# Patient Record
Sex: FEMALE | Race: WHITE | Employment: FULL TIME | URBAN - METROPOLITAN AREA
[De-identification: names, ages, dates, MRNs, and addresses within clinical notes are randomized per-mention and may not be internally consistent; named-entity substitution may affect disease eponyms.]

---

## 2015-01-19 LAB
LEFT VENTRICULAR EJECTION FRACTION MODE: NORMAL
LV EF: 63 %

## 2017-04-18 PROBLEM — M99.01 SOMATIC DYSFUNCTION OF CERVICAL REGION: Status: ACTIVE | Noted: 2017-04-18

## 2017-04-18 PROBLEM — R29.898 WEAKNESS OF BOTH HIPS: Status: ACTIVE | Noted: 2017-04-18

## 2017-04-18 PROBLEM — M99.03 SOMATIC DYSFUNCTION OF LUMBAR REGION: Status: ACTIVE | Noted: 2017-04-18

## 2018-01-11 PROBLEM — N20.0 RENAL STONES: Status: ACTIVE | Noted: 2018-01-11

## 2018-01-11 PROBLEM — K22.70 BARRETT'S ESOPHAGUS: Status: ACTIVE | Noted: 2018-01-11

## 2018-03-28 ENCOUNTER — OFFICE VISIT (OUTPATIENT)
Dept: FAMILY MEDICINE CLINIC | Age: 68
End: 2018-03-28
Payer: COMMERCIAL

## 2018-03-28 VITALS
TEMPERATURE: 98.5 F | OXYGEN SATURATION: 98 % | SYSTOLIC BLOOD PRESSURE: 134 MMHG | DIASTOLIC BLOOD PRESSURE: 84 MMHG | BODY MASS INDEX: 28.22 KG/M2 | HEART RATE: 85 BPM | WEIGHT: 140 LBS | HEIGHT: 59 IN

## 2018-03-28 DIAGNOSIS — K44.9 HIATAL HERNIA: ICD-10-CM

## 2018-03-28 DIAGNOSIS — Z00.00 ROUTINE GENERAL MEDICAL EXAMINATION AT A HEALTH CARE FACILITY: Primary | ICD-10-CM

## 2018-03-28 PROCEDURE — G0438 PPPS, INITIAL VISIT: HCPCS | Performed by: FAMILY MEDICINE

## 2018-03-28 ASSESSMENT — ANXIETY QUESTIONNAIRES: GAD7 TOTAL SCORE: 1

## 2018-03-28 ASSESSMENT — LIFESTYLE VARIABLES: HOW OFTEN DO YOU HAVE A DRINK CONTAINING ALCOHOL: 0

## 2018-03-28 ASSESSMENT — PATIENT HEALTH QUESTIONNAIRE - PHQ9: SUM OF ALL RESPONSES TO PHQ QUESTIONS 1-9: 1

## 2018-07-12 ENCOUNTER — OFFICE VISIT (OUTPATIENT)
Dept: FAMILY MEDICINE CLINIC | Age: 68
End: 2018-07-12
Payer: COMMERCIAL

## 2018-07-12 ENCOUNTER — HOSPITAL ENCOUNTER (OUTPATIENT)
Age: 68
Discharge: HOME | End: 2018-07-14
Payer: COMMERCIAL

## 2018-07-12 VITALS
SYSTOLIC BLOOD PRESSURE: 124 MMHG | BODY MASS INDEX: 28.07 KG/M2 | HEART RATE: 88 BPM | DIASTOLIC BLOOD PRESSURE: 72 MMHG | OXYGEN SATURATION: 96 % | RESPIRATION RATE: 16 BRPM | WEIGHT: 139 LBS

## 2018-07-12 DIAGNOSIS — K21.9 GASTROESOPHAGEAL REFLUX DISEASE WITHOUT ESOPHAGITIS: ICD-10-CM

## 2018-07-12 DIAGNOSIS — K22.70 BARRETT'S ESOPHAGUS WITHOUT DYSPLASIA: ICD-10-CM

## 2018-07-12 DIAGNOSIS — E78.2 MIXED HYPERLIPIDEMIA: ICD-10-CM

## 2018-07-12 DIAGNOSIS — I10 ESSENTIAL HYPERTENSION: Primary | ICD-10-CM

## 2018-07-12 DIAGNOSIS — I10 ESSENTIAL HYPERTENSION: ICD-10-CM

## 2018-07-12 DIAGNOSIS — M99.03 SOMATIC DYSFUNCTION OF LUMBAR REGION: ICD-10-CM

## 2018-07-12 LAB
BASOPHILS ABSOLUTE: 0.04 E9/L (ref 0–0.2)
BASOPHILS RELATIVE PERCENT: 0.8 % (ref 0–2)
EOSINOPHILS ABSOLUTE: 0.11 E9/L (ref 0.05–0.5)
EOSINOPHILS RELATIVE PERCENT: 2.2 % (ref 0–6)
HCT VFR BLD CALC: 45.1 % (ref 34–48)
HEMOGLOBIN: 15.1 G/DL (ref 11.5–15.5)
IMMATURE GRANULOCYTES #: 0.03 E9/L
IMMATURE GRANULOCYTES %: 0.6 % (ref 0–5)
LYMPHOCYTES ABSOLUTE: 1.51 E9/L (ref 1.5–4)
LYMPHOCYTES RELATIVE PERCENT: 30.5 % (ref 20–42)
MCH RBC QN AUTO: 30.1 PG (ref 26–35)
MCHC RBC AUTO-ENTMCNC: 33.5 % (ref 32–34.5)
MCV RBC AUTO: 90 FL (ref 80–99.9)
MONOCYTES ABSOLUTE: 0.51 E9/L (ref 0.1–0.95)
MONOCYTES RELATIVE PERCENT: 10.3 % (ref 2–12)
NEUTROPHILS ABSOLUTE: 2.75 E9/L (ref 1.8–7.3)
NEUTROPHILS RELATIVE PERCENT: 55.6 % (ref 43–80)
PDW BLD-RTO: 13.3 FL (ref 11.5–15)
PLATELET # BLD: 266 E9/L (ref 130–450)
PMV BLD AUTO: 10 FL (ref 7–12)
RBC # BLD: 5.01 E12/L (ref 3.5–5.5)
WBC # BLD: 5 E9/L (ref 4.5–11.5)

## 2018-07-12 PROCEDURE — 99214 OFFICE O/P EST MOD 30 MIN: CPT | Performed by: FAMILY MEDICINE

## 2018-07-12 PROCEDURE — 85025 COMPLETE CBC W/AUTO DIFF WBC: CPT

## 2018-07-12 ASSESSMENT — ENCOUNTER SYMPTOMS
NAUSEA: 0
VOMITING: 0
WHEEZING: 0
DIARRHEA: 0
COUGH: 0
EYE REDNESS: 0
BACK PAIN: 1
ABDOMINAL PAIN: 0
BLOOD IN STOOL: 0
CONSTIPATION: 0
COLOR CHANGE: 0
CHEST TIGHTNESS: 0

## 2018-07-12 NOTE — PROGRESS NOTES
Fili Cross  : 1950    Chief Complaint:     Chief Complaint   Patient presents with    Hypertension     HPI   [facility]. Back better. Doing home exercises routinely now. HTN controlled, taking meds as rx. Denies symptoms high bp including HA, vision changes, CP, SOB, edema, focal neuro deficits. No symptoms low bp. Has HLD. Taking statin. No s/e. Attempting to wacth diet including fatty foods. No CP, dyspnea, chest tightness, exertional symptoms, neuro deficits. Has GERD. Taking protonix daily and zantac PRN. Controlled. No symptoms heartburn, black/bloody stools, cough, weight loss. Seasonal allergies. Not taking anything. Does not want meds. PND and throat clearing. Dr Huong Monge, gyn, current    cscope     Past medical, surgical, family and social histories reviewed and updated today as appropriate    Health Maintenance Due   Topic Date Due    DTaP/Tdap/Td vaccine (1 - Tdap) 1967    Shingles Vaccine (1 of 2 - 2 Dose Series) 1998       Current Outpatient Prescriptions   Medication Sig Dispense Refill    CARTIA  MG extended release capsule TAKE 1 CAPSULE BY MOUTH EVERY DAY 90 capsule 3    albuterol sulfate HFA (PROAIR HFA) 108 (90 Base) MCG/ACT inhaler Inhale 1 puff into the lungs 4 times daily 1 Inhaler 3    atorvastatin (LIPITOR) 10 MG tablet Take 1 tablet by mouth daily 90 tablet 3    naproxen (NAPROSYN) 500 MG tablet TAKE 1 TABLET BY MOUTH TWICE DAILY WITH MEALS 60 tablet 5    tiZANidine (ZANAFLEX) 2 MG tablet TAKE 1 TABLET BY MOUTH EVERY EVENING 30 tablet 1    NONFORMULARY Valerian Root 1000mg two caps qhs      Biotin 1000 MCG TABS Take by mouth daily      DHEA 50 MG CAPS Take by mouth daily      Multiple Vitamin (MULTI-VITAMIN DAILY PO) Take by mouth daily      ranitidine (ZANTAC) 300 MG tablet   Take 300 mg by mouth nightly       aspirin EC 81 MG EC tablet Take 81 mg by mouth daily.         Flaxseed, Linseed, (FLAXSEED OIL PO) cervical adenopathy. Neurological: She is alert and oriented to person, place, and time. Skin: Skin is warm and dry. No rash noted. Psychiatric: She has a normal mood and affect. Her behavior is normal. Judgment and thought content normal.   Vitals reviewed. The 10-year ASCVD risk score (Kaity Jay, et al., 2013) is: 9.4%    Values used to calculate the score:      Age: 77 years      Sex: Female      Is Non- : No      Diabetic: No      Tobacco smoker: No      Systolic Blood Pressure: 727 mmHg      Is BP treated: No      HDL Cholesterol: 49 mg/dL      Total Cholesterol: 220 mg/dL     ASSESSMENT/PLAN:     Diagnosis Orders   1. Essential hypertension  CBC Auto Differential    Comprehensive Metabolic Panel   2. Mixed hyperlipidemia  Lipid Panel   3. Gastroesophageal reflux disease without esophagitis     4. Somatic dysfunction of lumbar region     5. Aden's esophagus without dysplasia       Above diagnoses stable, continue current medication regimen   Labs  The patient is asked to make an attempt to improve diet and exercise patterns to aid in medical management of this problem. Problem list reviewed and simplified/updated  HM reviewed today and counseled as appropriate    Call or go to ED immediately if symptoms worsen or persist.  Return in about 6 months (around 1/12/2019). Sooner if necessary. Counseled regarding above diagnosis, including possible risks and complications,  especially if left uncontrolled. Counseled regarding the possible side effects, risks, benefits and alternatives to treatment; patient and/or guardian verbalizes understanding. Advised patient to call with any new medication issues. All questions answered.     Eden Wong MD  7/12/18

## 2018-07-16 ENCOUNTER — TELEPHONE (OUTPATIENT)
Dept: FAMILY MEDICINE CLINIC | Age: 68
End: 2018-07-16

## 2018-07-16 NOTE — TELEPHONE ENCOUNTER
Lab called states pt blood work needs redrawn due to not being spun.  Called pt left vm to call office to schedule to have labs done or faxed to lab of choice

## 2018-07-18 ENCOUNTER — TELEPHONE (OUTPATIENT)
Dept: FAMILY MEDICINE CLINIC | Age: 68
End: 2018-07-18

## 2018-07-26 ENCOUNTER — TELEPHONE (OUTPATIENT)
Dept: SURGERY | Age: 68
End: 2018-07-26

## 2018-07-28 ENCOUNTER — HOSPITAL ENCOUNTER (OUTPATIENT)
Age: 68
Discharge: HOME | End: 2018-07-28
Payer: COMMERCIAL

## 2018-07-28 DIAGNOSIS — E78.2 MIXED HYPERLIPIDEMIA: ICD-10-CM

## 2018-07-28 DIAGNOSIS — I10 ESSENTIAL HYPERTENSION: ICD-10-CM

## 2018-07-28 LAB
ALBUMIN SERPL-MCNC: 4.2 G/DL (ref 3.5–5.2)
ALP BLD-CCNC: 93 U/L (ref 35–104)
ALT SERPL-CCNC: 16 U/L (ref 0–32)
ANION GAP SERPL CALCULATED.3IONS-SCNC: 11 MMOL/L (ref 7–16)
AST SERPL-CCNC: 23 U/L (ref 0–31)
BILIRUB SERPL-MCNC: 0.7 MG/DL (ref 0–1.2)
BUN BLDV-MCNC: 16 MG/DL (ref 8–23)
CALCIUM SERPL-MCNC: 9.5 MG/DL (ref 8.6–10.2)
CHLORIDE BLD-SCNC: 105 MMOL/L (ref 98–107)
CHOLESTEROL, TOTAL: 188 MG/DL (ref 0–199)
CO2: 26 MMOL/L (ref 22–29)
CREAT SERPL-MCNC: 0.7 MG/DL (ref 0.5–1)
GFR AFRICAN AMERICAN: >60
GFR NON-AFRICAN AMERICAN: >60 ML/MIN/1.73
GLUCOSE BLD-MCNC: 94 MG/DL (ref 74–109)
HDLC SERPL-MCNC: 55 MG/DL
LDL CHOLESTEROL CALCULATED: 117 MG/DL (ref 0–99)
POTASSIUM SERPL-SCNC: 4 MMOL/L (ref 3.5–5)
SODIUM BLD-SCNC: 142 MMOL/L (ref 132–146)
TOTAL PROTEIN: 7.7 G/DL (ref 6.4–8.3)
TRIGL SERPL-MCNC: 78 MG/DL (ref 0–149)
VLDLC SERPL CALC-MCNC: 16 MG/DL

## 2018-07-28 PROCEDURE — 80053 COMPREHEN METABOLIC PANEL: CPT

## 2018-07-28 PROCEDURE — 36415 COLL VENOUS BLD VENIPUNCTURE: CPT

## 2018-07-28 PROCEDURE — 80061 LIPID PANEL: CPT

## 2018-08-03 ENCOUNTER — OFFICE VISIT (OUTPATIENT)
Dept: SURGERY | Age: 68
End: 2018-08-03
Payer: COMMERCIAL

## 2018-08-03 VITALS
HEIGHT: 60 IN | BODY MASS INDEX: 27.29 KG/M2 | OXYGEN SATURATION: 98 % | DIASTOLIC BLOOD PRESSURE: 84 MMHG | WEIGHT: 139 LBS | HEART RATE: 80 BPM | SYSTOLIC BLOOD PRESSURE: 122 MMHG

## 2018-08-03 DIAGNOSIS — K44.9 HIATAL HERNIA: Primary | ICD-10-CM

## 2018-08-03 PROCEDURE — 99204 OFFICE O/P NEW MOD 45 MIN: CPT | Performed by: SURGERY

## 2018-08-09 DIAGNOSIS — K44.9 HIATAL HERNIA: Primary | ICD-10-CM

## 2018-08-16 ENCOUNTER — TELEPHONE (OUTPATIENT)
Dept: SURGERY | Age: 68
End: 2018-08-16

## 2018-08-16 NOTE — TELEPHONE ENCOUNTER
Pt called in saying she's having chest congestion and is wondering if it would interfere with her manometry testing tomorrow. Call placed to Endo to ask; no answer. Left voicemail message asking for a return call.     Daniele Marks MA

## 2018-08-17 ENCOUNTER — HOSPITAL ENCOUNTER (OUTPATIENT)
Age: 68
Setting detail: OUTPATIENT SURGERY
Discharge: HOME | End: 2018-08-17
Attending: INTERNAL MEDICINE | Admitting: INTERNAL MEDICINE
Payer: COMMERCIAL

## 2018-08-17 ENCOUNTER — OFFICE VISIT (OUTPATIENT)
Dept: FAMILY MEDICINE CLINIC | Age: 68
End: 2018-08-17
Payer: COMMERCIAL

## 2018-08-17 VITALS
TEMPERATURE: 99.3 F | DIASTOLIC BLOOD PRESSURE: 74 MMHG | HEIGHT: 60 IN | BODY MASS INDEX: 27.29 KG/M2 | OXYGEN SATURATION: 98 % | RESPIRATION RATE: 18 BRPM | WEIGHT: 139 LBS | HEART RATE: 86 BPM | SYSTOLIC BLOOD PRESSURE: 132 MMHG

## 2018-08-17 DIAGNOSIS — M25.561 CHRONIC PAIN OF RIGHT KNEE: Primary | ICD-10-CM

## 2018-08-17 DIAGNOSIS — R05.9 COUGH: ICD-10-CM

## 2018-08-17 DIAGNOSIS — G89.29 CHRONIC PAIN OF RIGHT KNEE: Primary | ICD-10-CM

## 2018-08-17 PROCEDURE — 6370000000 HC RX 637 (ALT 250 FOR IP): Performed by: INTERNAL MEDICINE

## 2018-08-17 PROCEDURE — 90155 HC GASTRIC/DUODENAL MOTILITY &/OR MANOMETRY STUDY: CPT | Performed by: INTERNAL MEDICINE

## 2018-08-17 PROCEDURE — 99213 OFFICE O/P EST LOW 20 MIN: CPT | Performed by: FAMILY MEDICINE

## 2018-08-17 PROCEDURE — 3609015500 HC GASTRIC/DUODENAL MOTILITY &/OR MANOMETRY STUDY: Performed by: INTERNAL MEDICINE

## 2018-08-17 RX ORDER — AZITHROMYCIN 250 MG/1
TABLET, FILM COATED ORAL
Qty: 6 TABLET | Refills: 0 | Status: SHIPPED | OUTPATIENT
Start: 2018-08-17 | End: 2018-10-01

## 2018-08-17 RX ORDER — FLUTICASONE PROPIONATE 50 MCG
1 SPRAY, SUSPENSION (ML) NASAL DAILY
Qty: 1 BOTTLE | Refills: 3 | Status: SHIPPED | OUTPATIENT
Start: 2018-08-17 | End: 2019-09-24 | Stop reason: ALTCHOICE

## 2018-08-17 ASSESSMENT — ENCOUNTER SYMPTOMS
COUGH: 1
WHEEZING: 0
SHORTNESS OF BREATH: 0

## 2018-08-17 NOTE — PROGRESS NOTES
After confirmation of potential allergies, a topical analgesic was used to numb the nares followed by trans-nasal insertion of a high resolution Manometry catheter. Pressure bands of both UES and LES were observed on the contour color. Patient instructed to take a deep breath to verify placement of catheter and diaphragmatic pinch noted on inspiration. Patient was assisted to semi-supine position and catheter was stabilized with tape. Patient encouraged to relax while acclimating to catheter for several minutes. A 30 second baseline pressure was obtained to identify landmarks. A series of wet swallows with 5 cc of room temperature saline were then administered to assess esophageal motility. At the conclusion of the procedure; the catheter was removed.

## 2018-08-17 NOTE — PROGRESS NOTES
[address] [facility] [place]   [phone]   Ruby Clemens MD     Patient: Faina Black  YOB: 1950  Visit Date: 8/17/18    Shari Gordon is a 77y.o. year old female here today for   Chief Complaint   Patient presents with    Cough     x 3 days or more     Congestion    Knee Pain     rt knee x 3 weeks        HPI  Patient is a 77year old with 3 day history of cough. Mild sore throat. Had a procedure down her right nostril today. Cough is productive of yellow/clear sputum. Started with nasal congestion and runny nose but this has resolved. Denies fevers or chills. Has nausea chronically from hiatal hernia. Occasional sinus pain or pressure. Overall improving today. Denies shortness of breath. Has chest tightness when she coughs. Pain in right knee . X 3 weeks. Overall improved. Has been putting muscle rub on it that is helping. Not giving out on her. Pain is on the inside. Denies any injury. Has bothered her off and on for a long time. Review of Systems   Constitutional: Negative for chills and fever. HENT: Negative for congestion. Respiratory: Positive for cough. Negative for shortness of breath and wheezing. Cardiovascular: Negative for chest pain, palpitations and leg swelling. Musculoskeletal: Positive for arthralgias.        Current Outpatient Prescriptions on File Prior to Visit   Medication Sig Dispense Refill    CARTIA  MG extended release capsule TAKE 1 CAPSULE BY MOUTH EVERY DAY 90 capsule 3    atorvastatin (LIPITOR) 10 MG tablet Take 1 tablet by mouth daily 90 tablet 3    NONFORMULARY Valerian Root 1000mg two caps qhs      Biotin 1000 MCG TABS Take 2,000 mcg by mouth daily       DHEA 50 MG CAPS Take by mouth daily      Multiple Vitamin (MULTI-VITAMIN DAILY PO) Take by mouth daily      ranitidine (ZANTAC) 300 MG tablet Take 150 mg by mouth 2 times daily       aspirin EC 81 MG EC tablet Take 81 mg by mouth

## 2018-08-24 ENCOUNTER — TELEPHONE (OUTPATIENT)
Dept: FAMILY MEDICINE CLINIC | Age: 68
End: 2018-08-24

## 2018-08-30 ENCOUNTER — OFFICE VISIT (OUTPATIENT)
Dept: FAMILY MEDICINE CLINIC | Age: 68
End: 2018-08-30
Payer: COMMERCIAL

## 2018-08-30 VITALS
SYSTOLIC BLOOD PRESSURE: 148 MMHG | WEIGHT: 148 LBS | RESPIRATION RATE: 14 BRPM | BODY MASS INDEX: 28.9 KG/M2 | HEART RATE: 85 BPM | OXYGEN SATURATION: 96 % | DIASTOLIC BLOOD PRESSURE: 96 MMHG

## 2018-08-30 DIAGNOSIS — M25.561 CHRONIC PAIN OF RIGHT KNEE: Primary | ICD-10-CM

## 2018-08-30 DIAGNOSIS — Z91.81 AT HIGH RISK FOR FALLS: ICD-10-CM

## 2018-08-30 DIAGNOSIS — G89.29 CHRONIC PAIN OF RIGHT KNEE: Primary | ICD-10-CM

## 2018-08-30 PROCEDURE — 99213 OFFICE O/P EST LOW 20 MIN: CPT | Performed by: FAMILY MEDICINE

## 2018-08-30 RX ORDER — NAPROXEN 500 MG/1
500 TABLET ORAL 2 TIMES DAILY WITH MEALS
Qty: 30 TABLET | Refills: 0 | Status: SHIPPED | OUTPATIENT
Start: 2018-08-30 | End: 2018-10-12

## 2018-09-06 ENCOUNTER — PREP FOR PROCEDURE (OUTPATIENT)
Dept: BARIATRICS/WEIGHT MGMT | Age: 68
End: 2018-09-06

## 2018-09-06 RX ORDER — SODIUM CHLORIDE 0.9 % (FLUSH) 0.9 %
10 SYRINGE (ML) INJECTION EVERY 12 HOURS SCHEDULED
Status: CANCELLED | OUTPATIENT
Start: 2018-09-06 | End: 2019-09-06

## 2018-09-06 RX ORDER — SODIUM CHLORIDE, SODIUM LACTATE, POTASSIUM CHLORIDE, CALCIUM CHLORIDE 600; 310; 30; 20 MG/100ML; MG/100ML; MG/100ML; MG/100ML
INJECTION, SOLUTION INTRAVENOUS CONTINUOUS
Status: CANCELLED | OUTPATIENT
Start: 2018-09-06 | End: 2019-09-06

## 2018-09-06 RX ORDER — SODIUM CHLORIDE 0.9 % (FLUSH) 0.9 %
10 SYRINGE (ML) INJECTION PRN
Status: CANCELLED | OUTPATIENT
Start: 2018-09-06 | End: 2019-09-06

## 2018-09-07 ENCOUNTER — TELEPHONE (OUTPATIENT)
Dept: FAMILY MEDICINE CLINIC | Age: 68
End: 2018-09-07

## 2018-09-07 ENCOUNTER — TELEPHONE (OUTPATIENT)
Dept: SURGERY | Age: 68
End: 2018-09-07

## 2018-09-07 NOTE — TELEPHONE ENCOUNTER
[place] [facility] [place] Germaine Novak and form faxed back (see attached). Pt scheduled for Lap hiatal hernia repair with mesh on 10.4. 18. Called and spoke to Karla Khanna with [facility] ([phone]). No prior auth needed for CPT # C3802159. Reference # [id number]. Info faxed to [place].   2 week post op appt scheduled for 10.18.18 at 8:30 am.    Boris Monge MA

## 2018-09-07 NOTE — TELEPHONE ENCOUNTER
Pt called about swelling in her R ft, ankle. She has been wearing a knee brace X 1wk,. She has not had any pain or injury to this area but it is swollen. She is going to elevate it while at home and check back on Monday. If she has any change she will go to a UC or ED.

## 2018-09-10 ASSESSMENT — ENCOUNTER SYMPTOMS
NAUSEA: 0
EYE REDNESS: 0
WHEEZING: 0
COUGH: 0
VOMITING: 0
COLOR CHANGE: 0
BLOOD IN STOOL: 0
SHORTNESS OF BREATH: 0
DIARRHEA: 0
CONSTIPATION: 0
ABDOMINAL PAIN: 0
BACK PAIN: 0
CHEST TIGHTNESS: 0

## 2018-09-26 ENCOUNTER — TELEPHONE (OUTPATIENT)
Dept: SURGERY | Age: 68
End: 2018-09-26

## 2018-10-02 ENCOUNTER — HOSPITAL ENCOUNTER (OUTPATIENT)
Dept: PREADMISSION TESTING | Age: 68
Discharge: HOME | End: 2018-10-02
Payer: COMMERCIAL

## 2018-10-02 VITALS
RESPIRATION RATE: 16 BRPM | OXYGEN SATURATION: 98 % | BODY MASS INDEX: 28.53 KG/M2 | HEIGHT: 60 IN | WEIGHT: 145.31 LBS | HEART RATE: 88 BPM | SYSTOLIC BLOOD PRESSURE: 152 MMHG | DIASTOLIC BLOOD PRESSURE: 80 MMHG | TEMPERATURE: 98.4 F

## 2018-10-02 DIAGNOSIS — Z01.818 PRE-OP TESTING: Primary | ICD-10-CM

## 2018-10-02 LAB
ANION GAP SERPL CALCULATED.3IONS-SCNC: 10 MMOL/L (ref 7–16)
BUN BLDV-MCNC: 12 MG/DL (ref 8–23)
CALCIUM SERPL-MCNC: 9.3 MG/DL (ref 8.6–10.2)
CHLORIDE BLD-SCNC: 102 MMOL/L (ref 98–107)
CO2: 29 MMOL/L (ref 22–29)
CREAT SERPL-MCNC: 0.6 MG/DL (ref 0.5–1)
EKG ATRIAL RATE: 78 BPM
EKG P AXIS: 26 DEGREES
EKG P-R INTERVAL: 166 MS
EKG Q-T INTERVAL: 436 MS
EKG QRS DURATION: 144 MS
EKG QTC CALCULATION (BAZETT): 497 MS
EKG R AXIS: -19 DEGREES
EKG T AXIS: -7 DEGREES
EKG VENTRICULAR RATE: 78 BPM
GFR AFRICAN AMERICAN: >60
GFR NON-AFRICAN AMERICAN: >60 ML/MIN/1.73
GLUCOSE BLD-MCNC: 113 MG/DL (ref 74–109)
HCT VFR BLD CALC: 43.4 % (ref 34–48)
HEMOGLOBIN: 14.7 G/DL (ref 11.5–15.5)
MCH RBC QN AUTO: 29.9 PG (ref 26–35)
MCHC RBC AUTO-ENTMCNC: 33.9 % (ref 32–34.5)
MCV RBC AUTO: 88.4 FL (ref 80–99.9)
PDW BLD-RTO: 13 FL (ref 11.5–15)
PLATELET # BLD: 244 E9/L (ref 130–450)
PMV BLD AUTO: 9.4 FL (ref 7–12)
POTASSIUM REFLEX MAGNESIUM: 3.8 MMOL/L (ref 3.5–5)
RBC # BLD: 4.91 E12/L (ref 3.5–5.5)
SODIUM BLD-SCNC: 141 MMOL/L (ref 132–146)
WBC # BLD: 5.4 E9/L (ref 4.5–11.5)

## 2018-10-02 PROCEDURE — 85027 COMPLETE CBC AUTOMATED: CPT

## 2018-10-02 PROCEDURE — 36415 COLL VENOUS BLD VENIPUNCTURE: CPT

## 2018-10-02 PROCEDURE — 93005 ELECTROCARDIOGRAM TRACING: CPT | Performed by: ANESTHESIOLOGY

## 2018-10-02 PROCEDURE — 80048 BASIC METABOLIC PNL TOTAL CA: CPT

## 2018-10-03 ENCOUNTER — ANESTHESIA EVENT (OUTPATIENT)
Dept: OPERATING ROOM | Age: 68
End: 2018-10-03
Payer: COMMERCIAL

## 2018-10-03 ENCOUNTER — HOSPITAL ENCOUNTER (OUTPATIENT)
Age: 68
Setting detail: OUTPATIENT SURGERY
Discharge: HOME | End: 2018-10-03
Attending: SURGERY | Admitting: SURGERY
Payer: COMMERCIAL

## 2018-10-03 ENCOUNTER — ANESTHESIA (OUTPATIENT)
Dept: OPERATING ROOM | Age: 68
End: 2018-10-03
Payer: COMMERCIAL

## 2018-10-03 VITALS
DIASTOLIC BLOOD PRESSURE: 70 MMHG | WEIGHT: 144.19 LBS | RESPIRATION RATE: 20 BRPM | TEMPERATURE: 97.8 F | OXYGEN SATURATION: 93 % | HEIGHT: 60 IN | BODY MASS INDEX: 28.31 KG/M2 | HEART RATE: 72 BPM | SYSTOLIC BLOOD PRESSURE: 148 MMHG

## 2018-10-03 VITALS
SYSTOLIC BLOOD PRESSURE: 144 MMHG | TEMPERATURE: 96.1 F | RESPIRATION RATE: 27 BRPM | DIASTOLIC BLOOD PRESSURE: 96 MMHG | OXYGEN SATURATION: 100 %

## 2018-10-03 DIAGNOSIS — G89.18 POST-OP PAIN: Primary | ICD-10-CM

## 2018-10-03 PROCEDURE — 3600000014 HC SURGERY LEVEL 4 ADDTL 15MIN: Performed by: SURGERY

## 2018-10-03 PROCEDURE — 2580000003 HC RX 258

## 2018-10-03 PROCEDURE — 3700000000 HC ANESTHESIA ATTENDED CARE: Performed by: SURGERY

## 2018-10-03 PROCEDURE — 3700000001 HC ADD 15 MINUTES (ANESTHESIA): Performed by: SURGERY

## 2018-10-03 PROCEDURE — 7100000001 HC PACU RECOVERY - ADDTL 15 MIN: Performed by: SURGERY

## 2018-10-03 PROCEDURE — 93010 ELECTROCARDIOGRAM REPORT: CPT | Performed by: INTERNAL MEDICINE

## 2018-10-03 PROCEDURE — C1713 ANCHOR/SCREW BN/BN,TIS/BN: HCPCS | Performed by: SURGERY

## 2018-10-03 PROCEDURE — 2709999900 HC NON-CHARGEABLE SUPPLY: Performed by: SURGERY

## 2018-10-03 PROCEDURE — 7100000010 HC PHASE II RECOVERY - FIRST 15 MIN: Performed by: SURGERY

## 2018-10-03 PROCEDURE — 2500000003 HC RX 250 WO HCPCS

## 2018-10-03 PROCEDURE — 7100000000 HC PACU RECOVERY - FIRST 15 MIN: Performed by: SURGERY

## 2018-10-03 PROCEDURE — 7100000011 HC PHASE II RECOVERY - ADDTL 15 MIN: Performed by: SURGERY

## 2018-10-03 PROCEDURE — 6360000002 HC RX W HCPCS: Performed by: SURGERY

## 2018-10-03 PROCEDURE — 2580000003 HC RX 258: Performed by: SURGERY

## 2018-10-03 PROCEDURE — 6360000002 HC RX W HCPCS

## 2018-10-03 PROCEDURE — 99024 POSTOP FOLLOW-UP VISIT: CPT | Performed by: SURGERY

## 2018-10-03 PROCEDURE — 2500000003 HC RX 250 WO HCPCS: Performed by: SURGERY

## 2018-10-03 PROCEDURE — 43282 LAP PARAESOPH HER RPR W/MESH: CPT | Performed by: SURGERY

## 2018-10-03 PROCEDURE — 3600000004 HC SURGERY LEVEL 4 BASE: Performed by: SURGERY

## 2018-10-03 PROCEDURE — 15734 MUSCLE-SKIN GRAFT TRUNK: CPT | Performed by: SURGERY

## 2018-10-03 DEVICE — MESH SURG W8XL8CM FLAT SHT BIO-A: Type: IMPLANTABLE DEVICE | Site: ESOPHAGUS | Status: FUNCTIONAL

## 2018-10-03 RX ORDER — ROCURONIUM BROMIDE 10 MG/ML
INJECTION, SOLUTION INTRAVENOUS PRN
Status: DISCONTINUED | OUTPATIENT
Start: 2018-10-03 | End: 2018-10-03 | Stop reason: SDUPTHER

## 2018-10-03 RX ORDER — FENTANYL CITRATE 50 UG/ML
INJECTION, SOLUTION INTRAMUSCULAR; INTRAVENOUS PRN
Status: DISCONTINUED | OUTPATIENT
Start: 2018-10-03 | End: 2018-10-03 | Stop reason: SDUPTHER

## 2018-10-03 RX ORDER — IBUPROFEN 800 MG/1
800 TABLET ORAL EVERY 6 HOURS PRN
Qty: 20 TABLET | Refills: 0 | Status: SHIPPED | OUTPATIENT
Start: 2018-10-03 | End: 2019-08-23

## 2018-10-03 RX ORDER — SODIUM CHLORIDE, SODIUM LACTATE, POTASSIUM CHLORIDE, CALCIUM CHLORIDE 600; 310; 30; 20 MG/100ML; MG/100ML; MG/100ML; MG/100ML
INJECTION, SOLUTION INTRAVENOUS CONTINUOUS
Status: DISCONTINUED | OUTPATIENT
Start: 2018-10-03 | End: 2018-10-03 | Stop reason: HOSPADM

## 2018-10-03 RX ORDER — PROPOFOL 10 MG/ML
INJECTION, EMULSION INTRAVENOUS PRN
Status: DISCONTINUED | OUTPATIENT
Start: 2018-10-03 | End: 2018-10-03 | Stop reason: SDUPTHER

## 2018-10-03 RX ORDER — GLYCOPYRROLATE 1 MG/5 ML
SYRINGE (ML) INTRAVENOUS PRN
Status: DISCONTINUED | OUTPATIENT
Start: 2018-10-03 | End: 2018-10-03 | Stop reason: SDUPTHER

## 2018-10-03 RX ORDER — MEPERIDINE HYDROCHLORIDE 25 MG/ML
12.5 INJECTION INTRAMUSCULAR; INTRAVENOUS; SUBCUTANEOUS EVERY 5 MIN PRN
Status: DISCONTINUED | OUTPATIENT
Start: 2018-10-03 | End: 2018-10-03 | Stop reason: HOSPADM

## 2018-10-03 RX ORDER — BUPIVACAINE HYDROCHLORIDE AND EPINEPHRINE 2.5; 5 MG/ML; UG/ML
INJECTION, SOLUTION EPIDURAL; INFILTRATION; INTRACAUDAL; PERINEURAL PRN
Status: DISCONTINUED | OUTPATIENT
Start: 2018-10-03 | End: 2018-10-03 | Stop reason: HOSPADM

## 2018-10-03 RX ORDER — SODIUM CHLORIDE 0.9 % (FLUSH) 0.9 %
10 SYRINGE (ML) INJECTION PRN
Status: DISCONTINUED | OUTPATIENT
Start: 2018-10-03 | End: 2018-10-03 | Stop reason: HOSPADM

## 2018-10-03 RX ORDER — NEOSTIGMINE METHYLSULFATE 0.5 MG/ML
INJECTION, SOLUTION INTRAVENOUS PRN
Status: DISCONTINUED | OUTPATIENT
Start: 2018-10-03 | End: 2018-10-03 | Stop reason: SDUPTHER

## 2018-10-03 RX ORDER — SODIUM CHLORIDE, SODIUM LACTATE, POTASSIUM CHLORIDE, CALCIUM CHLORIDE 600; 310; 30; 20 MG/100ML; MG/100ML; MG/100ML; MG/100ML
INJECTION, SOLUTION INTRAVENOUS CONTINUOUS PRN
Status: DISCONTINUED | OUTPATIENT
Start: 2018-10-03 | End: 2018-10-03 | Stop reason: SDUPTHER

## 2018-10-03 RX ORDER — ONDANSETRON 2 MG/ML
INJECTION INTRAMUSCULAR; INTRAVENOUS PRN
Status: DISCONTINUED | OUTPATIENT
Start: 2018-10-03 | End: 2018-10-03 | Stop reason: SDUPTHER

## 2018-10-03 RX ORDER — OXYCODONE HYDROCHLORIDE AND ACETAMINOPHEN 5; 325 MG/1; MG/1
1 TABLET ORAL EVERY 6 HOURS PRN
Qty: 20 TABLET | Refills: 0 | Status: SHIPPED | OUTPATIENT
Start: 2018-10-03 | End: 2018-10-08

## 2018-10-03 RX ORDER — SODIUM CHLORIDE 0.9 % (FLUSH) 0.9 %
10 SYRINGE (ML) INJECTION EVERY 12 HOURS SCHEDULED
Status: DISCONTINUED | OUTPATIENT
Start: 2018-10-03 | End: 2018-10-03 | Stop reason: HOSPADM

## 2018-10-03 RX ORDER — DEXAMETHASONE SODIUM PHOSPHATE 10 MG/ML
INJECTION INTRAMUSCULAR; INTRAVENOUS PRN
Status: DISCONTINUED | OUTPATIENT
Start: 2018-10-03 | End: 2018-10-03 | Stop reason: SDUPTHER

## 2018-10-03 RX ADMIN — FENTANYL CITRATE 50 MCG: 50 INJECTION, SOLUTION INTRAMUSCULAR; INTRAVENOUS at 08:59

## 2018-10-03 RX ADMIN — Medication 0.6 MG: at 09:49

## 2018-10-03 RX ADMIN — FENTANYL CITRATE 100 MCG: 50 INJECTION, SOLUTION INTRAMUSCULAR; INTRAVENOUS at 08:50

## 2018-10-03 RX ADMIN — SODIUM CHLORIDE, POTASSIUM CHLORIDE, SODIUM LACTATE AND CALCIUM CHLORIDE: 600; 310; 30; 20 INJECTION, SOLUTION INTRAVENOUS at 08:47

## 2018-10-03 RX ADMIN — PROPOFOL 150 MG: 10 INJECTION, EMULSION INTRAVENOUS at 08:50

## 2018-10-03 RX ADMIN — Medication 2 G: at 08:47

## 2018-10-03 RX ADMIN — SODIUM CHLORIDE, POTASSIUM CHLORIDE, SODIUM LACTATE AND CALCIUM CHLORIDE: 600; 310; 30; 20 INJECTION, SOLUTION INTRAVENOUS at 07:55

## 2018-10-03 RX ADMIN — NEOSTIGMINE METHYLSULFATE 3 MG: 0.5 INJECTION INTRAVENOUS at 09:49

## 2018-10-03 RX ADMIN — ONDANSETRON 4 MG: 2 INJECTION INTRAMUSCULAR; INTRAVENOUS at 09:35

## 2018-10-03 RX ADMIN — ROCURONIUM BROMIDE 40 MG: 10 INJECTION, SOLUTION INTRAVENOUS at 08:51

## 2018-10-03 RX ADMIN — DEXAMETHASONE SODIUM PHOSPHATE 4 MG: 10 INJECTION INTRAMUSCULAR; INTRAVENOUS at 09:00

## 2018-10-03 RX ADMIN — LIDOCAINE HYDROCHLORIDE 80 MG: 20 INJECTION, SOLUTION INTRAVENOUS at 08:50

## 2018-10-03 ASSESSMENT — PULMONARY FUNCTION TESTS
PIF_VALUE: 22
PIF_VALUE: 24
PIF_VALUE: 24
PIF_VALUE: 0
PIF_VALUE: 15
PIF_VALUE: 18
PIF_VALUE: 26
PIF_VALUE: 20
PIF_VALUE: 0
PIF_VALUE: 18
PIF_VALUE: 18
PIF_VALUE: 25
PIF_VALUE: 26
PIF_VALUE: 27
PIF_VALUE: 25
PIF_VALUE: 23
PIF_VALUE: 25
PIF_VALUE: 25
PIF_VALUE: 20
PIF_VALUE: 21
PIF_VALUE: 0
PIF_VALUE: 20
PIF_VALUE: 25
PIF_VALUE: 15
PIF_VALUE: 20
PIF_VALUE: 25
PIF_VALUE: 25
PIF_VALUE: 23
PIF_VALUE: 24
PIF_VALUE: 24
PIF_VALUE: 17
PIF_VALUE: 22
PIF_VALUE: 24
PIF_VALUE: 20
PIF_VALUE: 24
PIF_VALUE: 25
PIF_VALUE: 25
PIF_VALUE: 20
PIF_VALUE: 24
PIF_VALUE: 20
PIF_VALUE: 20
PIF_VALUE: 15
PIF_VALUE: 18
PIF_VALUE: 24
PIF_VALUE: 20
PIF_VALUE: 23
PIF_VALUE: 26
PIF_VALUE: 25
PIF_VALUE: 18
PIF_VALUE: 20
PIF_VALUE: 23
PIF_VALUE: 6
PIF_VALUE: 25
PIF_VALUE: 21
PIF_VALUE: 2
PIF_VALUE: 26
PIF_VALUE: 38
PIF_VALUE: 27
PIF_VALUE: 24
PIF_VALUE: 25
PIF_VALUE: 27
PIF_VALUE: 25
PIF_VALUE: 19

## 2018-10-03 ASSESSMENT — PAIN SCALES - GENERAL
PAINLEVEL_OUTOF10: 0
PAINLEVEL_OUTOF10: 1

## 2018-10-03 ASSESSMENT — PAIN DESCRIPTION - ORIENTATION: ORIENTATION: MID

## 2018-10-03 ASSESSMENT — PAIN DESCRIPTION - ONSET: ONSET: GRADUAL

## 2018-10-03 ASSESSMENT — PAIN DESCRIPTION - PAIN TYPE: TYPE: SURGICAL PAIN

## 2018-10-03 ASSESSMENT — PAIN - FUNCTIONAL ASSESSMENT: PAIN_FUNCTIONAL_ASSESSMENT: 0-10

## 2018-10-03 ASSESSMENT — PAIN DESCRIPTION - LOCATION: LOCATION: ABDOMEN

## 2018-10-03 ASSESSMENT — PAIN DESCRIPTION - DESCRIPTORS: DESCRIPTORS: BURNING

## 2018-10-03 NOTE — ANESTHESIA PRE PROCEDURE
Department of Anesthesiology  Preprocedure Note       Name:  Fam Contreras   Age:  77 y.o.  :  1950                                          MRN:  [id number]         Date:  10/3/2018      Surgeon: Jeanne Cordon):  Philip Riggs MD    Procedure: Procedure(s):  LAPAROSCOPIC HIATAL HERNIA REPAIR WITH MESH    Medications prior to admission:   Prior to Admission medications    Medication Sig Start Date End Date Taking? Authorizing Provider   fluticasone (FLONASE) 50 MCG/ACT nasal spray 1 spray by Nasal route daily  Patient taking differently: 1 spray by Nasal route as needed  18  Yes Ambrocio Charles MD   CARTIA  MG extended release capsule TAKE 1 CAPSULE BY MOUTH EVERY DAY 1/15/18  Yes Kerrie Keith MD   atorvastatin (LIPITOR) 10 MG tablet Take 1 tablet by mouth daily 17 Yes Kerrie Keith MD   ranitidine (ZANTAC) 300 MG tablet Take 150 mg by mouth 2 times daily  7/7/15  Yes Historical Provider, MD   aspirin EC 81 MG EC tablet Take 81 mg by mouth daily.      Yes Historical Provider, MD   naproxen (NAPROSYN) 500 MG tablet Take 1 tablet by mouth 2 times daily (with meals)  Patient taking differently: Take 500 mg by mouth 2 times daily (with meals) Last dose 10/1/2018 8/30/18   Marjorie Velazco MD   NONFORMULARY Valerian Root 1000mg two caps qhs    Historical Provider, MD   Biotin 1000 MCG TABS Take 2,000 mcg by mouth daily     Historical Provider, MD   DHEA 50 MG CAPS Take by mouth daily    Historical Provider, MD   Multiple Vitamin (MULTI-VITAMIN DAILY PO) Take by mouth daily    Historical Provider, MD   Flaxseed, Linseed, (FLAXSEED OIL PO)   Take by mouth daily     Historical Provider, MD   FISH OIL Take 1,000 mg by mouth daily Last dose 10/1/2018    Historical Provider, MD   Coenzyme Q10 (COQ10 PO) Take by mouth daily     Historical Provider, MD       Current medications:    Current Facility-Administered Medications   Medication Dose Route Frequency Provider Last Rate Last Dose results found for: PHART, PO2ART, WKH8PFR, [id number], BEART, [id number]     Type & Screen (If Applicable):  No results found for: LABABO, [address]    Anesthesia Evaluation  Patient summary reviewed   history of anesthetic complications: PONV. Airway: Mallampati: II  TM distance: >3 FB   Neck ROM: full  Mouth opening: > = 3 FB Dental:          Pulmonary: breath sounds clear to auscultation                            ROS comment: Former Smoker. Cardiovascular:    (+) hypertension:, valvular problems/murmurs: MR, dysrhythmias: ventricular tachycardia, hyperlipidemia        Rhythm: regular  Rate: normal           Beta Blocker:  Not on Beta Blocker      ROS comment: EKG: Normal Sinus Rhythm 78, left Bundle Branch Block. Neuro/Psych:   (+) neuromuscular disease:,             GI/Hepatic/Renal:   (+) hiatal hernia, GERD:,           Endo/Other:    (+) hypothyroidism: arthritis:., .                  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 Abdominal:           Vascular: negative vascular ROS. Anesthesia Plan      general     ASA 3       Induction: intravenous. BIS  MIPS: Postoperative opioids intended and Prophylactic antiemetics administered. Anesthetic plan and risks discussed with patient. Plan discussed with CRNA.     Attending anesthesiologist reviewed and agrees with Edd Huntley MD   10/3/2018

## 2018-10-08 ENCOUNTER — TELEPHONE (OUTPATIENT)
Dept: SURGERY | Age: 68
End: 2018-10-08

## 2018-10-12 ENCOUNTER — OFFICE VISIT (OUTPATIENT)
Dept: CARDIOLOGY CLINIC | Age: 68
End: 2018-10-12
Payer: COMMERCIAL

## 2018-10-12 VITALS
SYSTOLIC BLOOD PRESSURE: 128 MMHG | HEIGHT: 59 IN | DIASTOLIC BLOOD PRESSURE: 54 MMHG | RESPIRATION RATE: 12 BRPM | BODY MASS INDEX: 27.9 KG/M2 | HEART RATE: 89 BPM | WEIGHT: 138.4 LBS

## 2018-10-12 DIAGNOSIS — I10 ESSENTIAL HYPERTENSION: Primary | ICD-10-CM

## 2018-10-12 DIAGNOSIS — I44.7 LBBB (LEFT BUNDLE BRANCH BLOCK): ICD-10-CM

## 2018-10-12 DIAGNOSIS — E78.2 MIXED HYPERLIPIDEMIA: ICD-10-CM

## 2018-10-12 PROCEDURE — 99213 OFFICE O/P EST LOW 20 MIN: CPT | Performed by: INTERNAL MEDICINE

## 2018-10-12 PROCEDURE — 93000 ELECTROCARDIOGRAM COMPLETE: CPT | Performed by: INTERNAL MEDICINE

## 2018-10-12 RX ORDER — CHLORAL HYDRATE 500 MG
CAPSULE ORAL DAILY
COMMUNITY
End: 2022-06-17

## 2018-10-12 RX ORDER — ASCORBIC ACID 1000 MG
2000 TABLET ORAL EVERY EVENING
COMMUNITY
End: 2022-06-17

## 2018-10-12 RX ORDER — MULTIVIT,IRON,MINERALS/LUTEIN
TABLET ORAL DAILY
COMMUNITY
End: 2022-06-17

## 2018-10-16 NOTE — PROGRESS NOTES
' ans Cardiology  MD Gavin Pretty MD Chet Settler, MD Kinnie Lama. MD Ema Francois   9/82/5145  Edna Goetz MD    Mrs. Sanket Henry was in the outpatient office on 10/12/2018 for follow up of hypertension. This 68-year-old female has a history of hypertension, chronic left bundle branch block and valvular heart disease. Her last echo in 2011 revealed mild MR. She presents to our office today for a yearly visit. She notes no new cardiac complaints. She continues to work and is able to take care of herself daily without any chest pain or shortness of breath. She denies orthopnea, PND or lower extremity edema. She denies any dizziness, presyncope or syncope. Past medical history:   1. No known drug allergies. 2. Never a smoker. 3. Hypertension. 4. Hyperlipidemia. 5. Chronic LBBB. 6. Left heart cath, 11/09/2001.  Normal.  7. Echo, 02/22/2006.  Mild MR.  Otherwise normal.    8. Adenosine MPS, 02/22/2006.  Normal perfusion.  EF 60%.    9. Adenosine MPS, 04/30/2007.  No ischemia.  EF 72%.    10. Echo, 09/12/2011.  Stage I diastolic dysfunction.  Mild MR.  Otherwise normal.    11. Lexiscan MPS, 01/19/2015.  Normal perfusion.  EF 63%.     Review of Systems:  HEENT: negative for acute visual and auditory problems  Constitutional: negative for fever and chills  Respiratory: negative for cough and hemoptysis  Cardiovascular: as per HPI  Gastrointestinal: negative for abdominal pain, diarrhea, nausea and vomiting  Genitourinary: negative for dysuria and hematuria  Derm: negative for rash and skin lesion(s)  Neurological: negative for seizures and tremors  Endocrine: negative for diabetic symptoms including polydipsia and polyuria  Musculoskeletal: negative for CTD  Psychiatric: negative for anxiety and major depression    On exam, she is an elderly, pleasant female in no particular distress. BP: 128/54. P: 89. R: 12. Wt. 138 lbs. BMI: 37.  HEENT, conjunctiva pink. Sclerae anicteric. Mucous membranes moist.  Neck, no JVD could be appreciated. Carotid upstrokes are normal, no bruits. Chest expands normally, no intercostal retractions. Cardiovascular exam, normal S1/S2, regular rate and rhythm. No murmurs, rubs or gallops. Lungs have good air entry bilaterally. No rales, rhonchi or wheezes. Abdomen soft, with good bowel sounds. Extremities are without edema. Distal pulses are normal bilaterally. Skin has no rashes. Neurologically, oriented x 3. Psych, she is pleasant. Back, no tenderness. Muscle tone is normal.     EKG, as per my interpretation, revealed sinus rhythm. Left bundle branch block. No acute ST-T changes. Mrs. Clinton Malik was in the outpatient office for follow up of her hypertension. She notes no cardiac complaints presently. Blood pressure is well controlled.   She does therefore, require no further cardiac work up or change in her medications, which are as follows:   Valerian Root 500 MG CAPS Take 2,000 mg by mouth every evening   NONFORMULARY shaklee organic greens booster   Omega-3 1000 MG CAPS Take by mouth daily   vitamin D (CHOLECALCIFEROL) 1000 UNIT TABS tablet Take 1,000 Units by mouth daily   NONFORMULARY [place] multivitamin w/iron   Multiple Minerals-Vitamins (CALCIUM-MAGNESIUM-ZINC-D3) TABS Take by mouth daily   ibuprofen (ADVIL;MOTRIN) 800 MG tablet Take 1 tablet by mouth every 6 hours as needed for Pain   fluticasone (FLONASE) 50 MCG/ACT nasal spray 1 spray by Nasal route daily   CARTIA  MG extended release capsule TAKE 1 CAPSULE BY MOUTH EVERY DAY   atorvastatin (LIPITOR) 10 MG tablet Take 1 tablet by mouth daily   Biotin 1000 MCG TABS Take 2,000 mcg by mouth daily    DHEA 50 MG CAPS Take by mouth daily   ranitidine (ZANTAC) 300 MG tablet Take 300 mg by mouth 2 times daily    aspirin EC 81 MG EC tablet Take 81 mg by mouth daily.     Flaxseed, Linseed, (FLAXSEED OIL PO) Take 1,300 mg by mouth daily

## 2018-10-18 ENCOUNTER — OFFICE VISIT (OUTPATIENT)
Dept: SURGERY | Age: 68
End: 2018-10-18

## 2018-10-18 VITALS — HEART RATE: 88 BPM | SYSTOLIC BLOOD PRESSURE: 138 MMHG | OXYGEN SATURATION: 97 % | DIASTOLIC BLOOD PRESSURE: 80 MMHG

## 2018-10-18 DIAGNOSIS — K44.9 HIATAL HERNIA: Primary | ICD-10-CM

## 2018-10-18 PROCEDURE — 99024 POSTOP FOLLOW-UP VISIT: CPT | Performed by: SURGERY

## 2019-01-04 DIAGNOSIS — E78.5 HYPERLIPIDEMIA, UNSPECIFIED HYPERLIPIDEMIA TYPE: ICD-10-CM

## 2019-01-04 RX ORDER — ATORVASTATIN CALCIUM 10 MG/1
TABLET, FILM COATED ORAL
Qty: 90 TABLET | Refills: 3 | Status: SHIPPED | OUTPATIENT
Start: 2019-01-04 | End: 2019-10-15 | Stop reason: SDUPTHER

## 2019-01-17 ENCOUNTER — OFFICE VISIT (OUTPATIENT)
Dept: FAMILY MEDICINE CLINIC | Age: 69
End: 2019-01-17
Payer: COMMERCIAL

## 2019-01-17 ENCOUNTER — HOSPITAL ENCOUNTER (OUTPATIENT)
Age: 69
Discharge: HOME | End: 2019-01-19
Payer: COMMERCIAL

## 2019-01-17 VITALS
HEART RATE: 96 BPM | WEIGHT: 141 LBS | BODY MASS INDEX: 28.48 KG/M2 | RESPIRATION RATE: 14 BRPM | SYSTOLIC BLOOD PRESSURE: 122 MMHG | DIASTOLIC BLOOD PRESSURE: 78 MMHG | OXYGEN SATURATION: 97 %

## 2019-01-17 DIAGNOSIS — K21.9 GASTROESOPHAGEAL REFLUX DISEASE WITHOUT ESOPHAGITIS: ICD-10-CM

## 2019-01-17 DIAGNOSIS — M99.03 SOMATIC DYSFUNCTION OF LUMBAR REGION: ICD-10-CM

## 2019-01-17 DIAGNOSIS — I10 ESSENTIAL HYPERTENSION: ICD-10-CM

## 2019-01-17 DIAGNOSIS — E04.9 ENLARGED THYROID: ICD-10-CM

## 2019-01-17 DIAGNOSIS — E55.9 VITAMIN D DEFICIENCY: ICD-10-CM

## 2019-01-17 DIAGNOSIS — E78.2 MIXED HYPERLIPIDEMIA: ICD-10-CM

## 2019-01-17 DIAGNOSIS — I10 ESSENTIAL HYPERTENSION: Primary | ICD-10-CM

## 2019-01-17 DIAGNOSIS — K22.70 BARRETT'S ESOPHAGUS WITHOUT DYSPLASIA: ICD-10-CM

## 2019-01-17 LAB
ALBUMIN SERPL-MCNC: 4.3 G/DL (ref 3.5–5.2)
ALP BLD-CCNC: 97 U/L (ref 35–104)
ALT SERPL-CCNC: 20 U/L (ref 0–32)
ANION GAP SERPL CALCULATED.3IONS-SCNC: 14 MMOL/L (ref 7–16)
AST SERPL-CCNC: 42 U/L (ref 0–31)
BASOPHILS ABSOLUTE: 0.05 E9/L (ref 0–0.2)
BASOPHILS RELATIVE PERCENT: 0.9 % (ref 0–2)
BILIRUB SERPL-MCNC: 0.7 MG/DL (ref 0–1.2)
BUN BLDV-MCNC: 15 MG/DL (ref 8–23)
CALCIUM SERPL-MCNC: 9.5 MG/DL (ref 8.6–10.2)
CHLORIDE BLD-SCNC: 102 MMOL/L (ref 98–107)
CHOLESTEROL, TOTAL: 222 MG/DL (ref 0–199)
CO2: 25 MMOL/L (ref 22–29)
CREAT SERPL-MCNC: 0.8 MG/DL (ref 0.5–1)
EOSINOPHILS ABSOLUTE: 0.09 E9/L (ref 0.05–0.5)
EOSINOPHILS RELATIVE PERCENT: 1.7 % (ref 0–6)
GFR AFRICAN AMERICAN: >60
GFR NON-AFRICAN AMERICAN: >60 ML/MIN/1.73
GLUCOSE BLD-MCNC: 89 MG/DL (ref 74–99)
HCT VFR BLD CALC: 47.4 % (ref 34–48)
HDLC SERPL-MCNC: 56 MG/DL
HEMOGLOBIN: 15.4 G/DL (ref 11.5–15.5)
IMMATURE GRANULOCYTES #: 0.04 E9/L
IMMATURE GRANULOCYTES %: 0.7 % (ref 0–5)
LDL CHOLESTEROL CALCULATED: 148 MG/DL (ref 0–99)
LYMPHOCYTES ABSOLUTE: 1.55 E9/L (ref 1.5–4)
LYMPHOCYTES RELATIVE PERCENT: 28.5 % (ref 20–42)
MCH RBC QN AUTO: 29.5 PG (ref 26–35)
MCHC RBC AUTO-ENTMCNC: 32.5 % (ref 32–34.5)
MCV RBC AUTO: 90.8 FL (ref 80–99.9)
MONOCYTES ABSOLUTE: 0.62 E9/L (ref 0.1–0.95)
MONOCYTES RELATIVE PERCENT: 11.4 % (ref 2–12)
NEUTROPHILS ABSOLUTE: 3.09 E9/L (ref 1.8–7.3)
NEUTROPHILS RELATIVE PERCENT: 56.8 % (ref 43–80)
PDW BLD-RTO: 13.3 FL (ref 11.5–15)
PLATELET # BLD: 267 E9/L (ref 130–450)
PMV BLD AUTO: 10.3 FL (ref 7–12)
POTASSIUM SERPL-SCNC: 3.7 MMOL/L (ref 3.5–5)
RBC # BLD: 5.22 E12/L (ref 3.5–5.5)
SODIUM BLD-SCNC: 141 MMOL/L (ref 132–146)
TOTAL PROTEIN: 7.5 G/DL (ref 6.4–8.3)
TRIGL SERPL-MCNC: 92 MG/DL (ref 0–149)
TSH SERPL DL<=0.05 MIU/L-ACNC: 2.91 UIU/ML (ref 0.27–4.2)
VITAMIN D 25-HYDROXY: 51 NG/ML (ref 30–100)
VLDLC SERPL CALC-MCNC: 18 MG/DL
WBC # BLD: 5.4 E9/L (ref 4.5–11.5)

## 2019-01-17 PROCEDURE — 84443 ASSAY THYROID STIM HORMONE: CPT

## 2019-01-17 PROCEDURE — 80053 COMPREHEN METABOLIC PANEL: CPT

## 2019-01-17 PROCEDURE — 85025 COMPLETE CBC W/AUTO DIFF WBC: CPT

## 2019-01-17 PROCEDURE — 80061 LIPID PANEL: CPT

## 2019-01-17 PROCEDURE — 82306 VITAMIN D 25 HYDROXY: CPT

## 2019-01-17 PROCEDURE — 99214 OFFICE O/P EST MOD 30 MIN: CPT | Performed by: FAMILY MEDICINE

## 2019-01-17 ASSESSMENT — ENCOUNTER SYMPTOMS
WHEEZING: 0
COLOR CHANGE: 0
NAUSEA: 0
BACK PAIN: 1
DIARRHEA: 0
COUGH: 0
CHEST TIGHTNESS: 0
VOMITING: 0
CONSTIPATION: 0
BLOOD IN STOOL: 0
EYE REDNESS: 0
ABDOMINAL PAIN: 0

## 2019-04-03 ENCOUNTER — OFFICE VISIT (OUTPATIENT)
Dept: FAMILY MEDICINE CLINIC | Age: 69
End: 2019-04-03
Payer: COMMERCIAL

## 2019-04-03 VITALS
WEIGHT: 148 LBS | SYSTOLIC BLOOD PRESSURE: 156 MMHG | DIASTOLIC BLOOD PRESSURE: 94 MMHG | HEIGHT: 60 IN | RESPIRATION RATE: 14 BRPM | BODY MASS INDEX: 29.06 KG/M2 | OXYGEN SATURATION: 96 % | HEART RATE: 76 BPM

## 2019-04-03 DIAGNOSIS — Z00.00 ROUTINE GENERAL MEDICAL EXAMINATION AT A HEALTH CARE FACILITY: Primary | ICD-10-CM

## 2019-04-03 DIAGNOSIS — Z23 NEED FOR SHINGLES VACCINE: ICD-10-CM

## 2019-04-03 PROCEDURE — 90471 IMMUNIZATION ADMIN: CPT | Performed by: FAMILY MEDICINE

## 2019-04-03 PROCEDURE — 90750 HZV VACC RECOMBINANT IM: CPT | Performed by: FAMILY MEDICINE

## 2019-04-03 PROCEDURE — G0439 PPPS, SUBSEQ VISIT: HCPCS | Performed by: FAMILY MEDICINE

## 2019-04-03 ASSESSMENT — PATIENT HEALTH QUESTIONNAIRE - PHQ9
SUM OF ALL RESPONSES TO PHQ QUESTIONS 1-9: 1
SUM OF ALL RESPONSES TO PHQ QUESTIONS 1-9: 1

## 2019-04-03 ASSESSMENT — ANXIETY QUESTIONNAIRES: GAD7 TOTAL SCORE: 2

## 2019-04-03 ASSESSMENT — LIFESTYLE VARIABLES: HOW OFTEN DO YOU HAVE A DRINK CONTAINING ALCOHOL: 0

## 2019-04-03 NOTE — PROGRESS NOTES
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, vulva 1985    HTN (hypertension)     Hyperlipidemia     LBBB (left bundle branch block)     Mitral regurgitation     PONV (postoperative nausea and vomiting)     Tachycardia     Thyroid disease      Past Surgical History:   Procedure Laterality Date    BREAST BIOPSY      benign    COLONOSCOPY      DIAGNOSTIC CARDIAC CATH LAB PROCEDURE  11/09/01    [facility]    ENDOSCOPY, COLON, DIAGNOSTIC      LITHOTRIPSY      ID ESOPHAGEAL MOTILITY STUDY W/INTERP&RPT N/A 8/17/2018    ESOPHAGEAL MOTILITY/MANOMETRY STUDY performed by Merilyn Sandhoff, DO at [address] [place] [address] LAP, REPAIR PARAESOPHAGEAL HERNIA, INCL FUNDOPLASTY W/ MESH N/A 10/3/2018    LAPAROSCOPIC HIATAL HERNIA REPAIR WITH MESH performed by Sangita Tijerina MD at [facility]      UPPER GASTROINTESTINAL ENDOSCOPY  6/17/15    VULVAR/PERINEAL BIOPSY  1985     Family History   Problem Relation Age of Onset    Heart Attack Father     Other Father         bowel disorder    Cancer Other     Diabetes Other     Asthma Son        CareTeam (Including outside providers/suppliers regularly involved in providing care):   Patient Care Team:  Bianca Fitzgerald MD as PCP - General (Family Medicine)  Bianca Fitzgerald MD as PCP - S Attributed Provider  Rohit Juarez MD as Consulting Physician (Cardiology)  Mery Lozada MD as Consulting Physician (Dermatology)  Chelsy Steve MD as Surgeon (Gastroenterology)  Kelley Lassiter MD as Obstetrician (Obstetrics & Gynecology)  Sarabjit Henry DO as Surgeon (Urology)  Sam Horner DPM as Consulting Physician ([facility])  Marycarmen Christensen MD as Consulting Physician (Physical Medicine and Rehab)    Wt Readings from Last 3 Encounters:   04/03/19 148 lb (67.1 kg)   01/17/19 141 lb (64 kg)   10/12/18 138 lb 6.4 oz (62.8 kg)     Vitals:    04/03/19 1015   BP: (!) 156/94   Pulse: 76   Resp: 14   SpO2: 96%   Weight: 148 lb (67.1 kg)   Height: 4' 11.5\" (1.511 m)     Body mass index is 29.39 kg/m². Based upon direct observation of the patient, evaluation of cognition reveals recent and remote memory intact. General Appearance: alert and oriented to person, place and time, well developed and well- nourished, in no acute distress  Neck: supple and non-tender without mass, no thyromegaly or thyroid nodules, no cervical lymphadenopathy  Pulmonary/Chest: clear to auscultation bilaterally- no wheezes, rales or rhonchi, normal air movement, no respiratory distress  Cardiovascular: normal rate, regular rhythm, normal S1 and S2, no murmurs, rubs, clicks, or gallops, distal pulses intact, no carotid bruits  Abdomen: soft, non-tender, non-distended, normal bowel sounds, no masses or organomegaly  Extremities: no cyanosis, clubbing or edema    Patient's complete Health Risk Assessment and screening values have been reviewed and are found in Flowsheets. The following problems were reviewed today and where indicated follow up appointments were made and/or referrals ordered. Positive Risk Factor Screenings with Interventions:     General Health:  General  In general, how would you say your health is?: Good  In the past 7 days, have you experienced any of the following?  New or Increased Pain, New or Increased Fatigue, Loneliness, Social Isolation, Stress or Anger?: (!) Stress  Do you get the social and emotional support that you need?: Yes  Do you have a Living Will?: Yes  General Health Risk Interventions:  · Stress: patient declines any further evaluation/treatment for this issue    Safety:  Safety  Do you have working smoke detectors?: Yes  Have all throw rugs been removed or fastened?: (!) No  Do you have non-slip mats in all bathtubs?: Yes  Do all of your stairways have a railing or banister?: Yes  Are your doorways, halls and stairs free of clutter?: Yes  Do you always fasten your seatbelt when you are in a car?: Yes  Safety Interventions:  · Home safety tips provided    Personalized Preventive Plan   Current Health Maintenance Status  Immunization History   Administered Date(s) Administered    Influenza Vaccine, unspecified formulation 09/27/2016    Influenza Virus Vaccine 10/15/2013, 09/29/2017, 08/30/2018    Influenza, High Dose (Fluzone 65 yrs and older) 10/19/2015    Influenza, Intradermal, Preservative free 11/14/2014    Pneumococcal 13-valent Conjugate ([id number]) 10/30/2015    Pneumococcal Conjugate 7-valent 02/16/2011    Pneumococcal Polysaccharide ([id number]) 06/22/2017    Zoster Live (Zostavax) 07/28/2012        Health Maintenance   Topic Date Due    DTaP/Tdap/Td vaccine (1 - Tdap) 04/18/1967    Shingles Vaccine (2 of 3) 09/22/2012    Breast cancer screen  10/11/2019    Colon cancer screen colonoscopy  01/27/2021    Lipid screen  01/17/2024    Flu vaccine  Completed    Pneumococcal 65+ years Vaccine  Completed    Hepatitis C screen  Completed    DEXA (modify frequency per FRAX score)  Addressed     Recommendations for Preventive Services Due: see orders and patient instructions/AVS.  .   Recommended screening schedule for the next 5-10 years is provided to the patient in written form: see Patient Naresh Caputo MD   4/3/19

## 2019-04-03 NOTE — PATIENT INSTRUCTIONS
Personalized Preventive Plan for Blank Colón - 4/3/2019  Medicare offers a range of preventive health benefits. Some of the tests and screenings are paid in full while other may be subject to a deductible, co-insurance, and/or copay. Some of these benefits include a comprehensive review of your medical history including lifestyle, illnesses that may run in your family, and various assessments and screenings as appropriate. After reviewing your medical record and screening and assessments performed today your provider may have ordered immunizations, labs, imaging, and/or referrals for you. A list of these orders (if applicable) as well as your Preventive Care list are included within your After Visit Summary for your review. Other Preventive Recommendations:    · A preventive eye exam performed by an eye specialist is recommended every 1-2 years to screen for glaucoma; cataracts, macular degeneration, and other eye disorders. · A preventive dental visit is recommended every 6 months. · Try to get at least 150 minutes of exercise per week or 10,000 steps per day on a pedometer . · Order or download the FREE \"Exercise & Physical Activity: Your Everyday Guide\" from [employer]. Call 3-957.942.9849 or search [employer] online. · You need 4408-1552 mg of calcium and 5382-4596 IU of vitamin D per day. It is possible to meet your calcium requirement with diet alone, but a vitamin D supplement is usually necessary to meet this goal.  · When exposed to the sun, use a sunscreen that protects against both UVA and UVB radiation with an SPF of 30 or greater. Reapply every 2 to 3 hours or after sweating, drying off with a towel, or swimming. · Always wear a seat belt when traveling in a car. Always wear a helmet when riding a bicycle or motorcycle. Heart-Healthy Diet   Sodium, Fat, and Cholesterol Controlled Diet       What Is a Heart Healthy Diet?    A heart-healthy diet is one that limits sodium , certain types of fat , and cholesterol . This type of diet is recommended for:   People with any form of cardiovascular disease (eg, coronary heart disease , peripheral vascular disease , previous heart attack , previous stroke )   People with risk factors for cardiovascular disease, such as high blood pressure , high cholesterol , or diabetes   Anyone who wants to lower their risk of developing cardiovascular disease   Sodium    Sodium is a mineral found in many foods. In general, most people consume much more sodium than they need. Diets high in sodium can increase blood pressure and lead to edema (water retention). On a heart-healthy diet, you should consume no more than 2,300 mg (milligrams) of sodium per dayabout the amount in one teaspoon of table salt. The foods highest in sodium include table salt (about 50% sodium), processed foods, convenience foods, and preserved foods. Cholesterol    Cholesterol is a fat-like, waxy substance in your blood. Our bodies make some cholesterol. It is also found in animal products, with the highest amounts in fatty meat, egg yolks, whole milk, cheese, shellfish, and organ meats. On a heart-healthy diet, you should limit your cholesterol intake to less than 200 mg per day. It is normal and important to have some cholesterol in your bloodstream. But too much cholesterol can cause plaque to build up within your arteries, which can eventually lead to a heart attack or stroke. The two types of cholesterol that are most commonly referred to are:   Low-density lipoprotein (LDL) cholesterol  Also known as bad cholesterol, this is the cholesterol that tends to build up along your arteries. Bad cholesterol levels are increased by eating fats that are saturated or hydrogenated. Optimal level of this cholesterol is less than 100. Over 130 starts to get risky for heart disease.    High-density lipoprotein (HDL) cholesterol  Also known as good cholesterol, this type of cholesterol actually carries cholesterol away from your arteries and may, therefore, help lower your risk of having a heart attack. You want this level to be high (ideally greater than 60). It is a risk to have a level less than 40. You can raise this good cholesterol by eating olive oil, canola oil, avocados, or nuts. Exercise raises this level, too. Fat    Fat is calorie dense and packs a lot of calories into a small amount of food. Even though fats should be limited due to their high calorie content, not all fats are bad. In fact, some fats are quite healthful. Fat can be broken down into four main types. The good-for-you fats are:   Monounsaturated fat  found in oils such as olive and canola, avocados, and nuts and natural nut butters; can decrease cholesterol levels, while keeping levels of HDL cholesterol high   Polyunsaturated fat  found in oils such as safflower, sunflower, soybean, corn, and sesame; can decrease total cholesterol and LDL cholesterol   Omega-3 fatty acids  particularly those found in fatty fish (such as salmon, trout, tuna, mackerel, herring, and sardines); can decrease risk of arrhythmias, decrease triglyceride levels, and slightly lower blood pressure   The fats that you want to limit are:   Saturated fat  found in animal products, many fast foods, and a few vegetables; increases total blood cholesterol, including LDL levels   Animal fats that are saturated include: butter, lard, whole-milk dairy products, meat fat, and poultry skin   Vegetable fats that are saturated include: hydrogenated shortening, palm oil, coconut oil, cocoa butter   Hydrogenated or trans fat  found in margarine and vegetable shortening, most shelf stable snack foods, and fried foods; increases LDL and decreases HDL     It is generally recommended that you limit your total fat for the day to less than 30% of your total calories.  If you follow an 1800-calorie heart healthy diet, for example, this would mean 60 grams of fat or less per day. Saturated fat and trans fat in your diet raises your blood cholesterol the most, much more than dietary cholesterol does. For this reason, on a heart-healthy diet, less than 7% of your calories should come from saturated fat and ideally 0% from trans fat. On an 1800-calorie diet, this translates into less than 14 grams of saturated fat per day, leaving 46 grams of fat to come from mono- and polyunsaturated fats.    Food Choices on a Heart Healthy Diet   Food Category   Foods Recommended   Foods to Avoid   Grains   Breads and rolls without salted tops Most dry and cooked cereals Unsalted crackers and breadsticks Low-sodium or homemade breadcrumbs or stuffing All rice and pastas   Breads, rolls, and crackers with salted tops High-fat baked goods (eg, muffins, donuts, pastries) Quick breads, self-rising flour, and biscuit mixes Regular bread crumbs Instant hot cereals Commercially prepared rice, pasta, or stuffing mixes   Vegetables   Most fresh, frozen, and low-sodium canned vegetables Low-sodium and salt-free vegetable juices Canned vegetables if unsalted or rinsed   Regular canned vegetables and juices, including sauerkraut and pickled vegetables Frozen vegetables with sauces Commercially prepared potato and vegetable mixes   Fruits   Most fresh, frozen, and canned fruits All fruit juices   Fruits processed with salt or sodium   Milk   Nonfat or low-fat (1%) milk Nonfat or low-fat yogurt Cottage cheese, low-fat ricotta, cheeses labeled as low-fat and low-sodium   Whole milk Reduced-fat (2%) milk Malted and chocolate milk Full fat yogurt Most cheeses (unless low-fat and low salt) Buttermilk (no more than 1 cup per week)   Meats and Beans   Lean cuts of fresh or frozen beef, veal, lamb, or pork (look for the word loin) Fresh or frozen poultry without the skin Fresh or frozen fish and some shellfish Egg whites and egg substitutes (Limit whole eggs to three per week) Tofu Nuts or seeds (unsalted, dry-roasted), low-sodium peanut butter Dried peas, beans, and lentils   Any smoked, cured, salted, or canned meat, fish, or poultry (including zaragoza, chipped beef, cold cuts, hot dogs, sausages, sardines, and anchovies) Poultry skins Breaded and/or fried fish or meats Canned peas, beans, and lentils Salted nuts   Fats and Oils   Olive oil and canola oil Low-sodium, low-fat salad dressings and mayonnaise   Butter, margarine, coconut and palm oils, zaragoza fat   Snacks, Sweets, and Condiments   Low-sodium or unsalted versions of broths, soups, soy sauce, and condiments Pepper, herbs, and spices; vinegar, lemon, or lime juice Low-fat frozen desserts (yogurt, sherbet, fruit bars) Sugar, cocoa powder, honey, syrup, jam, and preserves Low-fat, trans-fat free cookies, cakes, and pies Zach and animal crackers, fig bars, kaushik snaps   High-fat desserts Broth, soups, gravies, and sauces, made from instant mixes or other high-sodium ingredients Salted snack foods Canned olives Meat tenderizers, seasoning salt, and most flavored vinegars   Beverages   Low-sodium carbonated beverages Tea and coffee in moderation Soy milk   Commercially softened water   Suggestions   Make whole grains, fruits, and vegetables the base of your diet. Choose heart-healthy fats such as canola, olive, and flaxseed oil, and foods high in heart-healthy fats, such as nuts, seeds, soybeans, tofu, and fish. Eat fish at least twice per week; the fish highest in omega-3 fatty acids and lowest in mercury include salmon, herring, mackerel, sardines, and canned chunk light tuna. If you eat fish less than twice per week or have high triglycerides, talk to your doctor about taking fish oil supplements. Read food labels.    For products low in fat and cholesterol, look for fat free, low-fat, cholesterol free, saturated fat free, and trans fat freeAlso scan the Nutrition Facts Label, which lists saturated fat, trans fat, and cholesterol amounts. For products low in sodium, look for sodium free, very low sodium, low sodium, no added salt, and unsalted   Skip the salt when cooking or at the table; if food needs more flavor, get creative and try out different herbs and spices. Garlic and onion also add substantial flavor to foods. Trim any visible fat off meat and poultry before cooking, and drain the fat off after manzano. Use cooking methods that require little or no added fat, such as grilling, boiling, baking, poaching, broiling, roasting, steaming, stir-frying, and sauting. Avoid fast food and convenience food. They tend to be high in saturated and trans fat and have a lot of added salt. Talk to a registered dietitian for individualized diet advice. Last Reviewed: March 2011 Nathaniel Dean MS, MPH, RD   Updated: 3/29/2011   ·     Keep Your Memory Aloma Brandin       Many factors can affect your ability to remembera hectic lifestyle, aging, stress, chronic disease, and certain medicines. But, there are steps you can take to sharpen your mind and help preserve your memory. Challenge Your Brain   Regularly challenging your mind may help keeps it in top shape. Good mental exercises include:   Crossword puzzlesUse a dictionary if you need it; you will learn more that way. Brainteasers Try some! Crafts, such as wood working and sewing   Hobbies, such as gardening and building model airplanes   SocializingVisit old friends or join groups to meet new ones. Reading   Learning a new language   Taking a class, whether it be art history or yuli chi   TravelingExperience the food, history, and culture of your destination   Learning to use a computer   Going to museums, the theater, or thought-provoking movies   Changing things in your daily life, such as reversing your pattern in the grocery store or brushing your teeth using your nondominant hand   Use Memory Aids   There is no need to remember every detail on your own.  These memory aids can help:   Calendars and day planners   Electronic organizers to store all sorts of helpful informationThese devices can \"beep\" to remind you of appointments. A book of days to record birthdays, anniversaries, and other occasions that occur on the same date every year   Detailed \"to-do\" lists and strategically placed sticky notes   Quick \"study\" sessionsBefore a gathering, review who will be there so their names will be fresh in your mind. Establish routinesFor example, keep your keys, wallet, and umbrella in the same place all the time or take medicine with your 8:00 AM glass of juice   Live a Healthy Life   Many actions that will keep your body strong will do the same for your mind. For example:   Talk to Your Doctor About Herbs and Supplements    Malnutrition and vitamin deficiencies can impair your mental function. For example, vitamin B12 deficiency can cause a range of symptoms, including confusion. But, what if your nutritional needs are being met? Can herbs and supplements still offer a benefit? Researchers have investigated a range of natural remedies, such as ginkgo , ginseng , and the supplement phosphatidylserine (PS). So far, though, the evidence is inconsistent as to whether these products can improve memory or thinking. If you are interested in taking herbs and supplements, talk to your doctor first because they may interact with other medicines that you are taking. Exercise Regularly    Among the many benefits of regular exercise are increased blood flow to the brain and decreased risk of certain diseases that can interfere with memory function. One study found that even moderate exercise has a beneficial effect. Examples of \"moderate\" exercise include:   Playing 18 holes of golf once a week, without a cart   Playing tennis twice a week   Walking one mile per day   Manage Stress    It can be tough to remember what is important when your mind is cluttered.  Make time for relaxation. Choose activities that calm you down, and make it routine. Manage Chronic Conditions    Side effects of high blood pressure , diabetes, and heart disease can interfere with mental function. Many of the lifestyle steps discussed here can help manage these conditions. Strive to eat a healthy diet, exercise regularly, get stress under control, and follow your doctor's advice for your condition. Minimize Medications    Talk to your doctor about the medicines that you take. Some may be unnecessary. Also, healthy lifestyle habits may lower the need for certain drugs. Last Reviewed: April 2010 Hawk Delgadillo MD   Updated: 4/13/2010   ·     [address]       As we get older, changes in balance, gait, strength, vision, hearing, and cognition make even the most youthful senior more prone to accidents. Falls are one of the leading health risks for older people. This increased risk of falling is related to:   Aging process (eg, decreased muscle strength, slowed reflexes)   Higher incidence of chronic health problems (eg, arthritis, diabetes) that may limit mobility, agility or sensory awareness   Side effects of medicine (eg, dizziness, blurred vision)especially medicines like prescription pain medicines and drugs used to treat mental health conditions   Depending on the brittleness of your bones, the consequences of a fall can be serious and long lasting. Home Life   Research by the Association of A[facility]) shows that some home accidents among older adults can be prevented by making simple lifestyle changes and basic modifications and repairs to the home environment. Here are some lifestyle changes that experts recommend:   Have your hearing and vision checked regularly. Be sure to wear prescription glasses that are right for you. Speak to your doctor or pharmacist about the possible side effects of your medicines. A number of medicines can cause dizziness.    If you have problems with sleep, talk to your doctor. Limit your intake of alcohol. If necessary, use a cane or walker to help maintain your balance. Wear supportive, rubber-soled shoes, even at home. If you live in a region that gets wintry weather, you may want to put special cleats on your shoes to prevent you from slipping on the snow and ice. Exercise regularly to help maintain muscle tone, agility, and balance. Always hold the banister when going up or down stairs. Also, use  bars when getting in or out of the bath or shower, or using the toilet. To avoid dizziness, get up slowly from a lying down position. Sit up first, dangling your legs for a minute or two before rising to a standing position. Overall Home Safety Check   According to the Consumer Product Safety Commision's \"Older Consumer Home Safety Checklist,\" it is important to check for potential hazards in each room. And remember, proper lighting is an essential factor in home safety. If you cannot see clearly, you are more likely to fall. Important questions to ask yourself include:   Are lamp, electric, extension, and telephone cords placed out of the flow of traffic and maintained in good condition? Have frayed cords been replaced? Are all small rugs and runners slip resistant? If not, you can secure them to the floor with a special double-sided carpet tape. Are smoke detectors properly locatedone on every floor of your home and one outside of every sleeping area? Are they in good working order? Are batteries replaced at least once a year? Do you have a well-maintained carbon monoxide detector outside every sleeping are in your home? Does your furniture layout leave plenty of space to maneuver between and around chairs, tables, beds, and sofas? Are hallways, stairs and passages between rooms well lit? Can you reach a lamp without getting out of bed? Are floor surfaces well maintained?  Shag rugs, high-pile carpeting, tile floors, and polished

## 2019-04-05 DIAGNOSIS — I10 ESSENTIAL HYPERTENSION: ICD-10-CM

## 2019-04-05 RX ORDER — DILTIAZEM HYDROCHLORIDE 240 MG/1
CAPSULE, EXTENDED RELEASE ORAL
Qty: 90 CAPSULE | Refills: 3 | Status: SHIPPED | OUTPATIENT
Start: 2019-04-05 | End: 2020-05-12 | Stop reason: SDUPTHER

## 2019-08-23 ENCOUNTER — OFFICE VISIT (OUTPATIENT)
Dept: FAMILY MEDICINE CLINIC | Age: 69
End: 2019-08-23
Payer: COMMERCIAL

## 2019-08-23 VITALS
WEIGHT: 145 LBS | HEART RATE: 88 BPM | BODY MASS INDEX: 28.47 KG/M2 | DIASTOLIC BLOOD PRESSURE: 80 MMHG | RESPIRATION RATE: 20 BRPM | TEMPERATURE: 98.9 F | SYSTOLIC BLOOD PRESSURE: 132 MMHG | HEIGHT: 60 IN

## 2019-08-23 DIAGNOSIS — T14.8XXA MUSCLE STRAIN: Primary | ICD-10-CM

## 2019-08-23 PROCEDURE — 99213 OFFICE O/P EST LOW 20 MIN: CPT | Performed by: FAMILY MEDICINE

## 2019-08-23 RX ORDER — PANTOPRAZOLE SODIUM 40 MG/1
40 TABLET, DELAYED RELEASE ORAL DAILY
COMMUNITY
Start: 2019-08-02 | End: 2020-11-03

## 2019-08-23 RX ORDER — RANITIDINE 300 MG/1
300 TABLET ORAL 2 TIMES DAILY
COMMUNITY
End: 2020-11-03

## 2019-08-23 RX ORDER — RANITIDINE 150 MG/1
150 TABLET ORAL DAILY
COMMUNITY
End: 2019-08-23

## 2019-08-23 RX ORDER — ATORVASTATIN CALCIUM 10 MG/1
10 TABLET, FILM COATED ORAL DAILY
COMMUNITY
End: 2019-08-23

## 2019-08-23 RX ORDER — LIDOCAINE 50 MG/G
OINTMENT TOPICAL
Qty: 1 TUBE | Refills: 0 | Status: SHIPPED | OUTPATIENT
Start: 2019-08-23 | End: 2019-09-24 | Stop reason: ALTCHOICE

## 2019-08-23 NOTE — PROGRESS NOTES
Food insecurity:     Worry: None     Inability: None    Transportation needs:     Medical: None     Non-medical: None   Tobacco Use    Smoking status: Former Smoker     Packs/day: 0.50     Years: 4.00     Pack years: 2.00     Types: Cigarettes     Last attempt to quit: 1969     Years since quittin.6    Smokeless tobacco: Never Used   Substance and Sexual Activity    Alcohol use: Yes     Alcohol/week: 0.0 standard drinks     Comment: rare glass of wine; denies caffeine    Drug use: No    Sexual activity: Never   Lifestyle    Physical activity:     Days per week: None     Minutes per session: None    Stress: None   Relationships    Social connections:     Talks on phone: None     Gets together: None     Attends Jewish service: None     Active member of club or organization: None     Attends meetings of clubs or organizations: None     Relationship status: None    Intimate partner violence:     Fear of current or ex partner: None     Emotionally abused: None     Physically abused: None     Forced sexual activity: None   Other Topics Concern    None   Social History Narrative    None       Family History   Problem Relation Age of Onset    Heart Attack Father     Other Father         bowel disorder    Cancer Other     Diabetes Other     Asthma Son           Current Outpatient Medications   Medication Sig Dispense Refill    ranitidine (ZANTAC) 300 MG tablet Take 300 mg by mouth nightly      lidocaine (XYLOCAINE) 5 % ointment Apply topically as needed.  1 Tube 0    CARTIA  MG extended release capsule TAKE ONE CAPSULE BY MOUTH ONCE DAILY 90 capsule 3    atorvastatin (LIPITOR) 10 MG tablet TAKE ONE TABLET BY MOUTH ONCE DAILY 90 tablet 3    Valerian Root 500 MG CAPS Take 2,000 mg by mouth every evening      NONFORMULARY shaklee organic greens booster      Omega-3 1000 MG CAPS Take by mouth daily      vitamin D (CHOLECALCIFEROL) 1000 UNIT TABS tablet Take 1,000 Units by mouth daily

## 2019-08-27 ASSESSMENT — ENCOUNTER SYMPTOMS
SHORTNESS OF BREATH: 0
ABDOMINAL PAIN: 0
COUGH: 0
BACK PAIN: 0
EYE PAIN: 0
SORE THROAT: 0
SINUS PRESSURE: 0
CONSTIPATION: 0
DIARRHEA: 0

## 2019-09-24 ENCOUNTER — HOSPITAL ENCOUNTER (OUTPATIENT)
Age: 69
Discharge: HOME | End: 2019-09-26
Payer: COMMERCIAL

## 2019-09-24 ENCOUNTER — OFFICE VISIT (OUTPATIENT)
Dept: FAMILY MEDICINE CLINIC | Age: 69
End: 2019-09-24
Payer: COMMERCIAL

## 2019-09-24 VITALS
DIASTOLIC BLOOD PRESSURE: 80 MMHG | TEMPERATURE: 98.4 F | SYSTOLIC BLOOD PRESSURE: 126 MMHG | WEIGHT: 147.4 LBS | BODY MASS INDEX: 28.94 KG/M2 | RESPIRATION RATE: 18 BRPM | HEART RATE: 86 BPM | HEIGHT: 60 IN | OXYGEN SATURATION: 98 %

## 2019-09-24 DIAGNOSIS — R31.0 GROSS HEMATURIA: ICD-10-CM

## 2019-09-24 DIAGNOSIS — N30.91 HEMORRHAGIC CYSTITIS: ICD-10-CM

## 2019-09-24 DIAGNOSIS — R30.0 DYSURIA: ICD-10-CM

## 2019-09-24 DIAGNOSIS — R30.0 DYSURIA: Primary | ICD-10-CM

## 2019-09-24 LAB
BILIRUBIN, POC: NEGATIVE
BLOOD URINE, POC: NORMAL
CLARITY, POC: NORMAL
COLOR, POC: NORMAL
GLUCOSE URINE, POC: NEGATIVE
KETONES, POC: NEGATIVE
LEUKOCYTE EST, POC: NORMAL
NITRITE, POC: NEGATIVE
PH, POC: 7
PROTEIN, POC: 100
SPECIFIC GRAVITY, POC: 1.01
UROBILINOGEN, POC: 0.2

## 2019-09-24 PROCEDURE — 87088 URINE BACTERIA CULTURE: CPT

## 2019-09-24 PROCEDURE — 81002 URINALYSIS NONAUTO W/O SCOPE: CPT | Performed by: PHYSICIAN ASSISTANT

## 2019-09-24 PROCEDURE — 99214 OFFICE O/P EST MOD 30 MIN: CPT | Performed by: PHYSICIAN ASSISTANT

## 2019-09-24 RX ORDER — CEFDINIR 300 MG/1
300 CAPSULE ORAL 2 TIMES DAILY
Qty: 14 CAPSULE | Refills: 0 | Status: SHIPPED | OUTPATIENT
Start: 2019-09-24 | End: 2019-10-01

## 2019-09-24 NOTE — PROGRESS NOTES
Allergies    Social History:     Social History     Tobacco Use    Smoking status: Former Smoker     Packs/day: 0.50     Years: 4.00     Pack years: 2.00     Types: Cigarettes     Last attempt to quit: 1969     Years since quittin.7    Smokeless tobacco: Never Used   Substance Use Topics    Alcohol use: Yes     Alcohol/week: 0.0 standard drinks     Comment: rare glass of wine; denies caffeine    Drug use: No       Patient lives at home. Physical Exam:     Vitals:    19 0940   BP: 126/80   Pulse: 86   Resp: 18   Temp: 98.4 °F (36.9 °C)   SpO2: 98%   Weight: 147 lb 6.4 oz (66.9 kg)   Height: 4' 11.5\" (1.511 m)       Exam:  Physical Exam  Nurses note and vital signs reviewed and patient is not hypoxic. General: The patient appears well and in no apparent distress. Patient is resting comfortably on cart. Skin: Warm, dry, no pallor noted. There is no rash noted. Head: Normocephalic, atraumatic. Eye: Normal conjunctiva  Ears, Nose, Mouth, and Throat: Oral mucosa is moist  Cardiovascular: Regular Rate and Rhythm  Respiratory: Patient is in no distress, no accessory muscle use, lungs are clear to auscultation, no wheezing, crackles or rhonchi  Back: Non-tender, no CVA tenderness bilaterally to percussion. GI: Normal bowel sounds, no tenderness to palpation, no masses appreciated. No rebound, guarding, or rigidity noted.   Musculoskeletal: The patient has no evidence of calf tenderness, no pitting edema, symmetrical pulses noted bilaterally  Neurological: A&O x4, normal speech        Testing:     Results for orders placed or performed in visit on 19   POCT Urinalysis no Micro   Result Value Ref Range    Color, UA orange     Clarity, UA cloudy     Glucose, UA POC Negative     Bilirubin, UA Negative     Ketones, UA Negative     Spec Grav, UA 1.015     Blood, UA POC Large     pH, UA 7.0     Protein, UA      Urobilinogen, UA 0.2     Leukocytes, UA Trace     Nitrite, UA Negative      Us patient is to call for any concerns or return if any of the signs or symptoms worsen. The patient is to follow-up with PCP in the next 2-3 days for repeat evaluation repeat assessment or go directly to the emergency department.      SIGNATURE: Bill Mathew III, PA-C

## 2019-09-26 LAB — URINE CULTURE, ROUTINE: NORMAL

## 2019-10-02 ENCOUNTER — OFFICE VISIT (OUTPATIENT)
Dept: FAMILY MEDICINE CLINIC | Age: 69
End: 2019-10-02
Payer: COMMERCIAL

## 2019-10-02 VITALS
DIASTOLIC BLOOD PRESSURE: 72 MMHG | RESPIRATION RATE: 16 BRPM | WEIGHT: 145 LBS | OXYGEN SATURATION: 98 % | HEART RATE: 84 BPM | SYSTOLIC BLOOD PRESSURE: 126 MMHG | BODY MASS INDEX: 28.8 KG/M2

## 2019-10-02 DIAGNOSIS — R31.0 GROSS HEMATURIA: Primary | ICD-10-CM

## 2019-10-02 DIAGNOSIS — Z23 NEEDS FLU SHOT: ICD-10-CM

## 2019-10-02 DIAGNOSIS — N20.0 BILATERAL KIDNEY STONES: ICD-10-CM

## 2019-10-02 PROCEDURE — G0008 ADMIN INFLUENZA VIRUS VAC: HCPCS | Performed by: FAMILY MEDICINE

## 2019-10-02 PROCEDURE — 90653 IIV ADJUVANT VACCINE IM: CPT | Performed by: FAMILY MEDICINE

## 2019-10-02 PROCEDURE — 99213 OFFICE O/P EST LOW 20 MIN: CPT | Performed by: FAMILY MEDICINE

## 2019-10-15 DIAGNOSIS — E78.5 HYPERLIPIDEMIA, UNSPECIFIED HYPERLIPIDEMIA TYPE: ICD-10-CM

## 2019-10-15 RX ORDER — ATORVASTATIN CALCIUM 10 MG/1
TABLET, FILM COATED ORAL
Qty: 90 TABLET | Refills: 3 | Status: SHIPPED | OUTPATIENT
Start: 2019-10-15 | End: 2019-10-16 | Stop reason: SDUPTHER

## 2019-10-16 DIAGNOSIS — E78.5 HYPERLIPIDEMIA, UNSPECIFIED HYPERLIPIDEMIA TYPE: ICD-10-CM

## 2019-10-16 RX ORDER — ATORVASTATIN CALCIUM 10 MG/1
TABLET, FILM COATED ORAL
Qty: 90 TABLET | Refills: 3 | Status: SHIPPED
Start: 2019-10-16 | End: 2020-10-22

## 2019-10-18 ASSESSMENT — ENCOUNTER SYMPTOMS
SHORTNESS OF BREATH: 0
NAUSEA: 0
BACK PAIN: 0
BLOOD IN STOOL: 0
WHEEZING: 0
CHEST TIGHTNESS: 0
VOMITING: 0
DIARRHEA: 0
CONSTIPATION: 0
COUGH: 0
ABDOMINAL PAIN: 0

## 2019-10-29 ENCOUNTER — TELEPHONE (OUTPATIENT)
Dept: FAMILY MEDICINE CLINIC | Age: 69
End: 2019-10-29

## 2019-11-13 ENCOUNTER — HOSPITAL ENCOUNTER (OUTPATIENT)
Age: 69
Discharge: HOME | End: 2019-11-15
Payer: COMMERCIAL

## 2019-11-13 PROCEDURE — 88112 CYTOPATH CELL ENHANCE TECH: CPT

## 2019-11-20 ENCOUNTER — HOSPITAL ENCOUNTER (OUTPATIENT)
Dept: GENERAL RADIOLOGY | Age: 69
Discharge: HOME | End: 2019-11-22
Payer: COMMERCIAL

## 2019-11-20 ENCOUNTER — HOSPITAL ENCOUNTER (OUTPATIENT)
Age: 69
Discharge: HOME | End: 2019-11-22
Payer: COMMERCIAL

## 2019-11-20 ENCOUNTER — HOSPITAL ENCOUNTER (OUTPATIENT)
Dept: ULTRASOUND IMAGING | Age: 69
Discharge: HOME | End: 2019-11-22
Payer: COMMERCIAL

## 2019-11-20 DIAGNOSIS — N20.0 CALCULUS OF KIDNEY: ICD-10-CM

## 2019-11-20 PROCEDURE — 74018 RADEX ABDOMEN 1 VIEW: CPT

## 2019-11-20 PROCEDURE — 76770 US EXAM ABDO BACK WALL COMP: CPT

## 2019-12-11 ENCOUNTER — OFFICE VISIT (OUTPATIENT)
Dept: CARDIOLOGY CLINIC | Age: 69
End: 2019-12-11
Payer: COMMERCIAL

## 2019-12-11 VITALS
BODY MASS INDEX: 28.99 KG/M2 | SYSTOLIC BLOOD PRESSURE: 128 MMHG | HEART RATE: 75 BPM | HEIGHT: 59 IN | DIASTOLIC BLOOD PRESSURE: 80 MMHG | RESPIRATION RATE: 16 BRPM | WEIGHT: 143.8 LBS

## 2019-12-11 DIAGNOSIS — I10 ESSENTIAL HYPERTENSION: Primary | ICD-10-CM

## 2019-12-11 DIAGNOSIS — I44.7 LBBB (LEFT BUNDLE BRANCH BLOCK): ICD-10-CM

## 2019-12-11 DIAGNOSIS — I34.0 NONRHEUMATIC MITRAL VALVE REGURGITATION: ICD-10-CM

## 2019-12-11 PROCEDURE — 93000 ELECTROCARDIOGRAM COMPLETE: CPT | Performed by: INTERNAL MEDICINE

## 2019-12-11 PROCEDURE — 99214 OFFICE O/P EST MOD 30 MIN: CPT | Performed by: NURSE PRACTITIONER

## 2019-12-30 ENCOUNTER — OFFICE VISIT (OUTPATIENT)
Dept: FAMILY MEDICINE CLINIC | Age: 69
End: 2019-12-30
Payer: COMMERCIAL

## 2019-12-30 VITALS
DIASTOLIC BLOOD PRESSURE: 88 MMHG | HEART RATE: 90 BPM | HEIGHT: 60 IN | RESPIRATION RATE: 18 BRPM | SYSTOLIC BLOOD PRESSURE: 136 MMHG | OXYGEN SATURATION: 98 % | BODY MASS INDEX: 28.07 KG/M2 | WEIGHT: 143 LBS | TEMPERATURE: 97.8 F

## 2019-12-30 DIAGNOSIS — J01.90 ACUTE NON-RECURRENT SINUSITIS, UNSPECIFIED LOCATION: Primary | ICD-10-CM

## 2019-12-30 DIAGNOSIS — H65.191 ACUTE EFFUSION OF RIGHT EAR: ICD-10-CM

## 2019-12-30 PROCEDURE — 99213 OFFICE O/P EST LOW 20 MIN: CPT | Performed by: PHYSICIAN ASSISTANT

## 2019-12-30 RX ORDER — AMOXICILLIN 875 MG/1
875 TABLET, COATED ORAL 2 TIMES DAILY
Qty: 20 TABLET | Refills: 0 | Status: SHIPPED | OUTPATIENT
Start: 2019-12-30 | End: 2020-01-09

## 2019-12-30 RX ORDER — CHLORPHENIRAMINE MALEATE 4 MG/1
4 TABLET ORAL EVERY 6 HOURS PRN
Qty: 20 TABLET | Refills: 0 | Status: SHIPPED
Start: 2019-12-30 | End: 2020-11-03

## 2019-12-30 RX ORDER — METHYLPREDNISOLONE 4 MG/1
TABLET ORAL
Qty: 1 KIT | Refills: 0 | Status: SHIPPED | OUTPATIENT
Start: 2019-12-30 | End: 2020-01-27

## 2020-01-08 ENCOUNTER — HOSPITAL ENCOUNTER (OUTPATIENT)
Dept: CT IMAGING | Age: 70
Discharge: HOME | End: 2020-01-10
Payer: MEDICARE

## 2020-01-08 PROCEDURE — 74176 CT ABD & PELVIS W/O CONTRAST: CPT

## 2020-01-10 ENCOUNTER — TELEPHONE (OUTPATIENT)
Dept: PRIMARY CARE CLINIC | Age: 70
End: 2020-01-10

## 2020-01-20 ENCOUNTER — PREP FOR PROCEDURE (OUTPATIENT)
Dept: UROLOGY | Age: 70
End: 2020-01-20

## 2020-01-20 RX ORDER — SODIUM CHLORIDE 9 MG/ML
INJECTION, SOLUTION INTRAVENOUS CONTINUOUS
Status: CANCELLED | OUTPATIENT
Start: 2020-01-20

## 2020-01-20 NOTE — H&P
does not smoke anymore. Has not smoked since 10/1/1970. Drinks 1 drink per year. Does not use drugs. Does not drink caffeine. Patient's occupation is/was  at [employer]. Notes: Works, psychologist      REVIEW OF SYSTEMS:     Constitutional:   Patient denies fever, chills, and weight loss. Eyes:   Patient denies cataract(s), dry eyes, and wearing glasses. Ears, Nose, Mouth, Throat:   Patient denies hearing loss and dry mouth. Cardiovascular:   Patient denies chest pains, swollen ankles, and irregular heartbeat. Respiratory:   Patient denies shortness of breath, wheezing, and chronic cough. Gastrointestinal:   Patient denies abdominal pain, nausea/vomiting, and change in bowels. Genitourinary:   Patient denies incontinence, painful urination, blood in urine, nunes cath in place, and s/p cath in place. Musculoskeletal:   Patient denies using cane, using walker, and using wheelchair. Integumentary/Skin:   Patient denies rash, persistent itching, and skin cancer history. Neurological:   Patient denies numbness, tingling, and dizziness. Hematologic/Lymphatic:   Patient denies swollen glands, abnormal bleeding, and history blood transfusion. VITAL SIGNS:       01/10/2020 08:26 AM   Weight 143 lb / 64.86 kg   Height 59 in / 149.86 cm   BMI 28.9 kg/m²     MULTI-SYSTEM PHYSICAL EXAMINATION:     Constitutional: Well-nourished. No physical deformities. Normally developed. Good grooming. Neck: Neck symmetrical, not swollen. Normal tracheal position. Respiratory: No labored breathing, no use of accessory muscles. Cardiovascular: Normal temperature, normal extremity pulses, no swelling, no varicosities. Lymphatic: No enlargement of neck, axillae, groin. Skin: No paleness, no jaundice, no cyanosis. No lesion, no ulcer, no rash. Neurologic / Psychiatric: Oriented to time, oriented to place, oriented to person. No depression, no anxiety, no agitation.    Gastrointestinal: No mass, no tenderness, no rigidity, non obese abdomen. Musculoskeletal: Normal gait and station of head and neck. PAST DATA REVIEWED:   Source Of History:  Patient   X-Ray Review: C.T. Abdomen/Pelvis: Reviewed Report. Discussed With Patient. PROCEDURES:            Urinalysis - Dipstick - 09783  Dipstick Dipstick Cont'd   Specimen: Voided Blood:  Moderate   Appearance: Clear pH: 7.0   Color: Yellow Protein: Neg   Glucose: Neg Urobilinogen: Neg   Bilirubin: Neg Nitrites: Neg   Ketones: Neg Leukocyte Esterase: Neg       ASSESSMENT:       ICD-10 Details   1 :   Calculus of kidney with calculus of ureter - N20.2      PLAN:             Patient Handouts  Provided Patient Education Sheets     Paper Handout Provided PAT             Document  Billing Summary:  Created            Notes:   PATIENT HERE TO REVIEW cat SCAN   This patient's had multiple stones in the past   CAT scan of 1/8/2020 shows moderate hydronephrosis left kidney with a 9 mm obstructing stone proximal left ureter   We talked about the fact that a stent would need to be placed after the procedure   We also talked about the fact that it will most likely need to be done with lithotripsy but may require multiple procedures   Patient verbalized understanding of plan of care and wishes to proceed           SALVADOR Feliz - CNP  1/20/2020

## 2020-01-24 ENCOUNTER — PREP FOR PROCEDURE (OUTPATIENT)
Dept: UROLOGY | Age: 70
End: 2020-01-24

## 2020-01-24 NOTE — H&P
mass, no tenderness, no rigidity, non obese abdomen. Musculoskeletal: Normal gait and station of head and neck. PAST DATA REVIEWED:   Source Of History:  Patient   X-Ray Review: C.T. Abdomen/Pelvis: Reviewed Report. Discussed With Patient. PROCEDURES:            Urinalysis - Dipstick - 81108  Dipstick Dipstick Cont'd   Specimen: Voided Blood:  Moderate   Appearance: Clear pH: 7.0   Color: Yellow Protein: Neg   Glucose: Neg Urobilinogen: Neg   Bilirubin: Neg Nitrites: Neg   Ketones: Neg Leukocyte Esterase: Neg       ASSESSMENT:       ICD-10 Details   1 :   Calculus of kidney with calculus of ureter - N20.2      PLAN:             Patient Handouts  Provided Patient Education Sheets     Paper Handout Provided PAT             Document  Billing Summary:  Created            Notes:   PATIENT HERE TO REVIEW cat SCAN   This patient's had multiple stones in the past   CAT scan of 1/8/2020 shows moderate hydronephrosis left kidney with a 9 mm obstructing stone proximal left ureter   We talked about the fact that a stent would need to be placed after the procedure   We also talked about the fact that it will most likely need to be done with lithotripsy but may require multiple procedures   Patient verbalized understanding of plan of care and wishes to proceed         SALVADOR Ramirez - CNP  1/24/2020

## 2020-01-27 RX ORDER — IBUPROFEN 200 MG
200 TABLET ORAL EVERY 6 HOURS PRN
Status: ON HOLD | COMMUNITY
End: 2021-03-02 | Stop reason: HOSPADM

## 2020-01-30 ENCOUNTER — ANESTHESIA EVENT (OUTPATIENT)
Dept: OPERATING ROOM | Age: 70
End: 2020-01-30
Payer: MEDICARE

## 2020-01-30 ENCOUNTER — ANESTHESIA (OUTPATIENT)
Dept: OPERATING ROOM | Age: 70
End: 2020-01-30
Payer: MEDICARE

## 2020-01-30 ENCOUNTER — HOSPITAL ENCOUNTER (OUTPATIENT)
Age: 70
Setting detail: OUTPATIENT SURGERY
Discharge: HOME | End: 2020-01-30
Attending: UROLOGY | Admitting: UROLOGY
Payer: MEDICARE

## 2020-01-30 VITALS
HEART RATE: 74 BPM | WEIGHT: 140 LBS | SYSTOLIC BLOOD PRESSURE: 117 MMHG | RESPIRATION RATE: 16 BRPM | DIASTOLIC BLOOD PRESSURE: 61 MMHG | HEIGHT: 60 IN | TEMPERATURE: 98 F | OXYGEN SATURATION: 95 % | BODY MASS INDEX: 27.48 KG/M2

## 2020-01-30 VITALS
RESPIRATION RATE: 12 BRPM | DIASTOLIC BLOOD PRESSURE: 66 MMHG | OXYGEN SATURATION: 96 % | SYSTOLIC BLOOD PRESSURE: 119 MMHG

## 2020-01-30 PROCEDURE — C2617 STENT, NON-COR, TEM W/O DEL: HCPCS | Performed by: UROLOGY

## 2020-01-30 PROCEDURE — 6370000000 HC RX 637 (ALT 250 FOR IP): Performed by: ANESTHESIOLOGY

## 2020-01-30 PROCEDURE — 6360000002 HC RX W HCPCS: Performed by: NURSE PRACTITIONER

## 2020-01-30 PROCEDURE — 3700000000 HC ANESTHESIA ATTENDED CARE: Performed by: UROLOGY

## 2020-01-30 PROCEDURE — 2500000003 HC RX 250 WO HCPCS: Performed by: ANESTHESIOLOGIST ASSISTANT

## 2020-01-30 PROCEDURE — 7100000011 HC PHASE II RECOVERY - ADDTL 15 MIN: Performed by: UROLOGY

## 2020-01-30 PROCEDURE — 7100000010 HC PHASE II RECOVERY - FIRST 15 MIN: Performed by: UROLOGY

## 2020-01-30 PROCEDURE — C1758 CATHETER, URETERAL: HCPCS | Performed by: UROLOGY

## 2020-01-30 PROCEDURE — 7100000001 HC PACU RECOVERY - ADDTL 15 MIN: Performed by: UROLOGY

## 2020-01-30 PROCEDURE — 3600000013 HC SURGERY LEVEL 3 ADDTL 15MIN: Performed by: UROLOGY

## 2020-01-30 PROCEDURE — C1769 GUIDE WIRE: HCPCS | Performed by: UROLOGY

## 2020-01-30 PROCEDURE — 2580000003 HC RX 258: Performed by: NURSE PRACTITIONER

## 2020-01-30 PROCEDURE — 3600000003 HC SURGERY LEVEL 3 BASE: Performed by: UROLOGY

## 2020-01-30 PROCEDURE — 7100000000 HC PACU RECOVERY - FIRST 15 MIN: Performed by: UROLOGY

## 2020-01-30 PROCEDURE — 6360000002 HC RX W HCPCS: Performed by: ANESTHESIOLOGIST ASSISTANT

## 2020-01-30 PROCEDURE — 3700000001 HC ADD 15 MINUTES (ANESTHESIA): Performed by: UROLOGY

## 2020-01-30 PROCEDURE — 2709999900 HC NON-CHARGEABLE SUPPLY: Performed by: UROLOGY

## 2020-01-30 PROCEDURE — 87088 URINE BACTERIA CULTURE: CPT

## 2020-01-30 DEVICE — URETERAL STENT
Type: IMPLANTABLE DEVICE | Site: URETER | Status: FUNCTIONAL
Brand: PERCUFLEX™

## 2020-01-30 RX ORDER — SCOLOPAMINE TRANSDERMAL SYSTEM 1 MG/1
1 PATCH, EXTENDED RELEASE TRANSDERMAL ONCE
Status: DISCONTINUED | OUTPATIENT
Start: 2020-01-30 | End: 2020-01-30 | Stop reason: HOSPADM

## 2020-01-30 RX ORDER — SODIUM CHLORIDE 9 MG/ML
INJECTION, SOLUTION INTRAVENOUS CONTINUOUS
Status: DISCONTINUED | OUTPATIENT
Start: 2020-01-30 | End: 2020-01-30 | Stop reason: HOSPADM

## 2020-01-30 RX ORDER — DEXAMETHASONE SODIUM PHOSPHATE 10 MG/ML
INJECTION INTRAMUSCULAR; INTRAVENOUS PRN
Status: DISCONTINUED | OUTPATIENT
Start: 2020-01-30 | End: 2020-01-30 | Stop reason: SDUPTHER

## 2020-01-30 RX ORDER — ONDANSETRON 2 MG/ML
INJECTION INTRAMUSCULAR; INTRAVENOUS PRN
Status: DISCONTINUED | OUTPATIENT
Start: 2020-01-30 | End: 2020-01-30 | Stop reason: SDUPTHER

## 2020-01-30 RX ORDER — LIDOCAINE HYDROCHLORIDE 20 MG/ML
INJECTION, SOLUTION INTRAVENOUS PRN
Status: DISCONTINUED | OUTPATIENT
Start: 2020-01-30 | End: 2020-01-30 | Stop reason: SDUPTHER

## 2020-01-30 RX ORDER — FENTANYL CITRATE 50 UG/ML
INJECTION, SOLUTION INTRAMUSCULAR; INTRAVENOUS PRN
Status: DISCONTINUED | OUTPATIENT
Start: 2020-01-30 | End: 2020-01-30 | Stop reason: SDUPTHER

## 2020-01-30 RX ORDER — PROPOFOL 10 MG/ML
INJECTION, EMULSION INTRAVENOUS PRN
Status: DISCONTINUED | OUTPATIENT
Start: 2020-01-30 | End: 2020-01-30 | Stop reason: SDUPTHER

## 2020-01-30 RX ORDER — SUCCINYLCHOLINE/SOD CL,ISO/PF 200MG/10ML
SYRINGE (ML) INTRAVENOUS PRN
Status: DISCONTINUED | OUTPATIENT
Start: 2020-01-30 | End: 2020-01-30 | Stop reason: SDUPTHER

## 2020-01-30 RX ORDER — PANTOPRAZOLE SODIUM 40 MG/1
40 TABLET, DELAYED RELEASE ORAL ONCE
Status: COMPLETED | OUTPATIENT
Start: 2020-01-30 | End: 2020-01-30

## 2020-01-30 RX ORDER — MIDAZOLAM HYDROCHLORIDE 1 MG/ML
INJECTION INTRAMUSCULAR; INTRAVENOUS PRN
Status: DISCONTINUED | OUTPATIENT
Start: 2020-01-30 | End: 2020-01-30 | Stop reason: SDUPTHER

## 2020-01-30 RX ORDER — PROMETHAZINE HYDROCHLORIDE 25 MG/ML
6.25 INJECTION, SOLUTION INTRAMUSCULAR; INTRAVENOUS
Status: DISCONTINUED | OUTPATIENT
Start: 2020-01-30 | End: 2020-01-30 | Stop reason: HOSPADM

## 2020-01-30 RX ORDER — SODIUM CHLORIDE, SODIUM LACTATE, POTASSIUM CHLORIDE, CALCIUM CHLORIDE 600; 310; 30; 20 MG/100ML; MG/100ML; MG/100ML; MG/100ML
INJECTION, SOLUTION INTRAVENOUS CONTINUOUS
Status: DISCONTINUED | OUTPATIENT
Start: 2020-01-30 | End: 2020-01-30 | Stop reason: HOSPADM

## 2020-01-30 RX ORDER — OXYCODONE HYDROCHLORIDE AND ACETAMINOPHEN 5; 325 MG/1; MG/1
1 TABLET ORAL EVERY 4 HOURS PRN
Status: DISCONTINUED | OUTPATIENT
Start: 2020-01-30 | End: 2020-01-30 | Stop reason: HOSPADM

## 2020-01-30 RX ORDER — CEFAZOLIN SODIUM 2 G/50ML
2 SOLUTION INTRAVENOUS
Status: COMPLETED | OUTPATIENT
Start: 2020-01-30 | End: 2020-01-30

## 2020-01-30 RX ADMIN — MIDAZOLAM 1 MG: 1 INJECTION INTRAMUSCULAR; INTRAVENOUS at 08:42

## 2020-01-30 RX ADMIN — FENTANYL CITRATE 50 MCG: 50 INJECTION, SOLUTION INTRAMUSCULAR; INTRAVENOUS at 08:44

## 2020-01-30 RX ADMIN — FENTANYL CITRATE 25 MCG: 50 INJECTION, SOLUTION INTRAMUSCULAR; INTRAVENOUS at 09:23

## 2020-01-30 RX ADMIN — PROPOFOL 150 MG: 10 INJECTION, EMULSION INTRAVENOUS at 08:44

## 2020-01-30 RX ADMIN — CEFAZOLIN SODIUM 2 G: 2 SOLUTION INTRAVENOUS at 08:47

## 2020-01-30 RX ADMIN — LIDOCAINE HYDROCHLORIDE 100 MG: 20 INJECTION, SOLUTION INTRAVENOUS at 08:44

## 2020-01-30 RX ADMIN — DEXAMETHASONE SODIUM PHOSPHATE 10 MG: 10 INJECTION INTRAMUSCULAR; INTRAVENOUS at 08:50

## 2020-01-30 RX ADMIN — PANTOPRAZOLE SODIUM 40 MG: 40 TABLET, DELAYED RELEASE ORAL at 08:10

## 2020-01-30 RX ADMIN — SODIUM CHLORIDE: 9 INJECTION, SOLUTION INTRAVENOUS at 08:06

## 2020-01-30 RX ADMIN — Medication 140 MG: at 08:45

## 2020-01-30 RX ADMIN — ONDANSETRON HYDROCHLORIDE 4 MG: 2 INJECTION, SOLUTION INTRAMUSCULAR; INTRAVENOUS at 09:34

## 2020-01-30 ASSESSMENT — PULMONARY FUNCTION TESTS
PIF_VALUE: 17
PIF_VALUE: 17
PIF_VALUE: 5
PIF_VALUE: 14
PIF_VALUE: 25
PIF_VALUE: 17
PIF_VALUE: 14
PIF_VALUE: 17
PIF_VALUE: 14
PIF_VALUE: 17
PIF_VALUE: 17
PIF_VALUE: 14
PIF_VALUE: 14
PIF_VALUE: 4
PIF_VALUE: 14
PIF_VALUE: 17
PIF_VALUE: 3
PIF_VALUE: 17
PIF_VALUE: 1
PIF_VALUE: 17
PIF_VALUE: 14
PIF_VALUE: 8
PIF_VALUE: 15
PIF_VALUE: 18
PIF_VALUE: 14
PIF_VALUE: 14
PIF_VALUE: 12
PIF_VALUE: 14
PIF_VALUE: 17
PIF_VALUE: 16
PIF_VALUE: 16
PIF_VALUE: 18
PIF_VALUE: 1
PIF_VALUE: 4
PIF_VALUE: 2
PIF_VALUE: 17
PIF_VALUE: 14
PIF_VALUE: 15
PIF_VALUE: 16
PIF_VALUE: 1
PIF_VALUE: 17
PIF_VALUE: 17
PIF_VALUE: 3
PIF_VALUE: 14
PIF_VALUE: 17
PIF_VALUE: 16
PIF_VALUE: 0
PIF_VALUE: 17
PIF_VALUE: 19
PIF_VALUE: 17
PIF_VALUE: 16
PIF_VALUE: 14
PIF_VALUE: 18
PIF_VALUE: 12
PIF_VALUE: 15
PIF_VALUE: 1
PIF_VALUE: 17
PIF_VALUE: 14
PIF_VALUE: 14
PIF_VALUE: 12
PIF_VALUE: 0

## 2020-01-30 ASSESSMENT — PAIN DESCRIPTION - PAIN TYPE
TYPE: ACUTE PAIN
TYPE: SURGICAL PAIN

## 2020-01-30 ASSESSMENT — PAIN DESCRIPTION - DESCRIPTORS
DESCRIPTORS: ACHING;BURNING
DESCRIPTORS: DISCOMFORT;CONSTANT

## 2020-01-30 ASSESSMENT — PAIN SCALES - GENERAL
PAINLEVEL_OUTOF10: 0
PAINLEVEL_OUTOF10: 3
PAINLEVEL_OUTOF10: 0
PAINLEVEL_OUTOF10: 3
PAINLEVEL_OUTOF10: 0

## 2020-01-30 ASSESSMENT — PAIN DESCRIPTION - ONSET: ONSET: ON-GOING

## 2020-01-30 ASSESSMENT — PAIN DESCRIPTION - LOCATION: LOCATION: ABDOMEN

## 2020-01-30 ASSESSMENT — PAIN DESCRIPTION - FREQUENCY: FREQUENCY: CONTINUOUS

## 2020-01-30 ASSESSMENT — PAIN DESCRIPTION - PROGRESSION: CLINICAL_PROGRESSION: NOT CHANGED

## 2020-01-30 ASSESSMENT — PAIN DESCRIPTION - ORIENTATION: ORIENTATION: LEFT

## 2020-01-30 NOTE — PROGRESS NOTES
ADMITTED INTO PACU, REPORT FROM ANESTHESIA,  MONITORS APPLIED, STRIPTS PRINTED, FAMILY CALLED FOR UPDATE.
Discharge instructions gone over with patient and patiens  all questions answered at this time both verbalize understanding .
Patient admitted to Same Day Surgery. Pre-op instructions given.
if you are to spend the night in the hospital.     PARKING INSTRUCTIONS:   [x] Arrival Time:__0700_________  · [x] Parking lot '\"I\"  is located on [facility] (the corner of [facility] and [facility]). To enter, press the button and the gate will lift. A free token will be provided to exit the lot. One car per patient is allowed to park in this lot. All other cars are to park on [address] [place] [address] either in the parking garage or the handicap lot. [] To reach the [facility] lobby from [address] [place] [address], upon entering the hospital, take elevator B to the 3rd floor. EDUCATION INSTRUCTIONS:      [] Knee or hip replacement booklet & exercise pamphlets given. [] Triston Chase placed in chart. [] Pre-admission Testing educational folder given  [] Incentive Spirometry,coughing & deep breathing exercises reviewed. []Medication information sheet(s)   []Fluoroscopy-Xray used in surgery reviewed with patient. Educational pamphlet placed in chart. []Pain: Post-op pain is normal and to be expected. You will be asked to rate your pain from 0-10(a zero is not acceptable-education is needed). Your post-op pain goal is:  [] Ask your nurse for your pain medication. [] Joint camp offered. [] Joint replacement booklets given. [] Other:___________________________    MEDICATION INSTRUCTIONS:   [x]Bring a complete list of your medications, please write the last time you took the medicine, give this list to the nurse. [x] Take the following medications the morning of surgery with 1-2 ounces of water: SEE MED LIST  [x] Stop herbal supplements and vitamins 5 days before your surgery. [] DO NOT take any diabetic medicine the morning of surgery. Follow instructions for insulin the day before surgery. [] If you are diabetic and your blood sugar is low or you feel symptomatic, you may drink 1-2 ounces of apple juice or take a glucose tablet.   The morning of your procedure, you may call the

## 2020-01-30 NOTE — BRIEF OP NOTE
Brief Postoperative Note    Claus Yu  YOB: 1950  [id number]    Pre-operative Diagnosis: left renal stones    Post-operative Diagnosis: Same    Procedure: cysto stent ESWL left      Anesthesia: General    Surgeons/Assistants: Palma Atkinson      Estimated Blood Loss: less than 50     Complications: None    Specimens: Was Not Obtained    Findings:     Electronically signed by Sunshine Randle MD on 1/30/2020 at 9:17 AM

## 2020-01-30 NOTE — ANESTHESIA POSTPROCEDURE EVALUATION
Department of Anesthesiology  Postprocedure Note    Patient: Terry Reyes  MRN: [id number]  YOB: 1950  Date of evaluation: 1/30/2020  Time:  10:41 AM     Procedure Summary     Date:  01/30/20 Room / Location:  [facility] 12 / [facility]    Anesthesia Start:  6434 Anesthesia Stop:  5358    Procedure:  LEFT ESWL EXTRACORPOREAL SHOCK WAVE LITHOTRIPSY (Left Perineum) Diagnosis:  (URETERAL STONE)    Surgeon:  Kinsey Abad MD Responsible Provider:  Marcella Klein MD    Anesthesia Type:  general ASA Status:  3          Anesthesia Type: general    Sree Phase I: Sree Score: 10    Sree Phase II: Sree Score: 10    Last vitals: Reviewed and per EMR flowsheets.        Anesthesia Post Evaluation    Patient location during evaluation: PACU  Patient participation: complete - patient participated  Level of consciousness: awake and alert  Pain score: 0  Airway patency: patent  Nausea & Vomiting: no vomiting and no nausea  Complications: no  Cardiovascular status: hemodynamically stable  Respiratory status: spontaneous ventilation  Hydration status: stable

## 2020-01-30 NOTE — ANESTHESIA PRE PROCEDURE
Department of Anesthesiology  Preprocedure Note       Name:  Darshan Terrell   Age:  68 y.o.  :  1950                                          MRN:  [id number]         Date:  2020      Surgeon: Shoaib Peterson):  Valerie Moss MD    Procedure: LEFT ESWL EXTRACORPOREAL SHOCK WAVE LITHOTRIPSY (Left )    Medications prior to admission:   Prior to Admission medications    Medication Sig Start Date End Date Taking?  Authorizing Provider   ibuprofen (ADVIL;MOTRIN) 200 MG tablet Take 200 mg by mouth every 6 hours as needed for Pain   Yes Historical Provider, MD   Nutritional Supplements (SOY PROTEIN SHAKE) POWD Take by mouth   Yes Historical Provider, MD   chlorpheniramine (ALLER-CHLOR) 4 MG tablet Take 1 tablet by mouth every 6 hours as needed for Allergies 19   ANATOLY Monte III   atorvastatin (LIPITOR) 10 MG tablet TAKE ONE TABLET BY MOUTH ONCE DAILY  Patient taking differently: nightly TAKE ONE TABLET BY MOUTH ONCE DAILY 10/16/19   Flavio Jesus MD   pantoprazole (PROTONIX) 40 MG tablet 40 mg daily 19   Historical Provider, MD   ranitidine (ZANTAC) 300 MG tablet Take 300 mg by mouth 2 times daily     Historical Provider, MD   CARTIA  MG extended release capsule TAKE ONE CAPSULE BY MOUTH ONCE DAILY 19   Simeon Paige MD   Valerian Root 500 MG CAPS Take 2,000 mg by mouth every evening    Historical Provider, MD   NONFORMULARY shaklee organic greens booster    Historical Provider, MD   [place]-3 1000 MG CAPS Take by mouth daily    Historical Provider, MD   vitamin D (CHOLECALCIFEROL) 1000 UNIT TABS tablet Take 1,000 Units by mouth daily    Historical Provider, MD   NONFORMULARY shaklee multivitamin w/iron    Historical Provider, MD   Multiple Minerals-Vitamins (CALCIUM-MAGNESIUM-ZINC-D3) TABS Take by mouth daily    Historical Provider, MD   Biotin 1000 MCG TABS Take 2,000 mcg by mouth daily     Historical Provider, MD   DHEA 50 MG CAPS Take by mouth daily    Historical Provider, MD REPAIR PARAESOPHAGEAL HERNIA, INCL FUNDOPLASTY W/ MESH N/A 10/3/2018    LAPAROSCOPIC HIATAL HERNIA REPAIR WITH MESH performed by Flavio Lara MD at [facility]. UPPER GASTROINTESTINAL ENDOSCOPY  6/17/15    VULVAR/PERINEAL BIOPSY         Social History:    Social History     Tobacco Use    Smoking status: Former Smoker     Packs/day: 0.50     Years: 4.00     Pack years: 2.00     Types: Cigarettes     Last attempt to quit: 1969     Years since quittin.1    Smokeless tobacco: Never Used   Substance Use Topics    Alcohol use: Yes     Alcohol/week: 0.0 standard drinks     Comment: rare glass of wine; denies caffeine                                Counseling given: Not Answered      Vital Signs (Current):   Vitals:    20 1451 20 0730   BP:  (!) 150/82   Pulse:  93   Resp:  20   Temp:  96.5 °F (35.8 °C)   TempSrc:  Temporal   SpO2:  98%   Weight: 140 lb (63.5 kg) 140 lb (63.5 kg)   Height: 4' 11.5\" (1.511 m) 4' 11.5\" (1.511 m)                                              BP Readings from Last 3 Encounters:   20 (!) 150/82   19 136/88   19 128/80       NPO Status:                                                                                 BMI:   Wt Readings from Last 3 Encounters:   20 140 lb (63.5 kg)   19 143 lb (64.9 kg)   19 143 lb 12.8 oz (65.2 kg)     Body mass index is 27.8 kg/m².     CBC:   Lab Results   Component Value Date    WBC 5.4 2019    RBC 5.22 2019    HGB 15.4 2019    HCT 47.4 2019    MCV 90.8 2019    RDW 13.3 2019     2019       CMP:   Lab Results   Component Value Date     2019    K 3.7 2019    K 3.8 10/02/2018     2019    CO2 25 2019    BUN 15 2019    CREATININE 0.8 2019    GFRAA >60 2019    LABGLOM >60 2019    GLUCOSE 89 2019    GLUCOSE 83 2011    PROT 7.5 2019 CALCIUM 9.5 01/17/2019    BILITOT 0.7 01/17/2019    ALKPHOS 97 01/17/2019    AST 42 01/17/2019    ALT 20 01/17/2019       POC Tests: No results for input(s): POCGLU, POCNA, POCK, POCCL, POCBUN, POCHEMO, POCHCT in the last 72 hours. Coags: No results found for: PROTIME, INR, APTT    HCG (If Applicable): No results found for: PREGTESTUR, PREGSERUM, HCG, HCGQUANT     ABGs: No results found for: PHART, PO2ART, NXQ2KOL, ZWN4OCS, BEART, L3DNCRQC     Type & Screen (If Applicable):  No results found for: [facility]    Anesthesia Evaluation  Patient summary reviewed and Nursing notes reviewed   history of anesthetic complications: PONV. Airway: Mallampati: II        Dental: normal exam         Pulmonary: breath sounds clear to auscultation  (+) pneumonia:                             Cardiovascular:  Exercise tolerance: good (>4 METS),   (+) hypertension:,       ECG reviewed  Rhythm: regular  Rate: normal      Cleared by cardiology           ROS comment: LBBB     Neuro/Psych:   Negative Neuro/Psych ROS              GI/Hepatic/Renal:   (+) hiatal hernia, GERD: well controlled,           Endo/Other:    (+) hypothyroidism::., malignancy/cancer. Abdominal:           Vascular: negative vascular ROS. Anesthesia Plan      general     ASA 3       Induction: intravenous. MIPS: Postoperative opioids intended and Prophylactic antiemetics administered. Anesthetic plan and risks discussed with patient.       Plan discussed with CRNA and surgical team.                  Adrián Miles MD   1/30/2020

## 2020-01-31 NOTE — OP NOTE
[address]                  [address] [facility], [address] [place] [address]                                OPERATIVE REPORT    PATIENT NAME: Fab Burrows                    :        1950  MED REC NO:   [id number]                            ROOM:  ACCOUNT NO:   [de-identified]                           ADMIT DATE: 2020  PROVIDER:     Jos Mullen MD    DATE OF PROCEDURE:  2020    PREOPERATIVE DIAGNOSIS:  Large left upper ureteral obstructing calculus  with additional left calculi. OPERATIONS:  Cystopanendoscopy, stent placement, left shockwave  lithotripsy. SURGEON:  Jos Mullen MD.    ANESTHESIA:  General.    ESTIMATED BLOOD LOSS:  Less than 50 mL. OPERATION REPORT:  With the patient in the lithotomy position under  satisfactory general anesthesia, the genitalia were prepped and draped  in a sterile manner. A 21-Thai panendoscope passed easily into the  bladder. Urine was obtained for culture and sensitivity. Inspection  revealed the trigone to be symmetrical.  The ureteral orifices are  normal configuration and location. An open-ended catheter was passed up  the left ureter, it abutted against a very large stone in the upper  ureter. I attempted to manipulate it with hydraulic fluid and also with  the wire, neither one was successful, I passed the wire around it  placing a 4.8 26-cm double-J stent. The large stone was treated with  3000 shocks with excellent fragmentation. However, it took all of the  shocks to break the large upper ureteral stone. Therefore, the other  two stones in the kidney could not be treated at this time. The patient  tolerated the procedure well, was sent to recovery room in satisfactory  condition.         Melissa Lo MD    D: 2020 9:39:44       T: 2020 9:47:37     [email or web address]  Job#: [id number]     Doc#: [id number]    CC:

## 2020-02-01 LAB — URINE CULTURE, ROUTINE: NORMAL

## 2020-02-06 ENCOUNTER — HOSPITAL ENCOUNTER (OUTPATIENT)
Age: 70
Discharge: HOME | End: 2020-02-08
Payer: MEDICARE

## 2020-02-06 ENCOUNTER — HOSPITAL ENCOUNTER (OUTPATIENT)
Dept: GENERAL RADIOLOGY | Age: 70
Discharge: HOME | End: 2020-02-08
Payer: MEDICARE

## 2020-02-06 PROCEDURE — 74018 RADEX ABDOMEN 1 VIEW: CPT

## 2020-02-07 ENCOUNTER — HOSPITAL ENCOUNTER (OUTPATIENT)
Age: 70
Discharge: HOME | End: 2020-02-09
Payer: MEDICARE

## 2020-02-07 PROCEDURE — 82365 CALCULUS SPECTROSCOPY: CPT

## 2020-02-07 PROCEDURE — 88300 SURGICAL PATH GROSS: CPT

## 2020-02-09 ENCOUNTER — APPOINTMENT (OUTPATIENT)
Dept: GENERAL RADIOLOGY | Age: 70
End: 2020-02-09
Payer: MEDICARE

## 2020-02-09 ENCOUNTER — HOSPITAL ENCOUNTER (EMERGENCY)
Age: 70
Discharge: HOME | End: 2020-02-10
Attending: EMERGENCY MEDICINE
Payer: MEDICARE

## 2020-02-09 PROCEDURE — 87502 INFLUENZA DNA AMP PROBE: CPT

## 2020-02-09 PROCEDURE — 99283 EMERGENCY DEPT VISIT LOW MDM: CPT

## 2020-02-09 PROCEDURE — 71046 X-RAY EXAM CHEST 2 VIEWS: CPT

## 2020-02-10 VITALS
DIASTOLIC BLOOD PRESSURE: 74 MMHG | RESPIRATION RATE: 16 BRPM | TEMPERATURE: 100 F | HEART RATE: 94 BPM | WEIGHT: 140 LBS | BODY MASS INDEX: 27.48 KG/M2 | HEIGHT: 60 IN | OXYGEN SATURATION: 95 % | SYSTOLIC BLOOD PRESSURE: 124 MMHG

## 2020-02-10 LAB
INFLUENZA A BY PCR: NOT DETECTED
INFLUENZA B BY PCR: NOT DETECTED

## 2020-02-10 PROCEDURE — 6370000000 HC RX 637 (ALT 250 FOR IP): Performed by: EMERGENCY MEDICINE

## 2020-02-10 RX ORDER — AMOXICILLIN AND CLAVULANATE POTASSIUM 875; 125 MG/1; MG/1
1 TABLET, FILM COATED ORAL 2 TIMES DAILY
Qty: 19 TABLET | Refills: 0 | Status: SHIPPED | OUTPATIENT
Start: 2020-02-10 | End: 2020-09-01

## 2020-02-10 RX ORDER — AMOXICILLIN AND CLAVULANATE POTASSIUM 875; 125 MG/1; MG/1
1 TABLET, FILM COATED ORAL ONCE
Status: COMPLETED | OUTPATIENT
Start: 2020-02-10 | End: 2020-02-10

## 2020-02-10 RX ADMIN — AMOXICILLIN AND CLAVULANATE POTASSIUM 1 TABLET: 875; 125 TABLET, FILM COATED ORAL at 00:31

## 2020-02-10 NOTE — ED PROVIDER NOTES
HPI:  2/9/20, Time: 11:59 PM         Celi Jose is a 68 y.o. female presenting to the ED for cough after regurgitating gastric acid in the night 2 days ago. Dulce has been coughing quite hard since then. Today had a fever of 100.2. Has history of hiatal hernia s/p repair and is supposed to take acid reducers but she states they don't work. She was advised by her nurse friend to come to the ER to get antibiotics for her aspiration pneumonia. The complaint has been persistent, moderate in severity, and worsened by nothing. Patient denies congestion, chest pain, shortness of breath, edema, headache, visual disturbances, focal paresthesias, focal weakness, abdominal pain, nausea, vomiting, diarrhea, constipation, dysuria, hematuria, trauma, neck or back pain or other complaints. ROS:   Pertinent positives and negatives are stated within HPI, all other systems reviewed and are negative.      --------------------------------------------- PAST HISTORY ---------------------------------------------  Past Medical History:  has a past medical history of Arthritis, Cancer ([address] HTN (hypertension), Hyperlipidemia, LBBB (left bundle branch block), Mitral regurgitation, PONV (postoperative nausea and vomiting), Tachycardia, and Thyroid disease. Past Surgical History:  has a past surgical history that includes Diagnostic Cardiac Cath Lab Procedure (11/09/01); Tonsillectomy; Lithotripsy; Breast biopsy; Skin cancer excision; Upper gastrointestinal endoscopy (6/17/15); vulvar/perineal biopsy (1985); pr esophageal motility study w/interp&rpt (N/A, 8/17/2018); Colonoscopy; Endoscopy, colon, diagnostic; pr lap, repair paraesophageal hernia, incl fundoplasty w/ mesh (N/A, 10/3/2018); and Lithotripsy (Left, 1/30/2020). Social History:  reports that she quit smoking about 51 years ago. Her smoking use included cigarettes. She has a 2.00 pack-year smoking history.  She has never used smokeless tobacco. She reports current alcohol use. She reports that she does not use drugs. Family History: family history includes Asthma in her son; Cancer in an other family member; Diabetes in an other family member; Heart Attack in her father; Other in her father. The patients home medications have been reviewed. Allergies: Patient has no known allergies. ---------------------------------------------------PHYSICAL EXAM--------------------------------------    Constitutional:  Well developed, well nourished, no acute distress, non-toxic appearance   Eyes:  PERRL, conjunctiva normal, EOMI  HENT:  Atraumatic, external ears normal, nose normal, oropharynx moist. Neck- normal range of motion, no nuchal rigidity   Respiratory:  No respiratory distress, normal breath sounds, no rales, scattered wheezing on right, persistent cough   Cardiovascular:  Normal rate, normal rhythm, no murmurs, no gallops, no rubs. Radial and DP pulses 2+ bilaterally. GI:  Soft, nondistended, normal bowel sounds, nontender, no organomegaly, no mass, no rebound, no guarding   Musculoskeletal:  No edema,  no deformities. Back- no tenderness  Integument:  Well hydrated, no rash. Adequate perfusion. Lymphatic:  No cervical lymphadenopathy noted   Neurologic:  Alert & oriented x 3, CN 2-12 normal, normal gait, no focal deficits noted. Psychiatric:  Speech and behavior appropriate       -------------------------------------------------- RESULTS -------------------------------------------------  I have personally reviewed all laboratory and imaging results for this patient. Results are listed below. LABS:  Results for orders placed or performed during the hospital encounter of 02/09/20   Rapid influenza A/B antigens   Result Value Ref Range    Influenza A by PCR Not Detected Not Detected    Influenza B by PCR Not Detected Not Detected       RADIOLOGY:  Interpreted by Radiologist.  XR CHEST STANDARD (2 VW)   Final Result   No acute cardiopulmonary process.

## 2020-02-14 LAB
CALCULI COMPOSITION: NORMAL
MASS: 32 MG
STONE DESCRIPTION: NORMAL
STONE NUMBER: NORMAL
STONE SIZE: NORMAL MM

## 2020-03-23 ENCOUNTER — TELEPHONE (OUTPATIENT)
Dept: FAMILY MEDICINE CLINIC | Age: 70
End: 2020-03-23

## 2020-05-01 ENCOUNTER — TELEPHONE (OUTPATIENT)
Dept: FAMILY MEDICINE CLINIC | Age: 70
End: 2020-05-01

## 2020-05-01 NOTE — TELEPHONE ENCOUNTER
Pt states she has had a sore throat for a few days but no other symptoms , she is asking should she she go get tested for Covid or treat this at home.  Please advise       Contact # [phone] or cell # [phone]

## 2020-05-12 RX ORDER — DILTIAZEM HYDROCHLORIDE 240 MG/1
CAPSULE, COATED, EXTENDED RELEASE ORAL
Qty: 90 CAPSULE | Refills: 3 | OUTPATIENT
Start: 2020-05-12 | End: 2021-06-01

## 2020-06-02 ENCOUNTER — OFFICE VISIT (OUTPATIENT)
Dept: FAMILY MEDICINE CLINIC | Age: 70
End: 2020-06-02
Payer: MEDICARE

## 2020-06-02 VITALS
DIASTOLIC BLOOD PRESSURE: 80 MMHG | RESPIRATION RATE: 16 BRPM | HEART RATE: 87 BPM | SYSTOLIC BLOOD PRESSURE: 141 MMHG | OXYGEN SATURATION: 96 % | BODY MASS INDEX: 28.74 KG/M2 | WEIGHT: 142.58 LBS | HEIGHT: 59 IN | TEMPERATURE: 99 F

## 2020-06-02 PROCEDURE — G0439 PPPS, SUBSEQ VISIT: HCPCS | Performed by: FAMILY MEDICINE

## 2020-06-02 ASSESSMENT — PATIENT HEALTH QUESTIONNAIRE - PHQ9
SUM OF ALL RESPONSES TO PHQ QUESTIONS 1-9: 0
SUM OF ALL RESPONSES TO PHQ QUESTIONS 1-9: 0

## 2020-06-02 ASSESSMENT — LIFESTYLE VARIABLES: HOW OFTEN DO YOU HAVE A DRINK CONTAINING ALCOHOL: 0

## 2020-06-02 NOTE — PROGRESS NOTES
(FLAXSEED OIL PO) Take 1,300 mg by mouth daily  Yes Historical Provider, MD   Coenzyme Q10 (COQ10 PO) Take by mouth daily  Yes Historical Provider, MD   amoxicillin-clavulanate (AUGMENTIN) 875-125 MG per tablet Take 1 tablet by mouth 2 times daily  Patient not taking: Reported on 6/2/2020  Shannon Birmingham DO   chlorpheniramine (ALLER-CHLOR) 4 MG tablet Take 1 tablet by mouth every 6 hours as needed for Allergies  Patient not taking: Reported on 6/2/2020  ANATOLY Harmon III         Past Medical History:   Diagnosis Date    Arthritis     Cancer [facility]) april 2014    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, vulva 1985    HTN (hypertension)     Hyperlipidemia     LBBB (left bundle branch block)     Mitral regurgitation     PONV (postoperative nausea and vomiting)     Tachycardia     Thyroid disease        Past Surgical History:   Procedure Laterality Date    BREAST BIOPSY      benign    COLONOSCOPY      DIAGNOSTIC CARDIAC CATH LAB PROCEDURE  11/09/01    [facility]    ENDOSCOPY, COLON, DIAGNOSTIC      LITHOTRIPSY      VERY SMALL URETER,TERRIBLE TIME GETTING STENTS IN, ALOT OF ANESTH, VERY SICK AFTER    LITHOTRIPSY Left 1/30/2020    LEFT ESWL EXTRACORPOREAL SHOCK WAVE LITHOTRIPSY performed by Kayleigh Daugherty MD at [address] [facility] W/INTERP&RPT N/A 8/17/2018    ESOPHAGEAL MOTILITY/MANOMETRY STUDY performed by Cem Martell DO at [address] [place], REPAIR PARAESOPHAGEAL HERNIA, INCL FUNDOPLASTY W/ MESH N/A 10/3/2018    LAPAROSCOPIC HIATAL HERNIA REPAIR WITH MESH performed by Clarice Mcgill MD at [facility] [address]      UPPER GASTROINTESTINAL ENDOSCOPY  6/17/15    VULVAR/PERINEAL BIOPSY  1985         Family History   Problem Relation Age of Onset    Heart Attack Father     Other Father         bowel disorder    Cancer Other     Diabetes Other     Asthma Son        CareTeam (Including outside providers/suppliers regularly stairs free of clutter?: (!) No  Do you always fasten your seatbelt when you are in a car?: Yes  Safety Interventions:  · Home safety tips provided    Personalized Preventive Plan   Current Health Maintenance Status  Immunization History   Administered Date(s) Administered    Influenza 10/15/2013    Influenza Vaccine, unspecified formulation 10/19/2015, 09/27/2016    Influenza Virus Vaccine 09/29/2017, 08/30/2018    Influenza, High Dose (Fluzone 65 yrs and older) 10/19/2015, 09/27/2016, 08/30/2018    Influenza, Intradermal, Preservative free 11/14/2014    Influenza, Triv, inactivated, subunit, adjuvanted, IM (Fluad 65 yrs and older) 09/29/2017, 10/02/2019    Pneumococcal Conjugate 13-valent ([id number]) 10/30/2015    Pneumococcal Conjugate 7-valent (Prevnar7) 02/16/2011    Pneumococcal Polysaccharide ([id number]) 06/22/2017    Zoster Live (Zostavax) 07/28/2012    Zoster Recombinant (Shingrix) 04/03/2019, 08/23/2019, 11/14/2019        Health Maintenance   Topic Date Due    DTaP/Tdap/Td vaccine (1 - Tdap) 04/18/1967    Annual Wellness Visit (AWV)  05/29/2019    Breast cancer screen  10/11/2019    Lipid screen  01/17/2020    Colon cancer screen colonoscopy  10/14/2029    Flu vaccine  Completed    Shingles Vaccine  Completed    Pneumococcal 65+ years Vaccine  Completed    Hepatitis C screen  Completed    DEXA (modify frequency per FRAX score)  Addressed    Hepatitis A vaccine  Aged Out    Hepatitis B vaccine  Aged Out    Hib vaccine  Aged Out    Meningococcal (ACWY) vaccine  Aged Out     Recommendations for Tribe Due: see orders and patient instructions/AVS.  . Recommended screening schedule for the next 5-10 years is provided to the patient in written form: see Patient Sofy Hernandez was seen today for medicare awv.     Diagnoses and all orders for this visit:    Medicare annual wellness visit, subsequent

## 2020-06-03 ENCOUNTER — TELEPHONE (OUTPATIENT)
Dept: FAMILY MEDICINE CLINIC | Age: 70
End: 2020-06-03

## 2020-09-01 ENCOUNTER — HOSPITAL ENCOUNTER (OUTPATIENT)
Age: 70
Discharge: HOME | End: 2020-09-03
Payer: MEDICARE

## 2020-09-01 ENCOUNTER — OFFICE VISIT (OUTPATIENT)
Dept: FAMILY MEDICINE CLINIC | Age: 70
End: 2020-09-01
Payer: MEDICARE

## 2020-09-01 VITALS
HEART RATE: 93 BPM | RESPIRATION RATE: 18 BRPM | BODY MASS INDEX: 29.84 KG/M2 | DIASTOLIC BLOOD PRESSURE: 84 MMHG | HEIGHT: 59 IN | SYSTOLIC BLOOD PRESSURE: 167 MMHG | TEMPERATURE: 98.3 F | WEIGHT: 148 LBS | OXYGEN SATURATION: 99 %

## 2020-09-01 LAB
ALBUMIN SERPL-MCNC: 4.3 G/DL (ref 3.5–5.2)
ALP BLD-CCNC: 92 U/L (ref 35–104)
ALT SERPL-CCNC: 9 U/L (ref 0–32)
ANION GAP SERPL CALCULATED.3IONS-SCNC: 14 MMOL/L (ref 7–16)
AST SERPL-CCNC: 24 U/L (ref 0–31)
BASOPHILS ABSOLUTE: 0.03 E9/L (ref 0–0.2)
BASOPHILS RELATIVE PERCENT: 0.3 % (ref 0–2)
BILIRUB SERPL-MCNC: 0.2 MG/DL (ref 0–1.2)
BUN BLDV-MCNC: 24 MG/DL (ref 8–23)
CALCIUM SERPL-MCNC: 10.6 MG/DL (ref 8.6–10.2)
CHLORIDE BLD-SCNC: 103 MMOL/L (ref 98–107)
CO2: 25 MMOL/L (ref 22–29)
CREAT SERPL-MCNC: 0.7 MG/DL (ref 0.5–1)
EOSINOPHILS ABSOLUTE: 0.01 E9/L (ref 0.05–0.5)
EOSINOPHILS RELATIVE PERCENT: 0.1 % (ref 0–6)
GFR AFRICAN AMERICAN: >60
GFR NON-AFRICAN AMERICAN: >60 ML/MIN/1.73
GLUCOSE BLD-MCNC: 114 MG/DL (ref 74–99)
HCT VFR BLD CALC: 45.4 % (ref 34–48)
HEMOGLOBIN: 14.8 G/DL (ref 11.5–15.5)
IMMATURE GRANULOCYTES #: 0.07 E9/L
IMMATURE GRANULOCYTES %: 0.6 % (ref 0–5)
LYMPHOCYTES ABSOLUTE: 1.11 E9/L (ref 1.5–4)
LYMPHOCYTES RELATIVE PERCENT: 9.7 % (ref 20–42)
MCH RBC QN AUTO: 29.2 PG (ref 26–35)
MCHC RBC AUTO-ENTMCNC: 32.6 % (ref 32–34.5)
MCV RBC AUTO: 89.5 FL (ref 80–99.9)
MONOCYTES ABSOLUTE: 0.57 E9/L (ref 0.1–0.95)
MONOCYTES RELATIVE PERCENT: 5 % (ref 2–12)
NEUTROPHILS ABSOLUTE: 9.7 E9/L (ref 1.8–7.3)
NEUTROPHILS RELATIVE PERCENT: 84.3 % (ref 43–80)
PDW BLD-RTO: 13.2 FL (ref 11.5–15)
PLATELET # BLD: 291 E9/L (ref 130–450)
PMV BLD AUTO: 10.4 FL (ref 7–12)
POTASSIUM SERPL-SCNC: 4.1 MMOL/L (ref 3.5–5)
RBC # BLD: 5.07 E12/L (ref 3.5–5.5)
SODIUM BLD-SCNC: 142 MMOL/L (ref 132–146)
TOTAL PROTEIN: 7.6 G/DL (ref 6.4–8.3)
WBC # BLD: 11.5 E9/L (ref 4.5–11.5)

## 2020-09-01 PROCEDURE — 99214 OFFICE O/P EST MOD 30 MIN: CPT | Performed by: FAMILY MEDICINE

## 2020-09-01 PROCEDURE — 85025 COMPLETE CBC W/AUTO DIFF WBC: CPT

## 2020-09-01 PROCEDURE — 80053 COMPREHEN METABOLIC PANEL: CPT

## 2020-09-01 RX ORDER — ROSUVASTATIN CALCIUM 20 MG/1
20 TABLET, COATED ORAL NIGHTLY
Qty: 30 TABLET | Refills: 3 | Status: SHIPPED
Start: 2020-09-01 | End: 2020-11-03

## 2020-09-01 NOTE — PATIENT INSTRUCTIONS
Stop the lipitor. Start the PataFoods). Continue the CoQ10. Continue the protonix. We will monitor your blood pressure for now. Finish the steroid for the leg pain as it is helping.

## 2020-09-01 NOTE — PROGRESS NOTES
FM Progress Note    Subjective: James De is a 65y.o. year old female here for GERD, HTN. Health Maintenance Due   Topic Date Due    DTaP/Tdap/Td vaccine (1 - Tdap) 04/18/1967    Breast cancer screen  10/11/2019    Lipid screen  01/17/2020    Flu vaccine (1) 09/01/2020       OP. Follows w/ GYN. utd on DEXA. No recent Fx. GERD. Taking protonix in the AM, and if she gets any heartburn, rarely, takes alfalfa which helps. Taking diltiazem for HTN and tachy, but mostly for tachy. BP not controlled, but is normally controlled. No CP, SOB, palpitations, blurred vision, HA, lightheadedness, LOC or focal neurological deficits. Recently in urgent care for cat lying on her LLE overnight. Woke up the next morning in much pain, and in urgent care advised to take nsaids, tylenol and medrol dosepak. Since starting steroid high BP and fast heart rate and trouble sleeping and facial flushing. Calcium oxalate is the type of kidney stone she gets. Taking atorvastatin for dyslipidemia.  Muscle aches w/ 10 mg  Lab Results   Component Value Date    CHOL 222 (H) 01/17/2019    CHOL 188 07/28/2018    CHOL 220 (H) 01/11/2018     Lab Results   Component Value Date    TRIG 92 01/17/2019    TRIG 78 07/28/2018    TRIG 82 01/11/2018     Lab Results   Component Value Date    HDL 56 01/17/2019    HDL 55 07/28/2018    HDL 49 01/11/2018     Lab Results   Component Value Date    LDLCALC 148 (H) 01/17/2019    LDLCALC 117 (H) 07/28/2018    LDLCALC 155 (H) 01/11/2018     The 10-year ASCVD risk score (Andres Fischer, et al., 2013) is: 19.3%    Values used to calculate the score:      Age: 65 years      Sex: Female      Is Non- : No      Diabetic: No      Tobacco smoker: No      Systolic Blood Pressure: 286 mmHg      Is BP treated: No      HDL Cholesterol: 56 mg/dL      Total Cholesterol: 222 mg/dL        Past Medical History:   Diagnosis Date    Arthritis     Cancer [facility]) april 2014    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, vulva     HTN (hypertension)     Hyperlipidemia     LBBB (left bundle branch block)     Mitral regurgitation     PONV (postoperative nausea and vomiting)     Tachycardia     Thyroid disease      Past Surgical History:   Procedure Laterality Date    BREAST BIOPSY      benign    COLONOSCOPY      DIAG[facility]ST[facility]ARD[facility]AC [facility]ATH [facility] PROCEDURE  01    [facility]    ENDOSCOPY, COLON, DIAGNOSTIC      LITHOTRIPSY      VERY SMALL URETER,TERRIBLE TIME GETTING STENTS IN, ALOT OF ANESTH, VERY SICK AFTER    LITHOTRIPSY Left 2020    LEFT ESWL EXTRACORPOREAL SHOCK WAVE LITHOTRIPSY performed by Leyla Cam MD at [address] [facility] W/INTERP&RPT N/A 2018    ESOPHAGEAL MOTILITY/MANOMETRY STUDY performed by Raleigh Goodrich DO at [address] [place], REPAIR PARAESOPHAGEAL HERNIA, INCL FUNDOPLASTY W/ MESH N/A 10/3/2018    LAPAROSCOPIC HIATAL HERNIA REPAIR WITH MESH performed by Rene Carvalho MD at [facility]      UPPER GASTROINTESTINAL ENDOSCOPY  6/17/15    VULVAR/PERINEAL BIOPSY  1985     Family History   Problem Relation Age of Onset    Heart Attack Father     Other Father         bowel disorder    Cancer Other     Diabetes Other     Asthma Son      Social History     Socioeconomic History    Marital status:      Spouse name: Not on file    Number of children: Not on file    Years of education: Not on file    Highest education level: Not on file   Occupational History    Not on file   Social Needs    Financial resource strain: Not on file    Food insecurity     Worry: Not on file     Inability: Not on file    Transportation needs     Medical: Not on file     Non-medical: Not on file   Tobacco Use    Smoking status: Former Smoker     Packs/day: 0.50     Years: 4.00     Pack years: 2.00     Types: Cigarettes     Last attempt to quit: 1969     Years since quittin.7    Smokeless tobacco: Never Used   Substance and Sexual Activity    Alcohol use: Yes     Alcohol/week: 0.0 standard drinks     Comment: rare glass of wine; denies caffeine    Drug use: No    Sexual activity: Never   Lifestyle    Physical activity     Days per week: Not on file     Minutes per session: Not on file    Stress: Not on file   Relationships    Social connections     Talks on phone: Not on file     Gets together: Not on file     Attends Shinto service: Not on file     Active member of club or organization: Not on file     Attends meetings of clubs or organizations: Not on file     Relationship status: Not on file    Intimate partner violence     Fear of current or ex partner: Not on file     Emotionally abused: Not on file     Physically abused: Not on file     Forced sexual activity: Not on file   Other Topics Concern    Not on file   Social History Narrative    Not on file       Review Of Systems (unless otherwise specified)  Gen: No fevers, sweats, chills   No fatigue   No weight unintentional weight changes  Skin:  No rash, no lesion  Resp: No cough, shortness of breath, wheeze, chest congestion  CV: No chest pain, palpitation, edema   GI:  No abdominal pain   No nausea, no vomiting   No change in bowels, no diarrhea or constipation   No blood in stool or blood per rectum  Neuro: No headaches   No syncope/near syncope   No dizziness            Objective:   BP (!) 167/84 (Site: Left Upper Arm, Position: Sitting, Cuff Size: Large Adult)   Pulse 93   Temp 98.3 °F (36.8 °C) (Temporal)   Resp 18   Ht 4' 11\" (1.499 m)   Wt 148 lb (67.1 kg)   SpO2 99%   BMI 29.89 kg/m²   General appearance: NAD, alert and interacting appropriately  HEENT: NCAT, PERRLA, EOMI   Resp: CTAB, no WRC  CVS: RRR, no MRG  Abdomen: BS +, SNDNT  Extremities: No clubbing, cyanosis, or edema. Warm. Dry. I have reviewed this patient's previous records. I have reviewed this patient's labs.     I have reviewed this patient's medications. Assessment/Plan:    Roberto Carlos Bahena was seen today for hypertension and neck pain. Diagnoses and all orders for this visit:    Essential hypertension  -     COMPREHENSIVE METABOLIC PANEL; Future  -     CBC Auto Differential; Future    Mixed hyperlipidemia  -     rosuvastatin (CRESTOR) 20 MG tablet; Take 1 tablet by mouth nightly    Gastroesophageal reflux disease without esophagitis    Osteoporosis, unspecified osteoporosis type, unspecified pathological fracture presence    Left leg pain    Encounter for screening mammogram for malignant neoplasm of breast        Patient Instructions   Stop the lipitor. Start the crestor [employer] [facility]). Continue the CoQ10. Continue the protonix. We will monitor your blood pressure for now. Finish the steroid for the leg pain as it is helping. Return in about 1 month (around 10/1/2020) for nurse's blood pressure check. Greater than 50% of this 25 minute face-to-face patient encounter was spent counseling on the following: The primary encounter diagnosis was Essential hypertension. Diagnoses of Mixed hyperlipidemia, Gastroesophageal reflux disease without esophagitis, Osteoporosis, unspecified osteoporosis type, unspecified pathological fracture presence, Left leg pain, and Encounter for screening mammogram for malignant neoplasm of breast were also pertinent to this visit.       Electronically signed by Blu Doherty MD on 9/1/2020 at 10:43 AM

## 2020-09-18 ENCOUNTER — TELEPHONE (OUTPATIENT)
Dept: FAMILY MEDICINE CLINIC | Age: 70
End: 2020-09-18

## 2020-09-18 NOTE — TELEPHONE ENCOUNTER
Pt thinks that the Crestor is making her wake up in the middle of the night with an ache in her legs and some back pain. Aching got worse through out the day. She stopped taking the Crestor and wants to know if you want to put her on Lipitor or maybe a half of a Crestor pill. Please advise.

## 2020-09-21 RX ORDER — CHOLECALCIFEROL (VITAMIN D3) 125 MCG
2 CAPSULE ORAL DAILY
Qty: 60 CAPSULE | Refills: 5 | Status: SHIPPED
Start: 2020-09-21 | End: 2022-06-17

## 2020-09-21 NOTE — TELEPHONE ENCOUNTER
Pt should increase the coenzyme q10 from 100 to 200 mg daily. See how this helps for 2 wks, then if needed we can decrease or switch statin. Please keep me informed. Thank you very much.

## 2020-09-22 RX ORDER — SIMVASTATIN 10 MG
10 TABLET ORAL NIGHTLY
Qty: 90 TABLET | Refills: 1 | Status: SHIPPED
Start: 2020-09-22 | End: 2020-11-03

## 2020-09-22 NOTE — TELEPHONE ENCOUNTER
Pt called back she will not do this, it bothered her to badly, please note, something else we can do?

## 2020-09-23 ENCOUNTER — TELEPHONE (OUTPATIENT)
Dept: FAMILY MEDICINE CLINIC | Age: 70
End: 2020-09-23

## 2020-09-23 NOTE — TELEPHONE ENCOUNTER
Pt just called back she states pharmacy cant fill Simvastatin cause Crestor was filled do we need to do a prior auth on this and in meantime does pt not want to take crestor, she does have some Atorvastatin should she take this meantime call pt, leave message per pt if needed

## 2020-09-24 NOTE — TELEPHONE ENCOUNTER
Please advise do you want patient to take Atorvastatin--Patient's next appointment is not until March of 2021

## 2020-10-06 ENCOUNTER — TELEPHONE (OUTPATIENT)
Dept: FAMILY MEDICINE CLINIC | Age: 70
End: 2020-10-06

## 2020-10-06 ENCOUNTER — NURSE ONLY (OUTPATIENT)
Dept: FAMILY MEDICINE CLINIC | Age: 70
End: 2020-10-06

## 2020-10-06 VITALS — SYSTOLIC BLOOD PRESSURE: 153 MMHG | DIASTOLIC BLOOD PRESSURE: 87 MMHG

## 2020-10-06 PROCEDURE — 99999 PR OFFICE/OUTPT VISIT,PROCEDURE ONLY: CPT | Performed by: FAMILY MEDICINE

## 2020-10-06 PROCEDURE — 2000F BLOOD PRESSURE MEASURE: CPT | Performed by: FAMILY MEDICINE

## 2020-10-20 ENCOUNTER — TELEPHONE (OUTPATIENT)
Dept: FAMILY MEDICINE CLINIC | Age: 70
End: 2020-10-20

## 2020-10-21 NOTE — TELEPHONE ENCOUNTER
lipitor on $4 list at [address] [facility] and [employer] . If insurance not approving then insurance not needed. Does pt want to  [facility] Thanks.

## 2020-10-21 NOTE — TELEPHONE ENCOUNTER
Please advise 9/23 encounter per PCP pt was to continue atorvastatin if tolerating and can address at next [address] in March

## 2020-10-22 RX ORDER — ATORVASTATIN CALCIUM 10 MG/1
TABLET, FILM COATED ORAL
Qty: 90 TABLET | Refills: 0 | Status: SHIPPED
Start: 2020-10-22 | End: 2020-10-22 | Stop reason: SDUPTHER

## 2020-10-22 RX ORDER — ATORVASTATIN CALCIUM 10 MG/1
TABLET, FILM COATED ORAL
Qty: 90 TABLET | Refills: 1 | Status: SHIPPED | OUTPATIENT
Start: 2020-10-22 | End: 2021-08-20

## 2021-02-26 ENCOUNTER — OFFICE VISIT (OUTPATIENT)
Dept: CARDIOLOGY CLINIC | Age: 71
End: 2021-02-26
Payer: MEDICARE

## 2021-02-26 VITALS
DIASTOLIC BLOOD PRESSURE: 80 MMHG | SYSTOLIC BLOOD PRESSURE: 146 MMHG | BODY MASS INDEX: 29.52 KG/M2 | HEART RATE: 84 BPM | HEIGHT: 59 IN | WEIGHT: 146.4 LBS | RESPIRATION RATE: 16 BRPM

## 2021-02-26 DIAGNOSIS — I34.0 NONRHEUMATIC MITRAL VALVE REGURGITATION: ICD-10-CM

## 2021-02-26 DIAGNOSIS — I38 VHD (VALVULAR HEART DISEASE): ICD-10-CM

## 2021-02-26 DIAGNOSIS — I10 ESSENTIAL HYPERTENSION: Primary | ICD-10-CM

## 2021-02-26 DIAGNOSIS — I44.7 LBBB (LEFT BUNDLE BRANCH BLOCK): ICD-10-CM

## 2021-02-26 PROCEDURE — 99214 OFFICE O/P EST MOD 30 MIN: CPT | Performed by: INTERNAL MEDICINE

## 2021-02-26 PROCEDURE — 93000 ELECTROCARDIOGRAM COMPLETE: CPT | Performed by: INTERNAL MEDICINE

## 2021-02-26 SDOH — HEALTH STABILITY: MENTAL HEALTH: HOW MANY STANDARD DRINKS CONTAINING ALCOHOL DO YOU HAVE ON A TYPICAL DAY?: NOT ASKED

## 2021-02-26 ASSESSMENT — ENCOUNTER SYMPTOMS
VOMITING: 0
BLOOD IN STOOL: 0
BACK PAIN: 0
WHEEZING: 0
ABDOMINAL PAIN: 0
SHORTNESS OF BREATH: 0
CONSTIPATION: 0
DIARRHEA: 0
COUGH: 0
NAUSEA: 0

## 2021-02-26 NOTE — PROGRESS NOTES
Past Surgical History:  Past Surgical History:   Procedure Laterality Date    BREAST BIOPSY      benign    COLONOSCOPY      DIAG[facility]ST[facility]AC [facility]URE  [facility]    ENDOSCOPY, COLON, DIAGNOSTIC      LITHOTRIPSY      VERY SMALL URETER,TERRIBLE TIME GETTING STENTS IN, ALOT OF ANESTH, VERY SICK AFTER    LITHOTRIPSY Left 2020    LEFT ESWL EXTRACORPOREAL SHOCK WAVE LITHOTRIPSY performed by Robert Davis MD at [address] W/INTERP&RPT N/A 2018    ESOPHAGEAL MOTILITY/MANOMETRY STUDY performed by Dada Post DO at [address] [place], REPAIR PARAESOPHAGEAL HERNIA, INCL FUNDOPLASTY W/ MESH N/A 10/3/2018    LAPAROSCOPIC HIATAL HERNIA REPAIR WITH MESH performed by Court Robles MD at [place] [address]      UPPER GASTROINTESTINAL ENDOSCOPY  6/17/15    VULVAR/PERINEAL BIOPSY  1985       Family History:  Family History   Problem Relation Age of Onset    Heart Attack Father     Other Father         bowel disorder    Cancer Other     Diabetes Other     Asthma Son        Social History:  Social History     Socioeconomic History    Marital status:      Spouse name: Not on file    Number of children: Not on file    Years of education: Not on file    Highest education level: Not on file   Occupational History    Not on file   Social Needs    Financial resource strain: Not on file    Food insecurity     Worry: Not on file     Inability: Not on file    Transportation needs     Medical: Not on file     Non-medical: Not on file   Tobacco Use    Smoking status: Former Smoker     Packs/day: 0.50     Years: 4.00     Pack years: 2.00     Types: Cigarettes     Quit date: 1969     Years since quittin.1    Smokeless tobacco: Never Used   Substance and Sexual Activity    Alcohol use:  Yes     Alcohol/week: 0.0 standard drinks     Comment: rare glass of wine; denies caffeine    Drug use: No    Sexual activity: Never   Lifestyle    Physical activity     Days per week: Not on file     Minutes per session: Not on file    Stress: Not on file   Relationships    Social connections     Talks on phone: Not on file     Gets together: Not on file     Attends Yazidism service: Not on file     Active member of club or organization: Not on file     Attends meetings of clubs or organizations: Not on file     Relationship status: Not on file    Intimate partner violence     Fear of current or ex partner: Not on file     Emotionally abused: Not on file     Physically abused: Not on file     Forced sexual activity: Not on file   Other Topics Concern    Not on file   Social History Narrative    Not on file       Allergies:  No Known Allergies    Current Medications:  Current Outpatient Medications   Medication Sig Dispense Refill    famotidine (PEPCID) 20 MG tablet       atorvastatin (LIPITOR) 10 MG tablet Take 1 tablet by mouth once daily 90 tablet 1    coenzyme Q-10 100 MG capsule Take 2 capsules by mouth daily 60 capsule 5    dilTIAZem (CARTIA XT) 240 MG extended release capsule TAKE ONE CAPSULE BY MOUTH ONCE DAILY 90 capsule 3    ibuprofen (ADVIL;MOTRIN) 200 MG tablet Take 200 mg by mouth every 6 hours as needed for Pain      Nutritional Supplements (SOY PROTEIN SHAKE) POWD Take by mouth      Valerian Root 500 MG CAPS Take 2,000 mg by mouth every evening      Omega-3 1000 MG CAPS Take by mouth daily      vitamin D (CHOLECALCIFEROL) 1000 UNIT TABS tablet Take 1,000 Units by mouth daily      Multiple Minerals-Vitamins (CALCIUM-MAGNESIUM-ZINC-D3) TABS Take by mouth daily      Biotin 1000 MCG TABS Take 2,000 mcg by mouth daily       DHEA 50 MG CAPS Take by mouth daily      aspirin EC 81 MG EC tablet Take 81 mg by mouth daily.  Flaxseed, Linseed, (FLAXSEED OIL PO) Take 1,300 mg by mouth daily        No current facility-administered medications for this visit.         Physical Exam:  There were no vitals taken for this visit. Wt Readings from Last 3 Encounters:   11/03/20 145 lb 9.6 oz (66 kg)   09/01/20 148 lb (67.1 kg)   06/02/20 142 lb 9.3 oz (64.7 kg)       Physical Exam  Constitutional:       General: She is not in acute distress. Appearance: She is well-developed. HENT:      Head: Normocephalic and atraumatic. Neck:      Musculoskeletal: Neck supple. Vascular: No carotid bruit or JVD. Cardiovascular:      Rate and Rhythm: Normal rate and regular rhythm. Pulses: Normal pulses. Heart sounds: No murmur. No friction rub. No gallop. Pulmonary:      Breath sounds: Normal breath sounds. No wheezing or rales. Chest:      Chest wall: No tenderness. Abdominal:      General: Bowel sounds are normal. There is no distension. Palpations: Abdomen is soft. There is no mass. Tenderness: There is no abdominal tenderness. Comments: No abdominal bruit. Musculoskeletal:      Right lower leg: No edema. Left lower leg: No edema. Skin:     General: Skin is warm and dry. Neurological:      Mental Status: She is alert and oriented to person, place, and time.            Laboratory Tests:  Lab Results   Component Value Date    CREATININE 0.7 09/01/2020    BUN 24 (H) 09/01/2020     09/01/2020    K 4.1 09/01/2020     09/01/2020    CO2 25 09/01/2020       Lab Results   Component Value Date    WBC 11.5 09/01/2020    HGB 14.8 09/01/2020    HCT 45.4 09/01/2020    MCV 89.5 09/01/2020     09/01/2020     Lab Results   Component Value Date    ALT 9 09/01/2020    AST 24 09/01/2020    ALKPHOS 92 09/01/2020    BILITOT 0.2 09/01/2020       Lab Results   Component Value Date    TSH 2.910 01/17/2019       Lab Results   Component Value Date    CHOL 222 (H) 01/17/2019    CHOL 188 07/28/2018    CHOL 220 (H) 01/11/2018     Lab Results   Component Value Date    TRIG 92 01/17/2019    TRIG 78 07/28/2018    TRIG 82 01/11/2018     Lab Results   Component Value Date    HDL 56 01/17/2019    HDL 55 07/28/2018    HDL 49 01/11/2018     Lab Results   Component Value Date    LDLCALC 148 (H) 01/17/2019    LDLCALC 117 (H) 07/28/2018    LDLCALC 155 (H) 01/11/2018     Lab Results   Component Value Date    LABVLDL 18 01/17/2019    LABVLDL 16 07/28/2018    LABVLDL 16 01/11/2018       Cardiac Tests:    -Echo, 09/12/2011.  Stage I diastolic dysfunction.  Mild MR.  Otherwise normal.      -Lexiscan MPS, 01/19/2015.  Normal perfusion.  EF 63%. ASSESSMENT / PLAN:  -Hypertension: Mildly elevated but could be exacerbated by white coat hypertension.  -Hyperlipidemia: On atorvastatin.  -History of mild mitral regurgitation.  -Grade 1 diastolic dysfunction: Clinically compensated.  -Chronic left bundle branch block.  -Hiatal hernia and Aden's esophagus.  -Thyroid disease. -Arthritis. We will continue current cardiac medications. We will arrange for the patient to have an echocardiogram to follow-up her mitral regurgitation and to reassess her ejection fraction. We will follow-up at the office in 1 year. {  Avril Lamas MD , [facility] [place].   [facility]) Cardiology

## 2021-02-28 ENCOUNTER — HOSPITAL ENCOUNTER (EMERGENCY)
Age: 71
Discharge: ANOTHER HOSPITAL | End: 2021-02-28
Attending: EMERGENCY MEDICINE
Payer: MEDICARE

## 2021-02-28 ENCOUNTER — TELEPHONE (OUTPATIENT)
Dept: FAMILY MEDICINE CLINIC | Age: 71
End: 2021-02-28

## 2021-02-28 ENCOUNTER — HOSPITAL ENCOUNTER (INPATIENT)
Age: 71
LOS: 2 days | Discharge: HOME | DRG: 394 | End: 2021-03-02
Attending: FAMILY MEDICINE | Admitting: FAMILY MEDICINE
Payer: MEDICARE

## 2021-02-28 ENCOUNTER — APPOINTMENT (OUTPATIENT)
Dept: CT IMAGING | Age: 71
End: 2021-02-28
Payer: MEDICARE

## 2021-02-28 VITALS
HEART RATE: 88 BPM | HEIGHT: 60 IN | DIASTOLIC BLOOD PRESSURE: 76 MMHG | RESPIRATION RATE: 16 BRPM | TEMPERATURE: 98.2 F | WEIGHT: 146 LBS | SYSTOLIC BLOOD PRESSURE: 162 MMHG | BODY MASS INDEX: 28.66 KG/M2 | OXYGEN SATURATION: 96 %

## 2021-02-28 DIAGNOSIS — K92.2 LOWER GI BLEED: Primary | ICD-10-CM

## 2021-02-28 PROBLEM — K52.9 ACUTE HEMORRHAGIC COLITIS: Status: ACTIVE | Noted: 2021-02-28

## 2021-02-28 LAB
ALBUMIN SERPL-MCNC: 3.7 G/DL (ref 3.5–5.2)
ALP BLD-CCNC: 102 U/L (ref 35–104)
ALT SERPL-CCNC: 8 U/L (ref 0–32)
ANION GAP SERPL CALCULATED.3IONS-SCNC: 10 MMOL/L (ref 7–16)
APTT: 31.4 SEC (ref 24.5–35.1)
AST SERPL-CCNC: 23 U/L (ref 0–31)
BASOPHILS ABSOLUTE: 0.02 E9/L (ref 0–0.2)
BASOPHILS RELATIVE PERCENT: 0.2 % (ref 0–2)
BILIRUB SERPL-MCNC: 0.8 MG/DL (ref 0–1.2)
BUN BLDV-MCNC: 14 MG/DL (ref 8–23)
CALCIUM SERPL-MCNC: 9.1 MG/DL (ref 8.6–10.2)
CHLORIDE BLD-SCNC: 101 MMOL/L (ref 98–107)
CO2: 27 MMOL/L (ref 22–29)
CREAT SERPL-MCNC: 0.8 MG/DL (ref 0.5–1)
EOSINOPHILS ABSOLUTE: 0.09 E9/L (ref 0.05–0.5)
EOSINOPHILS RELATIVE PERCENT: 0.8 % (ref 0–6)
GFR AFRICAN AMERICAN: >60
GFR NON-AFRICAN AMERICAN: >60 ML/MIN/1.73
GLUCOSE BLD-MCNC: 85 MG/DL (ref 74–99)
HCT VFR BLD CALC: 46.7 % (ref 34–48)
HEMOGLOBIN: 16 G/DL (ref 11.5–15.5)
IMMATURE GRANULOCYTES #: 0.03 E9/L
IMMATURE GRANULOCYTES %: 0.3 % (ref 0–5)
INR BLD: 1.1
LYMPHOCYTES ABSOLUTE: 2.08 E9/L (ref 1.5–4)
LYMPHOCYTES RELATIVE PERCENT: 18.1 % (ref 20–42)
MCH RBC QN AUTO: 30.2 PG (ref 26–35)
MCHC RBC AUTO-ENTMCNC: 34.3 % (ref 32–34.5)
MCV RBC AUTO: 88.1 FL (ref 80–99.9)
MONOCYTES ABSOLUTE: 0.97 E9/L (ref 0.1–0.95)
MONOCYTES RELATIVE PERCENT: 8.4 % (ref 2–12)
NEUTROPHILS ABSOLUTE: 8.29 E9/L (ref 1.8–7.3)
NEUTROPHILS RELATIVE PERCENT: 72.2 % (ref 43–80)
PDW BLD-RTO: 13.2 FL (ref 11.5–15)
PLATELET # BLD: 244 E9/L (ref 130–450)
PMV BLD AUTO: 9.9 FL (ref 7–12)
POTASSIUM SERPL-SCNC: 3.3 MMOL/L (ref 3.5–5)
PROTHROMBIN TIME: 12.2 SEC (ref 9.3–12.4)
RBC # BLD: 5.3 E12/L (ref 3.5–5.5)
SODIUM BLD-SCNC: 138 MMOL/L (ref 132–146)
TOTAL PROTEIN: 6.9 G/DL (ref 6.4–8.3)
WBC # BLD: 11.5 E9/L (ref 4.5–11.5)

## 2021-02-28 PROCEDURE — 74177 CT ABD & PELVIS W/CONTRAST: CPT

## 2021-02-28 PROCEDURE — 6360000002 HC RX W HCPCS: Performed by: FAMILY MEDICINE

## 2021-02-28 PROCEDURE — 2580000003 HC RX 258: Performed by: EMERGENCY MEDICINE

## 2021-02-28 PROCEDURE — 96365 THER/PROPH/DIAG IV INF INIT: CPT

## 2021-02-28 PROCEDURE — 85610 PROTHROMBIN TIME: CPT

## 2021-02-28 PROCEDURE — 96375 TX/PRO/DX INJ NEW DRUG ADDON: CPT

## 2021-02-28 PROCEDURE — 80053 COMPREHEN METABOLIC PANEL: CPT

## 2021-02-28 PROCEDURE — 36415 COLL VENOUS BLD VENIPUNCTURE: CPT

## 2021-02-28 PROCEDURE — G0379 DIRECT REFER HOSPITAL OBSERV: HCPCS

## 2021-02-28 PROCEDURE — C9113 INJ PANTOPRAZOLE SODIUM, VIA: HCPCS | Performed by: FAMILY MEDICINE

## 2021-02-28 PROCEDURE — 85025 COMPLETE CBC W/AUTO DIFF WBC: CPT

## 2021-02-28 PROCEDURE — 2500000003 HC RX 250 WO HCPCS: Performed by: EMERGENCY MEDICINE

## 2021-02-28 PROCEDURE — 2580000003 HC RX 258: Performed by: FAMILY MEDICINE

## 2021-02-28 PROCEDURE — 96367 TX/PROPH/DG ADDL SEQ IV INF: CPT

## 2021-02-28 PROCEDURE — 6360000004 HC RX CONTRAST MEDICATION: Performed by: RADIOLOGY

## 2021-02-28 PROCEDURE — 99223 1ST HOSP IP/OBS HIGH 75: CPT | Performed by: INTERNAL MEDICINE

## 2021-02-28 PROCEDURE — 85730 THROMBOPLASTIN TIME PARTIAL: CPT

## 2021-02-28 PROCEDURE — 6370000000 HC RX 637 (ALT 250 FOR IP): Performed by: INTERNAL MEDICINE

## 2021-02-28 PROCEDURE — 6370000000 HC RX 637 (ALT 250 FOR IP): Performed by: FAMILY MEDICINE

## 2021-02-28 PROCEDURE — 99283 EMERGENCY DEPT VISIT LOW MDM: CPT

## 2021-02-28 PROCEDURE — 2060000000 HC ICU INTERMEDIATE R&B

## 2021-02-28 PROCEDURE — G0378 HOSPITAL OBSERVATION PER HR: HCPCS

## 2021-02-28 PROCEDURE — 6360000002 HC RX W HCPCS: Performed by: EMERGENCY MEDICINE

## 2021-02-28 RX ORDER — PANTOPRAZOLE SODIUM 40 MG/10ML
40 INJECTION, POWDER, LYOPHILIZED, FOR SOLUTION INTRAVENOUS EVERY 12 HOURS
Status: DISCONTINUED | OUTPATIENT
Start: 2021-02-28 | End: 2021-03-02 | Stop reason: HOSPADM

## 2021-02-28 RX ORDER — SODIUM CHLORIDE 0.9 % (FLUSH) 0.9 %
10 SYRINGE (ML) INJECTION PRN
Status: DISCONTINUED | OUTPATIENT
Start: 2021-02-28 | End: 2021-03-02 | Stop reason: HOSPADM

## 2021-02-28 RX ORDER — ONDANSETRON 2 MG/ML
4 INJECTION INTRAMUSCULAR; INTRAVENOUS EVERY 6 HOURS PRN
Status: DISCONTINUED | OUTPATIENT
Start: 2021-02-28 | End: 2021-03-02 | Stop reason: HOSPADM

## 2021-02-28 RX ORDER — SODIUM CHLORIDE 9 MG/ML
INJECTION, SOLUTION INTRAVENOUS CONTINUOUS
Status: DISCONTINUED | OUTPATIENT
Start: 2021-02-28 | End: 2021-03-02 | Stop reason: HOSPADM

## 2021-02-28 RX ORDER — DILTIAZEM HYDROCHLORIDE 240 MG/1
240 CAPSULE, COATED, EXTENDED RELEASE ORAL DAILY
Status: DISCONTINUED | OUTPATIENT
Start: 2021-03-01 | End: 2021-02-28

## 2021-02-28 RX ORDER — DILTIAZEM HYDROCHLORIDE 240 MG/1
240 CAPSULE, COATED, EXTENDED RELEASE ORAL DAILY
Status: DISCONTINUED | OUTPATIENT
Start: 2021-02-28 | End: 2021-03-02 | Stop reason: HOSPADM

## 2021-02-28 RX ORDER — SODIUM CHLORIDE 9 MG/ML
10 INJECTION INTRAVENOUS EVERY 12 HOURS
Status: DISCONTINUED | OUTPATIENT
Start: 2021-02-28 | End: 2021-03-02 | Stop reason: HOSPADM

## 2021-02-28 RX ORDER — SODIUM CHLORIDE 0.9 % (FLUSH) 0.9 %
10 SYRINGE (ML) INJECTION EVERY 12 HOURS SCHEDULED
Status: DISCONTINUED | OUTPATIENT
Start: 2021-02-28 | End: 2021-03-02 | Stop reason: HOSPADM

## 2021-02-28 RX ORDER — CHOLECALCIFEROL (VITAMIN D3) 125 MCG
5 CAPSULE ORAL NIGHTLY PRN
Status: DISCONTINUED | OUTPATIENT
Start: 2021-02-28 | End: 2021-03-02 | Stop reason: HOSPADM

## 2021-02-28 RX ORDER — VITAMIN B COMPLEX
1000 TABLET ORAL DAILY
Status: DISCONTINUED | OUTPATIENT
Start: 2021-03-01 | End: 2021-03-02 | Stop reason: HOSPADM

## 2021-02-28 RX ORDER — CIPROFLOXACIN 2 MG/ML
400 INJECTION, SOLUTION INTRAVENOUS EVERY 12 HOURS
Status: DISCONTINUED | OUTPATIENT
Start: 2021-03-01 | End: 2021-03-02 | Stop reason: HOSPADM

## 2021-02-28 RX ORDER — 0.9 % SODIUM CHLORIDE 0.9 %
500 INTRAVENOUS SOLUTION INTRAVENOUS ONCE
Status: COMPLETED | OUTPATIENT
Start: 2021-02-28 | End: 2021-02-28

## 2021-02-28 RX ORDER — ATORVASTATIN CALCIUM 10 MG/1
10 TABLET, FILM COATED ORAL NIGHTLY
Status: DISCONTINUED | OUTPATIENT
Start: 2021-02-28 | End: 2021-03-02 | Stop reason: HOSPADM

## 2021-02-28 RX ORDER — ACETAMINOPHEN 325 MG/1
650 TABLET ORAL EVERY 6 HOURS PRN
Status: DISCONTINUED | OUTPATIENT
Start: 2021-02-28 | End: 2021-03-02 | Stop reason: HOSPADM

## 2021-02-28 RX ORDER — ACETAMINOPHEN 650 MG/1
650 SUPPOSITORY RECTAL EVERY 6 HOURS PRN
Status: DISCONTINUED | OUTPATIENT
Start: 2021-02-28 | End: 2021-03-02 | Stop reason: HOSPADM

## 2021-02-28 RX ADMIN — Medication 10 ML: at 22:47

## 2021-02-28 RX ADMIN — SODIUM CHLORIDE, PRESERVATIVE FREE 10 ML: 5 INJECTION INTRAVENOUS at 22:47

## 2021-02-28 RX ADMIN — ATORVASTATIN CALCIUM 10 MG: 10 TABLET, FILM COATED ORAL at 22:47

## 2021-02-28 RX ADMIN — CEFTRIAXONE SODIUM 1000 MG: 1 INJECTION, POWDER, FOR SOLUTION INTRAMUSCULAR; INTRAVENOUS at 18:13

## 2021-02-28 RX ADMIN — PANTOPRAZOLE SODIUM 40 MG: 40 INJECTION, POWDER, FOR SOLUTION INTRAVENOUS at 22:47

## 2021-02-28 RX ADMIN — ACETAMINOPHEN 650 MG: 325 TABLET ORAL at 22:47

## 2021-02-28 RX ADMIN — DILTIAZEM HYDROCHLORIDE 240 MG: 240 CAPSULE, COATED, EXTENDED RELEASE ORAL at 22:47

## 2021-02-28 RX ADMIN — IOPAMIDOL 75 ML: 755 INJECTION, SOLUTION INTRAVENOUS at 17:05

## 2021-02-28 RX ADMIN — SODIUM CHLORIDE: 9 INJECTION, SOLUTION INTRAVENOUS at 22:46

## 2021-02-28 RX ADMIN — Medication 5 MG: at 23:40

## 2021-02-28 RX ADMIN — METRONIDAZOLE 500 MG: 500 INJECTION, SOLUTION INTRAVENOUS at 18:32

## 2021-02-28 RX ADMIN — SODIUM CHLORIDE 500 ML: 9 INJECTION, SOLUTION INTRAVENOUS at 16:30

## 2021-02-28 ASSESSMENT — PAIN DESCRIPTION - DESCRIPTORS: DESCRIPTORS: PRESSURE

## 2021-02-28 ASSESSMENT — PAIN DESCRIPTION - ONSET: ONSET: ON-GOING

## 2021-02-28 ASSESSMENT — PAIN DESCRIPTION - LOCATION: LOCATION: ABDOMEN

## 2021-02-28 ASSESSMENT — PAIN - FUNCTIONAL ASSESSMENT: PAIN_FUNCTIONAL_ASSESSMENT: PREVENTS OR INTERFERES SOME ACTIVE ACTIVITIES AND ADLS

## 2021-02-28 ASSESSMENT — PAIN DESCRIPTION - PAIN TYPE: TYPE: ACUTE PAIN

## 2021-02-28 ASSESSMENT — PAIN SCALES - GENERAL: PAINLEVEL_OUTOF10: 1

## 2021-02-28 ASSESSMENT — PAIN DESCRIPTION - ORIENTATION: ORIENTATION: LOWER

## 2021-02-28 ASSESSMENT — PAIN DESCRIPTION - FREQUENCY: FREQUENCY: INTERMITTENT

## 2021-02-28 NOTE — ED PROVIDER NOTES
HPI:  2/28/21,   Time: 4:04 PM JAYSON Oliver is a 65 y.o. female presenting to the ED for rectal bleeding and diarrhea, beginning 1 ago. The complaint has been intermittent, moderate in severity, and worsened by nothing. Patient denies history of colitis or diverticulitis. She states she only takes a baby aspirin a day. She says she has had diarrhea which has been bloody, bright red. She has had at least 4 episodes. Had colonoscopy some years ago, none recent. Brother had colon cancer. ROS:   Pertinent positives and negatives are stated within HPI, all other systems reviewed and are negative.  --------------------------------------------- PAST HISTORY ---------------------------------------------  Past Medical History:  has a past medical history of Arthritis, Cancer ([facility] [address] HTN (hypertension), Hyperlipidemia, LBBB (left bundle branch block), Mitral regurgitation, PONV (postoperative nausea and vomiting), Tachycardia, and Thyroid disease. Past Surgical History:  has a past surgical history that includes Diagnostic Cardiac Cath Lab Procedure (11/09/01); Tonsillectomy; Lithotripsy; Breast biopsy; Skin cancer excision; Upper gastrointestinal endoscopy (6/17/15); vulvar/perineal biopsy (1985); pr esophageal motility study w/interp&rpt (N/A, 8/17/2018); Colonoscopy; Endoscopy, colon, diagnostic; pr lap, repair paraesophageal hernia, incl fundoplasty w/ mesh (N/A, 10/3/2018); and Lithotripsy (Left, 1/30/2020). Social History:  reports that she quit smoking about 52 years ago. Her smoking use included cigarettes. She has a 2.00 pack-year smoking history. She has never used smokeless tobacco. She reports current alcohol use. She reports that she does not use drugs. Family History: family history includes Asthma in her son; Cancer in an other family member; Diabetes in an other family member; Heart Attack in her father; Other in her father.      The patients home medications have been Radiologist.  CT ABDOMEN PELVIS W IV CONTRAST   Final Result   Diffuse thickening of the colon distal to the hepatic flexure, compatible   with ischemic or inflammatory colitis. Multiple nonobstructing right renal stones and 7 mm partially obstructing   left proximal ureteral stone.          ------------------------- NURSING NOTES AND VITALS REVIEWED ---------------------------   The nursing notes within the ED encounter and vital signs as below have been reviewed. BP (!) 150/78   Pulse 100   Temp 97.7 °F (36.5 °C) (Temporal)   Resp 16   Ht 4' 11.6\" (1.514 m)   Wt 146 lb (66.2 kg)   SpO2 95%   BMI 28.90 kg/m²   Oxygen Saturation Interpretation: Normal      ---------------------------------------------------PHYSICAL EXAM--------------------------------------      Constitutional/General: Alert and oriented x3, well appearing, non toxic in NAD  Head: NC/AT  Eyes: PERRL, EOMI  Mouth: Oropharynx clear, handling secretions, no trismus  Neck: Supple, full ROM, no meningeal signs  Pulmonary: Lungs clear to auscultation bilaterally, no wheezes, rales, or rhonchi. Not in respiratory distress  Cardiovascular:  Regular rate and rhythm, no murmurs, gallops, or rubs. 2+ distal pulses  Abdomen: Soft, non tender, non distended, no guarding or rebound; rectal examination reveals moderate amount of bright red blood, testing strongly guaiac positive. Extremities: Moves all extremities x 4.  Warm and well perfused  Skin: warm and dry without rash  Neurologic: GCS 15,  Psych: Normal Affect      ------------------------------ ED COURSE/MEDICAL DECISION MAKING----------------------  Medications   metronidazole (FLAGYL) 500 mg in NaCl 100 mL IVPB premix (500 mg Intravenous New Bag 2/28/21 1832)   0.9 % sodium chloride bolus (0 mLs Intravenous Stopped 2/28/21 1750)   iopamidol (ISOVUE-370) 76 % injection 75 mL (75 mLs Intravenous Given 2/28/21 1705)   cefTRIAXone (ROCEPHIN) 1,000 mg in sterile water 10 mL IV syringe (0 mg

## 2021-02-28 NOTE — TELEPHONE ENCOUNTER
Patient called the On-call line for concerns of BRBPR and diarrhea. She notes that her diarrhea started yesterday. Denies any chills, CP, sob, dizziness or lightheadedness. Dulce has been checking her temperature since yesterday and has had no fevers. Patient does note intermittent crampy abdominal pain but denies any other symptoms. Has been eating crackers and 7-up and has been tolerating it well. Recommended patient present to the ED for further assessment and evaluation.     Electronically signed by Eliel Pineda MD on 2/28/2021 at 2:09 PM

## 2021-02-28 NOTE — ED NOTES
Pt hit her call light needing to use the restroom, pt assisted up to restroom and ambulated with no difficulties.        Elba Arita RN  02/28/21 [id number]

## 2021-03-01 LAB
ABO/RH: NORMAL
ALBUMIN SERPL-MCNC: 3.5 G/DL (ref 3.5–5.2)
ALP BLD-CCNC: 95 U/L (ref 35–104)
ALT SERPL-CCNC: 9 U/L (ref 0–32)
ANION GAP SERPL CALCULATED.3IONS-SCNC: 8 MMOL/L (ref 7–16)
ANTIBODY SCREEN: NORMAL
AST SERPL-CCNC: 20 U/L (ref 0–31)
BILIRUB SERPL-MCNC: 0.7 MG/DL (ref 0–1.2)
BILIRUBIN DIRECT: <0.2 MG/DL (ref 0–0.3)
BILIRUBIN, INDIRECT: ABNORMAL MG/DL (ref 0–1)
BUN BLDV-MCNC: 10 MG/DL (ref 8–23)
C-REACTIVE PROTEIN: 4.5 MG/DL (ref 0–0.4)
CALCIUM SERPL-MCNC: 8.5 MG/DL (ref 8.6–10.2)
CHLORIDE BLD-SCNC: 105 MMOL/L (ref 98–107)
CO2: 25 MMOL/L (ref 22–29)
CREAT SERPL-MCNC: 0.7 MG/DL (ref 0.5–1)
GFR AFRICAN AMERICAN: >60
GFR NON-AFRICAN AMERICAN: >60 ML/MIN/1.73
GLUCOSE BLD-MCNC: 91 MG/DL (ref 74–99)
HCT VFR BLD CALC: 41.1 % (ref 34–48)
HCT VFR BLD CALC: 41.3 % (ref 34–48)
HCT VFR BLD CALC: 45.4 % (ref 34–48)
HEMOGLOBIN: 13.8 G/DL (ref 11.5–15.5)
HEMOGLOBIN: 13.9 G/DL (ref 11.5–15.5)
HEMOGLOBIN: 14.4 G/DL (ref 11.5–15.5)
LACTIC ACID: 0.9 MMOL/L (ref 0.5–2.2)
MAGNESIUM: 1.9 MG/DL (ref 1.6–2.6)
POTASSIUM REFLEX MAGNESIUM: 3.2 MMOL/L (ref 3.5–5)
SEDIMENTATION RATE, ERYTHROCYTE: 17 MM/HR (ref 0–20)
SODIUM BLD-SCNC: 138 MMOL/L (ref 132–146)
TOTAL PROTEIN: 6.2 G/DL (ref 6.4–8.3)

## 2021-03-01 PROCEDURE — 2060000000 HC ICU INTERMEDIATE R&B

## 2021-03-01 PROCEDURE — 36415 COLL VENOUS BLD VENIPUNCTURE: CPT

## 2021-03-01 PROCEDURE — 6370000000 HC RX 637 (ALT 250 FOR IP): Performed by: FAMILY MEDICINE

## 2021-03-01 PROCEDURE — 86900 BLOOD TYPING SEROLOGIC ABO: CPT

## 2021-03-01 PROCEDURE — 2580000003 HC RX 258: Performed by: FAMILY MEDICINE

## 2021-03-01 PROCEDURE — 86140 C-REACTIVE PROTEIN: CPT

## 2021-03-01 PROCEDURE — 85014 HEMATOCRIT: CPT

## 2021-03-01 PROCEDURE — 85018 HEMOGLOBIN: CPT

## 2021-03-01 PROCEDURE — 96365 THER/PROPH/DIAG IV INF INIT: CPT

## 2021-03-01 PROCEDURE — 6370000000 HC RX 637 (ALT 250 FOR IP): Performed by: INTERNAL MEDICINE

## 2021-03-01 PROCEDURE — 96367 TX/PROPH/DG ADDL SEQ IV INF: CPT

## 2021-03-01 PROCEDURE — 80048 BASIC METABOLIC PNL TOTAL CA: CPT

## 2021-03-01 PROCEDURE — 86850 RBC ANTIBODY SCREEN: CPT

## 2021-03-01 PROCEDURE — 99232 SBSQ HOSP IP/OBS MODERATE 35: CPT | Performed by: INTERNAL MEDICINE

## 2021-03-01 PROCEDURE — G0378 HOSPITAL OBSERVATION PER HR: HCPCS

## 2021-03-01 PROCEDURE — 83605 ASSAY OF LACTIC ACID: CPT

## 2021-03-01 PROCEDURE — 2500000003 HC RX 250 WO HCPCS: Performed by: INTERNAL MEDICINE

## 2021-03-01 PROCEDURE — 85651 RBC SED RATE NONAUTOMATED: CPT

## 2021-03-01 PROCEDURE — 6360000002 HC RX W HCPCS: Performed by: FAMILY MEDICINE

## 2021-03-01 PROCEDURE — 80076 HEPATIC FUNCTION PANEL: CPT

## 2021-03-01 PROCEDURE — 96376 TX/PRO/DX INJ SAME DRUG ADON: CPT

## 2021-03-01 PROCEDURE — 86901 BLOOD TYPING SEROLOGIC RH(D): CPT

## 2021-03-01 PROCEDURE — 83735 ASSAY OF MAGNESIUM: CPT

## 2021-03-01 PROCEDURE — 96366 THER/PROPH/DIAG IV INF ADDON: CPT

## 2021-03-01 PROCEDURE — 6360000002 HC RX W HCPCS: Performed by: INTERNAL MEDICINE

## 2021-03-01 PROCEDURE — C9113 INJ PANTOPRAZOLE SODIUM, VIA: HCPCS | Performed by: FAMILY MEDICINE

## 2021-03-01 RX ORDER — POTASSIUM CHLORIDE 20 MEQ/1
40 TABLET, EXTENDED RELEASE ORAL ONCE
Status: COMPLETED | OUTPATIENT
Start: 2021-03-01 | End: 2021-03-01

## 2021-03-01 RX ORDER — CIPROFLOXACIN 500 MG/1
500 TABLET, FILM COATED ORAL 2 TIMES DAILY
Qty: 14 TABLET | Refills: 0 | Status: SHIPPED | OUTPATIENT
Start: 2021-03-01 | End: 2021-03-08

## 2021-03-01 RX ORDER — METRONIDAZOLE 500 MG/1
500 TABLET ORAL 3 TIMES DAILY
Qty: 21 TABLET | Refills: 0 | Status: SHIPPED | OUTPATIENT
Start: 2021-03-01 | End: 2021-03-08

## 2021-03-01 RX ADMIN — Medication 10 ML: at 22:12

## 2021-03-01 RX ADMIN — Medication 5 MG: at 23:15

## 2021-03-01 RX ADMIN — METRONIDAZOLE 500 MG: 500 INJECTION, SOLUTION INTRAVENOUS at 22:11

## 2021-03-01 RX ADMIN — METRONIDAZOLE 500 MG: 500 INJECTION, SOLUTION INTRAVENOUS at 09:49

## 2021-03-01 RX ADMIN — SODIUM CHLORIDE, PRESERVATIVE FREE 10 ML: 5 INJECTION INTRAVENOUS at 09:50

## 2021-03-01 RX ADMIN — SODIUM CHLORIDE, PRESERVATIVE FREE 10 ML: 5 INJECTION INTRAVENOUS at 21:57

## 2021-03-01 RX ADMIN — Medication 1000 UNITS: at 09:48

## 2021-03-01 RX ADMIN — SODIUM CHLORIDE, PRESERVATIVE FREE 10 ML: 5 INJECTION INTRAVENOUS at 23:15

## 2021-03-01 RX ADMIN — POTASSIUM CHLORIDE 40 MEQ: 20 TABLET, EXTENDED RELEASE ORAL at 09:54

## 2021-03-01 RX ADMIN — ATORVASTATIN CALCIUM 10 MG: 10 TABLET, FILM COATED ORAL at 20:30

## 2021-03-01 RX ADMIN — PANTOPRAZOLE SODIUM 40 MG: 40 INJECTION, POWDER, FOR SOLUTION INTRAVENOUS at 09:48

## 2021-03-01 RX ADMIN — PANTOPRAZOLE SODIUM 40 MG: 40 INJECTION, POWDER, FOR SOLUTION INTRAVENOUS at 23:15

## 2021-03-01 RX ADMIN — CIPROFLOXACIN 400 MG: 2 INJECTION, SOLUTION INTRAVENOUS at 00:22

## 2021-03-01 RX ADMIN — Medication 10 ML: at 09:55

## 2021-03-01 RX ADMIN — CIPROFLOXACIN 400 MG: 2 INJECTION, SOLUTION INTRAVENOUS at 13:15

## 2021-03-01 RX ADMIN — DILTIAZEM HYDROCHLORIDE 240 MG: 240 CAPSULE, COATED, EXTENDED RELEASE ORAL at 20:30

## 2021-03-01 RX ADMIN — METRONIDAZOLE 500 MG: 500 INJECTION, SOLUTION INTRAVENOUS at 02:16

## 2021-03-01 ASSESSMENT — PAIN DESCRIPTION - ORIENTATION: ORIENTATION: LOWER

## 2021-03-01 ASSESSMENT — PAIN SCALES - GENERAL: PAINLEVEL_OUTOF10: 0

## 2021-03-01 ASSESSMENT — PAIN DESCRIPTION - FREQUENCY: FREQUENCY: CONTINUOUS

## 2021-03-01 ASSESSMENT — PAIN DESCRIPTION - LOCATION: LOCATION: ABDOMEN

## 2021-03-01 ASSESSMENT — PAIN DESCRIPTION - PROGRESSION: CLINICAL_PROGRESSION: GRADUALLY IMPROVING

## 2021-03-01 NOTE — CONSULTS
Cici Wilson M.D. [facility]  Dr. Ashley Gaffney M.D.,  Dr. Jessa Linder M.D.,  Dr. Artie Diaz D.O.,  Dr. Daysi Todd D.O. ,  Dr. Linh Rodriguez M.D.,  Dr. Clive Walker D.O. Harriet Shepard  65 y.o.  female      Re: Lower GI bleed, acute colitis  Requesting physician: Dr. Teresa Cortez  Date:4:56 PM 3/1/2021          HPI: This is a 68-year-old female patient presenting to the emergency department because of rectal bleeding. Patient reports on Saturday developing abdominal pain which she describes as moderately severe to severe and diffuse. Patient reports pain to be with a character of cramping and without clear provoking or relieving factors. Patient reports that pain was associated with initially nonbloody diarrhea/loose stool which subsequently turned bloody. Patient describes stool as bright red blood with possible blood clots but denies noticing any black stool. She reports some nausea and dry heaves but no emesis including no hematemesis emesis of coffee-ground material.  Patient denies any significant fever but reports chills. She denies shortness of breath, cough or wheezing. She denies chest pain or palpitations. Patient reports significantly improved abdominal pain which small amount of blood with her most recent bowel movement which she has been 4 to 5 hours ago and none since. 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 Patient reports most recent colonoscopy to have been performed between 7 and 10 years ago by Dr. Maria Isabel Gerber in our office which patient reports to be fairly unremarkable. Patient has family history of CRC (brother diagnosed at age 72). Patient denies significant amount of NSAIDs, oral anticoagulation. She takes daily aspirin.   Upon presentation CT scan of the abdomen and pelvis was obtained reporting diffuse thickening of the colon distal to the hepatic flexure. White blood cell count was 11.5 with hemoglobin of 16 however baseline appears to be between 14 and 15. Today her hemoglobin is 14.4.     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, vulva 1985    HTN (hypertension)     Hyperlipidemia     LBBB (left bundle branch block)     Mitral regurgitation     PONV (postoperative nausea and vomiting)     Tachycardia     Thyroid disease        PSHx:  Past Surgical History:   Procedure Laterality Date    BREAST BIOPSY      benign    COLONOSCOPY      DIAGNOSTIC [facility]AC [facility]ED[facility]  [facility]09/[facility]    ENDOSCOPY, COLON, DIAGNOSTIC      LITHOTRIPSY      VERY SMALL Marie [place] TIME GETTING STENTS IN, ALOT OF ANESTH, VERY SICK AFTER    LITHOTRIPSY Left 1/30/2020    LEFT ESWL EXTRACORPOREAL SHOCK WAVE LITHOTRIPSY performed by Iris Woodall MD at [address] [facility] W/INTERP&RPT N/A 8/17/2018    ESOPHAGEAL MOTILITY/MANOMETRY STUDY performed by Julia Reeves DO at [address] [place], REPAIR PARAESOPHAGEAL HERNIA, INCL FUNDOPLASTY W/ MESH N/A 10/3/2018    LAPAROSCOPIC HIATAL HERNIA REPAIR WITH MESH performed by Sari Pierce MD at [address] [facility] ENDOSCOPY  6/17/15    VULVAR/PERINEAL BIOPSY  1985       Meds:  Current Facility-Administered Medications   Medication Dose Route Frequency Provider Last Rate Last Admin    atorvastatin (LIPITOR) tablet 10 mg  10 mg Oral Nightly Lisbet Gerardo DO   10 mg at 02/28/21 2247    vitamin D (CHOLECALCIFEROL) tablet 1,000 Units  1,000 Units Oral Daily Ana Gerardo DO   1,000 Units at 03/01/21 0948    sodium chloride flush 0.9 % injection 10 mL  10 mL Intravenous 2 times per day Ana Gerardo DO   10 mL at 03/01/21 5674  sodium chloride flush 0.9 % injection 10 mL  10 mL Intravenous PRN Ashish Caves Scheufler, DO        acetaminophen (TYLENOL) tablet 650 mg  650 mg Oral Q6H PRN Ashish Caves Scheufler, DO   650 mg at 21 2247    Or    acetaminophen (TYLENOL) suppository 650 mg  650 mg Rectal Q6H PRN Ashish Caves Scheufler, DO        pantoprazole (PROTONIX) injection 40 mg  40 mg Intravenous Q12H Lisbet J Scheufler, DO   40 mg at 21 6187    And    sodium chloride (PF) 0.9 % injection 10 mL  10 mL Intravenous Q12H Lisbet J Scheufler, DO   10 mL at 21 0950    ondansetron (ZOFRAN) injection 4 mg  4 mg Intravenous Q6H PRN Ashish Caves Scheufler, DO        0.9 % sodium chloride infusion   Intravenous Continuous Ashish Caves Scheufler,  mL/hr at 21 2246 New Bag at 21 2246    dilTIAZem (CARDIZEM CD) extended release capsule 240 mg  240 mg Oral Daily Lisbet J Scheufler, DO   240 mg at 21 2247    melatonin tablet 5 mg  5 mg Oral Nightly PRN Hortencia Burroughs MD   5 mg at 21 2340    ciprofloxacin (CIPRO) IVPB 400 mg  400 mg Intravenous Q12H Hortencia Burroughs MD   Stopped at 21 1415    metronidazole (FLAGYL) 500 mg in NaCl 100 mL IVPB premix  500 mg Intravenous Breanna Hrenandez MD   Stopped at 21 1050       SocHx:  Social History     Socioeconomic History    Marital status:      Spouse name: Not on file    Number of children: Not on file    Years of education: Not on file    Highest education level: Not on file   Occupational History    Not on file   Social Needs    Financial resource strain: Not on file    Food insecurity     Worry: Not on file     Inability: Not on file   [employer] needs     Medical: Not on file     Non-medical: Not on file   Tobacco Use    Smoking status: Former Smoker     Packs/day: 0.50     Years: 4.00     Pack years: 2.00     Types: Cigarettes     Quit date: 1969     Years since quittin.1    Smokeless tobacco: Never Used   Substance and Sexual Activity    Alcohol use: Yes     Comment: rare glass of wine    Drug use: No    Sexual activity: Never   Lifestyle    Physical activity     Days per week: Not on file     Minutes per session: Not on file    Stress: Not on file   Relationships    Social connections     Talks on phone: Not on file     Gets together: Not on file     Attends Confucianist service: Not on file     Active member of club or organization: Not on file     Attends meetings of clubs or organizations: Not on file     Relationship status: Not on file    Intimate partner violence     Fear of current or ex partner: Not on file     Emotionally abused: Not on file     Physically abused: Not on file     Forced sexual activity: Not on file   Other Topics Concern    Not on file   Social History Narrative    No caffienated drinks. FamHx:  Family History   Problem Relation Age of Onset    Heart Attack Father     Other Father         bowel disorder    Cancer Other     Diabetes Other     Asthma Son        Allergy:No Known Allergies      ROS: As described in the HPI and in addition is negative upon detailed review of systems or unobtainable unless otherwise stated in this dictation. PE:  BP (!) 144/75   Pulse 84   Temp 98.4 °F (36.9 °C) (Oral)   Resp 18   Ht 4' 11\" (1.499 m)   Wt 146 lb 14.4 oz (66.6 kg)   SpO2 95%   BMI 29.67 kg/m²     Gen.: NAD/ female  Head: Atraumatic/normocephalic  Eyes:Anicteric sclera//no conjunctival erythema  ENT:moist oral mucosa  Neck: Supple/trachea midline  Chest: CTA B  Cor: Regular/S1-S2  Abd.: Soft/nondistended.   Minimally diffusely tender mostly in the lower abdomen  Extr.:  No peripheral edema  Muscles: Decreased tone and bulk, consistent with age and condition  Skin: Warm and dry        DATA:     Lab Results   Component Value Date    WBC 11.5 02/28/2021    RBC 5.30 02/28/2021    HGB 14.4 03/01/2021    HCT 45.4 03/01/2021    MCV 88.1 02/28/2021    MCH 30.2 02/28/2021    MCHC 34.3 02/28/2021    RDW 13.2 02/28/2021     02/28/2021    MPV 9.9 02/28/2021     Lab Results   Component Value Date     03/01/2021    K 3.2 03/01/2021     03/01/2021    CO2 25 03/01/2021    BUN 10 03/01/2021    CREATININE 0.7 03/01/2021    CALCIUM 8.5 03/01/2021    PROT 6.2 03/01/2021    LABALBU 3.5 03/01/2021    LABALBU 4.1 02/16/2011    BILITOT 0.7 03/01/2021    ALKPHOS 95 03/01/2021    AST 20 03/01/2021    ALT 9 03/01/2021     No results found for: LIPASE  No results found for: AMYLASE      ASSESSMENT/PLAN:  1. Colitis/hematochezia  -Broad differential diagnosis likely ischemic and less likely infectious or inflammatory  -Agree with antibiotics  -Start clear liquid  -Monitor clinical improvement in laboratory work  -Decrease frequency of H&H checks      Above has been discussed in detail with the patient and her  at bedside. Patient is inquiring if it possible for further work-up to be done as outpatient. If patient H&H remained stable and her symptoms continue to improve, consider increasing that in the morning with discharge home for outpatient colonoscopy, otherwise plan for colonoscopy Wednesday 3/3. Above has been discussed with the patient and her  at bedside and all questions answered to their satisfaction. They are agreeable with the plan as delineated    Thank you for the opportunity to see this patient in consultation    Berenice Crigler, MD  3/1/2021  4:56 PM    NOTE:  This report was transcribed using voice recognition software. Every effort was made to ensure accuracy; however, inadvertent computerized transcription errors may be present.

## 2021-03-01 NOTE — PROGRESS NOTES
P Quality Flow/Interdisciplinary Rounds Progress Note        Quality Flow Rounds held on March 1, 2021    Disciplines Attending:  Bedside Nurse,  and     Jorge Gar was admitted on 2/28/2021 10:06 PM    Anticipated Discharge Date:  Expected Discharge Date: 03/02/21    Disposition:    Julián Score:  Julián Scale Score: 21    Readmission Risk              Risk of Unplanned Readmission:        7           Discussed patient goal for the day, patient clinical progression, and barriers to discharge. The following Goal(s) of the Day/Commitment(s) have been identified:  monitor H/H values, check GI consult's plan.       Jas Veronica  March 1, 2021

## 2021-03-01 NOTE — PROGRESS NOTES
[facility]   Progress Note    Admitting Date and Time: 2/28/2021 10:06 PM  Admit Dx: GI bleed [K92.2]     Seen for follow on multiple problems as listed below. Subjective:  Continues with rectal bleed, as per patient this morning she had liquidy  brown bowel movement with mixed blood , she feels it is slowing down. She denies any abdominal pain but has some lower abdominal discomfort. Denies any nausea or vomiting. No chest pain or short of breath. ROS: denies fever, chills, cp, sob, n/v, HA unless stated above.      atorvastatin  10 mg Oral Nightly    Vitamin D  1,000 Units Oral Daily    sodium chloride flush  10 mL Intravenous 2 times per day    pantoprazole  40 mg Intravenous Q12H    And    sodium chloride (PF)  10 mL Intravenous Q12H    dilTIAZem  240 mg Oral Daily    ciprofloxacin  400 mg Intravenous Q12H    metroNIDAZOLE  500 mg Intravenous Q8H         sodium chloride flush, 10 mL, PRN      acetaminophen, 650 mg, Q6H PRN    Or      acetaminophen, 650 mg, Q6H PRN      ondansetron, 4 mg, Q6H PRN      melatonin, 5 mg, Nightly PRN         Objective:    BP (!) 144/88   Pulse 105   Temp 100.3 °F (37.9 °C) (Oral)   Resp 16   Ht 4' 11\" (1.499 m)   Wt 146 lb 14.4 oz (66.6 kg)   SpO2 93%   BMI 29.67 kg/m²   General Appearance: alert and oriented to person, place and time, in no acute distress  Skin: warm and dry  Head: normocephalic and atraumatic  Eyes: pupils equal, round, and reactive to light, extraocular eye movements intact, conjunctivae normal  Neck: supple and non-tender without mass  Pulmonary/Chest: clear to auscultation bilaterally  Cardiovascular: normal rate, regular rhythm, normal S1 and S2  Abdomen: soft, non-tender, non-distended, normal bowel sounds, no masses or organomegaly  Extremities: no cyanosis, clubbing   Musculoskeletal: normal range of motion  Neurologic:  no cranial nerve deficit,  speech normal      Recent Labs     02/28/21  1620

## 2021-03-01 NOTE — H&P
[facility] History and Physical      CHIEF COMPLAINT:  hematochezia    History of Present Illness: 55-year-old female who states she has a history of left bundle branch block, diastolic dysfunction, hypertension, hyperlipidemia. Yesterday she started to get diarrhea with some cramping. Had dry heaves. Today she started to notice bright red blood with her bowel movements. Had some chills last night. States the blood is throughout the whole bowel movement. No history of hemorrhoids. Presented to an outside ED, CT showed colitis therefore referred for admission. Reports colonoscopy by Dr. Charan Barahona a few years ago that showed polyps. No sick contacts. Did not eat anything out of the ordinary.     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, vulva 1985    HTN (hypertension)     Hyperlipidemia     LBBB (left bundle branch block)     Mitral regurgitation     PONV (postoperative nausea and vomiting)     Tachycardia     Thyroid disease        Surgical History:  Past Surgical History:   Procedure Laterality Date    BREAST BIOPSY      benign    COLONOSCOPY      DIAGNOSTIC CARDIAC [facility]EDURE  [facility]/09/01    [facility]    ENDOSCOPY, COLON, DIAGNOSTIC      LITHOTRIPSY      VERY SMALL URETER,TERRIBLE TIME GETTING STENTS IN, ALOT OF ANESTH, VERY SICK AFTER    LITHOTRIPSY Left 1/30/2020    LEFT ESWL EXTRACORPOREAL SHOCK WAVE LITHOTRIPSY performed by Robert Davis MD at [address] [facility] W/INTERP&RPT N/A 8/17/2018    ESOPHAGEAL MOTILITY/MANOMETRY STUDY performed by aDda Post DO at [address] [facility] [place], REPAIR PARAESOPHAGEAL HERNIA, INCL FUNDOPLASTY W/ MESH N/A 10/3/2018    LAPAROSCOPIC HIATAL HERNIA REPAIR WITH MESH performed by Jakub Skelton MD at [facility]      UPPER GASTROINTESTINAL ENDOSCOPY  6/17/15    VULVAR/PERINEAL BIOPSY  1985       Medications Prior to Admission:    Prior to Admission medications    Medication Sig Start Date End Date Taking? Authorizing Provider   Calcium Carbonate (CALCIUM 600 PO) Take by mouth 1 1/2 tabs daily    Historical Provider, MD   atorvastatin (LIPITOR) 10 MG tablet Take 1 tablet by mouth once daily 10/22/20   Emily Wylie MD   coenzyme Q-10 100 MG capsule Take 2 capsules by mouth daily 9/21/20   Emily Wylie MD   dilTIAZem (CARTIA XT) 240 MG extended release capsule TAKE ONE CAPSULE BY MOUTH ONCE DAILY 5/12/20   Mar Hurst MD   ibuprofen (ADVIL;MOTRIN) 200 MG tablet Take 200 mg by mouth every 6 hours as needed for Pain    Historical Provider, MD   Nutritional Supplements (SOY PROTEIN SHAKE) POWD Take by mouth daily     Historical Provider, MD   Valerian Root 500 MG CAPS Take 2,000 mg by mouth every evening    Historical Provider, MD   [place]-3 1000 MG CAPS Take by mouth daily    Historical Provider, MD   vitamin D (CHOLECALCIFEROL) 1000 UNIT TABS tablet Take 1,000 Units by mouth daily    Historical Provider, MD   Multiple Minerals-Vitamins (CALCIUM-MAGNESIUM-ZINC-D3) TABS Take by mouth daily    Historical Provider, MD   Biotin 1000 MCG TABS Take 2,000 mcg by mouth daily     Historical Provider, MD   DHEA 50 MG CAPS Take by mouth daily    Historical Provider, MD   aspirin EC 81 MG EC tablet Take 81 mg by mouth daily. Historical Provider, MD   Flaxseed, Linseed, (FLAXSEED OIL PO) Take 1,300 mg by mouth daily     Historical Provider, MD       Allergies:    Patient has no known allergies. Social History:    reports that she quit smoking about 52 years ago. Her smoking use included cigarettes. She has a 2.00 pack-year smoking history. She has never used smokeless tobacco. She reports current alcohol use.  She reports that she does not use drugs. Family History:   family history includes Asthma in her son; Cancer in an other family member; Diabetes in an other family member; Heart Attack in her father; Other in her father. PHYSICAL EXAM:  Vitals:  BP (!) 144/88   Pulse 105   Temp 100.3 °F (37.9 °C) (Oral)   Resp 16   Ht 4' 11\" (1.499 m)   SpO2 93%   BMI 29.49 kg/m²   General Appearance: alert and oriented to person, place and time and in no acute distress  Skin: warm and dry, turgor not diminished  Head: normocephalic and atraumatic  Eyes: pupils equal, round, and reactive to light, extraocular eye movements intact, conjunctivae normal  Neck: neck supple and non tender without mass   Pulmonary/Chest: clear to auscultation bilaterally- no wheezes, rales or rhonchi, normal air movement, no respiratory distress  Cardiovascular: normal rate, normal S1 and S2 and no M/R/R  Abdomen: soft, mild tenderness in the lower quadrants, non-distended, normal bowel sounds, no masses or organomegaly  Extremities: no cyanosis, no clubbing and no edema  Neurologic: no cranial nerve deficit and speech normal        LABS:  Recent Labs     21  1620      K 3.3*      CO2 27   BUN 14   CREATININE 0.8   GLUCOSE 85   CALCIUM 9.1       Recent Labs     21  1620   WBC 11.5   RBC 5.30   HGB 16.0*   HCT 46.7   MCV 88.1   MCH 30.2   MCHC 34.3   RDW 13.2      MPV 9.9       No results for input(s): POCGLU in the last 72 hours. Radiology:   No orders to display       EK days ago showed left bundle branch block    ASSESSMENT:      Principal Problem:    Lower GI bleed  Active Problems:    MR (mitral regurgitation)    HTN (hypertension)    GERD (gastroesophageal reflux disease)    Aden's esophagus    Hiatal hernia    GI bleed    Acute hemorrhagic colitis  Resolved Problems:    * No resolved hospital problems. *      PLAN:  Colitis:? Ischemic vs infectious.    -ciprofloxacin and flagyl  -check blood cx, ESR/crp, lactic acid  -consult GI  -serial H&H    Essential hypertension  -continue cardizem    Code Status: full  DVT prophylaxis: hold      NOTE: This report was transcribed using voice recognition software. Every effort was made to ensure accuracy; however, inadvertent computerized transcription errors may be present.   Electronically signed by Maryann Smith MD on 2/28/2021 at 11:40 PM

## 2021-03-01 NOTE — ED NOTES
[facility] called, spoke with Rick Daniels to transport pt is 60-75 minutes     Rochelle Middleton RN  02/28/21 2011

## 2021-03-01 NOTE — CARE COORDINATION
Met w/ patient. Explained role of  nad plan of care. Lives w/  and adult son in a 1 story house- 1 step to entrance. Very independent PTA- still works. PCP is Dr. Daily Nguyen and pharmacy is [facility]. No Hx DME, HHC, or GERA. Currently on iv abxs. Per pt, plan is to return home on discharge- states no needs. Will follow.  Vasu Be

## 2021-03-02 VITALS
RESPIRATION RATE: 18 BRPM | BODY MASS INDEX: 29.61 KG/M2 | WEIGHT: 146.9 LBS | DIASTOLIC BLOOD PRESSURE: 68 MMHG | SYSTOLIC BLOOD PRESSURE: 129 MMHG | OXYGEN SATURATION: 98 % | HEIGHT: 59 IN | HEART RATE: 86 BPM | TEMPERATURE: 98.6 F

## 2021-03-02 LAB
ANION GAP SERPL CALCULATED.3IONS-SCNC: 6 MMOL/L (ref 7–16)
BASOPHILS ABSOLUTE: 0.03 E9/L (ref 0–0.2)
BASOPHILS RELATIVE PERCENT: 0.3 % (ref 0–2)
BUN BLDV-MCNC: 5 MG/DL (ref 8–23)
CALCIUM SERPL-MCNC: 8.5 MG/DL (ref 8.6–10.2)
CHLORIDE BLD-SCNC: 106 MMOL/L (ref 98–107)
CO2: 27 MMOL/L (ref 22–29)
CREAT SERPL-MCNC: 0.6 MG/DL (ref 0.5–1)
EOSINOPHILS ABSOLUTE: 0.19 E9/L (ref 0.05–0.5)
EOSINOPHILS RELATIVE PERCENT: 2 % (ref 0–6)
GFR AFRICAN AMERICAN: >60
GFR NON-AFRICAN AMERICAN: >60 ML/MIN/1.73
GLUCOSE BLD-MCNC: 97 MG/DL (ref 74–99)
HCT VFR BLD CALC: 38.7 % (ref 34–48)
HEMOGLOBIN: 12.5 G/DL (ref 11.5–15.5)
IMMATURE GRANULOCYTES #: 0.06 E9/L
IMMATURE GRANULOCYTES %: 0.6 % (ref 0–5)
LYMPHOCYTES ABSOLUTE: 1.85 E9/L (ref 1.5–4)
LYMPHOCYTES RELATIVE PERCENT: 19.6 % (ref 20–42)
MCH RBC QN AUTO: 28.9 PG (ref 26–35)
MCHC RBC AUTO-ENTMCNC: 32.3 % (ref 32–34.5)
MCV RBC AUTO: 89.4 FL (ref 80–99.9)
MONOCYTES ABSOLUTE: 0.78 E9/L (ref 0.1–0.95)
MONOCYTES RELATIVE PERCENT: 8.2 % (ref 2–12)
NEUTROPHILS ABSOLUTE: 6.55 E9/L (ref 1.8–7.3)
NEUTROPHILS RELATIVE PERCENT: 69.3 % (ref 43–80)
PDW BLD-RTO: 13.2 FL (ref 11.5–15)
PLATELET # BLD: 210 E9/L (ref 130–450)
PMV BLD AUTO: 9.7 FL (ref 7–12)
POTASSIUM SERPL-SCNC: 3.3 MMOL/L (ref 3.5–5)
RBC # BLD: 4.33 E12/L (ref 3.5–5.5)
SODIUM BLD-SCNC: 139 MMOL/L (ref 132–146)
WBC # BLD: 9.5 E9/L (ref 4.5–11.5)

## 2021-03-02 PROCEDURE — 80048 BASIC METABOLIC PNL TOTAL CA: CPT

## 2021-03-02 PROCEDURE — C9113 INJ PANTOPRAZOLE SODIUM, VIA: HCPCS | Performed by: FAMILY MEDICINE

## 2021-03-02 PROCEDURE — 6360000002 HC RX W HCPCS: Performed by: INTERNAL MEDICINE

## 2021-03-02 PROCEDURE — 36415 COLL VENOUS BLD VENIPUNCTURE: CPT

## 2021-03-02 PROCEDURE — 6360000002 HC RX W HCPCS: Performed by: FAMILY MEDICINE

## 2021-03-02 PROCEDURE — 99238 HOSP IP/OBS DSCHRG MGMT 30/<: CPT | Performed by: INTERNAL MEDICINE

## 2021-03-02 PROCEDURE — 96366 THER/PROPH/DIAG IV INF ADDON: CPT

## 2021-03-02 PROCEDURE — 85025 COMPLETE CBC W/AUTO DIFF WBC: CPT

## 2021-03-02 PROCEDURE — 6370000000 HC RX 637 (ALT 250 FOR IP): Performed by: FAMILY MEDICINE

## 2021-03-02 PROCEDURE — 96376 TX/PRO/DX INJ SAME DRUG ADON: CPT

## 2021-03-02 PROCEDURE — 6370000000 HC RX 637 (ALT 250 FOR IP): Performed by: INTERNAL MEDICINE

## 2021-03-02 PROCEDURE — 96375 TX/PRO/DX INJ NEW DRUG ADDON: CPT

## 2021-03-02 PROCEDURE — G0378 HOSPITAL OBSERVATION PER HR: HCPCS

## 2021-03-02 PROCEDURE — 2580000003 HC RX 258: Performed by: FAMILY MEDICINE

## 2021-03-02 PROCEDURE — 2500000003 HC RX 250 WO HCPCS: Performed by: INTERNAL MEDICINE

## 2021-03-02 RX ORDER — PANTOPRAZOLE SODIUM 40 MG/1
40 TABLET, DELAYED RELEASE ORAL
Qty: 30 TABLET | Refills: 0 | Status: SHIPPED | OUTPATIENT
Start: 2021-03-02 | End: 2021-09-21

## 2021-03-02 RX ORDER — POTASSIUM CHLORIDE 20 MEQ/1
40 TABLET, EXTENDED RELEASE ORAL ONCE
Status: COMPLETED | OUTPATIENT
Start: 2021-03-02 | End: 2021-03-02

## 2021-03-02 RX ADMIN — CIPROFLOXACIN 400 MG: 2 INJECTION, SOLUTION INTRAVENOUS at 00:58

## 2021-03-02 RX ADMIN — METRONIDAZOLE 500 MG: 500 INJECTION, SOLUTION INTRAVENOUS at 09:53

## 2021-03-02 RX ADMIN — PANTOPRAZOLE SODIUM 40 MG: 40 INJECTION, POWDER, FOR SOLUTION INTRAVENOUS at 09:52

## 2021-03-02 RX ADMIN — POTASSIUM CHLORIDE 40 MEQ: 20 TABLET, EXTENDED RELEASE ORAL at 15:02

## 2021-03-02 RX ADMIN — Medication 1000 UNITS: at 09:52

## 2021-03-02 RX ADMIN — ONDANSETRON 4 MG: 2 INJECTION INTRAMUSCULAR; INTRAVENOUS at 00:58

## 2021-03-02 RX ADMIN — SODIUM CHLORIDE, PRESERVATIVE FREE 10 ML: 5 INJECTION INTRAVENOUS at 09:52

## 2021-03-02 ASSESSMENT — PAIN DESCRIPTION - LOCATION: LOCATION: ABDOMEN

## 2021-03-02 ASSESSMENT — PAIN SCALES - GENERAL
PAINLEVEL_OUTOF10: 0
PAINLEVEL_OUTOF10: 0

## 2021-03-02 ASSESSMENT — PAIN DESCRIPTION - PAIN TYPE: TYPE: ACUTE PAIN

## 2021-03-02 NOTE — PROGRESS NOTES
Notified Dr Marlen Colon via telephone to notify that GI signed off pending tolerating advanced diet.  Order placed to advance diet

## 2021-03-02 NOTE — DISCHARGE SUMMARY
[facility] Physician Discharge Summary       Desmond Laird MD  [facility] [address] [phone]                PCP in 1 week      Activity level: As tolerated    Diet: DIET GENERAL; Lactose Controlled    Home with self care    Condition on Discharge - stable       Patient ID:  Dheeraj Oliver  [id number]  70 y.o.  1950    Admit date: 2/28/2021    Discharge date and time:  3/2/2021  10:44 AM    Admission Diagnoses: Principal Problem:    Lower GI bleed  Active Problems:    MR (mitral regurgitation)    HTN (hypertension)    GERD (gastroesophageal reflux disease)    Aden's esophagus    Hiatal hernia    GI bleed    Acute hemorrhagic colitis  Resolved Problems:    * No resolved hospital problems. *      Discharge Diagnoses: Principal Problem:    Lower GI bleed  Active Problems:    MR (mitral regurgitation)    HTN (hypertension)    GERD (gastroesophageal reflux disease)    Aden's esophagus    Hiatal hernia    GI bleed    Acute hemorrhagic colitis  Resolved Problems:    * No resolved hospital problems. *      Consults:  IP CONSULT TO GI  IP CONSULT TO IV TEAM    Hospital Course:   78-year-old female with past medical history of hypertension, diastolic dysfunction, hyperlipidemia, left bundle branch block came to ER with lower abdominal cramping, loose bowel movements/diarrhea with rectal bleed. Denied any fever chills or rigors. Denied any nausea vomiting. She had bright red blood per rectum with bowel movement. No history of hemorrhoids. Work-up in ER with stable H&H 16/46.7 , CT abdomen plus pelvis with diffuse thickening of colon distal to hepatic flexure compatible with colitis  ischemic or inflammatory. She was started on IV Cipro plus Flagyl, IV fluids and other supportive treatment. She was managed as below. Rectal bleed /hematochezia/mild acute blood loss anemia - H&H  was monitored  and remained stable. Had mild drop but stable. On discharge it is 12.5/38,7. Bleeding has stopped. Last BM yesterday afternoon and was brown semisolid with minor bleed. Gi was consulted & following and recommend out pt colonoscopy. If tolerates regular diet then can be discharged. She will follow with dr Nilesh Whaley as out pt.     Acute colitis as per CT-ischemic versus infectious versus inflammatory. GI was  consulted and following & feels most likely has ischemic colitis. On discharge IV abx were switched to po as below.     Other co morbid conditions were managed by continuing home meds.     Discharge Exam:  Vitals:    03/01/21 1540 03/01/21 2015 03/02/21 0100 03/02/21 0945   BP: (!) 144/75 (!) 155/72 (!) 112/56 129/68   Pulse: 84 81 74 86   Resp: 18 18 18 18   Temp: 98.4 °F (36.9 °C) 98.8 °F (37.1 °C) 97.4 °F (36.3 °C) 98.6 °F (37 °C)   TempSrc: Oral Oral Oral Oral   SpO2:  97% 96% 98%   Weight:       Height:           General Appearance: alert and oriented to person, place and time,in no acute distress  Skin: warm and dry  Head: normocephalic and atraumatic  Eyes: pupils equal, round, and reactive to light, extraocular eye movements intact, conjunctivae normal  Neck: supple and non-tender without mass  Pulmonary/Chest: clear to auscultation bilaterally  Cardiovascular: normal rate, regular rhythm, normal S1 and S2  Abdomen: soft, non-tender, non-distended, normal bowel sounds, no masses or organomegaly  Extremities: no cyanosis, clubbing   Musculoskeletal: normal range of motion  Neurologic:no cranial nerve deficit,speech normal        LABS:  Recent Labs     02/28/21  1620 03/01/21  0240 03/02/21  0435    138 139   K 3.3* 3.2* 3.3*    105 106   CO2 27 25 27   BUN 14 10 5*   CREATININE 0.8 0.7 0.6   GLUCOSE 85 91 97   CALCIUM 9.1 8.5* 8.5*       Recent Labs     02/28/21  1620 02/28/21  1620 03/01/21  1346 03/01/21  2135 03/02/21  0435   WBC 11.5  --   --   --  9.5   RBC 5.30  --   --   --  4.33   HGB 16.0*   < > 14.4 13.8 12.5   HCT 46.7   < > 45.4 41.1 38.7   MCV 88.1  --   --   --  89.4   MCH 30.2  --   --   --  28.9   MCHC 34.3  --   --   --  32.3   RDW 13.2  --   --   --  13.2     --   --   --  210   MPV 9.9  --   --   --  9.7    < > = values in this interval not displayed. No results for input(s): POCGLU in the last 72 hours.     Imaging:   No orders to display       Patient Instructions:      Medication List      START taking these medications    ciprofloxacin 500 MG tablet  Commonly known as: CIPRO  Take 1 tablet by mouth 2 times daily for 7 days     metroNIDAZOLE 500 MG tablet  Commonly known as: FLAGYL  Take 1 tablet by mouth 3 times daily for 7 days     pantoprazole 40 MG tablet  Commonly known as: PROTONIX  Take 1 tablet by mouth every morning (before breakfast)        CONTINUE taking these medications    aspirin EC 81 MG EC tablet     atorvastatin 10 MG tablet  Commonly known as: LIPITOR  Take 1 tablet by mouth once daily     Biotin 1000 MCG Tabs     CALCIUM 600 PO     Calcium-Magnesium-Zinc-D3 Tabs     coenzyme Q-10 100 MG capsule  Take 2 capsules by mouth daily     DHEA 50 MG Caps     dilTIAZem 240 MG extended release capsule  Commonly known as: Cartia XT  TAKE ONE CAPSULE BY MOUTH ONCE DAILY     FLAXSEED OIL PO     Emory-3 1000 MG Caps     Soy Protein Shake Powd     Valerian Root 500 MG Caps     vitamin D 1000 UNIT Tabs tablet  Commonly known as: CHOLECALCIFEROL        STOP taking these medications    ibuprofen 200 MG tablet  Commonly known as: ADVIL;MOTRIN           Where to Get Your Medications      These medications were sent to [facility], [address] [place] [address], [address] [place] [address]    Phone: [phone]   · pantoprazole 40 MG tablet     You can get these medications from any pharmacy    Bring a paper prescription for each of these medications  · ciprofloxacin 500 MG tablet  · metroNIDAZOLE 500 MG tablet           Note that  30 minutes was spent in preparing discharge papers, discussing discharge with patient, medication review, etc.    Signed:  Electronically signed by Gonzalo Bacon MD on 3/2/2021 at 10:44 AM    NOTE: This report was transcribed using voice recognition software. Every effort was made to ensure accuracy; however, inadvertent computerized transcription errors may be present.

## 2021-03-02 NOTE — PLAN OF CARE
Problem:  Bowel/Gastric:  Goal: Control of bowel function will improve  Description: Control of bowel function will improve  Outcome: Ongoing  Goal: Ability to achieve a regular elimination pattern will improve  Description: Ability to achieve a regular elimination pattern will improve  Outcome: Ongoing

## 2021-03-02 NOTE — PROGRESS NOTES
CLINICAL PHARMACY NOTE: MEDS TO [address] [place] [address] Drive Select Patient?: Yes  Total # of Prescriptions Filled: 3   The following medications were delivered to the patient:  · Ciprofloxacin 500 mg   · Metronidazole 500 mg   · Pantoprazole 40 mg     Total # of Interventions Completed: 3  Time Spent (min): 30    Additional Documentation:

## 2021-03-02 NOTE — PROGRESS NOTES
[facility] Quality Flow/Interdisciplinary Rounds Progress Note        Quality Flow Rounds held on March 2, 2021    Disciplines Attending:  Bedside Nurse, ,  and Nursing Unit Leadership    Riley Grimes was admitted on 2/28/2021 10:06 PM    Anticipated Discharge Date:  Expected Discharge Date: 03/03/21    Disposition:    Julián Score:  Julián Scale Score: 21    Readmission Risk              Risk of Unplanned Readmission:        7           Discussed patient goal for the day, patient clinical progression, and barriers to discharge.   The following Goal(s) of the Day/Commitment(s) have been identified:  discharge later today once tolerates advanced diet      Shireen Diamond  March 2, 2021

## 2021-03-02 NOTE — PLAN OF CARE
Pertinent notes/labs reviewed. Discussed with nursing. No further rectal bleed. Advance diet and if tolerated patient can be discharged from GI POV. No restrictions for returning to work from GI POV. Prescription for antibiotics placed on chart. Follow-up in the office in 1-2 weeks after discharge, earlier if necessary -  Patient to call for appointment and with questions / concerns at [phone].       Samia Avila MD  3/2/2021  9:35 AM

## 2021-03-03 ENCOUNTER — TELEPHONE (OUTPATIENT)
Dept: INTERNAL MEDICINE | Age: 71
End: 2021-03-03

## 2021-03-03 NOTE — TELEPHONE ENCOUNTER
[facility] Trans[facility] Initial Follow Up Call    Outreach made within 2 business days of discharge: Yes    Patient: Sarai Flower Patient :    MRN: [id number]  Reason for Admission: There are no discharge diagnoses documented for the most recent discharge. Discharge Date: 3/2/21       Spoke with: Allie Cotto    Discharge department/facility: home    TCM Interactive Patient Contact:  Was patient able to fill all prescriptions: Yes  Was patient instructed to bring all medications to the follow-up visit: Yes  Is patient taking all medications as directed in the discharge summary? Yes  Does patient understand their discharge instructions: Yes  Does patient have questions or concerns that need addressed prior to 7-14 day follow up office visit: yes -  Pt states that she has not had a bowel movement since she entered the hospital on Saturday, and upon discharge pt was told to call gastroenterology to be seen and have colonoscopy. Pt states when she called [facility] office- they will not schedule her for colonoscopy because she has not been seen in over a year. Pt stated they scheduled an OV for her 3/19 and she said it seems like the colonoscopy will not be scheduled any time soon. Pt would like a return phone call on how to get this resolved. Thank you.      Appointment with PCP within 7-14 days    Follow Up  Future Appointments   Date Time Provider Ebenezer Parks   3/16/2021 11:00 AM MD [facility] [address]

## 2021-03-04 NOTE — TELEPHONE ENCOUNTER
Pt states she does not take any medication for bowel, ps pt has not had a bowel movement since last Saturday. .the patient contact [phone], currently at counseling.

## 2021-03-04 NOTE — TELEPHONE ENCOUNTER
Recommend to increase water and fiber intake and take colace twice daily.  Recommend an appt next week with pcp

## 2021-03-05 ENCOUNTER — TELEPHONE (OUTPATIENT)
Dept: CARDIOLOGY CLINIC | Age: 71
End: 2021-03-05

## 2021-03-05 NOTE — TELEPHONE ENCOUNTER
Pt was seen by you  02/26/21 and you ordered an echo to re evaluate Mitral regurgitation and EF. This has not been scheduled yet. Since her visit she was hospitalized and is now concerned with having too many medical bills. She is asking if this test is urgent or if it can wait 6 more moths so that she doesn't get overwhelmed with Bills if she doesn't have to.  Please advise

## 2021-03-09 ENCOUNTER — TELEPHONE (OUTPATIENT)
Dept: CARDIOLOGY CLINIC | Age: 71
End: 2021-03-09

## 2021-03-09 NOTE — TELEPHONE ENCOUNTER
DOMINICK CALLED REGARDING HER ECHO. SHE WAS JUST IN THE HOSPITAL AND HAS A LOT OF BILLS COMING IN. CAN SHE WAIT ON THE ECHO FOR 6 MONTHS? PER DR WILKS  IT IS OK TO WAIT THE 6 MONTHS. I CALLED DOMINICK AND GAVE HER THE ABOVE INFORMATION.  RLL

## 2021-03-16 ENCOUNTER — OFFICE VISIT (OUTPATIENT)
Dept: FAMILY MEDICINE CLINIC | Age: 71
End: 2021-03-16
Payer: MEDICARE

## 2021-03-16 VITALS
BODY MASS INDEX: 28.03 KG/M2 | RESPIRATION RATE: 18 BRPM | WEIGHT: 142.8 LBS | HEART RATE: 93 BPM | SYSTOLIC BLOOD PRESSURE: 134 MMHG | TEMPERATURE: 96.8 F | DIASTOLIC BLOOD PRESSURE: 76 MMHG | HEIGHT: 60 IN

## 2021-03-16 DIAGNOSIS — I10 ESSENTIAL HYPERTENSION: ICD-10-CM

## 2021-03-16 DIAGNOSIS — M19.042 LOCALIZED OSTEOARTHRITIS OF LEFT HAND: ICD-10-CM

## 2021-03-16 DIAGNOSIS — E87.6 HYPOKALEMIA: Primary | ICD-10-CM

## 2021-03-16 DIAGNOSIS — E78.5 HYPERLIPIDEMIA, UNSPECIFIED HYPERLIPIDEMIA TYPE: ICD-10-CM

## 2021-03-16 DIAGNOSIS — E87.6 HYPOKALEMIA: ICD-10-CM

## 2021-03-16 LAB
ANION GAP SERPL CALCULATED.3IONS-SCNC: 13 MMOL/L (ref 7–16)
BUN BLDV-MCNC: 21 MG/DL (ref 8–23)
CALCIUM SERPL-MCNC: 9.2 MG/DL (ref 8.6–10.2)
CHLORIDE BLD-SCNC: 100 MMOL/L (ref 98–107)
CHOLESTEROL, TOTAL: 172 MG/DL (ref 0–199)
CO2: 27 MMOL/L (ref 22–29)
CREAT SERPL-MCNC: 1.3 MG/DL (ref 0.5–1)
GFR AFRICAN AMERICAN: 49
GFR NON-AFRICAN AMERICAN: 40 ML/MIN/1.73
GLUCOSE BLD-MCNC: 121 MG/DL (ref 74–99)
HDLC SERPL-MCNC: 39 MG/DL
LDL CHOLESTEROL CALCULATED: 111 MG/DL (ref 0–99)
POTASSIUM SERPL-SCNC: 3.6 MMOL/L (ref 3.5–5)
SODIUM BLD-SCNC: 140 MMOL/L (ref 132–146)
TRIGL SERPL-MCNC: 109 MG/DL (ref 0–149)
VLDLC SERPL CALC-MCNC: 22 MG/DL

## 2021-03-16 PROCEDURE — 99214 OFFICE O/P EST MOD 30 MIN: CPT | Performed by: FAMILY MEDICINE

## 2021-03-16 ASSESSMENT — PATIENT HEALTH QUESTIONNAIRE - PHQ9
1. LITTLE INTEREST OR PLEASURE IN DOING THINGS: 0
SUM OF ALL RESPONSES TO PHQ9 QUESTIONS 1 & 2: 0
2. FEELING DOWN, DEPRESSED OR HOPELESS: 0

## 2021-03-16 NOTE — PROGRESS NOTES
FM Progress Note    Subjective:   Recent diarrhea and then blood in stool. Admitted. Hospital Course (per Dr. Jacque Javierost):   69-year-old female with past medical history of hypertension, diastolic dysfunction, hyperlipidemia, left bundle branch block came to ER with lower abdominal cramping, loose bowel movements/diarrhea with rectal bleed. Denied any fever chills or rigors. Denied any nausea vomiting. She had bright red blood per rectum with bowel movement. No history of hemorrhoids. Work-up in ER with stable H&H 16/46.7 , CT abdomen plus pelvis with diffuse thickening of colon distal to hepatic flexure compatible with colitis  ischemic or inflammatory. She was started on IV Cipro plus Flagyl, IV fluids and other supportive treatment. She was managed as below. Rectal bleed /hematochezia/mild acute blood loss anemia - H&H  was monitored  and remained stable. Had mild drop but stable. On discharge it is 12.5/38,7. Bleeding has stopped. Last BM yesterday afternoon and was brown semisolid with minor bleed. Gi was consulted & following and recommend out pt colonoscopy. If tolerates regular diet then can be discharged. She will follow with dr Pat Chapin as out pt. Acute colitis as per CT-ischemic versus infectious versus inflammatory. GI was  consulted and following & feels most likely has ischemic colitis. On discharge IV abx were switched to po as below. No further blood in stool aside from small amount y'day, but feels this may be after the cscope. No abd pain. Will see GI in 4 wks. L hand arthritis. Harder to make a fist. One month. Getting covid shots.  losing memory. Pt is caregiver, and she is concerned. Low potassium on latest blood work. For hypertension on diltiazem. Blood pressure controlled. 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 Going to [place] on August 24th. Flying there for a week.      Health Maintenance Due   Topic Date Due    DTaP/Tdap/Td vaccine (1 - Tdap) Never done    Lipid screen  01/17/2020           Objective:   /76 (Site: Left Upper Arm)   Pulse 93   Temp 96.8 °F (36 °C) (Temporal)   Resp 18   Ht 4' 11.5\" (1.511 m)   Wt 142 lb 12.8 oz (64.8 kg)   BMI 28.36 kg/m²   General appearance: NAD, alert and interacting appropriately  HEENT: NCAT, PERRLA, EOMI   Resp: CTAB, no WRC  CVS: RRR, 2/6 JEN RSB  Abdomen: BS +, SNDNT  Extremities: No clubbing, cyanosis, or edema. Warm. Dry. I have reviewed this patient's previous records. I have reviewed this patient's labs. I have reviewed this patient's medications. Assessment/Plan:    Alden Bruce was seen today for hypertension. Diagnoses and all orders for this visit:    Hypokalemia  -     BASIC METABOLIC PANEL; Future    Essential hypertension  -     BASIC METABOLIC PANEL; Future    Hyperlipidemia, unspecified hyperlipidemia type  -     Lipid Panel; Future    Localized osteoarthritis of left hand  -     diclofenac sodium (VOLTAREN) 1 % GEL; Apply topically 2 times daily            Patient Instructions   Follow up with the heart doctor. Return in about 6 months (around 9/16/2021) for hypertension.     Electronically signed by Vincent Jeffries MD on 3/16/2021 at 2:36 PM

## 2021-03-16 NOTE — TELEPHONE ENCOUNTER
Diclofenac 1% gel is not covered by patients insurance, pt will hv to buy OTC or PCP will hv to send rx to compounding pharmacy

## 2021-03-19 DIAGNOSIS — R79.89 ELEVATED SERUM CREATININE: Primary | ICD-10-CM

## 2021-03-30 ENCOUNTER — HOSPITAL ENCOUNTER (OUTPATIENT)
Age: 71
Discharge: HOME | End: 2021-03-30
Payer: MEDICARE

## 2021-03-30 DIAGNOSIS — R79.89 ELEVATED SERUM CREATININE: ICD-10-CM

## 2021-03-30 LAB
ANION GAP SERPL CALCULATED.3IONS-SCNC: 12 MMOL/L (ref 7–16)
BUN BLDV-MCNC: 19 MG/DL (ref 8–23)
CALCIUM SERPL-MCNC: 10.2 MG/DL (ref 8.6–10.2)
CHLORIDE BLD-SCNC: 104 MMOL/L (ref 98–107)
CO2: 27 MMOL/L (ref 22–29)
CREAT SERPL-MCNC: 1 MG/DL (ref 0.5–1)
GFR AFRICAN AMERICAN: >60
GFR NON-AFRICAN AMERICAN: 54 ML/MIN/1.73
GLUCOSE BLD-MCNC: 126 MG/DL (ref 74–99)
POTASSIUM SERPL-SCNC: 3.9 MMOL/L (ref 3.5–5)
SODIUM BLD-SCNC: 143 MMOL/L (ref 132–146)

## 2021-03-30 PROCEDURE — 80048 BASIC METABOLIC PNL TOTAL CA: CPT

## 2021-03-30 PROCEDURE — 36415 COLL VENOUS BLD VENIPUNCTURE: CPT

## 2021-04-02 ENCOUNTER — HOSPITAL ENCOUNTER (OUTPATIENT)
Age: 71
Discharge: HOME | End: 2021-04-04
Payer: MEDICARE

## 2021-04-02 ENCOUNTER — HOSPITAL ENCOUNTER (OUTPATIENT)
Dept: GENERAL RADIOLOGY | Age: 71
Discharge: HOME | End: 2021-04-04
Payer: MEDICARE

## 2021-04-02 DIAGNOSIS — K59.00 CONSTIPATION, UNSPECIFIED CONSTIPATION TYPE: ICD-10-CM

## 2021-04-02 PROCEDURE — 74018 RADEX ABDOMEN 1 VIEW: CPT

## 2021-05-04 ENCOUNTER — TELEPHONE (OUTPATIENT)
Dept: CARDIOLOGY | Age: 71
End: 2021-05-04

## 2021-05-06 ENCOUNTER — TELEPHONE (OUTPATIENT)
Dept: CARDIOLOGY | Age: 71
End: 2021-05-06

## 2021-05-21 ENCOUNTER — HOSPITAL ENCOUNTER (EMERGENCY)
Age: 71
Discharge: HOME | End: 2021-05-22
Attending: EMERGENCY MEDICINE
Payer: MEDICARE

## 2021-05-21 VITALS
BODY MASS INDEX: 28.02 KG/M2 | DIASTOLIC BLOOD PRESSURE: 76 MMHG | TEMPERATURE: 97.8 F | OXYGEN SATURATION: 96 % | WEIGHT: 139 LBS | HEART RATE: 92 BPM | RESPIRATION RATE: 16 BRPM | HEIGHT: 59 IN | SYSTOLIC BLOOD PRESSURE: 130 MMHG

## 2021-05-21 DIAGNOSIS — R31.0 GROSS HEMATURIA: Primary | ICD-10-CM

## 2021-05-21 PROCEDURE — 36415 COLL VENOUS BLD VENIPUNCTURE: CPT

## 2021-05-21 PROCEDURE — 87088 URINE BACTERIA CULTURE: CPT

## 2021-05-21 PROCEDURE — 99283 EMERGENCY DEPT VISIT LOW MDM: CPT

## 2021-05-21 PROCEDURE — 81001 URINALYSIS AUTO W/SCOPE: CPT

## 2021-05-21 PROCEDURE — 85025 COMPLETE CBC W/AUTO DIFF WBC: CPT

## 2021-05-21 PROCEDURE — 80048 BASIC METABOLIC PNL TOTAL CA: CPT

## 2021-05-22 LAB
ANION GAP SERPL CALCULATED.3IONS-SCNC: 10 MMOL/L (ref 7–16)
BACTERIA: ABNORMAL /HPF
BASOPHILS ABSOLUTE: 0.04 E9/L (ref 0–0.2)
BASOPHILS RELATIVE PERCENT: 0.6 % (ref 0–2)
BILIRUBIN URINE: ABNORMAL
BLOOD, URINE: ABNORMAL
BUN BLDV-MCNC: 22 MG/DL (ref 6–23)
CALCIUM SERPL-MCNC: 9.9 MG/DL (ref 8.6–10.2)
CHLORIDE BLD-SCNC: 105 MMOL/L (ref 98–107)
CLARITY: ABNORMAL
CO2: 26 MMOL/L (ref 22–29)
COLOR: ABNORMAL
CREAT SERPL-MCNC: 0.9 MG/DL (ref 0.5–1)
EOSINOPHILS ABSOLUTE: 0.11 E9/L (ref 0.05–0.5)
EOSINOPHILS RELATIVE PERCENT: 1.6 % (ref 0–6)
GFR AFRICAN AMERICAN: >60
GFR NON-AFRICAN AMERICAN: >60 ML/MIN/1.73
GLUCOSE BLD-MCNC: 116 MG/DL (ref 74–99)
GLUCOSE URINE: NEGATIVE MG/DL
HCT VFR BLD CALC: 38.6 % (ref 34–48)
HEMOGLOBIN: 13.2 G/DL (ref 11.5–15.5)
IMMATURE GRANULOCYTES #: 0.04 E9/L
IMMATURE GRANULOCYTES %: 0.6 % (ref 0–5)
KETONES, URINE: ABNORMAL MG/DL
LEUKOCYTE ESTERASE, URINE: ABNORMAL
LYMPHOCYTES ABSOLUTE: 1.98 E9/L (ref 1.5–4)
LYMPHOCYTES RELATIVE PERCENT: 28 % (ref 20–42)
MCH RBC QN AUTO: 29.8 PG (ref 26–35)
MCHC RBC AUTO-ENTMCNC: 34.2 % (ref 32–34.5)
MCV RBC AUTO: 87.1 FL (ref 80–99.9)
MONOCYTES ABSOLUTE: 0.74 E9/L (ref 0.1–0.95)
MONOCYTES RELATIVE PERCENT: 10.5 % (ref 2–12)
NEUTROPHILS ABSOLUTE: 4.17 E9/L (ref 1.8–7.3)
NEUTROPHILS RELATIVE PERCENT: 58.7 % (ref 43–80)
NITRITE, URINE: NEGATIVE
PDW BLD-RTO: 13.7 FL (ref 11.5–15)
PH UA: 5 (ref 5–9)
PLATELET # BLD: 240 E9/L (ref 130–450)
PMV BLD AUTO: 9.9 FL (ref 7–12)
POTASSIUM SERPL-SCNC: 3.4 MMOL/L (ref 3.5–5)
PROTEIN UA: 30 MG/DL
RBC # BLD: 4.43 E12/L (ref 3.5–5.5)
RBC UA: ABNORMAL /HPF (ref 0–2)
SODIUM BLD-SCNC: 141 MMOL/L (ref 132–146)
SPECIFIC GRAVITY UA: >=1.03 (ref 1–1.03)
UROBILINOGEN, URINE: 0.2 E.U./DL
WBC # BLD: 7.1 E9/L (ref 4.5–11.5)
WBC UA: ABNORMAL /HPF (ref 0–5)

## 2021-05-22 PROCEDURE — 6370000000 HC RX 637 (ALT 250 FOR IP): Performed by: EMERGENCY MEDICINE

## 2021-05-22 RX ORDER — CEFDINIR 300 MG/1
300 CAPSULE ORAL 2 TIMES DAILY
Qty: 6 CAPSULE | Refills: 0 | Status: SHIPPED | OUTPATIENT
Start: 2021-05-22 | End: 2021-05-25

## 2021-05-22 RX ORDER — CEFDINIR 300 MG/1
300 CAPSULE ORAL ONCE
Status: COMPLETED | OUTPATIENT
Start: 2021-05-22 | End: 2021-05-22

## 2021-05-22 RX ORDER — IBUPROFEN 400 MG/1
400 TABLET ORAL EVERY 8 HOURS PRN
Qty: 8 TABLET | Refills: 0 | Status: SHIPPED | OUTPATIENT
Start: 2021-05-22 | End: 2022-07-22

## 2021-05-22 RX ADMIN — CEFDINIR 300 MG: 300 CAPSULE ORAL at 01:18

## 2021-05-22 NOTE — ED NOTES
Discharge instructions and prescriptions given. Patient states verbal understanding. Ambulatory to exit.        Blanca Martinez RN  05/22/21 0819

## 2021-05-22 NOTE — ED PROVIDER NOTES
HPI:  5/22/21, Time: 1:09 AM EDT        Princess Oliver is a 66 y.o. female presenting to the ED for hematuria, beginning 3 to 4 days ago. The complaint has been persistent, mild in severity, and worsened by nothing. Patient denies any fever/chills, no nausea/vomiting, no flank pain, no abdominal pain, no change in bowel habit patterns, no urinary frequency nor hesitancy associated. She is not had any other complaints reported    Review of Systems:   A complete review of systems was performed and pertinent positives and negatives are stated within HPI, all other systems reviewed and are negative.    --------------------------------------------- PAST HISTORY ---------------------------------------------  Past Medical History:  has a past medical history of Arthritis, Cancer ([facility] [address] HTN (hypertension), Hyperlipidemia, LBBB (left bundle branch block), Mitral regurgitation, PONV (postoperative nausea and vomiting), Tachycardia, and Thyroid disease. Past Surgical History:  has a past surgical history that includes Diagnostic Cardiac Cath Lab Procedure (11/09/01); Tonsillectomy; Lithotripsy; Breast biopsy; Skin cancer excision; Upper gastrointestinal endoscopy (6/17/15); vulvar/perineal biopsy (1985); pr esophageal motility study w/interp&rpt (N/A, 8/17/2018); Colonoscopy; Endoscopy, colon, diagnostic; pr lap, repair paraesophageal hernia, incl fundoplasty w/ mesh (N/A, 10/3/2018); and Lithotripsy (Left, 1/30/2020). Social History:  reports that she quit smoking about 52 years ago. Her smoking use included cigarettes. She has a 2.00 pack-year smoking history. She has never used smokeless tobacco. She reports current alcohol use. She reports that she does not use drugs. Family History: family history includes Asthma in her son; Cancer in an other family member; Diabetes in an other family member; Heart Attack in her father; Other in her father. The patients home medications have been reviewed.     Allergies: Patient has no known allergies.     -------------------------------------------------- RESULTS -------------------------------------------------  All laboratory and radiology results have been personally reviewed by myself   LABS:  Results for orders placed or performed during the hospital encounter of 05/21/21   Urinalysis   Result Value Ref Range    Color, UA BROWN (A) Straw/Yellow    Clarity, UA CLOUDY (A) Clear    Glucose, Ur Negative Negative mg/dL    Bilirubin Urine SMALL (A) Negative    Ketones, Urine TRACE (A) Negative mg/dL    Specific Gravity, UA >=1.030 1.005 - 1.030    Blood, Urine LARGE (A) Negative    pH, UA 5.0 5.0 - 9.0    Protein, UA 30 (A) Negative mg/dL    Urobilinogen, Urine 0.2 <2.0 E.U./dL    Nitrite, Urine Negative Negative    Leukocyte Esterase, Urine SMALL (A) Negative   Basic Metabolic Panel   Result Value Ref Range    Sodium 141 132 - 146 mmol/L    Potassium 3.4 (L) 3.5 - 5.0 mmol/L    Chloride 105 98 - 107 mmol/L    CO2 26 22 - 29 mmol/L    Anion Gap 10 7 - 16 mmol/L    Glucose 116 (H) 74 - 99 mg/dL    BUN 22 6 - 23 mg/dL    CREATININE 0.9 0.5 - 1.0 mg/dL    GFR Non-African American >60 >=60 mL/min/1.73    GFR African American >60     Calcium 9.9 8.6 - 10.2 mg/dL   CBC Auto Differential   Result Value Ref Range    WBC 7.1 4.5 - 11.5 E9/L    RBC 4.43 3.50 - 5.50 E12/L    Hemoglobin 13.2 11.5 - 15.5 g/dL    Hematocrit 38.6 34.0 - 48.0 %    MCV 87.1 80.0 - 99.9 fL    MCH 29.8 26.0 - 35.0 pg    MCHC 34.2 32.0 - 34.5 %    RDW 13.7 11.5 - 15.0 fL    Platelets 950 882 - 534 E9/L    MPV 9.9 7.0 - 12.0 fL    Neutrophils % 58.7 43.0 - 80.0 %    Immature Granulocytes % 0.6 0.0 - 5.0 %    Lymphocytes % 28.0 20.0 - 42.0 %    Monocytes % 10.5 2.0 - 12.0 %    Eosinophils % 1.6 0.0 - 6.0 %    Basophils % 0.6 0.0 - 2.0 %    Neutrophils Absolute 4.17 1.80 - 7.30 E9/L    Immature Granulocytes # 0.04 E9/L    Lymphocytes Absolute 1.98 1.50 - 4.00 E9/L    Monocytes Absolute 0.74 0.10 - 0.95 E9/L    Eosinophils Absolute 0.11 0.05 - 0.50 E9/L    Basophils Absolute 0.04 0.00 - 0.20 E9/L   Microscopic Urinalysis   Result Value Ref Range    WBC, UA 1-3 0 - 5 /HPF    RBC, UA PACKED 0 - 2 /HPF    Bacteria, UA MODERATE (A) None Seen /HPF       RADIOLOGY:  Interpreted by Radiologist.  No orders to display       ------------------------- NURSING NOTES AND VITALS REVIEWED ---------------------------    The nursing notes within the ED encounter and vital signs as below have been reviewed. /76   Pulse 92   Temp 97.8 °F (36.6 °C) (Temporal)   Resp 16   Ht 4' 11\" (1.499 m)   Wt 139 lb (63 kg)   SpO2 96%   BMI 28.07 kg/m²   Oxygen Saturation Interpretation: Normal      ---------------------------------------------------PHYSICAL EXAM--------------------------------------      Constitutional/General: Alert and oriented x3, well appearing, non toxic in NAD  Head: Normocephalic and atraumatic  Eyes: PERRL, EOMI  Mouth: Oropharynx clear, handling secretions, no trismus  Neck: Supple, full ROM, no JVD. Trachea midline   Pulmonary: Lungs clear to auscultation bilaterally, no wheezes, rales, or rhonchi. Not in respiratory distress  Cardiovascular:  Regular rate and rhythm, no murmurs, gallops, or rubs. 2+ distal pulses  Abdomen: Soft, non tender, non distended, no organomegaly no masses no guarding no rigidity. Normoactive bowel sounds. No CVA tenderness bilaterally  Extremities: Moves all extremities x 4. Warm and well perfused  Skin: warm and dry without rash  Neurologic: GCS 15, cranial nerves II through XII intact no focal deficits.   No meningeal signs  Psych: Normal Affect      ------------------------------ ED COURSE/MEDICAL DECISION MAKING----------------------  Medications   cefdinir (OMNICEF) capsule 300 mg (has no administration in time range)         ED COURSE:     Medical Decision Making:   Patient's laboratory screening values confirm intact renal function and no significant anemia associate with her gross hematuria. Patient does have bacteriuria associated and a urine culture was sent. Patient was started on empiric therapy with short duration, renally age-adjusted course of Omnicef. Urologic follow-up is mandated. Patient understands she is to get immediate medical attention if she develops any new or worsening symptoms. Counseling: The emergency provider has spoken with the patient and spouse/SO and discussed todays results, in addition to providing specific details for the plan of care and counseling regarding the diagnosis and prognosis. Questions are answered at this time and they are agreeable with the plan.    --------------------------------- IMPRESSION AND DISPOSITION ---------------------------------    IMPRESSION  1. Gross hematuria        DISPOSITION  Disposition: Discharge to home  Patient condition is stable      NOTE: This report was transcribed using voice recognition software.  Every effort was made to ensure accuracy; however, inadvertent computerized transcription errors may be present        Alonso Charles MD  05/22/21 2153

## 2021-05-24 LAB — URINE CULTURE, ROUTINE: NORMAL

## 2021-05-28 ENCOUNTER — TELEPHONE (OUTPATIENT)
Dept: FAMILY MEDICINE CLINIC | Age: 71
End: 2021-05-28

## 2021-05-28 NOTE — TELEPHONE ENCOUNTER
Pt calling and states her arthritis in her left hand is bothering her especially her ring finger. She wants to know if by wearing her ring that's why her arthritis hurts more on that finger.  Please advise

## 2021-05-31 DIAGNOSIS — I10 ESSENTIAL HYPERTENSION: ICD-10-CM

## 2021-06-01 RX ORDER — DILTIAZEM HYDROCHLORIDE 240 MG/1
CAPSULE, COATED, EXTENDED RELEASE ORAL
Qty: 90 CAPSULE | Refills: 3 | Status: SHIPPED
Start: 2021-06-01 | End: 2021-06-24 | Stop reason: ALTCHOICE

## 2021-06-02 NOTE — TELEPHONE ENCOUNTER
Hard to say for sure about the ring, but pt can take diclofenac gel up to 4 times per day for the finger pain. Thanks.

## 2021-06-08 ENCOUNTER — TELEPHONE (OUTPATIENT)
Dept: CARDIOLOGY | Age: 71
End: 2021-06-08

## 2021-06-09 ENCOUNTER — HOSPITAL ENCOUNTER (OUTPATIENT)
Dept: CARDIOLOGY | Age: 71
Discharge: HOME | End: 2021-06-09
Payer: MEDICARE

## 2021-06-09 DIAGNOSIS — I34.0 NONRHEUMATIC MITRAL VALVE REGURGITATION: ICD-10-CM

## 2021-06-09 DIAGNOSIS — I44.7 LBBB (LEFT BUNDLE BRANCH BLOCK): ICD-10-CM

## 2021-06-09 LAB
LV EF: 48 %
LVEF MODALITY: NORMAL

## 2021-06-09 PROCEDURE — 93306 TTE W/DOPPLER COMPLETE: CPT

## 2021-06-14 ENCOUNTER — PREP FOR PROCEDURE (OUTPATIENT)
Dept: UROLOGY | Age: 71
End: 2021-06-14

## 2021-06-14 RX ORDER — SODIUM CHLORIDE 0.9 % (FLUSH) 0.9 %
5-40 SYRINGE (ML) INJECTION PRN
Status: CANCELLED | OUTPATIENT
Start: 2021-06-14

## 2021-06-14 RX ORDER — SODIUM CHLORIDE 9 MG/ML
INJECTION, SOLUTION INTRAVENOUS CONTINUOUS
Status: CANCELLED | OUTPATIENT
Start: 2021-06-14

## 2021-06-14 RX ORDER — SODIUM CHLORIDE 9 MG/ML
25 INJECTION, SOLUTION INTRAVENOUS PRN
Status: CANCELLED | OUTPATIENT
Start: 2021-06-14

## 2021-06-14 RX ORDER — SODIUM CHLORIDE 0.9 % (FLUSH) 0.9 %
5-40 SYRINGE (ML) INJECTION EVERY 12 HOURS SCHEDULED
Status: CANCELLED | OUTPATIENT
Start: 2021-06-14

## 2021-06-14 NOTE — PROGRESS NOTES
History of chronic LBBB, mitral regurg. Follows yearly with Dr. Quan Amanda. Echo done 6-9-21; results reviewed with Dr. Paulette Oleary. No further preoperative needs identified at this time.

## 2021-06-14 NOTE — PROGRESS NOTES
[facility] PRE-ADMISSION TESTING INSTRUCTIONS    The Preadmission Testing patient is instructed accordingly using the following criteria (check applicable):    ARRIVAL INSTRUCTIONS:  [x] Parking the day of Surgery is located in the Main Entrance lot. Upon entering the door, make an immediate right to the surgery reception desk    [x] Bring photo ID and insurance card    [] Bring in a copy of Living will or Durable Power of  papers. [x] Please be sure to arrange for responsible adult to provide transportation to and from the hospital    [x] Please arrange for responsible adult to be with you for the 24 hour period post procedure due to having anesthesia      GENERAL INSTRUCTIONS:    [x] Nothing by mouth after midnight, including gum, candy, mints or water    [x] You may brush your teeth, but do not swallow any water    [x] Take medications as instructed with 1-2 oz of water    [x] Stop herbal supplements and vitamins 5 days prior to procedure    [x] Follow preop dosing of blood thinners per physician instructions    [] Take 1/2 dose of evening insulin, but no insulin after midnight    [] No oral diabetic medications after midnight    [] If diabetic and have low blood sugar or feel symptomatic, take 1-2oz apple juice only    [] Bring inhalers day of surgery    [] Bring C-PAP/ Bi-Pap day of surgery    [] Bring urine specimen day of surgery    [x] Shower or bath with soap, lather and rinse well, AM of Surgery, no lotion, powders or creams to surgical site    [] Follow bowel prep as instructed per surgeon    [x] No tobacco products within 24 hours of surgery     [x] No alcohol or illegal drug use within 24 hours of surgery.     [x] Jewelry, body piercing's, eyeglasses, contact lenses and dentures are not permitted into surgery (bring cases)      [x] Please do not wear any nail polish, make up or hair products on the day of surgery    [x] You can expect a call the business day prior to procedure to notify you if your arrival time changes    [x] If you receive a survey after surgery we would greatly appreciate your comments    [] Parent/guardian of a minor must accompany their child and remain on the premises  the entire time they are under our care     [] Pediatric patients may bring favorite toy, blanket or comfort item with them    [] A caregiver or family member must remain with the patient during their stay if they are mentally handicapped, have dementia, disoriented or unable to use a call light or would be a safety concern if left unattended    [x] Please notify surgeon if you develop any illness between now and time of surgery (cold, cough, sore throat, fever, nausea, vomiting) or any signs of infections  including skin, wounds, and dental.    [x]  The Outpatient Pharmacy is available to fill your prescription here on your day of surgery, ask your preop nurse for details    [x] Other instructions: Wear comfortable clothing    EDUCATIONAL MATERIALS PROVIDED:    [] PAT Preoperative Education Packet/Booklet     [] Medication List    [] Transfusion bracelet applied with instructions    [] Shower with soap, lather and rinse well, and use CHG wipes provided the evening before surgery as instructed    [] Incentive spirometer with instructions        Have you been tested for COVID  No           Have you been told you were positive for COVID No  Have you had any known exposure to someone that is positive for COVID No  Do you have a cough                   No              Do you have shortness of breath No                 Do you have a sore throat            No                Are you having chills                    No                Are you having muscle aches. No                    Please come to the hospital wearing a mask and have your significant other wear a mask as well.   Both of you should check your temperature before leaving to come here,  if it is 100 or higher please call [phone] for instruction.

## 2021-06-15 ENCOUNTER — ANESTHESIA EVENT (OUTPATIENT)
Dept: OPERATING ROOM | Age: 71
End: 2021-06-15
Payer: MEDICARE

## 2021-06-16 ENCOUNTER — HOSPITAL ENCOUNTER (OUTPATIENT)
Dept: GENERAL RADIOLOGY | Age: 71
Discharge: HOME | End: 2021-06-18
Attending: UROLOGY
Payer: MEDICARE

## 2021-06-16 ENCOUNTER — ANESTHESIA (OUTPATIENT)
Dept: OPERATING ROOM | Age: 71
End: 2021-06-16
Payer: MEDICARE

## 2021-06-16 ENCOUNTER — HOSPITAL ENCOUNTER (OUTPATIENT)
Age: 71
Setting detail: OUTPATIENT SURGERY
Discharge: HOME | End: 2021-06-16
Attending: UROLOGY | Admitting: UROLOGY
Payer: MEDICARE

## 2021-06-16 VITALS
WEIGHT: 137 LBS | OXYGEN SATURATION: 98 % | DIASTOLIC BLOOD PRESSURE: 62 MMHG | HEIGHT: 59 IN | SYSTOLIC BLOOD PRESSURE: 128 MMHG | RESPIRATION RATE: 14 BRPM | BODY MASS INDEX: 27.62 KG/M2 | TEMPERATURE: 97.9 F | HEART RATE: 72 BPM

## 2021-06-16 VITALS — DIASTOLIC BLOOD PRESSURE: 67 MMHG | OXYGEN SATURATION: 99 % | SYSTOLIC BLOOD PRESSURE: 139 MMHG

## 2021-06-16 DIAGNOSIS — R93.1 ABNORMAL ECHOCARDIOGRAM: Primary | ICD-10-CM

## 2021-06-16 DIAGNOSIS — G89.18 POST-OP PAIN: Primary | ICD-10-CM

## 2021-06-16 DIAGNOSIS — R52 PAIN: ICD-10-CM

## 2021-06-16 DIAGNOSIS — I34.0 MITRAL VALVE INSUFFICIENCY, UNSPECIFIED ETIOLOGY: ICD-10-CM

## 2021-06-16 PROCEDURE — 3209999900 FLUORO FOR SURGICAL PROCEDURES

## 2021-06-16 PROCEDURE — 6360000004 HC RX CONTRAST MEDICATION: Performed by: UROLOGY

## 2021-06-16 PROCEDURE — 3700000000 HC ANESTHESIA ATTENDED CARE: Performed by: UROLOGY

## 2021-06-16 PROCEDURE — 7100000010 HC PHASE II RECOVERY - FIRST 15 MIN: Performed by: UROLOGY

## 2021-06-16 PROCEDURE — 2709999900 HC NON-CHARGEABLE SUPPLY: Performed by: UROLOGY

## 2021-06-16 PROCEDURE — 6360000002 HC RX W HCPCS: Performed by: NURSE PRACTITIONER

## 2021-06-16 PROCEDURE — C1769 GUIDE WIRE: HCPCS | Performed by: UROLOGY

## 2021-06-16 PROCEDURE — 3600000003 HC SURGERY LEVEL 3 BASE: Performed by: UROLOGY

## 2021-06-16 PROCEDURE — 6360000002 HC RX W HCPCS: Performed by: NURSE ANESTHETIST, CERTIFIED REGISTERED

## 2021-06-16 PROCEDURE — C1758 CATHETER, URETERAL: HCPCS | Performed by: UROLOGY

## 2021-06-16 PROCEDURE — 3700000001 HC ADD 15 MINUTES (ANESTHESIA): Performed by: UROLOGY

## 2021-06-16 PROCEDURE — C2617 STENT, NON-COR, TEM W/O DEL: HCPCS | Performed by: UROLOGY

## 2021-06-16 PROCEDURE — 3600000013 HC SURGERY LEVEL 3 ADDTL 15MIN: Performed by: UROLOGY

## 2021-06-16 PROCEDURE — 2580000003 HC RX 258: Performed by: NURSE ANESTHETIST, CERTIFIED REGISTERED

## 2021-06-16 PROCEDURE — 2720000010 HC SURG SUPPLY STERILE: Performed by: UROLOGY

## 2021-06-16 PROCEDURE — C1713 ANCHOR/SCREW BN/BN,TIS/BN: HCPCS | Performed by: UROLOGY

## 2021-06-16 PROCEDURE — 2500000003 HC RX 250 WO HCPCS: Performed by: NURSE ANESTHETIST, CERTIFIED REGISTERED

## 2021-06-16 PROCEDURE — 7100000011 HC PHASE II RECOVERY - ADDTL 15 MIN: Performed by: UROLOGY

## 2021-06-16 DEVICE — URETERAL STENT
Type: IMPLANTABLE DEVICE | Site: URETER | Status: FUNCTIONAL
Brand: PERCUFLEX™

## 2021-06-16 RX ORDER — HYDROCODONE BITARTRATE AND ACETAMINOPHEN 5; 325 MG/1; MG/1
1 TABLET ORAL PRN
Status: DISCONTINUED | OUTPATIENT
Start: 2021-06-16 | End: 2021-06-16 | Stop reason: HOSPADM

## 2021-06-16 RX ORDER — SODIUM CHLORIDE, SODIUM LACTATE, POTASSIUM CHLORIDE, CALCIUM CHLORIDE 600; 310; 30; 20 MG/100ML; MG/100ML; MG/100ML; MG/100ML
INJECTION, SOLUTION INTRAVENOUS CONTINUOUS PRN
Status: DISCONTINUED | OUTPATIENT
Start: 2021-06-16 | End: 2021-06-16 | Stop reason: SDUPTHER

## 2021-06-16 RX ORDER — SODIUM CHLORIDE 9 MG/ML
INJECTION, SOLUTION INTRAVENOUS CONTINUOUS
Status: DISCONTINUED | OUTPATIENT
Start: 2021-06-16 | End: 2021-06-16 | Stop reason: HOSPADM

## 2021-06-16 RX ORDER — FENTANYL CITRATE 50 UG/ML
INJECTION, SOLUTION INTRAMUSCULAR; INTRAVENOUS PRN
Status: DISCONTINUED | OUTPATIENT
Start: 2021-06-16 | End: 2021-06-16 | Stop reason: SDUPTHER

## 2021-06-16 RX ORDER — ONDANSETRON 2 MG/ML
INJECTION INTRAMUSCULAR; INTRAVENOUS PRN
Status: DISCONTINUED | OUTPATIENT
Start: 2021-06-16 | End: 2021-06-16 | Stop reason: SDUPTHER

## 2021-06-16 RX ORDER — LIDOCAINE HYDROCHLORIDE 20 MG/ML
INJECTION, SOLUTION EPIDURAL; INFILTRATION; INTRACAUDAL; PERINEURAL PRN
Status: DISCONTINUED | OUTPATIENT
Start: 2021-06-16 | End: 2021-06-16 | Stop reason: SDUPTHER

## 2021-06-16 RX ORDER — SODIUM CHLORIDE 9 MG/ML
25 INJECTION, SOLUTION INTRAVENOUS PRN
Status: DISCONTINUED | OUTPATIENT
Start: 2021-06-16 | End: 2021-06-16 | Stop reason: HOSPADM

## 2021-06-16 RX ORDER — SODIUM CHLORIDE 0.9 % (FLUSH) 0.9 %
5-40 SYRINGE (ML) INJECTION PRN
Status: DISCONTINUED | OUTPATIENT
Start: 2021-06-16 | End: 2021-06-16 | Stop reason: HOSPADM

## 2021-06-16 RX ORDER — OXYCODONE HYDROCHLORIDE AND ACETAMINOPHEN 5; 325 MG/1; MG/1
1 TABLET ORAL EVERY 4 HOURS PRN
Qty: 10 TABLET | Refills: 0 | Status: SHIPPED | OUTPATIENT
Start: 2021-06-16 | End: 2021-06-23

## 2021-06-16 RX ORDER — HYDROCODONE BITARTRATE AND ACETAMINOPHEN 5; 325 MG/1; MG/1
2 TABLET ORAL PRN
Status: DISCONTINUED | OUTPATIENT
Start: 2021-06-16 | End: 2021-06-16 | Stop reason: HOSPADM

## 2021-06-16 RX ORDER — PROPOFOL 10 MG/ML
INJECTION, EMULSION INTRAVENOUS CONTINUOUS PRN
Status: DISCONTINUED | OUTPATIENT
Start: 2021-06-16 | End: 2021-06-16 | Stop reason: SDUPTHER

## 2021-06-16 RX ORDER — SODIUM CHLORIDE 0.9 % (FLUSH) 0.9 %
5-40 SYRINGE (ML) INJECTION EVERY 12 HOURS SCHEDULED
Status: DISCONTINUED | OUTPATIENT
Start: 2021-06-16 | End: 2021-06-16 | Stop reason: HOSPADM

## 2021-06-16 RX ORDER — DEXAMETHASONE SODIUM PHOSPHATE 4 MG/ML
INJECTION, SOLUTION INTRA-ARTICULAR; INTRALESIONAL; INTRAMUSCULAR; INTRAVENOUS; SOFT TISSUE PRN
Status: DISCONTINUED | OUTPATIENT
Start: 2021-06-16 | End: 2021-06-16 | Stop reason: SDUPTHER

## 2021-06-16 RX ORDER — CIPROFLOXACIN 500 MG/1
500 TABLET, FILM COATED ORAL 2 TIMES DAILY
Qty: 10 TABLET | Refills: 0 | Status: SHIPPED | OUTPATIENT
Start: 2021-06-16 | End: 2021-06-21

## 2021-06-16 RX ADMIN — FENTANYL CITRATE 50 MCG: 50 INJECTION, SOLUTION INTRAMUSCULAR; INTRAVENOUS at 17:42

## 2021-06-16 RX ADMIN — SODIUM CHLORIDE, POTASSIUM CHLORIDE, SODIUM LACTATE AND CALCIUM CHLORIDE: 600; 310; 30; 20 INJECTION, SOLUTION INTRAVENOUS at 15:26

## 2021-06-16 RX ADMIN — PROPOFOL 100 MCG/KG/MIN: 10 INJECTION, EMULSION INTRAVENOUS at 17:42

## 2021-06-16 RX ADMIN — PROPOFOL 50 MCG/KG/MIN: 10 INJECTION, EMULSION INTRAVENOUS at 17:58

## 2021-06-16 RX ADMIN — LIDOCAINE HYDROCHLORIDE 100 MG: 20 INJECTION, SOLUTION EPIDURAL; INFILTRATION; INTRACAUDAL; PERINEURAL at 17:41

## 2021-06-16 RX ADMIN — FENTANYL CITRATE 50 MCG: 50 INJECTION, SOLUTION INTRAMUSCULAR; INTRAVENOUS at 17:45

## 2021-06-16 RX ADMIN — Medication 2000 MG: at 17:34

## 2021-06-16 RX ADMIN — ONDANSETRON 4 MG: 2 INJECTION INTRAMUSCULAR; INTRAVENOUS at 17:47

## 2021-06-16 RX ADMIN — SODIUM CHLORIDE, POTASSIUM CHLORIDE, SODIUM LACTATE AND CALCIUM CHLORIDE: 600; 310; 30; 20 INJECTION, SOLUTION INTRAVENOUS at 17:58

## 2021-06-16 RX ADMIN — DEXAMETHASONE SODIUM PHOSPHATE 10 MG: 4 INJECTION, SOLUTION INTRAMUSCULAR; INTRAVENOUS at 17:47

## 2021-06-16 ASSESSMENT — PULMONARY FUNCTION TESTS
PIF_VALUE: 0

## 2021-06-16 ASSESSMENT — PAIN SCALES - GENERAL
PAINLEVEL_OUTOF10: 0
PAINLEVEL_OUTOF10: 0

## 2021-06-16 NOTE — ANESTHESIA PRE PROCEDURE
Department of Anesthesiology  Preprocedure Note       Name:  Tiffany Avelar   Age:  66 y.o.  :  1950                                          MRN:  [id number]         Date:  2021      Surgeon: Vero Carter):  David Henry, DO    Procedure: CYSTOSCOPY RETROGRADE PYELOGRAM URETEROSCOPY J STENT LASER LITHOTRIPSY LEFT (Left )    Medications prior to admission:   Prior to Admission medications    Medication Sig Start Date End Date Taking?  Authorizing Provider   dilTIAZem (CARDIZEM CD) 240 MG extended release capsule Take 1 capsule by mouth once daily 21  Yes Jignesh Salter MD   pantoprazole (PROTONIX) 40 MG tablet Take 1 tablet by mouth every morning (before breakfast) 3/2/21  Yes Edna Silva MD   Calcium Carbonate (CALCIUM 600 PO) Take by mouth 1 1/2 tabs daily   Yes Historical Provider, MD   atorvastatin (LIPITOR) 10 MG tablet Take 1 tablet by mouth once daily 10/22/20  Yes Susan Hahn MD   coenzyme Q-10 100 MG capsule Take 2 capsules by mouth daily 20  Yes Susan Hahn MD   Nutritional Supplements (SOY PROTEIN SHAKE) POWD Take by mouth daily    Yes Historical Provider, MD   Valerian Root 500 MG CAPS Take 2,000 mg by mouth every evening   Yes Historical Provider, MD   [place]-3 1000 MG CAPS Take by mouth daily   Yes Historical Provider, MD   vitamin D (CHOLECALCIFEROL) 1000 UNIT TABS tablet Take 1,000 Units by mouth daily   Yes Historical Provider, MD   Multiple Minerals-Vitamins (CALCIUM-MAGNESIUM-ZINC-D3) TABS Take by mouth daily   Yes Historical Provider, MD   Biotin 1000 MCG TABS Take 2,000 mcg by mouth daily    Yes Historical Provider, MD   DHEA 50 MG CAPS Take by mouth daily   Yes Historical Provider, MD   aspirin EC 81 MG EC tablet Take 81 mg by mouth daily To verify holding w dr   Yes Historical Provider, MD   Flaxseed, Linseed, (FLAXSEED OIL PO) Take 1,300 mg by mouth daily    Yes Historical Provider, MD   ibuprofen (ADVIL;MOTRIN) 400 MG tablet Take 1 tablet by mouth every 8 hours as needed for Pain 5/22/21 5/27/21  Louis Gray MD   diclofenac sodium (VOLTAREN) 1 % GEL Apply topically 2 times daily 3/16/21   Barbara Young MD       Current medications:    Current Facility-Administered Medications   Medication Dose Route Frequency Provider Last Rate Last Admin    HYDROcodone-acetaminophen (NORCO) 5-325 MG per tablet 1 tablet  1 tablet Oral PRN Alex Valdez MD        Or   [facility] HYDROcodone-acetaminophen (NORCO) 5-325 MG per tablet 2 tablet  2 tablet Oral PRN Alex Valdez MD        0.9 % sodium chloride infusion   Intravenous Continuous SALVADOR Toth CNP        0.9 % sodium chloride infusion  25 mL Intravenous PRN SALVADOR Toth CNP        ceFAZolin (ANCEF) 2000 mg in sterile water 20 mL IV syringe  2,000 mg Intravenous On Call to [facility], APRN - CNP        sodium chloride flush 0.9 % injection 5-40 mL  5-40 mL Intravenous 2 times per day SALVADOR Toth CNP        sodium chloride flush 0.9 % injection 5-40 mL  5-40 mL Intravenous PRN SALVADOR Toth CNP           Allergies:  No Known Allergies    Problem List:    Patient Active Problem List   Diagnosis Code    MR (mitral regurgitation) I34.0    HTN (hypertension) I10    Hyperlipidemia E78.5    GERD (gastroesophageal reflux disease) K21.9    Osteoporosis M81.0    Vitamin D deficiency E55.9    Enlarged thyroid E04.9    Somatic dysfunction of lumbar region M99.03    Somatic dysfunction of cervical region M99.01    Weakness of both hips R29.898    Aden's esophagus K22.70    Renal stones N20.0    Hiatal hernia K44.9    GI bleed K92.2    Acute hemorrhagic colitis K52.9    Lower GI bleed K92.2       Past Medical History:        Diagnosis Date    Arthritis     Cancer [facility]) april 2014    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 Edith Bradley yearly    Mitral regurgitation     follows with Dr. Edith Bradley yearly    PONV (postoperative nausea and vomiting)     Tachycardia     follows with Dr. Edith Bradley yearly    Thyroid disease     'enlarged thyroid' ; no meds present       Past Surgical History:        Procedure Laterality Date    BREAST BIOPSY      benign    COLONOSCOPY      DIAG[facility]ST[facility]ARDIAC [facility]ATH LAB PROCEDURE  [facility]    [facility]    ENDOSCOPY, COLON, DIAGNOSTIC      LITHOTRIPSY      VERY SMALL URETER,TERRIBLE TIME GETTING STENTS IN, ALOT OF ANESTH, VERY SICK AFTER    LITHOTRIPSY Left 2020    LEFT ESWL EXTRACORPOREAL SHOCK WAVE LITHOTRIPSY performed by Rosibel Love MD at [address] [facility] W/INTERP&RPT N/A 2018    ESOPHAGEAL MOTILITY/MANOMETRY STUDY performed by Aimee Estes DO at [address] [place], REPAIR PARAESOPHAGEAL HERNIA, INCL FUNDOPLASTY W/ MESH N/A 10/3/2018    LAPAROSCOPIC HIATAL HERNIA REPAIR WITH MESH performed by Brittny Springer MD at [address] [facility] ENDOSCOPY  6/17/15    VULVAR/PERINEAL BIOPSY  1985       Social History:    Social History     Tobacco Use    Smoking status: Former Smoker     Packs/day: 0.50     Years: 4.00     Pack years: 2.00     Types: Cigarettes     Quit date: 1969     Years since quittin.4    Smokeless tobacco: Never Used   Substance Use Topics    Alcohol use: Yes     Comment: rare glass of wine                                Counseling given: Not Answered      Vital Signs (Current):   Vitals:    21 1703 21 1314   BP:  (!) 154/74   Pulse:  75   Resp:  20   Temp:  97.4 °F (36.3 °C)   SpO2:  95%   Weight: 137 lb (62.1 kg) 137 lb (62.1 kg)   Height: 4' 11\" (1.499 m) 4' 11\" (1.499 m)                                              BP Readings from Last 3 Encounters:   21 (!) 154/74   21 130/76   21 134/76       NPO Status: Time of last liquid hyperlipidemia      ECG reviewed  Rhythm: regular  Rate: normal      Cleared by cardiology     Beta Blocker:  Not on Beta Blocker      ROS comment: LBBB     Neuro/Psych:   Negative Neuro/Psych ROS              GI/Hepatic/Renal:   (+) hiatal hernia, GERD: well controlled,           Endo/Other:    (+) hypothyroidism::., malignancy/cancer. Abdominal:           Vascular: negative vascular ROS. Anesthesia Plan      MAC     ASA 4       Induction: intravenous. Anesthetic plan and risks discussed with patient.       Plan discussed with CRNA and surgical team.                  Delfino Adams MD   6/16/2021

## 2021-06-16 NOTE — BRIEF OP NOTE
Brief Postoperative Note      Patient: Karsten Kelly  YOB: 1950  MRN: [id number]    Date of Procedure: 6/16/2021    Pre-Op Diagnosis: URETERAL CALCULUS    Post-Op Diagnosis: Same       Procedure(s):  CYSTOSCOPY RETROGRADE PYELOGRAM URETEROSCOPY J STENT LASER LITHOTRIPSY LEFT    Surgeon(s):  Benjamín Henry DO    Assistant:  * No surgical staff found *    Anesthesia: Monitor Anesthesia Care    Estimated Blood Loss (mL): Minimal    Complications: None    Specimens:   * No specimens in log *    Implants:  * No implants in log *      Drains: * No LDAs found *    Findings: see operative report     Electronically signed by Jayne Sandifer Memo, DO on 6/16/2021 at 4:33 PM

## 2021-06-17 ENCOUNTER — TELEPHONE (OUTPATIENT)
Dept: CARDIOLOGY CLINIC | Age: 71
End: 2021-06-17

## 2021-06-17 NOTE — OP NOTE
[address] [address]                                OPERATIVE REPORT    PATIENT NAME: James Conteh                    :        1950  MED REC NO:   [id number]                            ROOM:  ACCOUNT NO:   [de-identified]                           ADMIT DATE: 2021  PROVIDER:     Rizwan Henry DO    DATE OF PROCEDURE:  2021    PREOPERATIVE DIAGNOSES:  1. A 7 mm left mid ureteral stone. 2.  Left hydronephrosis. 3.  Left flank pain. POSTOPERATIVE DIAGNOSES:  1. A 7 mm left mid ureteral stone. 2.  Left hydronephrosis. 3.  Left flank pain. PROCEDURE PERFORMED:  The patient had:  1. Cystoscopy. 2.  Retrograde pyelograms done under fluoroscopy. 3.  Semi-rigid ureteroscopy. 4.  Laser lithotripsy. 5.  Stone basket extraction. 6.  Stent insertion. 7.  Stent was left on a string. ANESTHESIA:  Monitored anesthesia. SURGEON:  Benjamín Henry DO.    ESTIMATED BLOOD LOSS:  None. CONDITION:  To PACU, stable. ANTIBIOTICS:  Preoperative antibiotics were administered. PATHOLOGIC SPECIMEN:  None. STORY ON THIS PATIENT:  This pleasant 26-year-old  female  presents to [facility] after  having seen Dr. Maru Haider in the office. The patient was  explained the risks, the benefits, and the alternatives of the proposed  surgical procedure and consented to have the procedure done on the  aforementioned date. DESCRIPTION OF PROCEDURE:  This pleasant 26-year-old  female  was brought to the operating room #6 at [facility], placed in the supine position, and induced with  monitored anesthesia. Anesthesia monitored the head and neck areas, IV  access and vital signs throughout the course of the case. Status post  induction, the patient was placed in the dorsal lithotomy position.   All  underlying points were pressure padded. SCDs were applied. Prepped and  draped in normal sterile fashion. I proceeded by taking a 21-Ukrainian  Olympus scope with a 30-degree lens and inserted through the meatus in  an atraumatic fashion. Panendoscopic visualization of the bladder was  devoid of any significant masses, tumors, lesions, or defects. The  right and left ureteral orifices had normal position. I shot a right  retrograde pyelogram, which was normal.  The left retrograde pyelogram  showed a complete ureteral impaction. I was able to get a 0.035  Glidewire past it. I then took a semi-rigid ureteroscope, coursed  easily into the mid ureter. I placed a 200 holmium laser fiber on the  stone with a setting of 0.8 and 20, the stone was systematically laser  ablated. The Orange was used to extract this out of the ureter. No pieces were obtained. There was no injury and/or trauma to the  ureter. Over the 0.035 Glidewire, I placed a 6 x 24 double-J stent with  a string left on. The patient's bladder was drained. She awoke from  anesthesia without complication and brought to PACU in stable condition. PLAN:  1. She can be discharged home today. 2.  Stent can be removed on Monday. 3.  Findings were conveyed to her family. 4.  Pain medication and antibiotics were written. 5.  No intraoperative complications.         [facility],     D: 06/16/2021 18:05:09       T: 06/17/2021 2:21:27     [id number]  Job#: [id number]     Doc#: [id number]    CC:

## 2021-06-17 NOTE — ANESTHESIA POSTPROCEDURE EVALUATION
Department of Anesthesiology  Postprocedure Note    Patient: Ldia Chin  MRN: [id number]  YOB: 1950  Date of evaluation: 6/16/2021  Time:  9:58 PM     Procedure Summary     Date: 06/16/21 Room / Location: [facility] 06 / [facility]    Anesthesia Start: 9823 Anesthesia Stop: 1812    Procedure: CYSTOSCOPY RETROGRADE PYELOGRAM URETEROSCOPY J STENT LASER LITHOTRIPSY LEFT (Left ) Diagnosis: (URETERAL CALCULUS)    Surgeons: Stephanie Henry DO Responsible Provider: Jose Antony MD    Anesthesia Type: MAC ASA Status: 4          Anesthesia Type: MAC    Sree Phase I: Sree Score: 10    Sree Phase II: Sree Score: 10    Last vitals: Reviewed and per EMR flowsheets.        Anesthesia Post Evaluation    Patient location during evaluation: PACU  Patient participation: complete - patient participated  Level of consciousness: awake and alert  Airway patency: patent  Nausea & Vomiting: no vomiting and no nausea  Complications: no  Cardiovascular status: hemodynamically stable  Respiratory status: acceptable  Hydration status: stable

## 2021-06-17 NOTE — TELEPHONE ENCOUNTER
6/17/21   I CALLED DOMINICK TO DISCUSS THE RUSSELL WITH HER. I WENT OVER THE ECHO REPORT. ANSWERED QUESTIONS ABOUT THE RUSSELL AND HER ABILITY TO HIKE BEFORE AND AFTER THE PROCEDURE. SHE AGREED AND WANTED IT DONE AT [facility]. I SCHEDULED THE PROCEDURE FOR 6/23/21 @ 2:00PM.  I FAXED THE ORDERS OVER TO PAT AT [facility].   DANIA

## 2021-06-18 NOTE — H&P
6/18/2021 2:28 PM  Service: Urology  Group: [facility] (Carl/Demetrio/Sixto)    Weldon Apley  [id number]     Chief Complaint: left mid ureteral stone    History of Present Illness: The patient is a 66 y.o. 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 Merline Speedy yearly    Mitral regurgitation     follows with Dr. Merline Speedy yearly    PONV (postoperative nausea and vomiting)     Tachycardia     follows with Dr. Merline Speedy yearly    Thyroid disease     'enlarged thyroid' ; no meds present       Past Surgical History:   Procedure Laterality Date    BREAST BIOPSY      benign    COLONOSCOPY      DIAGNOSTIC CARDIAC C[facility]ED[facility]  [facility]09[facility]    ENDOSCOPY, COLON, DIAGNOSTIC      LITHOTRIPSY      VERY SMALL URETER,TERRIBLE TIME GETTING STENTS IN, ALOT OF ANESTH, VERY SICK AFTER    LITHOTRIPSY Left 1/30/2020    LEFT ESWL EXTRACORPOREAL SHOCK WAVE LITHOTRIPSY performed by Kandice Augustine MD at [facility] LITHOTRIPSY Left 6/16/2021    CYSTOSCOPY RETROGRADE PYELOGRAM URETEROSCOPY J STENT LASER LITHOTRIPSY LEFT performed by Mark Henry DO at [facility] W/INTERP&RPT N/A 8/17/2018    ESOPHAGEAL MOTILITY/MANOMETRY STUDY performed by Azael Randle DO at [address] [place], REPAIR PARAESOPHAGEAL HERNIA, INCL FUNDOPLASTY W/ MESH N/A 10/3/2018    LAPAROSCOPIC HIATAL HERNIA REPAIR WITH MESH performed by Karen Peña MD at [address] [facility] ENDOSCOPY  6/17/15    VULVAR/PERINEAL BIOPSY  1985       Medications Prior to Admission:    No medications prior to admission. Allergies:    Patient has no known allergies.     Social History:    reports that she quit smoking about 52 years ago. Her smoking use included cigarettes. She has a 2.00 pack-year smoking history. She has never used smokeless tobacco. She reports current alcohol use. She reports that she does not use drugs. Family History:   Non-contributory to this urological problem  family history includes Asthma in her son; Cancer in an other family member; Diabetes in an other family member; Heart Attack in her father; Other in her father. Review of Systems:  Respiratory: negative for cough and hemoptysis  Cardiovascular: negative for chest pain and dyspnea  Gastrointestinal: negative for abdominal pain, diarrhea, nausea and vomiting  Derm: negative for rash and skin lesion(s)  Neurological: negative for seizures and tremors  Endocrine: negative for diabetic symptoms including polydipsia and polyuria  : As above in the HPI, otherwise negative  All other reviews are negative    Physical Exam:   Vitals: /62   Pulse 72   Temp 97.9 °F (36.6 °C) (Temporal)   Resp 14   Ht 4' 11\" (1.499 m)   Wt 137 lb (62.1 kg)   SpO2 98%   BMI 27.67 kg/m²   General:  Awake, alert, oriented X 3. Well developed, well nourished, well groomed. No apparent distress. HEENT:  Normocephalic, atraumatic. Pupils equal, round. No scleral icterus. No conjunctival injection. Normal lips, teeth, and gums. No nasal discharge. Neck:  Supple, no masses. Heart:  RRR  Lungs:  No audible wheezing. Respirations symmetric and non-labored. Abdomen:  soft, nontender, no masses, no organomegaly, no peritoneal signs  Extremities:  No clubbing, cyanosis, or edema  Skin:  Warm and dry, no open lesions or rashes  Neuro:  Cranial nerves 2-12 intact, no focal deficits  Rectal: deferred  Genitalia:  Garibay no    Labs:   No results for input(s): WBC, RBC, HGB, HCT, MCV, MCH, MCHC, RDW, PLT, MPV in the last 72 hours. No results for input(s): CREATININE in the last 72 hours. Images:      Assessment: Delonte Hanna 66 y.o. female     Left ureteral stone    Plan:    See the outpatient H&P  All options were discussed  The patient family is present  Progress to the OR for C&P, ULL, SBE, Left stent insertion  The risks, benefits, and alternatives were discussed  NPO  DVT prophylaxis  Pre-op antibiotics      Lucien Henry, DO [facility]  Urology

## 2021-06-22 NOTE — PROGRESS NOTES
Have you been tested for COVID  No           Have you been told you were positive for COVID No  Have you had any known exposure to someone that is positive for COVID No  Do you have a cough                   No              Do you have shortness of breath No                 Do you have a sore throat            No                Are you having chills                    No                Are you having muscle aches. No                    Please come to the hospital wearing a mask and have your significant other wear a mask as well. Both of you should check your temperature before leaving to come here,  if it is 100 or higher please call [phone] for instruction.
survey after surgery we would greatly appreciate your comments    [] Parent/guardian of a minor must accompany their child and remain on the premises  the entire time they are under our care     [] Pediatric patients may bring favorite toy, blanket or comfort item with them    [] A caregiver or family member must remain with the patient during their stay if they are mentally handicapped, have dementia, disoriented or unable to use a call light or would be a safety concern if left unattended    [x] Please notify surgeon if you develop any illness between now and time of surgery (cold, cough, sore throat, fever, nausea, vomiting) or any signs of infections  including skin, wounds, and dental.    [] Other instructions    EDUCATIONAL MATERIALS PROVIDED:    [] PAT Preoperative Education Packet/Booklet     [] Medication List    [] Fluoroscopy Information Pamphlet    [] Transfusion bracelet applied with instructions    [] Joint replacement video reviewed    [] Shower with antibacterial soap and use CHG wipes provided the evening before surgery as instructed

## 2021-06-22 NOTE — CONSULTS
OUTPATIENT CARDIOLOGY CONSULT    Name: Hemanth Gonzalez    Age: 66 y.o. Date of Service: 6/22/2021    Reason for Consultation: Mitral valve disease, hypertension, left bundle branch block    Referring Physician: Jolynn Burks MD    History of Present Illness: The patient is a 77-year-old white female known to [facility] with primary cardiovascular care provided by Darci Perla  She has a known history of hypertension, hyperlipidemia and a chronic left bundle branch block conduction pattern with previous echocardiographic assessment demonstrating evidence of mitral valve disease. She reportedly remains active without symptoms or changes of her functional capabilities but a recent echocardiogram suggested progression of mitral regurgitation with that of severe mitral regurgitation. Review of Systems: The remainder of a complete multisystem review including consitutional, central nervous, respiratory, circulatory, gastrointestinal, genitourinary, endocrinologic, hematologic, musculoskeletal and psychiatric are negative.     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 Skylar Rowley yearly    Mitral regurgitation     follows with Dr. Skylar Rowlye yearly    PONV (postoperative nausea and vomiting)     Tachycardia     follows with Dr. Skylar Rowley yearly    Thyroid disease     'enlarged thyroid' ; no meds present       Past Surgical History:  Past Surgical History:   Procedure Laterality Date    BREAST BIOPSY      benign    COLONOSCOPY      DIAGNOSTIC CARDIAC CATH LAB PROCEDURE  11/09/01    [facility]    ENDOSCOPY, COLON, DIAGNOSTIC      LITHOTRIPSY      VERY SMALL URETER,TERRIBLE TIME GETTING STENTS IN, ALOT OF ANESTH, VERY SICK AFTER    LITHOTRIPSY Left 1/30/2020    LEFT ESWL EXTRACORPOREAL SHOCK WAVE LITHOTRIPSY performed by Marixa Sahni MD at [facility] LITHOTRIPSY Left 2021    CYSTOSCOPY RETROGRADE PYELOGRAM URETEROSCOPY J STENT LASER LITHOTRIPSY LEFT performed by Tarik Henry DO at [address] W/INTERP&RPT N/A 2018    ESOPHAGEAL MOTILITY/MANOMETRY STUDY performed by Lady Troy DO at [address] [place], REPAIR PARAESOPHAGEAL HERNIA, INCL FUNDOPLASTY W/ MESH N/A 10/3/2018    LAPAROSCOPIC HIATAL HERNIA REPAIR WITH MESH performed by Marvin Albert MD at [place] [address]      UPPER GASTROINTESTINAL ENDOSCOPY  6/17/15    VULVAR/PERINEAL BIOPSY  1985       Family History:  Family History   Problem Relation Age of Onset    Heart Attack Father     Other Father         bowel disorder    Cancer Other     Diabetes Other     Asthma Son        Social History:  Social History     Socioeconomic History    Marital status:      Spouse name: Not on file    Number of children: Not on file    Years of education: Not on file    Highest education level: Not on file   Occupational History    Not on file   Tobacco Use    Smoking status: Former Smoker     Packs/day: 0.50     Years: 4.00     Pack years: 2.00     Types: Cigarettes     Quit date: 1969     Years since quittin.5    Smokeless tobacco: Never Used   Vaping Use    Vaping Use: Never used   Substance and Sexual Activity    Alcohol use: Yes     Comment: rare glass of wine    Drug use: No    Sexual activity: Never   Other Topics Concern    Not on file   Social History Narrative    No caffienated drinks. Social Determinants of Health     Financial Resource Strain:     Difficulty of Paying Living Expenses:    Food Insecurity:     Worried About Running Out of Food in the Last Year:     [address] in the Last Year:    Transportation Needs:     Lack of Transportation (Medical):      Lack of Transportation (Non-Medical):    Physical Activity:     Days of Exercise per Week:     Minutes of Exercise per Session:    Stress:     Feeling of Stress :    Social Connections:     Frequency of Communication with Friends and Family:     Frequency of Social Gatherings with Friends and Family:     Attends Yazidi Services:     Active Member of Clubs or Organizations:     Attends Club or Organization Meetings:     Marital Status:    Intimate Partner Violence:     Fear of Current or Ex-Partner:     Emotionally Abused:     Physically Abused:     Sexually Abused:         Allergies:  No Known Allergies    Current Medications:  Current Outpatient Medications   Medication Sig Dispense Refill    dilTIAZem (CARDIZEM CD) 240 MG extended release capsule Take 1 capsule by mouth once daily 90 capsule 3    ibuprofen (ADVIL;MOTRIN) 400 MG tablet Take 1 tablet by mouth every 8 hours as needed for Pain 8 tablet 0    diclofenac sodium (VOLTAREN) 1 % GEL Apply topically 2 times daily 150 g 2    pantoprazole (PROTONIX) 40 MG tablet Take 1 tablet by mouth every morning (before breakfast) 30 tablet 0    Calcium Carbonate (CALCIUM 600 PO) Take by mouth 1 1/2 tabs daily      atorvastatin (LIPITOR) 10 MG tablet Take 1 tablet by mouth once daily (Patient taking differently: nightly Take 1 tablet by mouth once daily) 90 tablet 1    Nutritional Supplements (SOY PROTEIN SHAKE) POWD Take by mouth daily       Valerian Root 500 MG CAPS Take 2,000 mg by mouth every evening      Omega-3 1000 MG CAPS Take by mouth daily      vitamin D (CHOLECALCIFEROL) 1000 UNIT TABS tablet Take 1,000 Units by mouth daily      Multiple Minerals-Vitamins (CALCIUM-MAGNESIUM-ZINC-D3) TABS Take by mouth daily      Biotin 1000 MCG TABS Take 2,000 mcg by mouth daily       DHEA 50 MG CAPS Take by mouth daily      aspirin EC 81 MG EC tablet Take 81 mg by mouth daily To verify holding w dr Angela Long Flaxseed, Linseed, (FLAXSEED OIL PO) Take 1,300 mg by mouth daily  oxyCODONE-acetaminophen (PERCOCET) 5-325 MG per tablet Take 1 tablet by mouth every 4 hours as needed for Pain for up to 7 days. (Patient not taking: Reported on 6/22/2021) 10 tablet 0    coenzyme Q-10 100 MG capsule Take 2 capsules by mouth daily 60 capsule 5     No current facility-administered medications for this encounter. Physical Exam:  Ht 4' 11\" (1.499 m)   Wt 137 lb (62.1 kg)   BMI 27.67 kg/m²   Wt Readings from Last 3 Encounters:   06/22/21 137 lb (62.1 kg)   06/16/21 137 lb (62.1 kg)   05/21/21 139 lb (63 kg)     The patient is awake, alert and in no discomfort or distress. No gross musculoskeletal deformity or lymphadenopathy are present. No significant skin or nail changes are present. Gross examination of head, eyes, nose and throat are negative. Jugular venous pressure is normal and no carotid bruits are present. No thyromegaly is noted. Normal respiratory effort is noted with no accessory muscle usage present. Lung fields are clear to ascultation. Cardiac examination is notable for a regular rate and rhythm with no palpable thrill. No gallop rhythm or cardiac murmur are identified. A benign abdominal examination is present with no masses or organomegaly. A normal abdominal aortic pulsation is present. Intact pulses are present throughout all extremities and no peripheral edema is present. No focal neurologic deficits are present.     Laboratory Tests:  Lab Results   Component Value Date    CREATININE 0.9 05/21/2021    BUN 22 05/21/2021     05/21/2021    K 3.4 (L) 05/21/2021     05/21/2021    CO2 26 05/21/2021     No results found for: BNP  Lab Results   Component Value Date    WBC 7.1 05/21/2021    RBC 4.43 05/21/2021    HGB 13.2 05/21/2021    HCT 38.6 05/21/2021    MCV 87.1 05/21/2021    MCH 29.8 05/21/2021    MCHC 34.2 05/21/2021    RDW 13.7 05/21/2021     05/21/2021    MPV 9.9 05/21/2021     No results for input(s): ALKPHOS, ALT, AST, PROT, BILITOT, BILIDIR, LABALBU in the last 72 hours. Lab Results   Component Value Date    MG 1.9 03/01/2021     Lab Results   Component Value Date    PROTIME 12.2 02/28/2021    INR 1.1 02/28/2021     Lab Results   Component Value Date    TSH 2.910 01/17/2019     No components found for: CHLPL  Lab Results   Component Value Date    TRIG 109 03/16/2021    TRIG 92 01/17/2019    TRIG 78 07/28/2018     Lab Results   Component Value Date    HDL 39 03/16/2021    HDL 56 01/17/2019    HDL 55 07/28/2018     Lab Results   Component Value Date    LDLCALC 111 (H) 03/16/2021    LDLCALC 148 (H) 01/17/2019    LDLCALC 117 (H) 07/28/2018       Cardiac Tests:  ECG: A resting electrocardiogram of February, 2021 demonstrated evidence of sinus rhythm with a left bundle branch block conduction pattern  Last Echocardiogram: An echocardiogram of June, 2021 demonstrated evidence of a mildly dilated left ventricular chamber with mild left ventricular systolic dysfunction abnormal septal motion in the face of her left bundle branch block with mild left atrial enlargement and severe centrally directed mitral regurgitation      ASSESSMENT / PLAN: On a clinical basis, the patient presents with recently documented severe mitral regurgitation in the absence of significant symptomatology with recommendations of a transesophageal echocardiogram for further identification of the mechanism as well as additional assessment of severity to guide additional management. Note: This report was completed utilizing a computerized voice recognition software. Every effort has been made to insure accuracy, however; inadvertent computerized transcription errors may be present. Jahaira Alcantara.  Anna Click, [address] [facility]

## 2021-06-23 ENCOUNTER — ANESTHESIA EVENT (OUTPATIENT)
Dept: NON INVASIVE DIAGNOSTICS | Age: 71
End: 2021-06-23

## 2021-06-23 ENCOUNTER — HOSPITAL ENCOUNTER (OUTPATIENT)
Dept: NON INVASIVE DIAGNOSTICS | Age: 71
Discharge: HOME | End: 2021-06-23
Payer: MEDICARE

## 2021-06-23 ENCOUNTER — ANESTHESIA (OUTPATIENT)
Dept: NON INVASIVE DIAGNOSTICS | Age: 71
End: 2021-06-23

## 2021-06-23 VITALS
RESPIRATION RATE: 16 BRPM | SYSTOLIC BLOOD PRESSURE: 116 MMHG | OXYGEN SATURATION: 98 % | DIASTOLIC BLOOD PRESSURE: 58 MMHG

## 2021-06-23 VITALS
OXYGEN SATURATION: 98 % | TEMPERATURE: 98.1 F | RESPIRATION RATE: 18 BRPM | HEART RATE: 77 BPM | DIASTOLIC BLOOD PRESSURE: 74 MMHG | BODY MASS INDEX: 27.62 KG/M2 | SYSTOLIC BLOOD PRESSURE: 140 MMHG | HEIGHT: 59 IN | WEIGHT: 137 LBS

## 2021-06-23 DIAGNOSIS — I34.0 MITRAL VALVE INSUFFICIENCY, UNSPECIFIED ETIOLOGY: ICD-10-CM

## 2021-06-23 DIAGNOSIS — R93.1 ABNORMAL ECHOCARDIOGRAM: ICD-10-CM

## 2021-06-23 LAB
LV EF: 48 %
LVEF MODALITY: NORMAL

## 2021-06-23 PROCEDURE — 7100000011 HC PHASE II RECOVERY - ADDTL 15 MIN

## 2021-06-23 PROCEDURE — 93320 DOPPLER ECHO COMPLETE: CPT

## 2021-06-23 PROCEDURE — 93312 ECHO TRANSESOPHAGEAL: CPT

## 2021-06-23 PROCEDURE — 93325 DOPPLER ECHO COLOR FLOW MAPG: CPT

## 2021-06-23 PROCEDURE — 2500000003 HC RX 250 WO HCPCS

## 2021-06-23 PROCEDURE — 3700000001 HC ADD 15 MINUTES (ANESTHESIA)

## 2021-06-23 PROCEDURE — 2500000003 HC RX 250 WO HCPCS: Performed by: NURSE ANESTHETIST, CERTIFIED REGISTERED

## 2021-06-23 PROCEDURE — 6360000002 HC RX W HCPCS

## 2021-06-23 PROCEDURE — 7100000010 HC PHASE II RECOVERY - FIRST 15 MIN

## 2021-06-23 PROCEDURE — 3700000000 HC ANESTHESIA ATTENDED CARE

## 2021-06-23 PROCEDURE — 2580000003 HC RX 258: Performed by: INTERNAL MEDICINE

## 2021-06-23 PROCEDURE — 6360000002 HC RX W HCPCS: Performed by: NURSE ANESTHETIST, CERTIFIED REGISTERED

## 2021-06-23 RX ORDER — SODIUM CHLORIDE 9 MG/ML
INJECTION, SOLUTION INTRAVENOUS CONTINUOUS
Status: DISCONTINUED | OUTPATIENT
Start: 2021-06-23 | End: 2021-06-24 | Stop reason: HOSPADM

## 2021-06-23 RX ORDER — LIDOCAINE HYDROCHLORIDE 20 MG/ML
INJECTION, SOLUTION INFILTRATION; PERINEURAL PRN
Status: DISCONTINUED | OUTPATIENT
Start: 2021-06-23 | End: 2021-06-23 | Stop reason: SDUPTHER

## 2021-06-23 RX ORDER — PROPOFOL 10 MG/ML
INJECTION, EMULSION INTRAVENOUS PRN
Status: DISCONTINUED | OUTPATIENT
Start: 2021-06-23 | End: 2021-06-23 | Stop reason: SDUPTHER

## 2021-06-23 RX ADMIN — LIDOCAINE HYDROCHLORIDE 100 MG: 20 INJECTION, SOLUTION INFILTRATION; PERINEURAL at 14:53

## 2021-06-23 RX ADMIN — PROPOFOL 50 MG: 10 INJECTION, EMULSION INTRAVENOUS at 14:56

## 2021-06-23 RX ADMIN — PROPOFOL 80 MG: 10 INJECTION, EMULSION INTRAVENOUS at 14:53

## 2021-06-23 RX ADMIN — PROPOFOL 20 MG: 10 INJECTION, EMULSION INTRAVENOUS at 14:58

## 2021-06-23 RX ADMIN — SODIUM CHLORIDE: 9 INJECTION, SOLUTION INTRAVENOUS at 14:27

## 2021-06-23 NOTE — PROCEDURES
Mary Cuello is a 66 y.o. female patient. 1. Mitral valve insufficiency, unspecified etiology    2. 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 Dana Torres yearly    Mitral regurgitation     follows with Dr. Dana Torres yearly    PONV (postoperative nausea and vomiting)     Tachycardia     follows with Dr. Dana Torres yearly    Thyroid disease     'enlarged thyroid' ; no meds present     Blood pressure (!) 140/76, pulse 78, temperature 98 °F (36.7 °C), resp. rate 15, height 4' 11\" (1.499 m), weight 137 lb (62.1 kg), SpO2 96 %, not currently breastfeeding. Procedures PRELIMINARY TRANSESOPHAGEAL ECHOCARDIOGRAPHY REPORT    Indications for study:  Mitral valve disease    Study performed using (Sedation): Per anesthesia    Complications: None    Preliminary findings: Normal LV function, normal RV function, no hemodynamically significant valve disease(mild MR), no evidence of vegetations, no left atrial or left atrial appendage thrombus (no evidence of spontaneous echo contrast), no intraatrial shunting on bubble study, no significant atherosclerosis of the aorta. For patient status during procedure, refer to intra-procedure sedation flowsheet. The complete [facility]LL report will follow - see \"CARDIOLOGY\" Section in [address] [place] [address] Osvaldo Uribe.  Fortino Dooley, [address] [place] [address] Cardiology            Armando Islas MD  6/23/2021

## 2021-06-23 NOTE — ANESTHESIA POSTPROCEDURE EVALUATION
Department of Anesthesiology  Postprocedure Note    Patient: Neo Covarrubias  MRN: [id number]  YOB: 1950  Date of evaluation: 6/23/2021  Time:  3:45 PM     Procedure Summary     Date: 06/23/21 Room / Location: [facility] Echocardiography    Anesthesia Start: 1446 Anesthesia Stop: 3086    Procedure: ECHOCARDIOGRAM TRANSESOPHAGEAL Diagnosis:       Mitral valve insufficiency, unspecified etiology      Abnormal echocardiogram      (SEVERE LEAKING OF MITRAL VALVE)      (Mitral valve disease)    Scheduled Providers:  Responsible Provider: Felipe Cararsco MD    Anesthesia Type: MAC ASA Status: 3          Anesthesia Type: MAC    Sree Phase I: Sree Score: 10    Sree Phase II: Sree Score: 8    Last vitals: Reviewed and per EMR flowsheets.        Anesthesia Post Evaluation    Patient location during evaluation: PACU  Patient participation: complete - patient participated  Level of consciousness: awake and alert  Pain score: 0  Airway patency: patent  Nausea & Vomiting: no vomiting and no nausea  Complications: no  Cardiovascular status: hemodynamically stable  Respiratory status: spontaneous ventilation  Hydration status: stable

## 2021-06-23 NOTE — ANESTHESIA PRE PROCEDURE
Department of Anesthesiology  Preprocedure Note       Name:  Grady Davis   Age:  66 y.o.  :  1950                                          MRN:  [id number]         Date:  2021      Surgeon: * No surgeons listed *    Procedure: ECHOCARDIOGRAM TRANSESOPHAGEAL    Medications prior to admission:   Prior to Admission medications    Medication Sig Start Date End Date Taking? Authorizing Provider   oxyCODONE-acetaminophen (PERCOCET) 5-325 MG per tablet Take 1 tablet by mouth every 4 hours as needed for Pain for up to 7 days.   Patient not taking: Reported on 2021  [facility] A Carl, DO   dilTIAZem (CARDIZEM CD) 240 MG extended release capsule Take 1 capsule by mouth once daily 21   Dimas Garcia MD   ibuprofen (ADVIL;MOTRIN) 400 MG tablet Take 1 tablet by mouth every 8 hours as needed for Pain 21  Alonso Charles MD   diclofenac sodium (VOLTAREN) 1 % GEL Apply topically 2 times daily 3/16/21   Thiago Del Valle MD   pantoprazole (PROTONIX) 40 MG tablet Take 1 tablet by mouth every morning (before breakfast) 3/2/21   Shivam Darden MD   Calcium Carbonate (CALCIUM 600 PO) Take by mouth 1 1/2 tabs daily    Historical Provider, MD   atorvastatin (LIPITOR) 10 MG tablet Take 1 tablet by mouth once daily  Patient taking differently: nightly Take 1 tablet by mouth once daily 10/22/20   Thiago Del Valle MD   coenzyme Q-10 100 MG capsule Take 2 capsules by mouth daily 20   Thiago Del Valle MD   Nutritional Supplements (SOY PROTEIN SHAKE) POWD Take by mouth daily     Historical Provider, MD   Valerian Root 500 MG CAPS Take 2,000 mg by mouth every evening    Historical Provider, MD   [place]-3 1000 MG CAPS Take by mouth daily    Historical Provider, MD   vitamin D (CHOLECALCIFEROL) 1000 UNIT TABS tablet Take 1,000 Units by mouth daily    Historical Provider, MD   Multiple Minerals-Vitamins (CALCIUM-MAGNESIUM-ZINC-D3) TABS Take by mouth daily    Historical Provider, MD Biotin 1000 MCG TABS Take 2,000 mcg by mouth daily     Historical Provider, MD   DHEA 50 MG CAPS Take by mouth daily    Historical Provider, MD   aspirin EC 81 MG EC tablet Take 81 mg by mouth daily To verify holding w     Historical Provider, MD   Flaxseed, Linseed, (FLAXSEED OIL PO) Take 1,300 mg by mouth daily     Historical Provider, MD       Current medications:    Current Outpatient Medications   Medication Sig Dispense Refill    oxyCODONE-acetaminophen (PERCOCET) 5-325 MG per tablet Take 1 tablet by mouth every 4 hours as needed for Pain for up to 7 days.  (Patient not taking: Reported on 6/22/2021) 10 tablet 0    dilTIAZem (CARDIZEM CD) 240 MG extended release capsule Take 1 capsule by mouth once daily 90 capsule 3    ibuprofen (ADVIL;MOTRIN) 400 MG tablet Take 1 tablet by mouth every 8 hours as needed for Pain 8 tablet 0    diclofenac sodium (VOLTAREN) 1 % GEL Apply topically 2 times daily 150 g 2    pantoprazole (PROTONIX) 40 MG tablet Take 1 tablet by mouth every morning (before breakfast) 30 tablet 0    Calcium Carbonate (CALCIUM 600 PO) Take by mouth 1 1/2 tabs daily      atorvastatin (LIPITOR) 10 MG tablet Take 1 tablet by mouth once daily (Patient taking differently: nightly Take 1 tablet by mouth once daily) 90 tablet 1    coenzyme Q-10 100 MG capsule Take 2 capsules by mouth daily 60 capsule 5    Nutritional Supplements (SOY PROTEIN SHAKE) POWD Take by mouth daily       Valerian Root 500 MG CAPS Take 2,000 mg by mouth every evening      Omega-3 1000 MG CAPS Take by mouth daily      vitamin D (CHOLECALCIFEROL) 1000 UNIT TABS tablet Take 1,000 Units by mouth daily      Multiple Minerals-Vitamins (CALCIUM-MAGNESIUM-ZINC-D3) TABS Take by mouth daily      Biotin 1000 MCG TABS Take 2,000 mcg by mouth daily       DHEA 50 MG CAPS Take by mouth daily      aspirin EC 81 MG EC tablet Take 81 mg by mouth daily To verify holding w dr Tabares Saliva Flaxseed, Linseed, (FLAXSEED OIL PO) Take 1,300 mg by LITHOTRIPSY LEFT performed by Tarik Henry DO at [address] W/INTERP&RPT N/A 2018    ESOPHAGEAL MOTILITY/MANOMETRY STUDY performed by Lady Troy DO at [address] [place], REPAIR PARAESOPHAGEAL HERNIA, INCL FUNDOPLASTY W/ MESH N/A 10/3/2018    LAPAROSCOPIC HIATAL HERNIA REPAIR WITH MESH performed by Marvin Albert MD at [place] [address]. UPPER GASTROINTESTINAL ENDOSCOPY  6/17/15    VULVAR/PERINEAL BIOPSY  1985       Social History:    Social History     Tobacco Use    Smoking status: Former Smoker     Packs/day: 0.50     Years: 4.00     Pack years: 2.00     Types: Cigarettes     Quit date: 1969     Years since quittin.5    Smokeless tobacco: Never Used   Substance Use Topics    Alcohol use: Yes     Comment: rare glass of wine                                Counseling given: Not Answered      Vital Signs (Current): There were no vitals filed for this visit.                                            BP Readings from Last 3 Encounters:   21 (!) 140/76   21 139/67   21 128/62       NPO Status:                                                                                 BMI:   Wt Readings from Last 3 Encounters:   21 137 lb (62.1 kg)   21 137 lb (62.1 kg)   21 139 lb (63 kg)     There is no height or weight on file to calculate BMI.    CBC:   Lab Results   Component Value Date    WBC 7.1 2021    RBC 4.43 2021    HGB 13.2 2021    HCT 38.6 2021    MCV 87.1 2021    RDW 13.7 2021     2021       CMP:   Lab Results   Component Value Date     2021    K 3.4 2021    K 3.2 2021     2021    CO2 26 2021    BUN 22 2021    CREATININE 0.9 2021    GFRAA >60 2021    LABGLOM >60 2021    GLUCOSE 116 2021    GLUCOSE 83 2011    PROT 6.2 2021    CALCIUM 9.9 2021 BILITOT 0.7 03/01/2021    ALKPHOS 95 03/01/2021    AST 20 03/01/2021    ALT 9 03/01/2021       POC Tests: No results for input(s): POCGLU, POCNA, POCK, POCCL, POCBUN, POCHEMO, POCHCT in the last 72 hours. Coags:   Lab Results   Component Value Date    PROTIME 12.2 02/28/2021    INR 1.1 02/28/2021    APTT 31.4 02/28/2021       HCG (If Applicable): No results found for: PREGTESTUR, PREGSERUM, HCG, HCGQUANT     ABGs: No results found for: PHART, PO2ART, XDB8AXT, YPI9VOC, BEART, S9PESXET     Type & Screen (If Applicable):  No results found for: LABABO, [address]    Anesthesia Evaluation  Patient summary reviewed and Nursing notes reviewed   history of anesthetic complications: PONV. Airway: Mallampati: II  TM distance: >3 FB   Neck ROM: full  Mouth opening: > = 3 FB Dental: normal exam         Pulmonary: breath sounds clear to auscultation  (+) pneumonia:                             Cardiovascular:  Exercise tolerance: good (>4 METS),   (+) hypertension:, valvular problems/murmurs: MR and AI, CHF (EF 60-47%.): systolic, murmur, hyperlipidemia      ECG reviewed  Rhythm: regular  Rate: normal      Cleared by cardiology     Beta Blocker:  Not on Beta Blocker      ROS comment: LBBB     Neuro/Psych:   Negative Neuro/Psych ROS              GI/Hepatic/Renal:   (+) hiatal hernia, GERD: well controlled,           Endo/Other:    (+) hypothyroidism::., malignancy/cancer. Abdominal:           Vascular: negative vascular ROS. Anesthesia Plan      MAC     ASA 3       Induction: intravenous. Anesthetic plan and risks discussed with patient and spouse.       Plan discussed with CRNA and surgical team.                  Benjie Gosselin, MD   6/23/2021

## 2021-06-24 ENCOUNTER — OFFICE VISIT (OUTPATIENT)
Dept: CARDIOLOGY CLINIC | Age: 71
End: 2021-06-24
Payer: MEDICARE

## 2021-06-24 VITALS
SYSTOLIC BLOOD PRESSURE: 138 MMHG | WEIGHT: 138.4 LBS | HEART RATE: 83 BPM | DIASTOLIC BLOOD PRESSURE: 74 MMHG | OXYGEN SATURATION: 97 % | BODY MASS INDEX: 27.9 KG/M2 | HEIGHT: 59 IN | RESPIRATION RATE: 19 BRPM

## 2021-06-24 DIAGNOSIS — I44.7 LBBB (LEFT BUNDLE BRANCH BLOCK): ICD-10-CM

## 2021-06-24 DIAGNOSIS — R93.1 ABNORMAL ECHOCARDIOGRAM: ICD-10-CM

## 2021-06-24 DIAGNOSIS — I10 ESSENTIAL HYPERTENSION: Primary | ICD-10-CM

## 2021-06-24 DIAGNOSIS — I38 VHD (VALVULAR HEART DISEASE): ICD-10-CM

## 2021-06-24 DIAGNOSIS — E78.5 HYPERLIPIDEMIA, UNSPECIFIED HYPERLIPIDEMIA TYPE: ICD-10-CM

## 2021-06-24 DIAGNOSIS — I34.0 MITRAL VALVE INSUFFICIENCY, UNSPECIFIED ETIOLOGY: ICD-10-CM

## 2021-06-24 PROCEDURE — 99214 OFFICE O/P EST MOD 30 MIN: CPT | Performed by: INTERNAL MEDICINE

## 2021-06-24 PROCEDURE — 93000 ELECTROCARDIOGRAM COMPLETE: CPT | Performed by: INTERNAL MEDICINE

## 2021-06-24 RX ORDER — METOPROLOL SUCCINATE 50 MG/1
50 TABLET, EXTENDED RELEASE ORAL DAILY
Qty: 30 TABLET | Refills: 5 | Status: SHIPPED
Start: 2021-06-24 | End: 2022-01-26

## 2021-06-24 RX ORDER — LISINOPRIL 10 MG/1
10 TABLET ORAL DAILY
Qty: 30 TABLET | Refills: 5 | Status: SHIPPED
Start: 2021-06-24 | End: 2022-01-26

## 2021-06-24 ASSESSMENT — ENCOUNTER SYMPTOMS
SHORTNESS OF BREATH: 1
BLOOD IN STOOL: 0
DIARRHEA: 0
WHEEZING: 0
NAUSEA: 0
COUGH: 0
CONSTIPATION: 0
VOMITING: 0
BACK PAIN: 0
ABDOMINAL PAIN: 0

## 2021-06-24 NOTE — PROGRESS NOTES
OUTPATIENT CARDIOLOGY FOLLOW-UP    HPI:    Name: Terrell Renee    Age: 66 y.o. Primary Care Physician: Karen Smith MD    Date of Service: 6/24/2021    Chief Complaint:   Chief Complaint   Patient presents with    Hypertension     4m OV - pt has no concernes    Hyperlipidemia        History of present illness :   55-year-old female who comes today for follow-up visit to discuss RUSSELL done yesterday. She was seen in my office on 2/26/2021. She has history of hypertension, hyperlipidemia, mild mitral regurgitation, grade 1 diastolic dysfunction, left bundle branch block, thyroid disease, GERD and hiatal hernia, Aden's esophagus, kidney stone and and arthritis. Patient is active. She has been hiking every week for 1 to 2 hours. She denies chest discomfort but has been complaining of dyspnea on exertion. She denies palpitations, dizziness or syncope. She denies orthopnea, PND or lower extremity edema. EKG done today revealed sinus rhythm at 76 bpm, left atrial enlargement, low voltage in frontal leads and complete left bundle branch block. Review of Systems:   Review of Systems   Constitutional: Negative for chills, fatigue and fever. HENT: Negative for nosebleeds. Respiratory: Positive for shortness of breath. Negative for cough and wheezing. Gastrointestinal: Negative for abdominal pain, blood in stool, constipation, diarrhea, nausea and vomiting. GERD. Genitourinary: Negative for dysuria and hematuria. Musculoskeletal: Negative for back pain, joint swelling and myalgias. Neurological: Negative for syncope and light-headedness. Psychiatric/Behavioral: The patient is not nervous/anxious.            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 Mike Barney yearly    Mitral regurgitation     follows with Dr. Mike Barney yearly    PONV (postoperative nausea and vomiting)     Tachycardia     follows with Dr. Mike Barney yearly    Thyroid disease     'enlarged thyroid' ; no meds present       Past Surgical History:  Past Surgical History:   Procedure Laterality Date    BREAST BIOPSY      benign    COLONOSCOPY      DIAGNOSTIC CARDIAC CATH LAB PROCEDURE  [facility]/09/01    [facility]    ENDOSCOPY, COLON, DIAGNOSTIC      LITHOTRIPSY      VERY SMALL URETER,TERRIBLE TIME GETTING STENTS IN, ALOT OF ANESTH, VERY SICK AFTER    LITHOTRIPSY Left 1/30/2020    LEFT ESWL EXTRACORPOREAL SHOCK WAVE LITHOTRIPSY performed by Conrado Aj MD at [facility] LITHOTRIPSY Left 6/16/2021    CYSTOSCOPY RETROGRADE PYELOGRAM URETEROSCOPY J STENT LASER LITHOTRIPSY LEFT performed by Shena Henry DO at [address] [facility] W/INTERP&RPT N/A 8/17/2018    ESOPHAGEAL MOTILITY/MANOMETRY STUDY performed by Thuy Torres DO at [facility] [address] [place], REPAIR PARAESOPHAGEAL HERNIA, INCL FUNDOPLASTY W/ MESH N/A 10/3/2018    LAPAROSCOPIC HIATAL HERNIA REPAIR WITH MESH performed by Thomas Garcia MD at [place] [address]. UPPER GASTROINTESTINAL ENDOSCOPY  6/17/15    VULVAR/PERINEAL BIOPSY  1985       Family History:  Family History   Problem Relation Age of Onset    Heart Attack Father     Other Father         bowel disorder    Cancer Other     Diabetes Other     Asthma Son        Social History:  Social History     Socioeconomic History    Marital status:      Spouse name: Not on file    Number of children: Not on file    Years of education: Not on file    Highest education level: Not on file   Occupational History    Not on file   Tobacco Use    Smoking status: Former Smoker     Packs/day: 0.50     Years: 4.00     Pack years: 2.00     Types: Cigarettes     Quit date: 1/1/1969     Years since quittin.5    Smokeless tobacco: Never Used   Vaping Use    Vaping Use: Never used   Substance and Sexual Activity    Alcohol use: Yes     Comment: rare glass of wine    Drug use: No    Sexual activity: Never   Other Topics Concern    Not on file   Social History Narrative    No caffienated drinks. Social Determinants of Health     Financial Resource Strain:     Difficulty of Paying Living Expenses:    Food Insecurity:     Worried About Running Out of Food in the Last Year:     [address] in the Last Year:    Transportation Needs:     Lack of Transportation (Medical):  Lack of Transportation (Non-Medical):    Physical Activity:     Days of Exercise per Week:     Minutes of Exercise per Session:    Stress:     Feeling of Stress :    Social Connections:     Frequency of Communication with Friends and Family:     Frequency of Social Gatherings with Friends and Family:     Attends Sikh Services:     Active Member of Clubs or Organizations:     Attends Club or Organization Meetings:     Marital Status:    Intimate Partner Violence:     Fear of Current or Ex-Partner:     Emotionally Abused:     Physically Abused:     Sexually Abused:         Allergies:  No Known Allergies    Current Medications:  Current Outpatient Medications   Medication Sig Dispense Refill    dilTIAZem (CARDIZEM CD) 240 MG extended release capsule Take 1 capsule by mouth once daily 90 capsule 3    ibuprofen (ADVIL;MOTRIN) 400 MG tablet Take 1 tablet by mouth every 8 hours as needed for Pain 8 tablet 0    diclofenac sodium (VOLTAREN) 1 % GEL Apply topically 2 times daily 150 g 2    pantoprazole (PROTONIX) 40 MG tablet Take 1 tablet by mouth every morning (before breakfast) 30 tablet 0    Calcium Carbonate (CALCIUM 600 PO) Take by mouth 1 1/2 tabs daily      atorvastatin (LIPITOR) 10 MG tablet Take 1 tablet by mouth once daily (Patient taking differently: nightly Take 1 tablet by mouth once daily) 90 tablet 1    coenzyme Q-10 100 MG capsule Take 2 capsules by mouth daily 60 capsule 5    Nutritional Supplements (SOY PROTEIN SHAKE) POWD Take by mouth daily       Valerian Root 500 MG CAPS Take 2,000 mg by mouth every evening      Omega-3 1000 MG CAPS Take by mouth daily      vitamin D (CHOLECALCIFEROL) 1000 UNIT TABS tablet Take 1,000 Units by mouth daily      Multiple Minerals-Vitamins (CALCIUM-MAGNESIUM-ZINC-D3) TABS Take by mouth daily      Biotin 1000 MCG TABS Take 2,000 mcg by mouth daily       DHEA 50 MG CAPS Take by mouth daily      aspirin EC 81 MG EC tablet Take 81 mg by mouth daily To verify holding w dr     [facility] Flaxseed, Linseed, (FLAXSEED OIL PO) Take 1,300 mg by mouth daily        No current facility-administered medications for this visit. Physical Exam:  BP (!) 144/80   Pulse 83   Resp 19   Ht 4' 11\" (1.499 m)   Wt 138 lb 6.4 oz (62.8 kg)   SpO2 97%   BMI 27.95 kg/m²   Wt Readings from Last 3 Encounters:   06/24/21 138 lb 6.4 oz (62.8 kg)   06/23/21 137 lb (62.1 kg)   06/16/21 137 lb (62.1 kg)       Physical Exam  Constitutional:       General: She is not in acute distress. Appearance: She is well-developed. HENT:      Head: Normocephalic and atraumatic. Neck:      Vascular: No carotid bruit or JVD. Cardiovascular:      Rate and Rhythm: Normal rate and regular rhythm. Pulses: Normal pulses. Heart sounds: No murmur heard. No friction rub. No gallop. Pulmonary:      Breath sounds: Normal breath sounds. No wheezing or rales. Chest:      Chest wall: No tenderness. Abdominal:      General: Bowel sounds are normal. There is no distension. Palpations: Abdomen is soft. There is no mass. Tenderness: There is no abdominal tenderness. Comments: No abdominal bruit. Musculoskeletal:      Cervical back: Neck supple. Right lower leg: No edema. Left lower leg: No edema. Skin:     General: Skin is warm and dry.    Neurological:      Mental

## 2021-06-29 ENCOUNTER — TELEPHONE (OUTPATIENT)
Dept: CARDIOLOGY CLINIC | Age: 71
End: 2021-06-29

## 2021-06-29 NOTE — TELEPHONE ENCOUNTER
She is probably anxious. She needs to record her blood pressure and heart rate twice a day for a week then call the office.

## 2021-06-30 ENCOUNTER — TELEPHONE (OUTPATIENT)
Dept: CARDIOLOGY | Age: 71
End: 2021-06-30

## 2021-06-30 NOTE — TELEPHONE ENCOUNTER
Spoke to the patient on the phone. She was reminded of her 1030am apt. At [facility]. Cardiology. She was instructed on NPO after MN except meds with a sip of water. She was instructed that she did not need to hold her Metoprolol. She was also instructed to avoid caffeine for 12 hours prior to the test. She verbalized an understanding.

## 2021-06-30 NOTE — TELEPHONE ENCOUNTER
DR Leticia TAN SAID THAT SHE DOES NOT BELIEVE THAT THIS IS ANXIETY. SHE DOES NOT WISH TO CHECK HER BP AND PULSE TWICE DAILY ( SHE STILL MAY). SHE WANTS TO STOP THE METOPROLOL AND LISINOPRIL AND GO BACK ON THE DILTIAZEM.     PLEASE ADVISE

## 2021-06-30 NOTE — TELEPHONE ENCOUNTER
I SPOKE WITH DOMINICK AND TOLD HER THAT DR Hu Weber SAID SHE CAN DO WHATEVER SHE WANTS. SHE WILL STOP THE LISINOPRIL AND METOPROLOL AND GO BACK ON THE DILTIAZEM. SHE WILL BE HAVING HER STRESS ON 7/2/21 AND I TOLD HER WE WILL CALL HER WITH THE RESULTS.   DANIA

## 2021-07-02 ENCOUNTER — HOSPITAL ENCOUNTER (OUTPATIENT)
Dept: CARDIOLOGY | Age: 71
Discharge: HOME | End: 2021-07-02
Payer: MEDICARE

## 2021-07-02 VITALS
HEIGHT: 59 IN | WEIGHT: 138 LBS | BODY MASS INDEX: 27.82 KG/M2 | SYSTOLIC BLOOD PRESSURE: 146 MMHG | DIASTOLIC BLOOD PRESSURE: 62 MMHG | HEART RATE: 68 BPM

## 2021-07-02 DIAGNOSIS — I44.7 LBBB (LEFT BUNDLE BRANCH BLOCK): ICD-10-CM

## 2021-07-02 DIAGNOSIS — I38 VHD (VALVULAR HEART DISEASE): ICD-10-CM

## 2021-07-02 PROCEDURE — 78452 HT MUSCLE IMAGE SPECT MULT: CPT

## 2021-07-02 PROCEDURE — A9502 TC99M TETROFOSMIN: HCPCS | Performed by: INTERNAL MEDICINE

## 2021-07-02 PROCEDURE — 2580000003 HC RX 258: Performed by: INTERNAL MEDICINE

## 2021-07-02 PROCEDURE — 99999 PR OFFICE/OUTPT VISIT,PROCEDURE ONLY: CPT | Performed by: INTERNAL MEDICINE

## 2021-07-02 PROCEDURE — 3430000000 HC RX DIAGNOSTIC RADIOPHARMACEUTICAL: Performed by: INTERNAL MEDICINE

## 2021-07-02 PROCEDURE — 6360000002 HC RX W HCPCS: Performed by: INTERNAL MEDICINE

## 2021-07-02 PROCEDURE — 93017 CV STRESS TEST TRACING ONLY: CPT

## 2021-07-02 RX ORDER — FAMOTIDINE 20 MG/1
20 TABLET, FILM COATED ORAL 2 TIMES DAILY
COMMUNITY

## 2021-07-02 RX ORDER — SODIUM CHLORIDE 0.9 % (FLUSH) 0.9 %
10 SYRINGE (ML) INJECTION PRN
Status: DISCONTINUED | OUTPATIENT
Start: 2021-07-02 | End: 2021-07-03 | Stop reason: HOSPADM

## 2021-07-02 RX ORDER — DILTIAZEM HYDROCHLORIDE 240 MG/1
240 CAPSULE, COATED, EXTENDED RELEASE ORAL DAILY
COMMUNITY
End: 2022-06-13

## 2021-07-02 RX ADMIN — REGADENOSON 0.4 MG: 0.08 INJECTION, SOLUTION INTRAVENOUS at 11:59

## 2021-07-02 RX ADMIN — SODIUM CHLORIDE, PRESERVATIVE FREE 10 ML: 5 INJECTION INTRAVENOUS at 12:00

## 2021-07-02 RX ADMIN — TETROFOSMIN 24 MILLICURIE: 0.23 INJECTION, POWDER, LYOPHILIZED, FOR SOLUTION INTRAVENOUS at 11:59

## 2021-07-02 RX ADMIN — SODIUM CHLORIDE, PRESERVATIVE FREE 10 ML: 5 INJECTION INTRAVENOUS at 11:59

## 2021-07-02 RX ADMIN — TETROFOSMIN 8 MILLICURIE: 0.23 INJECTION, POWDER, LYOPHILIZED, FOR SOLUTION INTRAVENOUS at 10:44

## 2021-07-02 RX ADMIN — SODIUM CHLORIDE, PRESERVATIVE FREE 10 ML: 5 INJECTION INTRAVENOUS at 10:43

## 2021-07-02 NOTE — PROCEDURES
[address] [facility] [address] and Vascular Lab - [place] [phone]               Pharmacologic Stress Nuclear Gated SPECT Study    Name: Cerda Paul Account Number: [de-identified]    :  1950          Sex: female         Date of Study:  2021    Height: 4' 11\" (149.9 cm)         Weight: 138 lb (62.6 kg)     Ordering Provider: Rebecca Piper. Fantasma Easton MD          PCP: Karina Berry MD      Cardiologist: Calista Easton MD             Interpreting Physician: Denton Shone. Olivia Fernández MD  _________________________________________________________________________________    Indication:   Detecting the presence and location of coronary artery disease    Clinical History:   Patient has no known history of coronary artery disease. Resting ECG:    SB with LBBB    Procedure:   Pharmacologic stress testing was performed with regadenoson 0.4 mg for 15 seconds. The heart rate was 54 at baseline and cleopatra to 86 beats during the infusion. The blood pressure at baseline was 146/62 and blood pressure at the end of infusion was 142/68. Blood pressure response was normal during the stress procedure. The patient experienced felt different chest gastric (bupring) post infusion. Post sips of Liseth Coke symptoms lessened. ECG during the infusion did not change. IMAGING: Myocardial perfusion imaging was performed at rest 30-35 minutes following the intravenous injection of 8.3 mCi of (Tc-tetrofosmin) followed by 10 ml of Normal Saline. As per infusion protocol, the patient was injected intravenously with 24.0 mCi of (Tc-tetrofosmin) followed by 10 ml of Normal Saline. Gated post-stress tomographic imaging was performed 45 minutes after stress. FINDINGS: The overall quality of the study was good. Left ventricular cavity size was noted to be normal.    Rotational analog analysis demonstrated abnormal patient motion.     A mild defect was present in the apical anterior wall(s) that was  small size on the post regadenoson images           The resting images are show no change. Gated SPECT left ventricular ejection fraction was calculated to be 62%, with normal myocardial thickening and wall motion and septal motion consistent with conduction abnormality / LBBB. Impression:    1. Electrocardiographically non diagnostic regadenoson infusion because of the presence of LBBB on the baseline ECG  2. Myocardial perfusion imaging was normal with attenuation artifact. 3. Overall left ventricular systolic function was normal with septal motion consistent with the presence of LBBB. 4.   Low risk general pharmacologic stress test    Thank you for sending your patient to this [employer].      Electronically signed by Mehreen Sanchez MD on 7/2/2021 at 4:36 PM

## 2021-07-06 ENCOUNTER — TELEPHONE (OUTPATIENT)
Dept: FAMILY MEDICINE CLINIC | Age: 71
End: 2021-07-06

## 2021-07-06 NOTE — TELEPHONE ENCOUNTER
Jessy Aleena called in she needs the results from her stress test before end of day today as she has to make the final payment on her vacation.  Pt said Cardiologist is on vacation so they are sending her results to Dr. Kayode Abernathy       Please call [phone] before 12:15     After call [phone]

## 2021-07-07 NOTE — TELEPHONE ENCOUNTER
D/w pt that results are not concerning as LBBB is chronic and pt has not been experiencing any sxs since restarting her previous medication regimen. Advised pt CTM for s/s and f/u w/ Cards when able.

## 2021-07-16 ENCOUNTER — TELEPHONE (OUTPATIENT)
Dept: FAMILY MEDICINE CLINIC | Age: 71
End: 2021-07-16

## 2021-08-18 ENCOUNTER — TELEPHONE (OUTPATIENT)
Dept: FAMILY MEDICINE CLINIC | Age: 71
End: 2021-08-18

## 2021-08-18 NOTE — TELEPHONE ENCOUNTER
Pt would like an Rx for sleeping meds as she is going on a trip for a week and Pt can not sleep well in any bed besides her own.  Please Advise

## 2021-08-18 NOTE — TELEPHONE ENCOUNTER
Patient was notified. Patient states that she's already taking melatonin and she doesn't think it would work. \"That's why I'm asking for something stronger. \"  Please advise.

## 2021-08-19 NOTE — TELEPHONE ENCOUNTER
Patient was notified. Patient states that she is very frustrated with you. All she's asking for is a week of something stronger. She states that she doesn't want to take for long term. She just want you to know that you are not very accommodating, and  she is not making an appointment.

## 2021-08-19 NOTE — TELEPHONE ENCOUNTER
Pt would need appointment to discuss further, as pharmacologic therapy is not the preferred treatment for insomnia. Thank you very much.

## 2021-08-20 DIAGNOSIS — E78.5 HYPERLIPIDEMIA, UNSPECIFIED HYPERLIPIDEMIA TYPE: ICD-10-CM

## 2021-08-20 RX ORDER — ATORVASTATIN CALCIUM 10 MG/1
TABLET, FILM COATED ORAL
Qty: 90 TABLET | Refills: 0 | Status: SHIPPED
Start: 2021-08-20 | End: 2021-09-15 | Stop reason: SDUPTHER

## 2021-09-14 DIAGNOSIS — E78.5 HYPERLIPIDEMIA, UNSPECIFIED HYPERLIPIDEMIA TYPE: ICD-10-CM

## 2021-09-14 NOTE — TELEPHONE ENCOUNTER
[facility] CLINICAL PHARMACY REVIEW: ADHERENCE REVIEW  Identified care gap per [employer]; fills at [place]: Statin adherence    Last Visit: 6/24/21    Patient also appears to be prescribed: ATORVASTATIN TAB 10MG  And LISINOPRIL TAB 10MG    Patient not found in Outcomes MTM    ASSESSMENT  ACE/ARB ADHERENCE    Per Insurance Records through [employer] (2020 [facility] = 0%; YTD PDC = 100%; Potential Fail Date: 6/24/21):   LISINOPRIL   TAB 10MG last filled on 6/24/21 for 90 day supply. Next refill due: 9/22/21    Per Reconciled Dispense Report:  LISINOPRIL TAB 10MG last filled on 6/24/21 for 90 day supply. Per [facility]:   LISINOPRIL   TAB 10MG last picked up on 6/24/21 for 90 day supply. 1 refills remaining. Billed through [employer]     BP Readings from Last 3 Encounters:   07/02/21 (!) 146/62   06/24/21 138/74   06/23/21 (!) 140/74     CrCl cannot be calculated (Unknown ideal weight.). STATIN ADHERENCE    Per Insurance Records through [employer] (2020 [facility] = 100%; YTD PDC = 84%; Potential Fail Date: 9/18/21):   ATORVASTATIN TAB 10MG last filled on 4/10/21 for 90 day supply. Next refill due: 7/11/21    Per Reconciled Dispense Report:  ATORVASTATIN TAB 10MG last filled on 4/10/21 for 90 day supply. Per [facility]:   ATORVASTATIN TAB 10MG last picked up on 8/20/21 for 90 day supply. 0 refills remaining.  Billed through [facility]   Component Value Date    CHOL 172 03/16/2021    TRIG 109 03/16/2021    HDL 39 03/16/2021    LDLCALC 111 (H) 03/16/2021     ALT   Date Value Ref Range Status   03/01/2021 9 0 - 32 U/L Final     AST   Date Value Ref Range Status   03/01/2021 20 0 - 31 U/L Final     The 10-year ASCVD risk score (Becki Vanegas, et al., 2013) is: 21.7%    Values used to calculate the score:      Age: 66 years      Sex: Female      Is Non- : No      Diabetic: No      Tobacco smoker: No      Systolic Blood Pressure: 327 mmHg      Is BP treated: Yes      HDL Cholesterol: 39 mg/dL      Total Cholesterol: 172 mg/dL     PLAN  The following are interventions that have been identified:   - Patient needs refills for Atorvastatin     No patient out reach planned at this time.     Patient appears to be adherent and filling a 90ds    Sending this encounter to pharmacist to request refills for Atorvastatin      Future Appointments   Date Time Provider Ebenezer Parks   9/21/2021 10:30 AM [facility] [place] [address] [address] [facility]  // Department, toll free [phone], Option 2

## 2021-09-15 RX ORDER — ATORVASTATIN CALCIUM 10 MG/1
TABLET, FILM COATED ORAL
Qty: 90 TABLET | Refills: 0 | Status: SHIPPED
Start: 2021-09-15 | End: 2021-11-01

## 2021-09-15 NOTE — TELEPHONE ENCOUNTER
Jack Marquez MD, patient out of refills of atorvastatin.  Rx pended for your signature/modification as appropriate    LOV: 3/16/21  Next: 9/21/21    Thank you,  Zenia Simon, PharmD, [facility]  Department, toll free: [phone]

## 2021-09-16 NOTE — TELEPHONE ENCOUNTER
rx signed    For Pharmacy Admin Tracking Only     CPA in place:  No   Recommendation Provided To: Provider: 1 via Note to Provider   Intervention Detail: Adherence Monitorin and New Rx: 1, reason: Improve Adherence   Gap Closed?: Yes    Intervention Accepted By: Provider: 1   Time Spent (min): 20

## 2021-09-21 ENCOUNTER — OFFICE VISIT (OUTPATIENT)
Dept: FAMILY MEDICINE CLINIC | Age: 71
End: 2021-09-21
Payer: MEDICARE

## 2021-09-21 VITALS
DIASTOLIC BLOOD PRESSURE: 72 MMHG | SYSTOLIC BLOOD PRESSURE: 129 MMHG | TEMPERATURE: 97.9 F | HEIGHT: 60 IN | HEART RATE: 85 BPM | WEIGHT: 143.2 LBS | RESPIRATION RATE: 20 BRPM | OXYGEN SATURATION: 97 % | BODY MASS INDEX: 28.11 KG/M2

## 2021-09-21 DIAGNOSIS — K21.9 GASTROESOPHAGEAL REFLUX DISEASE WITHOUT ESOPHAGITIS: ICD-10-CM

## 2021-09-21 DIAGNOSIS — I10 ESSENTIAL HYPERTENSION: ICD-10-CM

## 2021-09-21 DIAGNOSIS — M19.042 LOCALIZED OSTEOARTHRITIS OF LEFT HAND: ICD-10-CM

## 2021-09-21 DIAGNOSIS — Z23 NEED FOR INFLUENZA VACCINATION: Primary | ICD-10-CM

## 2021-09-21 DIAGNOSIS — M85.80 OSTEOPENIA, UNSPECIFIED LOCATION: ICD-10-CM

## 2021-09-21 DIAGNOSIS — E78.5 HYPERLIPIDEMIA, UNSPECIFIED HYPERLIPIDEMIA TYPE: ICD-10-CM

## 2021-09-21 DIAGNOSIS — T88.7XXA MEDICATION SIDE EFFECT: ICD-10-CM

## 2021-09-21 PROCEDURE — G0008 ADMIN INFLUENZA VIRUS VAC: HCPCS | Performed by: FAMILY MEDICINE

## 2021-09-21 PROCEDURE — 90694 VACC AIIV4 NO PRSRV 0.5ML IM: CPT | Performed by: FAMILY MEDICINE

## 2021-09-21 PROCEDURE — 99214 OFFICE O/P EST MOD 30 MIN: CPT | Performed by: FAMILY MEDICINE

## 2021-09-21 RX ORDER — PREDNISONE 20 MG/1
TABLET ORAL
COMMUNITY
Start: 2021-09-19 | End: 2021-09-24

## 2021-09-21 SDOH — ECONOMIC STABILITY: FOOD INSECURITY: WITHIN THE PAST 12 MONTHS, YOU WORRIED THAT YOUR FOOD WOULD RUN OUT BEFORE YOU GOT MONEY TO BUY MORE.: NEVER TRUE

## 2021-09-21 SDOH — ECONOMIC STABILITY: FOOD INSECURITY: WITHIN THE PAST 12 MONTHS, THE FOOD YOU BOUGHT JUST DIDN'T LAST AND YOU DIDN'T HAVE MONEY TO GET MORE.: NEVER TRUE

## 2021-09-21 ASSESSMENT — SOCIAL DETERMINANTS OF HEALTH (SDOH): HOW HARD IS IT FOR YOU TO PAY FOR THE VERY BASICS LIKE FOOD, HOUSING, MEDICAL CARE, AND HEATING?: NOT HARD AT ALL

## 2021-09-21 NOTE — PROGRESS NOTES
FM Progress Note    Subjective:   HTN. Cardizem. Controlled. MR. Chou Walked a lot on vacation. States she is not taking lisinopril or metoprolol     GERD. pepcid on MWF    OP, but most recent DEXA on record 2017 showed osteopenia. On vit D and calcium. HLD. lipitor 10. Declines to increase. coq10 helps with myalgias. The 10-year ASCVD risk score (Marcella Stiles., et al., 2013) is: 17.4%    Values used to calculate the score:      Age: 66 years      Sex: Female      Is Non- : No      Diabetic: No      Tobacco smoker: No      Systolic Blood Pressure: 454 mmHg      Is BP treated: Yes      HDL Cholesterol: 39 mg/dL      Total Cholesterol: 172 mg/dL     OA. Diclofenac gel nightly helps. No longer wearing wedding ring. Friday before labor day elbow started hurting. Got red, got worse, low grade temp. Now resolved as of Tuesday morning. Per picture olecranon bursitis with cellulitis. Prednisone for RAD Dx'd at [facility] in [place]. Pt's story is she swallowed neda pepper. Pt not sure what meds she's taking. Health Maintenance Due   Topic Date Due    DTaP/Tdap/Td vaccine (1 - Tdap) Never done   Leticia Jones Annual Wellness Visit (AWV)  Never done         Objective:   /72 (Site: Left Upper Arm, Position: Sitting, Cuff Size: Large Adult)   Pulse 85   Temp 97.9 °F (36.6 °C) (Temporal)   Resp 20   Ht 4' 11.5\" (1.511 m)   Wt 143 lb 3.2 oz (65 kg)   SpO2 97%   BMI 28.44 kg/m²   General appearance: NAD, alert and interacting appropriately  HEENT: NCAT, PERRLA, EOMI   Resp: CTAB, no WRC  CVS: RRR, no MRG  Abdomen: BS +, SNDNT  Extremities: No clubbing, cyanosis, or edema. Warm. Dry. I have reviewed this patient's previous records. I have reviewed this patient's labs. I have reviewed this patient's medications. Assessment/Plan:    Shavonne Torres was seen today for hypertension, ed follow-up and insect bite.     Diagnoses and all orders for this visit:    Need for influenza vaccination  -     INFLUENZA, QUADV, ADJUVANTED, 72 YRS =, IM, PF, PREFILL SYR, 0.5ML (FLUAD)    Hyperlipidemia, unspecified hyperlipidemia type    Essential hypertension    Localized osteoarthritis of left hand    Medication side effect    Gastroesophageal reflux disease without esophagitis    Osteopenia, unspecified location    CPM HLD  CPM HTN  CPM GERD  CPM osteopenia      There are no Patient Instructions on file for this visit. Return in about 6 months (around 3/21/2022) for hypertension. Greater than 50% of this 30 minute face-to-face patient encounter was spent counseling and/or care coordination on the following: The primary encounter diagnosis was Need for influenza vaccination. Diagnoses of Hyperlipidemia, unspecified hyperlipidemia type, Essential hypertension, Localized osteoarthritis of left hand, Medication side effect, Gastroesophageal reflux disease without esophagitis, and Osteopenia, unspecified location were also pertinent to this visit.       Electronically signed by Julianne Robles MD on 9/21/2021 at 11:40 AM

## 2021-09-24 ENCOUNTER — OFFICE VISIT (OUTPATIENT)
Dept: PRIMARY CARE CLINIC | Age: 71
End: 2021-09-24
Payer: MEDICARE

## 2021-09-24 VITALS
OXYGEN SATURATION: 96 % | HEIGHT: 60 IN | DIASTOLIC BLOOD PRESSURE: 80 MMHG | RESPIRATION RATE: 20 BRPM | TEMPERATURE: 97.2 F | HEART RATE: 80 BPM | BODY MASS INDEX: 28.44 KG/M2 | SYSTOLIC BLOOD PRESSURE: 147 MMHG

## 2021-09-24 DIAGNOSIS — R22.1 THROAT SWELLING: ICD-10-CM

## 2021-09-24 DIAGNOSIS — J39.2 THROAT IRRITATION: Primary | ICD-10-CM

## 2021-09-24 PROCEDURE — 99214 OFFICE O/P EST MOD 30 MIN: CPT | Performed by: STUDENT IN AN ORGANIZED HEALTH CARE EDUCATION/TRAINING PROGRAM

## 2021-09-24 RX ORDER — PREDNISONE 20 MG/1
20 TABLET ORAL 2 TIMES DAILY
Qty: 20 TABLET | Refills: 0 | Status: SHIPPED | OUTPATIENT
Start: 2021-09-24 | End: 2021-10-04

## 2021-09-24 NOTE — PROGRESS NOTES
Patient:  James Garcia 66 y.o. female     Date of Service: 9/24/21      Chiefcomplaint:   Chief Complaint   Patient presents with    Pharyngitis     cayenne pepper    Cough    Wheezing     History of Present Illness   Patient accidentally put cayenne pepper on cereal and ingested large dose. Since then she has had cough and upper respiratory discomfort and what she is calling wheezing. There was no inhalation. She was given prednisone for 3 days, 20 mg. She has not seen significant improvement over time. She did have some smoke exposure a day or two ago as well. No levon shortness of breath or mouth or tongue swelling. No difficulty eating. 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 Debby Durbin yearly    Mitral regurgitation     follows with Dr. Debby Durbin yearly    PONV (postoperative nausea and vomiting)     Tachycardia     follows with Dr. Debby Durbin yearly    Thyroid disease     'enlarged thyroid' ; no meds present     Physical Exam   Vitals: BP (!) 147/80   Pulse 80   Temp 97.2 °F (36.2 °C) (Temporal)   Resp 20   Ht 4' 11.5\" (1.511 m)   SpO2 96%   BMI 28.44 kg/m²   General Appearance: Alert, oriented, no acute distress  HEENT: No scleral icterus. No visible discharge from eyes  Neck: Not rigid. No visible masses  Chest wall/Lung: Clear to auscultation bilaterally,  respirations unlabored. No ronchi/wheezing/rales  Heart: RRR, no murmur  Abdomen: Soft, nontender  Extremities:  No edema  Skin: No rashes. No jaundice  Neuro: Alert and oriented        Psych: Appropriate mood and appropriate affect    Assessment and Plan   1. Throat irritation  Potential chemical type irritation to upper airway. Lungs are clear.   I do not hear any upper airway wheezing. No drooling. Patient does have wet cough and raspy voice. Oxygen is appropriate. Discussed this case with the ENT and they recommend additional steroid treatment as well as Magic mouthwash. patient can follow-up with them next week for additional consideration of scope if not improving.   -     predniSONE (DELTASONE) 20 MG tablet; Take 1 tablet by mouth 2 times daily for 10 days, Disp-20 tablet, R-0Normal  -     Magic Mouthwash (MIRACLE MOUTHWASH); Swish and spit 5 mLs 4 times daily as needed for Irritation, Disp-1 each, [facility]  -     [facility]  2. Throat swelling  -     predniSONE (DELTASONE) 20 MG tablet; Take 1 tablet by mouth 2 times daily for 10 days, Disp-20 tablet, R-0Normal  -     Magic Mouthwash (MIRACLE MOUTHWASH); Swish and spit 5 mLs 4 times daily as needed for Irritation, Disp-1 each, -[address] [place] [address]  -     [facility]      No follow-ups on file. Sheryle Capers, DO     This document may have been prepared at least partially through the use of voice recognition software. Although effort is taken to assure the accuracy of this document, it is possible that grammatical, syntax,  or spelling errors may occur.

## 2021-09-29 ENCOUNTER — OFFICE VISIT (OUTPATIENT)
Dept: ENT CLINIC | Age: 71
End: 2021-09-29
Payer: MEDICARE

## 2021-09-29 VITALS
HEART RATE: 91 BPM | WEIGHT: 143 LBS | SYSTOLIC BLOOD PRESSURE: 167 MMHG | BODY MASS INDEX: 28.07 KG/M2 | HEIGHT: 60 IN | DIASTOLIC BLOOD PRESSURE: 80 MMHG

## 2021-09-29 DIAGNOSIS — J02.9 PHARYNGITIS, UNSPECIFIED ETIOLOGY: Primary | ICD-10-CM

## 2021-09-29 DIAGNOSIS — R09.82 POST-NASAL DRAINAGE: ICD-10-CM

## 2021-09-29 DIAGNOSIS — R49.0 HOARSENESS: ICD-10-CM

## 2021-09-29 DIAGNOSIS — J30.9 ALLERGIC RHINITIS, UNSPECIFIED SEASONALITY, UNSPECIFIED TRIGGER: ICD-10-CM

## 2021-09-29 PROCEDURE — 99204 OFFICE O/P NEW MOD 45 MIN: CPT | Performed by: NURSE PRACTITIONER

## 2021-09-29 PROCEDURE — 31575 DIAGNOSTIC LARYNGOSCOPY: CPT | Performed by: NURSE PRACTITIONER

## 2021-09-29 RX ORDER — FLUTICASONE PROPIONATE 50 MCG
2 SPRAY, SUSPENSION (ML) NASAL DAILY
Qty: 16 G | Refills: 5 | Status: SHIPPED | OUTPATIENT
Start: 2021-09-29

## 2021-09-29 RX ORDER — ALBUTEROL SULFATE 90 UG/1
2 AEROSOL, METERED RESPIRATORY (INHALATION)
Qty: 1 EACH | Refills: 1 | Status: SHIPPED | OUTPATIENT
Start: 2021-09-29

## 2021-09-29 RX ORDER — AZELASTINE 1 MG/ML
1-2 SPRAY, METERED NASAL 2 TIMES DAILY PRN
Qty: 30 ML | Refills: 1 | Status: SHIPPED | OUTPATIENT
Start: 2021-09-29

## 2021-09-29 NOTE — PROGRESS NOTES
[facility] Otolaryngology  Dr. Padmini Bartlett. LUCIANO Cummings Ms.Ed. New Consult       Patient Name:  Beti Rosado  :  [id number]     CHIEF C/O:    Chief Complaint   Patient presents with    New Patient     swelling in troat, coughing and wheezing. SOB. states that it started apx two weeks ago when she inhaled neda pepper and seems to be getting worse. HISTORY OBTAINED FROM:  patient    HISTORY OF PRESENT ILLNESS:       Oscar Avendano is a 66y.o. year old female, here today for throat swelling and sinus issues. Symptoms began 2 weeks ago  Accidentally put cayanne pepper on her cereal instead of cinnamon  Feel like her throat is still swollen  Was seen at urgent care and walk in. Placed on steroids and magic mouthwash which gives mild relief  Denies difficulty breathing  Complains of tightness in the throat and chest when breathing, mild shortness of breath with intermittent wheezing  Hoarseness of her voice since inhaling the pepper  Began having sinus symptoms with congestion and cough for 1 week  Persistent post nasal drainage  Has been vaccinated for covid, has not been tested since onset of these symtpoms.         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 Jayda Lozoya yearly    Mitral regurgitation     follows with Dr. Jayda Lozoya yearly    PONV (postoperative nausea and vomiting)     Tachycardia     follows with Dr. Jayda Lozoya yearly    Thyroid disease     'enlarged thyroid' ; no meds present     Past Surgical History:   Procedure Laterality Date    BREAST BIOPSY      benign    COLONOSCOPY      DIAGNOSTIC CARDIAC CATH LAB PROCEDURE  01    [facility]    ENDOSCOPY, COLON, DIAGNOSTIC      LITHOTRIPSY      VERY SMALL URETER,TERRIBLE TIME GETTING STENTS IN, ALOT OF ANESTH, VERY SICK AFTER    LITHOTRIPSY Left 1/30/2020    LEFT ESWL EXTRACORPOREAL SHOCK WAVE LITHOTRIPSY performed by Joaquin Hardwick MD at [facility] 22 LITHOTRIPSY Left 6/16/2021    CYSTOSCOPY RETROGRADE PYELOGRAM URETEROSCOPY J STENT LASER LITHOTRIPSY LEFT performed by Tri Henry DO at [address] [employer] W/INTERP&RPT N/A 8/17/2018    ESOPHAGEAL MOTILITY/MANOMETRY STUDY performed by Tessa Kang DO at [address] [place], REPAIR PARAESOPHAGEAL HERNIA, INCL FUNDOPLASTY W/ MESH N/A 10/3/2018    LAPAROSCOPIC HIATAL HERNIA REPAIR WITH MESH performed by Yesica Bañuelos MD at [place] [address] GASTROINTESTINAL ENDOSCOPY  6/17/15    VULVAR/PERINEAL BIOPSY  1985       Current Outpatient Medications:     predniSONE (DELTASONE) 20 MG tablet, Take 1 tablet by mouth 2 times daily for 10 days, Disp: 20 tablet, Rfl: 0    Magic Mouthwash (MIRACLE MOUTHWASH), Swish and spit 5 mLs 4 times daily as needed for Irritation, Disp: 1 each, Rfl: 0    atorvastatin (LIPITOR) 10 MG tablet, Take 1 tablet by mouth once daily, Disp: 90 tablet, Rfl: 0    dilTIAZem (CARDIZEM CD) 240 MG extended release capsule, Take 240 mg by mouth daily, Disp: , Rfl:     famotidine (PEPCID) 20 MG tablet, Take 20 mg by mouth 2 times daily, Disp: , Rfl:     metoprolol succinate (TOPROL XL) 50 MG extended release tablet, Take 1 tablet by mouth daily (Patient taking differently: Take 50 mg by mouth daily Not taking changed back to Carta XT), Disp: 30 tablet, Rfl: 5    lisinopril (PRINIVIL;ZESTRIL) 10 MG tablet, Take 1 tablet by mouth daily (Patient taking differently: Take 10 mg by mouth daily Stopped taking according to Patient did feel well per Dr Raul Lee), Disp: 30 tablet, Rfl: 5    diclofenac sodium (VOLTAREN) 1 % GEL, Apply topically 2 times daily (Patient taking differently: Apply topically daily ), Disp: 150 g, Rfl: 2    Calcium Carbonate (CALCIUM 600 PO), Take by mouth 1 1/2 tabs daily, Disp: , Rfl:     coenzyme Q-10 100 MG capsule, Take 2 capsules by mouth daily, Disp: 60 capsule, Rfl: 5    Nutritional Supplements (SOY PROTEIN SHAKE) POWD, Take by mouth daily , Disp: , Rfl:     Valerian Root 500 MG CAPS, Take 2,000 mg by mouth every evening, Disp: , Rfl:     Omega-3 1000 MG CAPS, Take by mouth daily, Disp: , Rfl:     vitamin D (CHOLECALCIFEROL) 1000 UNIT TABS tablet, Take 1,000 Units by mouth daily, Disp: , Rfl:     Multiple Minerals-Vitamins (CALCIUM-MAGNESIUM-ZINC-D3) TABS, Take by mouth daily, Disp: , Rfl:     Biotin 1000 MCG TABS, Take 2,000 mcg by mouth daily , Disp: , Rfl:     DHEA 50 MG CAPS, Take by mouth daily, Disp: , Rfl:     aspirin EC 81 MG EC tablet, Take 81 mg by mouth daily To verify holding w dr, Disp: , Rfl:     Flaxseed, Linseed, (FLAXSEED OIL PO), Take 1,300 mg by mouth daily , Disp: , Rfl:     ibuprofen (ADVIL;MOTRIN) 400 MG tablet, Take 1 tablet by mouth every 8 hours as needed for Pain, Disp: 8 tablet, Rfl: 0  Patient has no known allergies. Social History     Tobacco Use    Smoking status: Former Smoker     Packs/day: 0.50     Years: 4.00     Pack years: 2.00     Types: Cigarettes     Quit date: 1969     Years since quittin.7    Smokeless tobacco: Never Used   Vaping Use    Vaping Use: Never used   Substance Use Topics    Alcohol use: Yes     Comment: rare glass of wine    Drug use: No     Family History   Problem Relation Age of Onset    Heart Attack Father     Other Father         bowel disorder    Cancer Other     Diabetes Other     Asthma Son        Review of Systems   Constitutional: Negative. Negative for activity change and appetite change. HENT: Positive for congestion, postnasal drip, rhinorrhea, sore throat and voice change (Hoarseness). Eyes: Negative. Respiratory: Positive for cough and wheezing. Negative for shortness of breath and stridor. Cardiovascular: Negative. Negative for chest pain and palpitations.    Endocrine: Negative. Musculoskeletal: Negative. Skin: Negative. Neurological: Negative. Negative for dizziness. Hematological: Negative. Psychiatric/Behavioral: Negative. BP (!) 167/80   Pulse 91   Ht 4' 11.5\" (1.511 m)   Wt 143 lb (64.9 kg)   BMI 28.40 kg/m²   Physical Exam  Constitutional:       Appearance: Normal appearance. HENT:      Head: Normocephalic. Right Ear: Tympanic membrane, ear canal and external ear normal.      Left Ear: Tympanic membrane, ear canal and external ear normal.      Nose: Rhinorrhea present. Rhinorrhea is clear. Right Turbinates: Pale. Left Turbinates: Pale. Mouth/Throat:      Lips: Pink. Pharynx: Oropharynx is clear. Eyes:      Conjunctiva/sclera: Conjunctivae normal.      Pupils: Pupils are equal, round, and reactive to light. Cardiovascular:      Rate and Rhythm: Normal rate and regular rhythm. Pulses: Normal pulses. Pulmonary:      Effort: Pulmonary effort is normal. No respiratory distress. Breath sounds: No stridor. Musculoskeletal:         General: Normal range of motion. Cervical back: Normal range of motion. No rigidity. No muscular tenderness. Skin:     General: Skin is warm and dry. Neurological:      General: No focal deficit present. Mental Status: She is alert and oriented to person, place, and time. Psychiatric:         Mood and Affect: Mood normal.         Behavior: Behavior normal.         Thought Content:  Thought content normal.         Judgment: Judgment normal.         IMPRESSION/PLAN:  Endoscopy Procedure Note    Pre-operative Diagnosis: hoarseness, wheezing     Post-operative Diagnosis: same    Indications: Hoarseness, dysphagia or aspiration - not able to be clearly evaluated by indirect laryngoscopy  Evaluation of the larynx and immediate subglottis - unable to be visualized by mirror examination    Anesthesia: Lidocaine 4% and Helder-Synephrine 1/2%    Endoscopy Type: current symptoms and returned negative. Patient will continue with current course of steroids until completion. She replaced on Flonase, 2 sprays each nostril once daily as well as Astelin spray, 1 to 2 sprays each nostril twice daily as needed for rhinitis postnasal drainage symptoms. Patient will also be given an albuterol inhaler to be used 2 puffs every 4-6 hours as needed for wheezing or shortness of breath. Laryngoscope performed in the office. Proper PPE including gown goggles and N95 mask was donned during the procedure. Scope reveals mild irritation of the hypopharynx. There is mild delay of the left cord movement. Images of this scope were reviewed by Dr. Antony Sorensen. If symptoms do not resolve within 1 month we will consider rescoping patient with possible CT of the neck and chest    Patient will follow up in 1 month. She is instructed to call with any new or worsening symptoms prior to her next appointment.           EBONY Pardo, FNP-C  8 [facility], Nose and Throat    The information contained in this note has been dictated using drug and medical speech recognition software and may contain errors

## 2021-10-12 ASSESSMENT — ENCOUNTER SYMPTOMS
COUGH: 1
STRIDOR: 0
EYES NEGATIVE: 1
VOICE CHANGE: 1
WHEEZING: 1
SORE THROAT: 1
RHINORRHEA: 1
SHORTNESS OF BREATH: 0

## 2021-10-27 ENCOUNTER — OFFICE VISIT (OUTPATIENT)
Dept: ENT CLINIC | Age: 71
End: 2021-10-27
Payer: MEDICARE

## 2021-10-27 VITALS
WEIGHT: 141 LBS | HEIGHT: 60 IN | HEART RATE: 93 BPM | BODY MASS INDEX: 27.68 KG/M2 | RESPIRATION RATE: 16 BRPM | SYSTOLIC BLOOD PRESSURE: 160 MMHG | DIASTOLIC BLOOD PRESSURE: 75 MMHG | OXYGEN SATURATION: 97 %

## 2021-10-27 DIAGNOSIS — R09.82 POST-NASAL DRAINAGE: ICD-10-CM

## 2021-10-27 DIAGNOSIS — J30.9 ALLERGIC RHINITIS, UNSPECIFIED SEASONALITY, UNSPECIFIED TRIGGER: Primary | ICD-10-CM

## 2021-10-27 PROCEDURE — 99213 OFFICE O/P EST LOW 20 MIN: CPT | Performed by: NURSE PRACTITIONER

## 2021-10-27 RX ORDER — LEVOCETIRIZINE DIHYDROCHLORIDE 5 MG/1
5 TABLET, FILM COATED ORAL NIGHTLY
Qty: 30 TABLET | Refills: 2 | Status: SHIPPED
Start: 2021-10-27 | End: 2022-03-15

## 2021-10-27 NOTE — PROGRESS NOTES
[facility] Otolaryngology  Dr. Pramod Jimenez. Becka Delacruz. Ms.Ed        Patient Name:  Karen Osman  :  [id number]     CHIEF C/O:    Chief Complaint   Patient presents with    Pharyngitis     Follow up thraot irritation-has improved, used nasal sprays but stopped due to epistaxis        HISTORY OBTAINED FROM:  patient    HISTORY OF PRESENT ILLNESS:       Leonie Cartwright is a 66y.o. year old female, here today for follow up of throat irritation after ingestion of cayenne pepper. Patient was last seen 1 month ago  Placed on flonase and astelin for sinus congestion and post nasal draiange with albuterol inhaler for wheezing. States sinus symptoms improved with sprays but caused nose bleeds so stopped  Symptoms have returned  Used albuterol for 2-3 weeks and wheezing resolved  No further throat irritation  No difficulty swallowing at this time.         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 Theresa Colon yearly    Mitral regurgitation     follows with Dr. Theresa Colon yearly    PONV (postoperative nausea and vomiting)     Tachycardia     follows with Dr. Theresa Colon yearly    Thyroid disease     'enlarged thyroid' ; no meds present     Past Surgical History:   Procedure Laterality Date    BREAST BIOPSY      benign    COLONOSCOPY      DIAGNOSTIC CARDIAC CATH LAB PROCEDURE  01    [facility]    ENDOSCOPY, COLON, DIAGNOSTIC      LITHOTRIPSY      VERY SMALL URETER,TERRIBLE TIME GETTING STENTS IN, ALOT OF ANESTH, VERY SICK AFTER    LITHOTRIPSY Left 2020    LEFT ESWL EXTRACORPOREAL SHOCK WAVE LITHOTRIPSY performed by Champ Zamora MD at [facility] LITHOTRIPSY Left 2021    CYSTOSCOPY RETROGRADE PYELOGRAM URETEROSCOPY J STENT LASER LITHOTRIPSY LEFT performed by Ann Marie GARCIA Memo, DO at [place] [address] MOTILITY STUDY W/INTERP&RPT N/A 8/17/2018    ESOPHAGEAL MOTILITY/MANOMETRY STUDY performed by Easton Mcduffie DO at [address] [place], REPAIR PARAESOPHAGEAL HERNIA, INCL FUNDOPLASTY W/ MESH N/A 10/3/2018    LAPAROSCOPIC HIATAL HERNIA REPAIR WITH MESH performed by Sofia Han MD at [place] [address]. UPPER GASTROINTESTINAL ENDOSCOPY  6/17/15    VULVAR/PERINEAL BIOPSY  1985       Current Outpatient Medications:     fluticasone (FLONASE) 50 MCG/ACT nasal spray, 2 sprays by Each Nostril route daily, Disp: 16 g, Rfl: 5    azelastine (ASTELIN) 0.1 % nasal spray, 1-2 sprays by Nasal route 2 times daily as needed for Rhinitis Use in each nostril as directed, Disp: 30 mL, Rfl: 1    atorvastatin (LIPITOR) 10 MG tablet, Take 1 tablet by mouth once daily, Disp: 90 tablet, Rfl: 0    dilTIAZem (CARDIZEM CD) 240 MG extended release capsule, Take 240 mg by mouth daily, Disp: , Rfl:     famotidine (PEPCID) 20 MG tablet, Take 20 mg by mouth 2 times daily, Disp: , Rfl:     metoprolol succinate (TOPROL XL) 50 MG extended release tablet, Take 1 tablet by mouth daily (Patient taking differently: Take 50 mg by mouth daily Not taking changed back to Carta XT), Disp: 30 tablet, Rfl: 5    lisinopril (PRINIVIL;ZESTRIL) 10 MG tablet, Take 1 tablet by mouth daily (Patient taking differently: Take 10 mg by mouth daily Stopped taking according to Patient did feel well per Dr Marilou Craig), Disp: 30 tablet, Rfl: 5    diclofenac sodium (VOLTAREN) 1 % GEL, Apply topically 2 times daily (Patient taking differently: Apply topically daily ), Disp: 150 g, Rfl: 2    Calcium Carbonate (CALCIUM 600 PO), Take by mouth 1 1/2 tabs daily, Disp: , Rfl:     coenzyme Q-10 100 MG capsule, Take 2 capsules by mouth daily, Disp: 60 capsule, Rfl: 5    Nutritional Supplements (SOY PROTEIN SHAKE) POWD, Take by mouth daily , Disp: , Rfl:     Valerian Root 500 MG CAPS, Take 2,000 mg by mouth every evening, Disp: , Rfl:     Omega-3 1000 MG CAPS, Take by mouth daily, Disp: , Rfl:     vitamin D (CHOLECALCIFEROL) 1000 UNIT TABS tablet, Take 1,000 Units by mouth daily, Disp: , Rfl:     Multiple Minerals-Vitamins (CALCIUM-MAGNESIUM-ZINC-D3) TABS, Take by mouth daily, Disp: , Rfl:     Biotin 1000 MCG TABS, Take 2,000 mcg by mouth daily , Disp: , Rfl:     DHEA 50 MG CAPS, Take by mouth daily, Disp: , Rfl:     aspirin EC 81 MG EC tablet, Take 81 mg by mouth daily To verify holding w dr, Disp: , Rfl:     Flaxseed, Linseed, (FLAXSEED OIL PO), Take 1,300 mg by mouth daily , Disp: , Rfl:     albuterol sulfate  (90 Base) MCG/ACT inhaler, Inhale 2 puffs into the lungs every 4-6 hours as needed for Wheezing or Shortness of Breath (Patient not taking: Reported on 10/27/2021), Disp: 1 each, Rfl: 1    Magic Mouthwash (MIRACLE MOUTHWASH), Swish and spit 5 mLs 4 times daily as needed for Irritation (Patient not taking: Reported on 10/27/2021), Disp: 1 each, Rfl: 0    ibuprofen (ADVIL;MOTRIN) 400 MG tablet, Take 1 tablet by mouth every 8 hours as needed for Pain, Disp: 8 tablet, Rfl: 0  Patient has no known allergies. Social History     Tobacco Use    Smoking status: Former Smoker     Packs/day: 0.50     Years: 4.00     Pack years: 2.00     Types: Cigarettes     Quit date: 1969     Years since quittin.8    Smokeless tobacco: Never Used   Vaping Use    Vaping Use: Never used   Substance Use Topics    Alcohol use: Yes     Comment: rare glass of wine    Drug use: No     Family History   Problem Relation Age of Onset    Heart Attack Father     Other Father         bowel disorder    Cancer Other     Diabetes Other     Asthma Son        Review of Systems   Constitutional: Negative. Negative for activity change and appetite change. HENT: Positive for congestion, postnasal drip and rhinorrhea. Negative for sinus pressure, sinus pain, sore throat, trouble swallowing and voice change. Eyes: Negative. Respiratory: Negative. Negative for shortness of breath and stridor. Cardiovascular: Negative. Negative for chest pain and palpitations. Endocrine: Negative. Musculoskeletal: Negative. Skin: Negative. Neurological: Negative. Negative for dizziness. Hematological: Negative. Psychiatric/Behavioral: Negative. BP (!) 160/75 (Site: Left Upper Arm, Position: Sitting, Cuff Size: Medium Adult)   Pulse 93   Resp 16   Ht 4' 11.5\" (1.511 m)   Wt 141 lb (64 kg)   SpO2 97%   BMI 28.00 kg/m²   Physical Exam  Constitutional:       Appearance: Normal appearance. HENT:      Head: Normocephalic. Right Ear: Tympanic membrane, ear canal and external ear normal.      Left Ear: Tympanic membrane, ear canal and external ear normal.      Nose: Rhinorrhea present. Rhinorrhea is clear. Right Turbinates: Pale. Left Turbinates: Pale. Mouth/Throat:      Lips: Pink. Mouth: Mucous membranes are moist.     Eyes:      Conjunctiva/sclera: Conjunctivae normal.      Pupils: Pupils are equal, round, and reactive to light. Cardiovascular:      Rate and Rhythm: Normal rate and regular rhythm. Pulses: Normal pulses. Pulmonary:      Effort: Pulmonary effort is normal. No respiratory distress. Breath sounds: No stridor. Musculoskeletal:         General: Normal range of motion. Cervical back: Normal range of motion. No rigidity. No muscular tenderness. Skin:     General: Skin is warm and dry. Neurological:      General: No focal deficit present. Mental Status: She is alert and oriented to person, place, and time. Psychiatric:         Mood and Affect: Mood normal.         Behavior: Behavior normal.         Thought Content: Thought content normal.         Judgment: Judgment normal.         IMPRESSION/PLAN:    Crystal Gray was seen today for pharyngitis.     Diagnoses and all orders for this visit:    Allergic rhinitis, unspecified seasonality, unspecified trigger    Post-nasal drainage    Other orders  -     levocetirizine (XYZAL) 5 MG tablet; Take 1 tablet by mouth nightly      Patient will be placed on Xyzal, 5 mg 1 tablet once daily at bedtime for her continued allergy symptoms. Also recommended that the patient begin using nasal saline several times daily. She will hold Flonase and Astelin at this time due to recurrent nosebleeds. She will otherwise follow-up in 3 months. She is instructed to call with any new or worsening symptoms prior to her next appointment.         EBONY Graham, FNP-C  8 [facility], Nose and Throat    The information contained in this note has been dictated using drug and medical speech recognition software and may contain errors

## 2021-11-01 DIAGNOSIS — E78.5 HYPERLIPIDEMIA, UNSPECIFIED HYPERLIPIDEMIA TYPE: ICD-10-CM

## 2021-11-01 RX ORDER — ATORVASTATIN CALCIUM 10 MG/1
TABLET, FILM COATED ORAL
Qty: 90 TABLET | Refills: 0 | Status: SHIPPED
Start: 2021-11-01 | End: 2022-02-17

## 2021-11-04 ASSESSMENT — ENCOUNTER SYMPTOMS
SINUS PRESSURE: 0
TROUBLE SWALLOWING: 0
RHINORRHEA: 1
VOICE CHANGE: 0
SORE THROAT: 0
STRIDOR: 0
SINUS PAIN: 0
RESPIRATORY NEGATIVE: 1
EYES NEGATIVE: 1
SHORTNESS OF BREATH: 0

## 2021-11-24 ENCOUNTER — TELEPHONE (OUTPATIENT)
Dept: FAMILY MEDICINE CLINIC | Age: 71
End: 2021-11-24

## 2021-11-24 NOTE — TELEPHONE ENCOUNTER
Pt wants to know if she needs an updated Shingles vaccine. She also states she will bring in her card with her covid vaccines.  Please advise

## 2021-12-03 ENCOUNTER — TELEPHONE (OUTPATIENT)
Dept: CARDIOLOGY | Age: 71
End: 2021-12-03

## 2022-01-26 ENCOUNTER — OFFICE VISIT (OUTPATIENT)
Dept: ENT CLINIC | Age: 72
End: 2022-01-26
Payer: MEDICARE

## 2022-01-26 VITALS
WEIGHT: 143 LBS | BODY MASS INDEX: 28.07 KG/M2 | HEIGHT: 60 IN | HEART RATE: 83 BPM | DIASTOLIC BLOOD PRESSURE: 80 MMHG | SYSTOLIC BLOOD PRESSURE: 150 MMHG

## 2022-01-26 DIAGNOSIS — R09.82 POST-NASAL DRAINAGE: ICD-10-CM

## 2022-01-26 DIAGNOSIS — J02.9 PHARYNGITIS, UNSPECIFIED ETIOLOGY: ICD-10-CM

## 2022-01-26 DIAGNOSIS — J30.9 ALLERGIC RHINITIS, UNSPECIFIED SEASONALITY, UNSPECIFIED TRIGGER: Primary | ICD-10-CM

## 2022-01-26 PROCEDURE — 99213 OFFICE O/P EST LOW 20 MIN: CPT | Performed by: NURSE PRACTITIONER

## 2022-01-26 ASSESSMENT — ENCOUNTER SYMPTOMS
STRIDOR: 0
SORE THROAT: 0
EYES NEGATIVE: 1
SINUS PAIN: 0
RESPIRATORY NEGATIVE: 1
SHORTNESS OF BREATH: 0
VOICE CHANGE: 0
RHINORRHEA: 0
TROUBLE SWALLOWING: 0
SINUS PRESSURE: 0

## 2022-01-26 NOTE — PROGRESS NOTES
[facility] Otolaryngology  Dr. Laina Oreilly. Ismael Burrell. Ms.Ed        Patient Name:  Patience Martinez  :  [id number]     CHIEF C/O:    Chief Complaint   Patient presents with    Follow-up     3 mo throat pain, better,        HISTORY OBTAINED FROM:  patient    HISTORY OF PRESENT ILLNESS:       Ying Harris is a 66y.o. year old female, here today for follow up of sore throat and allergy symptoms. Patient was last seen 3 months ago for follow-up of allergic rhinitis symptoms and throat irritation from cayenne pepper inhalation. Patient states she is suffering from no further sore throat or difficulty swallowing. She is currently taking Claritin daily but has stopped using Flonase and Astelin as the Claritin is helping her symptoms. She denies persistent rhinorrhea and congestion or postnasal drainage at this time.       Past Medical History:   Diagnosis Date    Arthritis     Cancer 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 Ann Martinez yearly    Mitral regurgitation     follows with Dr. Ann Martinez yearly    PONV (postoperative nausea and vomiting)     Tachycardia     follows with Dr. Ann Martinez yearly    Thyroid disease     'enlarged thyroid' ; no meds present     Past Surgical History:   Procedure Laterality Date    BREAST BIOPSY      benign    COLONOSCOPY      DIAG[facility]ST[facility]ARDIAC C[facility] LAB PROCEDURE  01    [facility]    ENDOSCOPY, COLON, DIAGNOSTIC      LITHOTRIPSY      VERY SMALL URETER,TERRIBLE TIME GETTING STENTS IN, ALOT OF ANESTH, VERY SICK AFTER    LITHOTRIPSY Left 2020    LEFT ESWL EXTRACORPOREAL SHOCK WAVE LITHOTRIPSY performed by Krystle Buenrostro MD at [facility] LITHOTRIPSY Left 2021    CYSTOSCOPY RETROGRADE PYELOGRAM URETEROSCOPY J STENT LASER LITHOTRIPSY LEFT performed by Blanca Henry DO at [address] [email or web address] W/INTERP&RPT N/A 8/17/2018    ESOPHAGEAL MOTILITY/MANOMETRY STUDY performed by Cornelia Mercedes DO at [address] [place] [address] LAP, REPAIR PARAESOPHAGEAL HERNIA, INCL FUNDOPLASTY W/ MESH N/A 10/3/2018    LAPAROSCOPIC HIATAL HERNIA REPAIR WITH MESH performed by Naomie Rai MD at [place] [address]. UPPER GASTROINTESTINAL ENDOSCOPY  6/17/15    VULVAR/PERINEAL BIOPSY  1985       Current Outpatient Medications:     atorvastatin (LIPITOR) 10 MG tablet, Take 1 tablet by mouth once daily, Disp: 90 tablet, Rfl: 0    dilTIAZem (CARDIZEM CD) 240 MG extended release capsule, Take 240 mg by mouth daily, Disp: , Rfl:     famotidine (PEPCID) 20 MG tablet, Take 20 mg by mouth 2 times daily, Disp: , Rfl:     diclofenac sodium (VOLTAREN) 1 % GEL, Apply topically 2 times daily (Patient taking differently: Apply topically daily ), Disp: 150 g, Rfl: 2    Calcium Carbonate (CALCIUM 600 PO), Take by mouth 1 1/2 tabs daily, Disp: , Rfl:     coenzyme Q-10 100 MG capsule, Take 2 capsules by mouth daily, Disp: 60 capsule, Rfl: 5    Nutritional Supplements (SOY PROTEIN SHAKE) POWD, Take by mouth daily , Disp: , Rfl:     Valerian Root 500 MG CAPS, Take 2,000 mg by mouth every evening, Disp: , Rfl:     Omega-3 1000 MG CAPS, Take by mouth daily, Disp: , Rfl:     vitamin D (CHOLECALCIFEROL) 1000 UNIT TABS tablet, Take 1,000 Units by mouth daily, Disp: , Rfl:     Multiple Minerals-Vitamins (CALCIUM-MAGNESIUM-ZINC-D3) TABS, Take by mouth daily, Disp: , Rfl:     Biotin 1000 MCG TABS, Take 2,000 mcg by mouth daily , Disp: , Rfl:     aspirin EC 81 MG EC tablet, Take 81 mg by mouth daily To verify holding w dr, Disp: , Rfl:     Flaxseed, Linseed, (FLAXSEED OIL PO), Take 1,300 mg by mouth daily , Disp: , Rfl:     fluticasone (FLONASE) 50 MCG/ACT nasal spray, 2 sprays by Each Nostril route daily (Patient not taking: Reported on 1/26/2022), Disp: 16 g, Rfl: 5    azelastine (ASTELIN) 0.1 % nasal spray, 1-2 sprays by Nasal route 2 times daily as needed for Rhinitis Use in each nostril as directed (Patient not taking: Reported on 2022), Disp: 30 mL, Rfl: 1    albuterol sulfate  (90 Base) MCG/ACT inhaler, Inhale 2 puffs into the lungs every 4-6 hours as needed for Wheezing or Shortness of Breath (Patient not taking: Reported on 10/27/2021), Disp: 1 each, Rfl: 1    ibuprofen (ADVIL;MOTRIN) 400 MG tablet, Take 1 tablet by mouth every 8 hours as needed for Pain, Disp: 8 tablet, Rfl: 0    DHEA 50 MG CAPS, Take by mouth daily (Patient not taking: Reported on 2022), Disp: , Rfl:   Patient has no known allergies. Social History     Tobacco Use    Smoking status: Former Smoker     Packs/day: 0.50     Years: 4.00     Pack years: 2.00     Types: Cigarettes     Quit date: 1969     Years since quittin.1    Smokeless tobacco: Never Used   Vaping Use    Vaping Use: Never used   Substance Use Topics    Alcohol use: Yes     Comment: rare glass of wine    Drug use: No     Family History   Problem Relation Age of Onset    Heart Attack Father     Other Father         bowel disorder    Cancer Other     Diabetes Other     Asthma Son        Review of Systems   Constitutional: Negative. Negative for activity change and appetite change. HENT: Negative for congestion, postnasal drip, rhinorrhea, sinus pressure, sinus pain, sore throat, trouble swallowing and voice change. Eyes: Negative. Respiratory: Negative. Negative for shortness of breath and stridor. Cardiovascular: Negative. Negative for chest pain and palpitations. Endocrine: Negative. Musculoskeletal: Negative. Skin: Negative. Neurological: Negative. Negative for dizziness. Hematological: Negative. Psychiatric/Behavioral: Negative. BP (!) 150/80   Pulse 83   Ht 4' 11.5\" (1.511 m)   Wt 143 lb (64.9 kg)   BMI 28.40 kg/m²   Physical Exam  Constitutional:       Appearance: Normal appearance.    HENT:      Head: Normocephalic. Right Ear: Tympanic membrane, ear canal and external ear normal.      Left Ear: Tympanic membrane, ear canal and external ear normal.      Nose: No rhinorrhea. Right Turbinates: Not pale. Left Turbinates: Not pale. Mouth/Throat:      Lips: Pink. Mouth: Mucous membranes are moist.      Pharynx: Oropharynx is clear. Eyes:      Conjunctiva/sclera: Conjunctivae normal.      Pupils: Pupils are equal, round, and reactive to light. Cardiovascular:      Rate and Rhythm: Normal rate and regular rhythm. Pulses: Normal pulses. Pulmonary:      Effort: Pulmonary effort is normal. No respiratory distress. Breath sounds: No stridor. Musculoskeletal:         General: Normal range of motion. Cervical back: Normal range of motion. No rigidity. No muscular tenderness. Skin:     General: Skin is warm and dry. Neurological:      General: No focal deficit present. Mental Status: She is alert and oriented to person, place, and time. Psychiatric:         Mood and Affect: Mood normal.         Behavior: Behavior normal.         Thought Content: Thought content normal.         Judgment: Judgment normal.         IMPRESSION/PLAN:    Romeo Cortez was seen today for follow-up. Diagnoses and all orders for this visit:    Allergic rhinitis, unspecified seasonality, unspecified trigger    Post-nasal drainage    Pharyngitis, unspecified etiology      Patient will continue with her current regimen of Claritin once daily and states she will purchase over-the-counter. If needing to resume Flonase and Astelin she states she will call for a follow-up appointment if refills are needed. She will otherwise follow-up as needed.   She instructed to call with any new or worsening symptoms        Judith Goodwin, MSN, FNP-C  [facility], Nose and Throat    The information contained in this note has been dictated using drug and medical speech recognition software and may contain errors

## 2022-02-17 DIAGNOSIS — E78.5 HYPERLIPIDEMIA, UNSPECIFIED HYPERLIPIDEMIA TYPE: ICD-10-CM

## 2022-02-17 RX ORDER — ATORVASTATIN CALCIUM 10 MG/1
TABLET, FILM COATED ORAL
Qty: 90 TABLET | Refills: 1 | Status: SHIPPED | OUTPATIENT
Start: 2022-02-17

## 2022-03-04 ENCOUNTER — TELEPHONE (OUTPATIENT)
Dept: CARDIOLOGY | Age: 72
End: 2022-03-04

## 2022-03-31 ENCOUNTER — HOSPITAL ENCOUNTER (EMERGENCY)
Age: 72
Discharge: HOME | End: 2022-03-31
Attending: EMERGENCY MEDICINE
Payer: MEDICARE

## 2022-03-31 ENCOUNTER — APPOINTMENT (OUTPATIENT)
Dept: CT IMAGING | Age: 72
End: 2022-03-31
Payer: MEDICARE

## 2022-03-31 ENCOUNTER — TELEPHONE (OUTPATIENT)
Dept: FAMILY MEDICINE CLINIC | Age: 72
End: 2022-03-31

## 2022-03-31 VITALS
TEMPERATURE: 99.9 F | DIASTOLIC BLOOD PRESSURE: 72 MMHG | SYSTOLIC BLOOD PRESSURE: 142 MMHG | RESPIRATION RATE: 18 BRPM | HEART RATE: 104 BPM | OXYGEN SATURATION: 94 % | BODY MASS INDEX: 28 KG/M2 | WEIGHT: 141 LBS

## 2022-03-31 DIAGNOSIS — K52.9 GASTROENTERITIS: Primary | ICD-10-CM

## 2022-03-31 LAB
ALBUMIN SERPL-MCNC: 4.5 G/DL (ref 3.5–5.2)
ALP BLD-CCNC: 100 U/L (ref 35–104)
ALT SERPL-CCNC: 18 U/L (ref 0–32)
ANION GAP SERPL CALCULATED.3IONS-SCNC: 14 MMOL/L (ref 7–16)
AST SERPL-CCNC: 38 U/L (ref 0–31)
BACTERIA: ABNORMAL /HPF
BASOPHILS ABSOLUTE: 0.02 E9/L (ref 0–0.2)
BASOPHILS RELATIVE PERCENT: 0.3 % (ref 0–2)
BILIRUB SERPL-MCNC: 0.6 MG/DL (ref 0–1.2)
BILIRUBIN URINE: NEGATIVE
BLOOD, URINE: NEGATIVE
BUN BLDV-MCNC: 22 MG/DL (ref 6–23)
CALCIUM SERPL-MCNC: 9.6 MG/DL (ref 8.6–10.2)
CHLORIDE BLD-SCNC: 104 MMOL/L (ref 98–107)
CLARITY: CLEAR
CO2: 22 MMOL/L (ref 22–29)
COLOR: YELLOW
CREAT SERPL-MCNC: 0.8 MG/DL (ref 0.5–1)
EOSINOPHILS ABSOLUTE: 0.01 E9/L (ref 0.05–0.5)
EOSINOPHILS RELATIVE PERCENT: 0.1 % (ref 0–6)
EPITHELIAL CELLS, UA: ABNORMAL /HPF
GFR AFRICAN AMERICAN: >60
GFR NON-AFRICAN AMERICAN: >60 ML/MIN/1.73
GLUCOSE BLD-MCNC: 131 MG/DL (ref 74–99)
GLUCOSE URINE: NEGATIVE MG/DL
HCT VFR BLD CALC: 45.1 % (ref 34–48)
HEMOGLOBIN: 15.5 G/DL (ref 11.5–15.5)
IMMATURE GRANULOCYTES #: 0.04 E9/L
IMMATURE GRANULOCYTES %: 0.6 % (ref 0–5)
KETONES, URINE: ABNORMAL MG/DL
LACTIC ACID, SEPSIS: 1.1 MMOL/L (ref 0.5–1.9)
LEUKOCYTE ESTERASE, URINE: NEGATIVE
LIPASE: 48 U/L (ref 13–60)
LYMPHOCYTES ABSOLUTE: 0.34 E9/L (ref 1.5–4)
LYMPHOCYTES RELATIVE PERCENT: 4.7 % (ref 20–42)
MCH RBC QN AUTO: 29.9 PG (ref 26–35)
MCHC RBC AUTO-ENTMCNC: 34.4 % (ref 32–34.5)
MCV RBC AUTO: 87.1 FL (ref 80–99.9)
MONOCYTES ABSOLUTE: 0.4 E9/L (ref 0.1–0.95)
MONOCYTES RELATIVE PERCENT: 5.5 % (ref 2–12)
NEUTROPHILS ABSOLUTE: 6.43 E9/L (ref 1.8–7.3)
NEUTROPHILS RELATIVE PERCENT: 88.8 % (ref 43–80)
NITRITE, URINE: NEGATIVE
PDW BLD-RTO: 13.4 FL (ref 11.5–15)
PH UA: 5 (ref 5–9)
PLATELET # BLD: 230 E9/L (ref 130–450)
PMV BLD AUTO: 9.5 FL (ref 7–12)
POTASSIUM SERPL-SCNC: 3.5 MMOL/L (ref 3.5–5)
PROTEIN UA: ABNORMAL MG/DL
RBC # BLD: 5.18 E12/L (ref 3.5–5.5)
RBC UA: ABNORMAL /HPF (ref 0–2)
SODIUM BLD-SCNC: 140 MMOL/L (ref 132–146)
SPECIFIC GRAVITY UA: >=1.03 (ref 1–1.03)
TOTAL PROTEIN: 7.8 G/DL (ref 6.4–8.3)
UROBILINOGEN, URINE: 0.2 E.U./DL
WBC # BLD: 7.2 E9/L (ref 4.5–11.5)
WBC UA: ABNORMAL /HPF (ref 0–5)

## 2022-03-31 PROCEDURE — 6360000002 HC RX W HCPCS: Performed by: NURSE PRACTITIONER

## 2022-03-31 PROCEDURE — 83690 ASSAY OF LIPASE: CPT

## 2022-03-31 PROCEDURE — 96372 THER/PROPH/DIAG INJ SC/IM: CPT

## 2022-03-31 PROCEDURE — 2580000003 HC RX 258: Performed by: NURSE PRACTITIONER

## 2022-03-31 PROCEDURE — A4216 STERILE WATER/SALINE, 10 ML: HCPCS | Performed by: NURSE PRACTITIONER

## 2022-03-31 PROCEDURE — 74176 CT ABD & PELVIS W/O CONTRAST: CPT

## 2022-03-31 PROCEDURE — 81001 URINALYSIS AUTO W/SCOPE: CPT

## 2022-03-31 PROCEDURE — 96375 TX/PRO/DX INJ NEW DRUG ADDON: CPT

## 2022-03-31 PROCEDURE — 36415 COLL VENOUS BLD VENIPUNCTURE: CPT

## 2022-03-31 PROCEDURE — 96374 THER/PROPH/DIAG INJ IV PUSH: CPT

## 2022-03-31 PROCEDURE — 80053 COMPREHEN METABOLIC PANEL: CPT

## 2022-03-31 PROCEDURE — 87088 URINE BACTERIA CULTURE: CPT

## 2022-03-31 PROCEDURE — 99283 EMERGENCY DEPT VISIT LOW MDM: CPT

## 2022-03-31 PROCEDURE — 2500000003 HC RX 250 WO HCPCS: Performed by: NURSE PRACTITIONER

## 2022-03-31 PROCEDURE — 85025 COMPLETE CBC W/AUTO DIFF WBC: CPT

## 2022-03-31 PROCEDURE — 83605 ASSAY OF LACTIC ACID: CPT

## 2022-03-31 RX ORDER — 0.9 % SODIUM CHLORIDE 0.9 %
1000 INTRAVENOUS SOLUTION INTRAVENOUS ONCE
Status: COMPLETED | OUTPATIENT
Start: 2022-03-31 | End: 2022-03-31

## 2022-03-31 RX ORDER — DICYCLOMINE HYDROCHLORIDE 10 MG/ML
20 INJECTION INTRAMUSCULAR ONCE
Status: COMPLETED | OUTPATIENT
Start: 2022-03-31 | End: 2022-03-31

## 2022-03-31 RX ORDER — ONDANSETRON 2 MG/ML
4 INJECTION INTRAMUSCULAR; INTRAVENOUS ONCE
Status: COMPLETED | OUTPATIENT
Start: 2022-03-31 | End: 2022-03-31

## 2022-03-31 RX ORDER — ONDANSETRON 4 MG/1
4 TABLET, ORALLY DISINTEGRATING ORAL EVERY 8 HOURS PRN
Qty: 20 TABLET | Refills: 0 | Status: SHIPPED | OUTPATIENT
Start: 2022-03-31 | End: 2022-04-04

## 2022-03-31 RX ADMIN — FAMOTIDINE 20 MG: 10 INJECTION INTRAVENOUS at 18:07

## 2022-03-31 RX ADMIN — DICYCLOMINE HYDROCHLORIDE 20 MG: 20 INJECTION, SOLUTION INTRAMUSCULAR at 18:10

## 2022-03-31 RX ADMIN — ONDANSETRON 4 MG: 2 INJECTION INTRAMUSCULAR; INTRAVENOUS at 18:06

## 2022-03-31 RX ADMIN — SODIUM CHLORIDE 1000 ML: 9 INJECTION, SOLUTION INTRAVENOUS at 18:05

## 2022-03-31 ASSESSMENT — PAIN DESCRIPTION - PAIN TYPE: TYPE: ACUTE PAIN

## 2022-03-31 ASSESSMENT — PAIN DESCRIPTION - PROGRESSION: CLINICAL_PROGRESSION: NOT CHANGED

## 2022-03-31 ASSESSMENT — PAIN DESCRIPTION - ONSET: ONSET: ON-GOING

## 2022-03-31 ASSESSMENT — PAIN DESCRIPTION - FREQUENCY: FREQUENCY: CONTINUOUS

## 2022-03-31 ASSESSMENT — PAIN DESCRIPTION - ORIENTATION: ORIENTATION: MID

## 2022-03-31 ASSESSMENT — PAIN DESCRIPTION - DESCRIPTORS: DESCRIPTORS: CRAMPING

## 2022-03-31 ASSESSMENT — PAIN DESCRIPTION - LOCATION: LOCATION: ABDOMEN

## 2022-03-31 ASSESSMENT — PAIN SCALES - GENERAL: PAINLEVEL_OUTOF10: 4

## 2022-03-31 NOTE — TELEPHONE ENCOUNTER
Pt called in because she is having Diarrhea that she believes is from the medicine from Dr Dieter Awad. She started it on Tuesday with Mylanta and the diarrhea just started today. Pt also is vomitting but she thinks its due to her kidney stones.   Advised pt that it would be best to call Dr Dieter Awad and that if she continued having the vomiting and Diarrhea go to ER for fluids

## 2022-04-01 NOTE — ED PROVIDER NOTES
ED Attending shared visit  CC: No         [address] [place] [facility]  Department of Emergency Medicine   ED  Encounter Note  Admit Date/RoomTime: 3/31/2022  5:08 PM  ED Room:   NAME: She Mack  :   MRN: [id number]     Chief Complaint:  Abdominal Pain (NVD for one day, feels dehydrated)    HISTORY OF PRESENT ILLNESS        She Mack is a 66 y.o. female who presents to the ED for evaluation of nausea vomiting and diarrhea for the last 2 days. She has been taking pantoprazole and Mylanta with some mild improvement however she reports \"feeling dry. 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 She has no concern for COVID-19 and reports being vaccinated as well as having a booster dose. She has had nausea and vomiting as well as diarrhea without black or blood and last night she developed left flank pain similar to previous kidney stone. She has had no measured fever, chest pain, shortness of breath, hematuria, dysuria, leg swelling or rash. Her symptoms are aggravated by eating and drinking and unalleviated. Her symptoms are moderate in severity and persistent nature. ROS   Pertinent positives and negatives are stated within HPI, all other systems reviewed and are negative. Past Medical History:  has a past medical history of Arthritis, Cancer ([address] History of kidney stones, HTN (hypertension), Hyperlipidemia, LBBB (left bundle branch block), Mitral regurgitation, PONV (postoperative nausea and vomiting), Tachycardia, and Thyroid disease. Surgical History:  has a past surgical history that includes Diagnostic Cardiac Cath Lab Procedure (01); Tonsillectomy; Lithotripsy; Breast biopsy; Skin cancer excision; Upper gastrointestinal endoscopy (6/17/15); vulvar/perineal biopsy (); pr esophageal motility study w/interp&rpt (N/A, 2018);  Colonoscopy; Endoscopy, colon, diagnostic; pr lap, repair paraesophageal hernia, incl fundoplasty w/ mesh (N/A, 10/3/2018); Lithotripsy (Left, 1/30/2020); and Lithotripsy (Left, 6/16/2021). Social History:  reports that she quit smoking about 53 years ago. Her smoking use included cigarettes. She has a 2.00 pack-year smoking history. She has never used smokeless tobacco. She reports current alcohol use. She reports that she does not use drugs. Family History: family history includes Asthma in her son; Cancer in an other family member; Diabetes in an other family member; Heart Attack in her father; Other in her father. Allergies: Patient has no known allergies. PHYSICAL EXAM   Oxygen Saturation Interpretation: Normal on room air analysis. ED Triage Vitals   BP Temp Temp Source Pulse Resp SpO2 Height Weight   03/31/22 1707 03/31/22 1707 03/31/22 1707 03/31/22 1707 03/31/22 1707 03/31/22 1707 -- 03/31/22 1719   132/81 99.4 °F (37.4 °C) Oral 115 16 96 %  141 lb (64 kg)       Physical Exam  Constitutional/General: Alert and oriented x3, well appearing, non toxic  HEENT:  NC/NT. PERRLA. Airway patent. Neck: Supple, full ROM. No midline vertebral tenderness or crepitus. Respiratory: Lung sounds clear to auscultation bilaterally. No wheezes, rhonchi or stridor. Not in respiratory distress. CV:  Tachycardic rate. Regular rhythm. No murmurs or rubs. 2+ distal pulses. GI:  Abdomen soft, mild periumbilical tenderness without tenderness at McBurney's point, non-distended. +BS. No rebound, guarding, or rigidity. No pulsatile masses. Musculoskeletal: Moves all extremities x 4. Warm and well perfused. Capillary refill <3 seconds  Integument: Skin warm and dry. No rashes. Neurologic: Alert and oriented with no focal deficits, symmetric strength 5/5 in the upper and lower extremities bilaterally. Psychiatric: Normal affect.     Lab / Imaging Results   (All laboratory and radiology results have been personally reviewed by myself)  Labs:  Results for orders placed or performed during the hospital encounter of 03/31/22   Comprehensive Metabolic Panel   Result Value Ref Range    Sodium 140 132 - 146 mmol/L    Potassium 3.5 3.5 - 5.0 mmol/L    Chloride 104 98 - 107 mmol/L    CO2 22 22 - 29 mmol/L    Anion Gap 14 7 - 16 mmol/L    Glucose 131 (H) 74 - 99 mg/dL    BUN 22 6 - 23 mg/dL    CREATININE 0.8 0.5 - 1.0 mg/dL    GFR Non-African American >60 >=60 mL/min/1.73    GFR African American >60     Calcium 9.6 8.6 - 10.2 mg/dL    Total Protein 7.8 6.4 - 8.3 g/dL    Albumin 4.5 3.5 - 5.2 g/dL    Total Bilirubin 0.6 0.0 - 1.2 mg/dL    Alkaline Phosphatase 100 35 - 104 U/L    ALT 18 0 - 32 U/L    AST 38 (H) 0 - 31 U/L   Lactate, Sepsis   Result Value Ref Range    Lactic Acid, Sepsis 1.1 0.5 - 1.9 mmol/L   Lipase   Result Value Ref Range    Lipase 48 13 - 60 U/L   CBC with Auto Differential   Result Value Ref Range    WBC 7.2 4.5 - 11.5 E9/L    RBC 5.18 3.50 - 5.50 E12/L    Hemoglobin 15.5 11.5 - 15.5 g/dL    Hematocrit 45.1 34.0 - 48.0 %    MCV 87.1 80.0 - 99.9 fL    MCH 29.9 26.0 - 35.0 pg    MCHC 34.4 32.0 - 34.5 %    RDW 13.4 11.5 - 15.0 fL    Platelets 383 583 - 662 E9/L    MPV 9.5 7.0 - 12.0 fL    Neutrophils % 88.8 (H) 43.0 - 80.0 %    Immature Granulocytes % 0.6 0.0 - 5.0 %    Lymphocytes % 4.7 (L) 20.0 - 42.0 %    Monocytes % 5.5 2.0 - 12.0 %    Eosinophils % 0.1 0.0 - 6.0 %    Basophils % 0.3 0.0 - 2.0 %    Neutrophils Absolute 6.43 1.80 - 7.30 E9/L    Immature Granulocytes # 0.04 E9/L    Lymphocytes Absolute 0.34 (L) 1.50 - 4.00 E9/L    Monocytes Absolute 0.40 0.10 - 0.95 E9/L    Eosinophils Absolute 0.01 (L) 0.05 - 0.50 E9/L    Basophils Absolute 0.02 0.00 - 0.20 E9/L   Urinalysis with Microscopic   Result Value Ref Range    Color, UA Yellow Straw/Yellow    Clarity, UA Clear Clear    Glucose, Ur Negative Negative mg/dL    Bilirubin Urine Negative Negative    Ketones, Urine TRACE (A) Negative mg/dL    Specific Gravity, UA >=1.030 1.005 - 1.030    Blood, Urine Negative Negative    pH, UA 5.0 5.0 - 9.0 Protein, UA TRACE Negative mg/dL    Urobilinogen, Urine 0.2 <2.0 E.U./dL    Nitrite, Urine Negative Negative    Leukocyte Esterase, Urine Negative Negative    WBC, UA 0-1 0 - 5 /HPF    RBC, UA NONE 0 - 2 /HPF    Epithelial Cells, UA FEW /HPF    Bacteria, UA FEW (A) None Seen /HPF     Imaging: All Radiology results interpreted by Radiologist unless otherwise noted. CT ABDOMEN PELVIS WO CONTRAST Additional Contrast? None   Final Result   1. Multiple nonobstructing bilateral renal calculi measuring up to 11 mm on   the right. 2. No bowel obstruction, free air, or focal inflammatory changes. ED Course / Medical Decision Making     Medications   0.9 % sodium chloride bolus (0 mLs IntraVENous Stopped 3/31/22 2011)   ondansetron (ZOFRAN) injection 4 mg (4 mg IntraVENous Given 3/31/22 1806)   famotidine (PEPCID) 20 mg in sodium chloride (PF) 10 mL injection (20 mg IntraVENous Given 3/31/22 1807)   dicyclomine (BENTYL) injection 20 mg (20 mg IntraMUSCular Given 3/31/22 1810)     ED Course as of 03/31/22 2118   Thu Mar 31, 2022   2109 Patient is a 75-year-old female presents emergency department due to nausea vomiting diarrhea abdominal cramping. Patient does work at a physician's office. Has the symptoms for the last few days. Urinalysis is negative. Laboratory evaluation is reassuring. Patient did tolerate oral intake after given Zofran. CT imaging reviewed likely secondary to gastroenteritis. Physical exam found nontender abdomen on physical exam nontender to palpation. Patient states that abdominal pain was crampy in nature mid abdominal.  Does admit to nausea with vomiting and diarrhea. Denies other symptoms. Remainder review of systems negative. Given Zofran for treatment. Follow-up precautions given    Patient was explicitly instructed on specific signs and symptoms on which to return to the emergency room for. Patient was instructed to return to the ER for any new or worsening symptoms. Additional discharge instructions were given verbally. All questions were answered. Patient is comfortable and agreeable with discharge plan. Patient in no acute distress and non-toxic in appearance. [DM]      ED Course User Index  [DM] Anya Taylor DO     Re-examination:  3/31/22       Time: 2100  Patients condition is improving after treatment and requesting ice chips. Patient evaluated by Dr. Marly Packer. Consult(s):   None    Procedure(s):   none    MDM:   Patient evaluated by myself and Dr. Marly Packer. Labs and diagnostics as resulted above. Patient given IV fluids, Pepcid and Zofran with significant improvement. She does have sick contacts of gastroenteritis and with CT reviewed by Dr. Marly Packer himself with fluid-filled bowel throughout consistent with gastroenteritis per his opinion. She is afebrile, does not have a leukocytosis, elevated lactate or renal insufficiency suggestive of requiring inpatient management at this time. She is amenable to outpatient management with clear liquid diet advancing as tolerated, frequent handwashing and Zofran as needed. She will continue her pantoprazole as well. She is aware signs and symptoms indicative of reevaluation in the emergency department setting. Otherwise outpatient follow-up with primary care. Patient departed in stable condition in the care of her family. Plan of Care/Counseling:  SALVADOR Martin CNP and EM Attending Physician reviewed today's visit with the patient and spouse / life partner in addition to providing specific details for the plan of care and counseling regarding the diagnosis and prognosis. Questions are answered at this time and are agreeable with the plan. ASSESSMENT     1. Gastroenteritis      PLAN   Discharged home.   Patient condition is stable    New Medications     Discharge Medication List as of 3/31/2022  9:09 PM      START taking these medications    Details   ondansetron (ZOFRAN ODT) 4 MG disintegrating tablet Take 1 tablet by mouth every 8 hours as needed for Nausea or Vomiting, Disp-20 tablet, R-0Normal           Electronically signed by SALVADOR Leung CNP   DD: 3/31/22  **This report was transcribed using voice recognition software. Every effort was made to ensure accuracy; however, inadvertent computerized transcription errors may be present.   END OF ED PROVIDER NOTE       SALVADOR Leung CNP  03/31/22 3301

## 2022-04-01 NOTE — TELEPHONE ENCOUNTER
FYI:  Notified pt to increase fluids. Pt states she went to ER last night and rec'd fluids.   Pt has stopped vomiting and she will call if she needs appt next week

## 2022-04-02 ENCOUNTER — TELEPHONE (OUTPATIENT)
Dept: FAMILY MEDICINE CLINIC | Age: 72
End: 2022-04-02

## 2022-04-02 NOTE — TELEPHONE ENCOUNTER
Patient called on call in regard to diarrhea. Recently seen in ER. I am on call for Dr. Ana Zayas. Called and informed the patient I am not covering Dr. King Farmer patients and advised she call the office on call line back and select prompt for Dr. Roderick Villatoro.  She stated she would

## 2022-04-03 LAB — URINE CULTURE, ROUTINE: NORMAL

## 2022-04-04 ENCOUNTER — TELEPHONE (OUTPATIENT)
Dept: FAMILY MEDICINE CLINIC | Age: 72
End: 2022-04-04

## 2022-04-04 ENCOUNTER — OFFICE VISIT (OUTPATIENT)
Dept: PRIMARY CARE CLINIC | Age: 72
End: 2022-04-04
Payer: MEDICARE

## 2022-04-04 VITALS — TEMPERATURE: 97 F | SYSTOLIC BLOOD PRESSURE: 151 MMHG | HEART RATE: 88 BPM | DIASTOLIC BLOOD PRESSURE: 75 MMHG

## 2022-04-04 DIAGNOSIS — R11.2 NAUSEA VOMITING AND DIARRHEA: Primary | ICD-10-CM

## 2022-04-04 DIAGNOSIS — R19.7 NAUSEA VOMITING AND DIARRHEA: Primary | ICD-10-CM

## 2022-04-04 DIAGNOSIS — R50.9 FEVER AND CHILLS: ICD-10-CM

## 2022-04-04 LAB
BILIRUBIN, POC: NEGATIVE
BLOOD URINE, POC: NORMAL
CLARITY, POC: CLEAR
COLOR, POC: YELLOW
GLUCOSE URINE, POC: NEGATIVE
INFLUENZA A ANTIGEN, POC: NEGATIVE
INFLUENZA B ANTIGEN, POC: NEGATIVE
KETONES, POC: NEGATIVE
LEUKOCYTE EST, POC: NEGATIVE
NITRITE, POC: NEGATIVE
PH, POC: 5
PROTEIN, POC: NORMAL
SPECIFIC GRAVITY, POC: 1.02
UROBILINOGEN, POC: NORMAL

## 2022-04-04 PROCEDURE — 87804 INFLUENZA ASSAY W/OPTIC: CPT | Performed by: NURSE PRACTITIONER

## 2022-04-04 PROCEDURE — 81002 URINALYSIS NONAUTO W/O SCOPE: CPT | Performed by: NURSE PRACTITIONER

## 2022-04-04 PROCEDURE — 99213 OFFICE O/P EST LOW 20 MIN: CPT | Performed by: NURSE PRACTITIONER

## 2022-04-04 RX ORDER — DICYCLOMINE HYDROCHLORIDE 10 MG/1
10 CAPSULE ORAL 3 TIMES DAILY PRN
Qty: 30 CAPSULE | Refills: 0 | Status: SHIPPED | OUTPATIENT
Start: 2022-04-04

## 2022-04-04 RX ORDER — PROMETHAZINE HYDROCHLORIDE 25 MG/1
25 TABLET ORAL 3 TIMES DAILY PRN
Qty: 12 TABLET | Refills: 0 | Status: SHIPPED | OUTPATIENT
Start: 2022-04-04 | End: 2022-04-11

## 2022-04-04 NOTE — TELEPHONE ENCOUNTER
----- Message from Kathya Pinto sent at 4/2/2022  2:14 PM EDT -----  Subject: Message to Provider    QUESTIONS  Information for Provider? Patient called for a third time. Previously   spoke with the wrong on call doctor which advised her to call the office   back. She still has diarrhea and needed to know what to do. I told her to   chose the other doctor option and transferred her back to after hours. ---------------------------------------------------------------------------  --------------  Joel PURDY  What is the best way for the office to contact you? OK to leave message on   voicemail  Preferred Call Back Phone Number? [phone]  ---------------------------------------------------------------------------  --------------  SCRIPT ANSWERS  Relationship to Patient? Third Party  Third Party Type? Other  Other Third Party Type? [facility],[address] [facility]  Representative Name?  Shaun Dutta

## 2022-04-04 NOTE — PROGRESS NOTES
Chief Complaint:   Diarrhea and Emesis      History of Present Illness   Source of history provided by:  patient. Charly Miles is a 66 y.o. 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 Karlene Garcia yearly    Mitral regurgitation     follows with Dr. Karlene Garcia yearly    PONV (postoperative nausea and vomiting)     Tachycardia     follows with Dr. Karlene Garcia yearly    Thyroid disease     'enlarged thyroid' ; no meds present       Nausea / Vomiting/ Diarrhea  Patient complains of N/V/D and intermittent fevers and chills. . Pt stated diarrhea has not occurred today. Pt was recently seen in the ED on 3/31/22 and dx with Gastroenteritis. CT Abd/Pelvis revealed multiple nonobstructive renal calculi only. All other diagnostic testing was basically unremarkable, UA did reveal a few bacteria however, urine culture only revealed < 10,000 mixed gram positive organisms. Pt currently denies any UTI s/s. Pt is tolerating some fluids but no solids. Pt does have a h/o Colitis and did contact GI and is awaiting a phone call to schedule appointment.  Pt denies  CP, bloody/tarry stools, hematemesis or any other concerning issues     ROS    Unless otherwise stated in this report or unable to obtain because of the patient's clinical or mental status as evidenced by the medical record, this patients's positive and negative responses for Review of Systems, constitutional, psych, eyes, ENT, cardiovascular, respiratory, gastrointestinal, neurological, genitourinary, musculoskeletal, integument systems and systems related to the presenting problem are either stated in the preceding or were not pertinent or were negative for the symptoms and/or complaints related to the medical problem. Past Surgical History:  has a past surgical history that includes Diagnostic Cardiac Cath Lab Procedure (11/09/01); Tonsillectomy; Lithotripsy; Breast biopsy; Skin cancer excision; Upper gastrointestinal endoscopy (6/17/15); vulvar/perineal biopsy (1985); pr esophageal motility study w/interp&rpt (N/A, 8/17/2018); Colonoscopy; Endoscopy, colon, diagnostic; pr lap, repair paraesophageal hernia, incl fundoplasty w/ mesh (N/A, 10/3/2018); Lithotripsy (Left, 1/30/2020); and Lithotripsy (Left, 6/16/2021). Social History:  reports that she quit smoking about 53 years ago. Her smoking use included cigarettes. She has a 2.00 pack-year smoking history. She has never used smokeless tobacco. She reports current alcohol use. She reports that she does not use drugs. Family History: family history includes Asthma in her son; Cancer in an other family member; Diabetes in an other family member; Heart Attack in her father; Other in her father. Allergies: Patient has no known allergies. Physical Exam         VS:  BP (!) 151/75   Pulse 88   Temp 97 °F (36.1 °C) (Temporal)    Oxygen Saturation Interpretation: Normal.    Constitutional:  Alert, development consistent with age. Ears:  External Ears: Normal bilateral pinna. TM's & External Canals: TM's normal bilaterally without perforation. Canals without erythema or drainage. Nose:   There is no obvious septal defect. Mouth:  Moist bucca mucosa and normal tongue. Throat: No posterior pharyngeal erythema, exudates or lesions. Neck:  Supple. There is no obvious adenopathy or neck tenderness. Lungs:   Breath sounds: Normal chest expansion and breath sounds noted throughout. no wheezes, rales, or rhonchi noted. Heart:  Regular rate and rhythm, normal heart sounds, without pathological murmurs, ectopy, gallops, or rubs. Abdomen:  Soft, non distended, mild tenderness to epigastric and lower abd quads, good bowel sounds.   No firm or pulsatile

## 2022-04-05 NOTE — TELEPHONE ENCOUNTER
----- Message from Laine Oppenheim, mayra  ----- Message -----  From: Bimal Good  Recommend continue fluids and antidiarrheal. Can schedule with me if not better in 2 weeks. Thanks.

## 2022-04-05 NOTE — TELEPHONE ENCOUNTER
Pt called stated she came into walk in yesterday, she was taking medication according to directions started with 2 tablets, then went to one tablet on a empty stomach. But was instructed at walk in [facility] yesterday to stop medication, and ws started on new med for diarrhea.  Pt stated she is still not able to eat, has no appetite, but she is able to keep crackers and liquids done like broth, and Gatorade

## 2022-04-11 ENCOUNTER — TELEPHONE (OUTPATIENT)
Dept: CARDIOLOGY | Age: 72
End: 2022-04-11

## 2022-04-11 NOTE — TELEPHONE ENCOUNTER
Patient l/m that she wants to c&r her echo scheduled on 4/22/22. I had to l/m.  Thank you  Electronically signed by Charlotte Merrill on 4/11/2022 at 10:53 AM

## 2022-04-21 ENCOUNTER — TELEPHONE (OUTPATIENT)
Dept: ENT CLINIC | Age: 72
End: 2022-04-21

## 2022-04-21 RX ORDER — LEVOCETIRIZINE DIHYDROCHLORIDE 5 MG/1
5 TABLET, FILM COATED ORAL NIGHTLY
Qty: 90 TABLET | Refills: 1 | Status: SHIPPED
Start: 2022-04-21 | End: 2022-07-22

## 2022-04-21 NOTE — TELEPHONE ENCOUNTER
Spoke with patient and stated that she was unable to get medication and that she wouldn't be able to get them. Our system shows that the medication was sent to pharmacy. Calling to verify.

## 2022-06-13 RX ORDER — DILTIAZEM HYDROCHLORIDE 240 MG/1
CAPSULE, COATED, EXTENDED RELEASE ORAL
Qty: 90 CAPSULE | Refills: 3 | Status: SHIPPED | OUTPATIENT
Start: 2022-06-13

## 2022-06-17 LAB — MAMMOGRAPHY, EXTERNAL: NORMAL

## 2022-06-28 ENCOUNTER — HOSPITAL ENCOUNTER (OUTPATIENT)
Age: 72
Discharge: HOME | End: 2022-06-28
Payer: MEDICARE

## 2022-06-28 ENCOUNTER — OFFICE VISIT (OUTPATIENT)
Dept: FAMILY MEDICINE CLINIC | Age: 72
End: 2022-06-28
Payer: MEDICARE

## 2022-06-28 VITALS
HEIGHT: 59 IN | DIASTOLIC BLOOD PRESSURE: 84 MMHG | RESPIRATION RATE: 14 BRPM | HEART RATE: 93 BPM | BODY MASS INDEX: 29.64 KG/M2 | TEMPERATURE: 98 F | WEIGHT: 147 LBS | SYSTOLIC BLOOD PRESSURE: 136 MMHG | OXYGEN SATURATION: 98 %

## 2022-06-28 DIAGNOSIS — I10 ESSENTIAL HYPERTENSION: ICD-10-CM

## 2022-06-28 DIAGNOSIS — R63.5 WEIGHT GAIN: ICD-10-CM

## 2022-06-28 DIAGNOSIS — Z12.31 SCREENING MAMMOGRAM, ENCOUNTER FOR: ICD-10-CM

## 2022-06-28 DIAGNOSIS — R23.2 FLUSHING: ICD-10-CM

## 2022-06-28 DIAGNOSIS — R23.2 FLUSHING: Primary | ICD-10-CM

## 2022-06-28 LAB
ALBUMIN SERPL-MCNC: 4.3 G/DL (ref 3.5–5.2)
ALP BLD-CCNC: 112 U/L (ref 35–104)
ALT SERPL-CCNC: 14 U/L (ref 0–32)
ANION GAP SERPL CALCULATED.3IONS-SCNC: 11 MMOL/L (ref 7–16)
AST SERPL-CCNC: 29 U/L (ref 0–31)
BASOPHILS ABSOLUTE: 0.04 E9/L (ref 0–0.2)
BASOPHILS RELATIVE PERCENT: 0.7 % (ref 0–2)
BILIRUB SERPL-MCNC: 0.5 MG/DL (ref 0–1.2)
BILIRUBIN URINE: NEGATIVE
BLOOD, URINE: NEGATIVE
BUN BLDV-MCNC: 18 MG/DL (ref 6–23)
CALCIUM SERPL-MCNC: 9.9 MG/DL (ref 8.6–10.2)
CHLORIDE BLD-SCNC: 104 MMOL/L (ref 98–107)
CLARITY: CLEAR
CO2: 25 MMOL/L (ref 22–29)
COLOR: YELLOW
CREAT SERPL-MCNC: 0.8 MG/DL (ref 0.5–1)
EOSINOPHILS ABSOLUTE: 0.12 E9/L (ref 0.05–0.5)
EOSINOPHILS RELATIVE PERCENT: 2.1 % (ref 0–6)
GFR AFRICAN AMERICAN: >60
GFR NON-AFRICAN AMERICAN: >60 ML/MIN/1.73
GLUCOSE BLD-MCNC: 106 MG/DL (ref 74–99)
GLUCOSE URINE: NEGATIVE MG/DL
HCT VFR BLD CALC: 46.4 % (ref 34–48)
HEMOGLOBIN: 15.8 G/DL (ref 11.5–15.5)
IMMATURE GRANULOCYTES #: 0.03 E9/L
IMMATURE GRANULOCYTES %: 0.5 % (ref 0–5)
KETONES, URINE: NEGATIVE MG/DL
LEUKOCYTE ESTERASE, URINE: NEGATIVE
LYMPHOCYTES ABSOLUTE: 1.41 E9/L (ref 1.5–4)
LYMPHOCYTES RELATIVE PERCENT: 24.9 % (ref 20–42)
MCH RBC QN AUTO: 29.7 PG (ref 26–35)
MCHC RBC AUTO-ENTMCNC: 34.1 % (ref 32–34.5)
MCV RBC AUTO: 87.2 FL (ref 80–99.9)
MONOCYTES ABSOLUTE: 0.52 E9/L (ref 0.1–0.95)
MONOCYTES RELATIVE PERCENT: 9.2 % (ref 2–12)
NEUTROPHILS ABSOLUTE: 3.55 E9/L (ref 1.8–7.3)
NEUTROPHILS RELATIVE PERCENT: 62.6 % (ref 43–80)
NITRITE, URINE: NEGATIVE
PDW BLD-RTO: 13.4 FL (ref 11.5–15)
PH UA: 7.5 (ref 5–9)
PLATELET # BLD: 245 E9/L (ref 130–450)
PMV BLD AUTO: 9.2 FL (ref 7–12)
POTASSIUM SERPL-SCNC: 3.8 MMOL/L (ref 3.5–5)
PROTEIN UA: NEGATIVE MG/DL
RBC # BLD: 5.32 E12/L (ref 3.5–5.5)
SODIUM BLD-SCNC: 140 MMOL/L (ref 132–146)
SPECIFIC GRAVITY UA: 1.01 (ref 1–1.03)
T4 FREE: 1.17 NG/DL (ref 0.93–1.7)
TOTAL PROTEIN: 7.8 G/DL (ref 6.4–8.3)
TSH SERPL DL<=0.05 MIU/L-ACNC: 2.67 UIU/ML (ref 0.27–4.2)
UROBILINOGEN, URINE: 0.2 E.U./DL
WBC # BLD: 5.7 E9/L (ref 4.5–11.5)

## 2022-06-28 PROCEDURE — 1123F ACP DISCUSS/DSCN MKR DOCD: CPT | Performed by: FAMILY MEDICINE

## 2022-06-28 PROCEDURE — 85025 COMPLETE CBC W/AUTO DIFF WBC: CPT

## 2022-06-28 PROCEDURE — 84439 ASSAY OF FREE THYROXINE: CPT

## 2022-06-28 PROCEDURE — 81003 URINALYSIS AUTO W/O SCOPE: CPT

## 2022-06-28 PROCEDURE — 84443 ASSAY THYROID STIM HORMONE: CPT

## 2022-06-28 PROCEDURE — 36415 COLL VENOUS BLD VENIPUNCTURE: CPT

## 2022-06-28 PROCEDURE — 80053 COMPREHEN METABOLIC PANEL: CPT

## 2022-06-28 PROCEDURE — 99214 OFFICE O/P EST MOD 30 MIN: CPT | Performed by: FAMILY MEDICINE

## 2022-06-28 RX ORDER — ACETAMINOPHEN/DIPHENHYDRAMINE 500MG-25MG
1 TABLET ORAL NIGHTLY PRN
COMMUNITY

## 2022-06-28 RX ORDER — BLOOD PRESSURE TEST KIT
1 KIT MISCELLANEOUS 2 TIMES DAILY
Qty: 1 KIT | Refills: 0 | Status: SHIPPED | OUTPATIENT
Start: 2022-06-28

## 2022-06-28 RX ORDER — PROMETHAZINE HYDROCHLORIDE 25 MG/1
25 TABLET ORAL EVERY 6 HOURS PRN
COMMUNITY

## 2022-06-28 ASSESSMENT — PATIENT HEALTH QUESTIONNAIRE - PHQ9
SUM OF ALL RESPONSES TO PHQ QUESTIONS 1-9: 0
SUM OF ALL RESPONSES TO PHQ QUESTIONS 1-9: 0
2. FEELING DOWN, DEPRESSED OR HOPELESS: 0
SUM OF ALL RESPONSES TO PHQ QUESTIONS 1-9: 0
1. LITTLE INTEREST OR PLEASURE IN DOING THINGS: 0
SUM OF ALL RESPONSES TO PHQ QUESTIONS 1-9: 0
SUM OF ALL RESPONSES TO PHQ9 QUESTIONS 1 & 2: 0

## 2022-06-28 NOTE — PROGRESS NOTES
FM Progress Note    Subjective:   Hot flashes since end of May. One really severe episode at work. Lasted at least an hour. Needed cold packs on face. No sweating. Mostly flushing in face. Nearly every day. Some insomnia. No fever. No EtOH. No change in diet except sunflower seeds. No diarrhea aside from baseline crohns. No sob. Echo 2021:   Summary   Left ventricle is normal in size . Abnormal (paradoxical) motion consistent with left bundle branch block. Mildly reduced left ventricular systolic dysfunction. Mild centrally directed mitral regurgitation. Mild aortic regurgitation is noted. No palps. No n/v. No HA. Weight gain of 5 lbs recently. Health Maintenance Due   Topic Date Due    DTaP/Tdap/Td vaccine (1 - Tdap) Never done    Annual Wellness Visit (AWV)  06/03/2021    Lipids  03/16/2022    Depression Screen  03/16/2022           Objective:   /84   Pulse 93   Temp 98 °F (36.7 °C)   Resp 14   Ht 4' 11\" (1.499 m)   Wt 147 lb (66.7 kg)   SpO2 98%   BMI 29.69 kg/m²   General appearance: NAD, alert and interacting appropriately  HEENT: NCAT, PERRLA, EOMI. No LAD. Resp: CTAB, no [place]  CVS: RRR, no MRG  Abdomen: BS +, SNDNT  Extremities: No clubbing, cyanosis, or edema. Warm. Dry. I have reviewed this patient's previous records. I have reviewed this patient's labs. I have reviewed this patient's imaging reports. I have reviewed this patient's medications. Assessment/Plan:    Duran Campoverde was seen today for menopause, insomnia and weight gain. Diagnoses and all orders for this visit:    Flushing  -     CBC with Auto Differential; Future  -     Urinalysis; Future    Essential hypertension  -     Blood Pressure KIT; 1 each by Does not apply route in the morning and at bedtime  -     Comprehensive Metabolic Panel; Future    Weight gain  -     TSH;  Future  -     T4, Free; Future          Patient Instructions   Stop the xyzal.   Hold the sunflower seeds for at least 2 weeks. Return in about 1 month (around 7/28/2022).         Electronically signed by Natividad Martinez MD on 6/28/2022 at 8:50 AM

## 2022-06-29 ENCOUNTER — TELEPHONE (OUTPATIENT)
Dept: FAMILY MEDICINE CLINIC | Age: 72
End: 2022-06-29

## 2022-06-29 NOTE — TELEPHONE ENCOUNTER
Pt called she received lab results on "[employer] but would like someone to explain the results, please call

## 2022-07-22 ENCOUNTER — OFFICE VISIT (OUTPATIENT)
Dept: CARDIOLOGY CLINIC | Age: 72
End: 2022-07-22
Payer: MEDICARE

## 2022-07-22 VITALS
WEIGHT: 146.6 LBS | BODY MASS INDEX: 29.56 KG/M2 | DIASTOLIC BLOOD PRESSURE: 62 MMHG | HEART RATE: 85 BPM | SYSTOLIC BLOOD PRESSURE: 140 MMHG | OXYGEN SATURATION: 97 % | HEIGHT: 59 IN | RESPIRATION RATE: 16 BRPM

## 2022-07-22 DIAGNOSIS — I10 PRIMARY HYPERTENSION: Primary | ICD-10-CM

## 2022-07-22 PROCEDURE — 93000 ELECTROCARDIOGRAM COMPLETE: CPT | Performed by: INTERNAL MEDICINE

## 2022-07-22 PROCEDURE — 1123F ACP DISCUSS/DSCN MKR DOCD: CPT | Performed by: INTERNAL MEDICINE

## 2022-07-22 PROCEDURE — 99214 OFFICE O/P EST MOD 30 MIN: CPT | Performed by: INTERNAL MEDICINE

## 2022-07-22 ASSESSMENT — ENCOUNTER SYMPTOMS
WHEEZING: 0
VOMITING: 0
DIARRHEA: 0
ABDOMINAL PAIN: 0
BACK PAIN: 0
CONSTIPATION: 0
NAUSEA: 0
BLOOD IN STOOL: 0
SHORTNESS OF BREATH: 0
COUGH: 0

## 2022-07-22 NOTE — PROGRESS NOTES
OUTPATIENT CARDIOLOGY FOLLOW-UP    HPI:    Name: Amira Kahn    Age: 74 y.o. Primary Care Physician: Kevin Hammer MD    Date of Service: 2022    Chief Complaint:   Chief Complaint   Patient presents with    Valvular Heart Disease     13 month ov. Stress done 21. Labs . Echo not done (order ). Hyperlipidemia    Hypertension        History of present illness :   68-year-old female who comes today for 1 year follow-up visit. She has history of hypertension, hyperlipidemia, mitral regurgitation, cardiomyopathy with mild systolic dysfunction, grade 1 diastolic dysfunction, left bundle branch block, thyroid disease, GERD and hiatal hernia, Aden's esophagus, kidney stone and and arthritis. I have switched her Cardizem to Toprol and lisinopril during last visit due to mild systolic dysfunction. .  Patient said that she did not tolerate the medication. Was placed back on her Cardizem. Patient has been active. She has difficulty hearing problem. She has been hiking with no cardiovascular symptoms. EKG done today revealed sinus rhythm at 85 bpm, left atrial enlargement and complete left bundle branch block. Review of Systems:   Review of Systems   Constitutional:  Negative for chills, fatigue and fever. HENT:  Negative for nosebleeds. Respiratory:  Negative for cough, shortness of breath and wheezing. Gastrointestinal:  Negative for abdominal pain, blood in stool, constipation, diarrhea, nausea and vomiting. GERD. Genitourinary:  Negative for dysuria and hematuria. Musculoskeletal:  Negative for back pain, joint swelling and myalgias. Neurological:  Negative for syncope and light-headedness. Psychiatric/Behavioral:  The patient is not nervous/anxious.          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 Elvi Borden yearly    Mitral regurgitation     follows with Dr. Elvi Borden yearly    PONV (postoperative nausea and vomiting)     Tachycardia     follows with Dr. Elvi Borden yearly    Thyroid disease     'enlarged thyroid' ; no meds present       Past Surgical History:  Past Surgical History:   Procedure Laterality Date    BREAST BIOPSY      benign    COLONOSCOPY      DIAGNOSTIC CARDIAC CATH LAB PROCEDURE  11/09/01    [facility]    ENDOSCOPY, COLON, DIAGNOSTIC      LITHOTRIPSY      VERY SMALL URETER,TERRIBLE TIME GETTING STENTS IN, ALOT OF ANESTH, VERY SICK AFTER    LITHOTRIPSY Left 1/30/2020    LEFT ESWL EXTRACORPOREAL SHOCK WAVE LITHOTRIPSY performed by Eva Manriquez MD at [address]    LITHOTRIPSY Left 6/16/2021    CYSTOSCOPY RETROGRADE PYELOGRAM URETEROSCOPY J STENT LASER LITHOTRIPSY LEFT performed by Kami Henry DO at [address] [facility] W/INTERP&RPT N/A 8/17/2018    ESOPHAGEAL MOTILITY/MANOMETRY STUDY performed by Deny Renae DO at [facility], REPAIR PARAESOPHAGEAL HERNIA, INCL FUNDOPLASTY W/ MESH N/A 10/3/2018    LAPAROSCOPIC HIATAL HERNIA REPAIR WITH MESH performed by Sky Vazquez MD at [facility] ENDOSCOPY  6/17/15    VULVAR/PERINEAL BIOPSY  1985       Family History:  Family History   Problem Relation Age of Onset    Heart Attack Father     Other Father         bowel disorder    Cancer Other     Diabetes Other     Asthma Son        Social History:  Social History     Socioeconomic History    Marital status:      Spouse name: Not on file    Number of children: Not on file    Years of education: Not on file    Highest education level: Not on file   Occupational History    Not on file   Tobacco Use    Smoking status: Former     Packs/day: 0.50     Years: 4.00     Pack years: 2.00     Types: Cigarettes     Quit date: 1969     Years since quittin.5    Smokeless tobacco: Never   Vaping Use    Vaping Use: Never used   Substance and Sexual Activity    Alcohol use: Yes     Comment: rare glass of wine    Drug use: No    Sexual activity: Never   Other Topics Concern    Not on file   Social History Narrative    No caffienated drinks.      Social Determinants of Health     Financial Resource Strain: Low Risk     Difficulty of Paying Living Expenses: Not hard at all   Food Insecurity: No Food Insecurity    Worried About Running Out of Food in the Last Year: Never true    Ran Out of Food in the Last Year: Never true   Transportation Needs: Not on file   Physical Activity: Not on file   Stress: Not on file   Social Connections: Not on file   Intimate Partner Violence: Not on file   Housing Stability: Not on file       Allergies:  No Known Allergies    Current Medications:  Current Outpatient Medications   Medication Sig Dispense Refill    promethazine (PHENERGAN) 25 MG tablet Take 25 mg by mouth every 6 hours as needed for Nausea      diphenhydrAMINE-APAP, sleep, (TYLENOL PM EXTRA STRENGTH)  MG tablet Take 1 tablet by mouth nightly as needed for Sleep      sodium chloride (OCEAN, BABY AYR) 0.65 % nasal spray 1 spray by Nasal route as needed for Congestion      Blood Pressure KIT 1 each by Does not apply route in the morning and at bedtime 1 kit 0    omeprazole (PRILOSEC) 40 MG delayed release capsule TAKE 1 CAPSULE BY MOUTH ONCE DAILY IN THE MORNING      Nutritional Supplements (NUTRITIONAL SUPPLEMENT PO) Take by mouth      dilTIAZem (CARDIZEM CD) 240 MG extended release capsule Take 1 capsule by mouth once daily 90 capsule 3    levocetirizine (XYZAL) 5 MG tablet Take 1 tablet by mouth nightly 90 tablet 1    dicyclomine (BENTYL) 10 MG capsule Take 1 capsule by mouth 3 times daily as needed (diarrhea) 30 capsule 0    atorvastatin (LIPITOR) 10 MG tablet Take 1 tablet by mouth once daily 90 tablet 1    fluticasone (FLONASE) 50 MCG/ACT nasal spray 2 sprays by Each Nostril route daily 16 g 5    azelastine (ASTELIN) 0.1 % nasal spray 1-2 sprays by Nasal route 2 times daily as needed for Rhinitis Use in each nostril as directed 30 mL 1    albuterol sulfate  (90 Base) MCG/ACT inhaler Inhale 2 puffs into the lungs every 4-6 hours as needed for Wheezing or Shortness of Breath 1 each 1    famotidine (PEPCID) 20 MG tablet Take 20 mg by mouth 2 times daily      ibuprofen (ADVIL;MOTRIN) 400 MG tablet Take 1 tablet by mouth every 8 hours as needed for Pain 8 tablet 0    diclofenac sodium (VOLTAREN) 1 % GEL Apply topically 2 times daily (Patient taking differently: Apply topically daily ) 150 g 2    aspirin EC 81 MG EC tablet Take 81 mg by mouth daily To verify holding w dr       No current facility-administered medications for this visit. Physical Exam:   4' 11\" (1.499 m)   BMI 29.69 kg/m²   Wt Readings from Last 3 Encounters:   06/28/22 147 lb (66.7 kg)   06/17/22 147 lb (66.7 kg)   03/31/22 141 lb (64 kg)       Physical Exam  Constitutional:       General: She is not in acute distress. Appearance: She is well-developed. HENT:      Head: Normocephalic and atraumatic. Neck:      Vascular: No carotid bruit or JVD. Cardiovascular:      Rate and Rhythm: Normal rate and regular rhythm. Heart sounds: No murmur heard. No friction rub. No gallop. Pulmonary:      Breath sounds: Normal breath sounds. No wheezing or rales. Chest:      Chest wall: No tenderness. Abdominal:      General: Bowel sounds are normal. There is no distension. Palpations: Abdomen is soft. There is no mass. Tenderness: There is no abdominal tenderness. Comments: No abdominal bruit. Musculoskeletal:      Cervical back: Neck supple. Right lower leg: No edema. Left lower leg: No edema. Skin:     General: Skin is warm and dry. Neurological:      Mental Status: She is alert and oriented to person, place, and time. Laboratory Tests:  Lab Results   Component Value Date    CREATININE 0.8 06/28/2022    BUN 18 06/28/2022     06/28/2022    K 3.8 06/28/2022     06/28/2022    CO2 25 06/28/2022     Lab Results   Component Value Date/Time    MG 1.9 03/01/2021 02:40 AM     Lab Results   Component Value Date    WBC 5.7 06/28/2022    HGB 15.8 (H) 06/28/2022    HCT 46.4 06/28/2022    MCV 87.2 06/28/2022     06/28/2022     Lab Results   Component Value Date    ALT 14 06/28/2022    AST 29 06/28/2022    ALKPHOS 112 (H) 06/28/2022    BILITOT 0.5 06/28/2022       Lab Results   Component Value Date    INR 1.1 02/28/2021    PROTIME 12.2 02/28/2021     Lab Results   Component Value Date    TSH 2.670 06/28/2022       Lab Results   Component Value Date    CHOL 172 03/16/2021    CHOL 222 (H) 01/17/2019    CHOL 188 07/28/2018     Lab Results   Component Value Date    TRIG 109 03/16/2021    TRIG 92 01/17/2019    TRIG 78 07/28/2018     Lab Results   Component Value Date    HDL 39 03/16/2021    HDL 56 01/17/2019    HDL 55 07/28/2018     Lab Results   Component Value Date    LDLCALC 111 (H) 03/16/2021    LDLCALC 148 (H) 01/17/2019    LDLCALC 117 (H) 07/28/2018     Lab Results   Component Value Date    LABVLDL 22 03/16/2021    LABVLDL 18 01/17/2019    LABVLDL 16 07/28/2018       Cardiac Tests:    Echocardiogram done on 6/9/2021:   Technically adequate study. Mildly dilated left atrium. Mildly dilated left ventricle with mild systolic function. Severe centrally directed mitral regurgitation. Mild aortic and pulmonic regurgitation. EF 45-50%. Recommend RUSSELL to better assess the mitral valve pathology and significance     RUSSELL done :  Mildly reduced left ventricular systolic function with paradoxical septal wall motion abnormality. Mild central directed mitral regurgitation. Mild aortic regurgitation.     Mikey Belarusian done on 7/2/2021:  Electrocardiographically non diagnostic regadenoson infusion because of the presence of LBBB on the baseline ECG  Myocardial perfusion imaging was normal with attenuation artifact. Overall left ventricular systolic function was normal with septal motion consistent with the presence of LBBB. Ejection fraction 62%. 4.   Low risk general pharmacologic stress test      ASSESSMENT / PLAN:  -Mitral regurgitation: The mitral regurgitation was moderate to severe by transthoracic echocardiogram and mild by RUSSELL probably due to lower blood pressure during sedation.  -Cardiomyopathy with mild systolic dysfunction and grade 1 diastolic dysfunction: Clinically compensated. -Hypertension: Mildly elevated but could be exacerbated by whitecoat hypertension.  -Hyperlipidemia: On atorvastatin.  -Chronic left bundle branch block.  -Hiatal hernia and Aden's esophagus.  -Thyroid disease. -Arthritis. Will continue patient on her current cardiac medications. Patient was advised to have low-salt diet. Will arrange for the patient to have an echocardiogram to follow-up her mitral regurgitation and ejection fraction. If significant mitral regurgitation, consider cardiac MRI to better assess the mitral regurgitation. Will follow-up at the office in 1 year. The patient's current medication list, allergies, problem list and results of all previously ordered testing were reviewed at today's visit. Cordell Colindres MD , [facility].   [facility]) Cardiology

## 2022-08-27 ENCOUNTER — HOSPITAL ENCOUNTER (EMERGENCY)
Age: 72
Discharge: HOME | End: 2022-08-28
Attending: EMERGENCY MEDICINE
Payer: MEDICARE

## 2022-08-27 ENCOUNTER — APPOINTMENT (OUTPATIENT)
Dept: GENERAL RADIOLOGY | Age: 72
End: 2022-08-27
Payer: MEDICARE

## 2022-08-27 DIAGNOSIS — J18.9 PNEUMONIA DUE TO INFECTIOUS ORGANISM, UNSPECIFIED LATERALITY, UNSPECIFIED PART OF LUNG: Primary | ICD-10-CM

## 2022-08-27 LAB
ALBUMIN SERPL-MCNC: 3.7 G/DL (ref 3.5–5.2)
ALP BLD-CCNC: 95 U/L (ref 35–104)
ALT SERPL-CCNC: 12 U/L (ref 0–32)
ANION GAP SERPL CALCULATED.3IONS-SCNC: 13 MMOL/L (ref 7–16)
AST SERPL-CCNC: 38 U/L (ref 0–31)
BACTERIA: NORMAL /HPF
BASOPHILS ABSOLUTE: 0.04 E9/L (ref 0–0.2)
BASOPHILS RELATIVE PERCENT: 0.4 % (ref 0–2)
BILIRUB SERPL-MCNC: 0.8 MG/DL (ref 0–1.2)
BILIRUBIN URINE: NEGATIVE
BLOOD, URINE: ABNORMAL
BUN BLDV-MCNC: 11 MG/DL (ref 6–23)
CALCIUM SERPL-MCNC: 9.1 MG/DL (ref 8.6–10.2)
CHLORIDE BLD-SCNC: 102 MMOL/L (ref 98–107)
CLARITY: CLEAR
CO2: 19 MMOL/L (ref 22–29)
COLOR: YELLOW
CREAT SERPL-MCNC: 0.8 MG/DL (ref 0.5–1)
EOSINOPHILS ABSOLUTE: 0.01 E9/L (ref 0.05–0.5)
EOSINOPHILS RELATIVE PERCENT: 0.1 % (ref 0–6)
GFR AFRICAN AMERICAN: >60
GFR NON-AFRICAN AMERICAN: >60 ML/MIN/1.73
GLUCOSE BLD-MCNC: 165 MG/DL (ref 74–99)
GLUCOSE URINE: NEGATIVE MG/DL
HCT VFR BLD CALC: 40.5 % (ref 34–48)
HEMOGLOBIN: 13.7 G/DL (ref 11.5–15.5)
IMMATURE GRANULOCYTES #: 0.04 E9/L
IMMATURE GRANULOCYTES %: 0.4 % (ref 0–5)
INFLUENZA A BY PCR: NOT DETECTED
INFLUENZA B BY PCR: NOT DETECTED
KETONES, URINE: NEGATIVE MG/DL
LACTIC ACID: 1.8 MMOL/L (ref 0.5–2.2)
LEUKOCYTE ESTERASE, URINE: ABNORMAL
LIPASE: 37 U/L (ref 13–60)
LYMPHOCYTES ABSOLUTE: 1.81 E9/L (ref 1.5–4)
LYMPHOCYTES RELATIVE PERCENT: 17.2 % (ref 20–42)
MCH RBC QN AUTO: 30.4 PG (ref 26–35)
MCHC RBC AUTO-ENTMCNC: 33.8 % (ref 32–34.5)
MCV RBC AUTO: 89.8 FL (ref 80–99.9)
MONOCYTES ABSOLUTE: 1.04 E9/L (ref 0.1–0.95)
MONOCYTES RELATIVE PERCENT: 9.9 % (ref 2–12)
NEUTROPHILS ABSOLUTE: 7.58 E9/L (ref 1.8–7.3)
NEUTROPHILS RELATIVE PERCENT: 72 % (ref 43–80)
NITRITE, URINE: NEGATIVE
PDW BLD-RTO: 13.6 FL (ref 11.5–15)
PH UA: 7 (ref 5–9)
PLATELET # BLD: 212 E9/L (ref 130–450)
PMV BLD AUTO: 9.9 FL (ref 7–12)
POTASSIUM SERPL-SCNC: 3.9 MMOL/L (ref 3.5–5)
PROTEIN UA: 30 MG/DL
RBC # BLD: 4.51 E12/L (ref 3.5–5.5)
RBC UA: NORMAL /HPF (ref 0–2)
SARS-COV-2, NAAT: NOT DETECTED
SODIUM BLD-SCNC: 134 MMOL/L (ref 132–146)
SPECIFIC GRAVITY UA: 1.01 (ref 1–1.03)
TOTAL PROTEIN: 7 G/DL (ref 6.4–8.3)
TROPONIN, HIGH SENSITIVITY: 12 NG/L (ref 0–9)
UROBILINOGEN, URINE: 0.2 E.U./DL
WBC # BLD: 10.5 E9/L (ref 4.5–11.5)
WBC UA: NORMAL /HPF (ref 0–5)

## 2022-08-27 PROCEDURE — 96365 THER/PROPH/DIAG IV INF INIT: CPT

## 2022-08-27 PROCEDURE — 6370000000 HC RX 637 (ALT 250 FOR IP): Performed by: NURSE PRACTITIONER

## 2022-08-27 PROCEDURE — 96375 TX/PRO/DX INJ NEW DRUG ADDON: CPT

## 2022-08-27 PROCEDURE — 87502 INFLUENZA DNA AMP PROBE: CPT

## 2022-08-27 PROCEDURE — 0202U NFCT DS 22 TRGT SARS-COV-2: CPT

## 2022-08-27 PROCEDURE — 6370000000 HC RX 637 (ALT 250 FOR IP): Performed by: EMERGENCY MEDICINE

## 2022-08-27 PROCEDURE — 81001 URINALYSIS AUTO W/SCOPE: CPT

## 2022-08-27 PROCEDURE — 83605 ASSAY OF LACTIC ACID: CPT

## 2022-08-27 PROCEDURE — 85025 COMPLETE CBC W/AUTO DIFF WBC: CPT

## 2022-08-27 PROCEDURE — 80053 COMPREHEN METABOLIC PANEL: CPT

## 2022-08-27 PROCEDURE — 71045 X-RAY EXAM CHEST 1 VIEW: CPT

## 2022-08-27 PROCEDURE — 87635 SARS-COV-2 COVID-19 AMP PRB: CPT

## 2022-08-27 PROCEDURE — 2580000003 HC RX 258: Performed by: EMERGENCY MEDICINE

## 2022-08-27 PROCEDURE — 83690 ASSAY OF LIPASE: CPT

## 2022-08-27 PROCEDURE — 84484 ASSAY OF TROPONIN QUANT: CPT

## 2022-08-27 PROCEDURE — 93005 ELECTROCARDIOGRAM TRACING: CPT | Performed by: NURSE PRACTITIONER

## 2022-08-27 PROCEDURE — 87040 BLOOD CULTURE FOR BACTERIA: CPT

## 2022-08-27 RX ORDER — ACETAMINOPHEN 500 MG
1000 TABLET ORAL ONCE
Status: COMPLETED | OUTPATIENT
Start: 2022-08-27 | End: 2022-08-27

## 2022-08-27 RX ORDER — 0.9 % SODIUM CHLORIDE 0.9 %
1000 INTRAVENOUS SOLUTION INTRAVENOUS ONCE
Status: COMPLETED | OUTPATIENT
Start: 2022-08-27 | End: 2022-08-27

## 2022-08-27 RX ORDER — IBUPROFEN 800 MG/1
800 TABLET ORAL ONCE
Status: COMPLETED | OUTPATIENT
Start: 2022-08-27 | End: 2022-08-27

## 2022-08-27 RX ADMIN — ACETAMINOPHEN 1000 MG: 500 TABLET ORAL at 23:34

## 2022-08-27 RX ADMIN — IBUPROFEN 800 MG: 800 TABLET, FILM COATED ORAL at 23:44

## 2022-08-27 RX ADMIN — SODIUM CHLORIDE 1000 ML: 9 INJECTION, SOLUTION INTRAVENOUS at 22:38

## 2022-08-27 ASSESSMENT — PAIN - FUNCTIONAL ASSESSMENT: PAIN_FUNCTIONAL_ASSESSMENT: 0-10

## 2022-08-27 ASSESSMENT — PAIN DESCRIPTION - DESCRIPTORS: DESCRIPTORS: PRESSURE

## 2022-08-27 ASSESSMENT — PAIN SCALES - GENERAL: PAINLEVEL_OUTOF10: 2

## 2022-08-28 ENCOUNTER — APPOINTMENT (OUTPATIENT)
Dept: CT IMAGING | Age: 72
End: 2022-08-28
Payer: MEDICARE

## 2022-08-28 VITALS
OXYGEN SATURATION: 95 % | WEIGHT: 145 LBS | SYSTOLIC BLOOD PRESSURE: 135 MMHG | HEART RATE: 81 BPM | TEMPERATURE: 98.3 F | BODY MASS INDEX: 29.29 KG/M2 | DIASTOLIC BLOOD PRESSURE: 76 MMHG | RESPIRATION RATE: 20 BRPM

## 2022-08-28 LAB
ADENOVIRUS BY PCR: NOT DETECTED
BORDETELLA PARAPERTUSSIS BY PCR: NOT DETECTED
BORDETELLA PERTUSSIS BY PCR: NOT DETECTED
CHLAMYDOPHILIA PNEUMONIAE BY PCR: NOT DETECTED
CORONAVIRUS 229E BY PCR: NOT DETECTED
CORONAVIRUS HKU1 BY PCR: NOT DETECTED
CORONAVIRUS NL63 BY PCR: NOT DETECTED
CORONAVIRUS OC43 BY PCR: NOT DETECTED
HUMAN METAPNEUMOVIRUS BY PCR: NOT DETECTED
HUMAN RHINOVIRUS/ENTEROVIRUS BY PCR: NOT DETECTED
INFLUENZA A BY PCR: NOT DETECTED
INFLUENZA B BY PCR: NOT DETECTED
MYCOPLASMA PNEUMONIAE BY PCR: NOT DETECTED
PARAINFLUENZA VIRUS 1 BY PCR: NOT DETECTED
PARAINFLUENZA VIRUS 2 BY PCR: NOT DETECTED
PARAINFLUENZA VIRUS 3 BY PCR: NOT DETECTED
PARAINFLUENZA VIRUS 4 BY PCR: NOT DETECTED
RESPIRATORY SYNCYTIAL VIRUS BY PCR: NOT DETECTED
SARS-COV-2, PCR: NOT DETECTED
TROPONIN, HIGH SENSITIVITY: 9 NG/L (ref 0–9)

## 2022-08-28 PROCEDURE — 6360000004 HC RX CONTRAST MEDICATION: Performed by: RADIOLOGY

## 2022-08-28 PROCEDURE — 99285 EMERGENCY DEPT VISIT HI MDM: CPT

## 2022-08-28 PROCEDURE — 2500000003 HC RX 250 WO HCPCS: Performed by: EMERGENCY MEDICINE

## 2022-08-28 PROCEDURE — 6360000002 HC RX W HCPCS: Performed by: EMERGENCY MEDICINE

## 2022-08-28 PROCEDURE — 74177 CT ABD & PELVIS W/CONTRAST: CPT

## 2022-08-28 PROCEDURE — 2580000003 HC RX 258: Performed by: EMERGENCY MEDICINE

## 2022-08-28 PROCEDURE — 36415 COLL VENOUS BLD VENIPUNCTURE: CPT

## 2022-08-28 PROCEDURE — 84484 ASSAY OF TROPONIN QUANT: CPT

## 2022-08-28 RX ORDER — CEFDINIR 300 MG/1
300 CAPSULE ORAL 2 TIMES DAILY
Qty: 14 CAPSULE | Refills: 0 | Status: ON HOLD | OUTPATIENT
Start: 2022-08-28 | End: 2022-09-02 | Stop reason: HOSPADM

## 2022-08-28 RX ORDER — DOXYCYCLINE HYCLATE 100 MG
100 TABLET ORAL 2 TIMES DAILY
Qty: 20 TABLET | Refills: 0 | Status: ON HOLD | OUTPATIENT
Start: 2022-08-28 | End: 2022-09-02 | Stop reason: HOSPADM

## 2022-08-28 RX ADMIN — DOXYCYCLINE 100 MG: 100 INJECTION, POWDER, LYOPHILIZED, FOR SOLUTION INTRAVENOUS at 02:48

## 2022-08-28 RX ADMIN — IOPAMIDOL 75 ML: 755 INJECTION, SOLUTION INTRAVENOUS at 00:29

## 2022-08-28 RX ADMIN — CEFTRIAXONE 1000 MG: 1 INJECTION, POWDER, FOR SOLUTION INTRAMUSCULAR; INTRAVENOUS at 02:48

## 2022-08-28 NOTE — ED PROVIDER NOTES
HPI:  8/27/22, Time: 10:27 PM EDT         Beti Rosado is a 74 y.o. female presenting to the ED for fever and left flank pain. Presented episode of fatigue today while she was hiking with her . When she got home, her temperature was 102. Denies having headaches, abdominal pain, nausea/vomiting diarrhea, shortness of breath, chest pain, blurry vision, dysuria. Endorses having chills today and a dry cough that has been present for at least some weeks/ months now. Also states she has had same left flank pain for past months. Has a history of kidney stones. States she recently saw Queta Martinez as recent as of 1-2 weeks ago and was told that she did not have a kidney stone. Left flank pain is constant, 8/10, worse with movement or pressure. Does not relief with voiding or stooling. Has not improved with medications. Denies dysuria, hematuria, vomiting, diarrhea, abnormal discharge. ROS:   Pertinent positives and negatives are stated within HPI, all other systems reviewed and are negative.  --------------------------------------------- PAST HISTORY ---------------------------------------------  Past Medical History:  has a past medical history of Arthritis, Cancer ([facility] [address] History of kidney stones, HTN (hypertension), Hyperlipidemia, LBBB (left bundle branch block), Mitral regurgitation, PONV (postoperative nausea and vomiting), Tachycardia, and Thyroid disease. Past Surgical History:  has a past surgical history that includes Diagnostic Cardiac Cath Lab Procedure (11/09/01); Tonsillectomy; Lithotripsy; Breast biopsy; Skin cancer excision; Upper gastrointestinal endoscopy (6/17/15); vulvar/perineal biopsy (1985); pr esophageal motility study w/interp&rpt (N/A, 8/17/2018); Colonoscopy; Endoscopy, colon, diagnostic; pr lap, repair paraesophageal hernia, incl fundoplasty w/ mesh (N/A, 10/3/2018); Lithotripsy (Left, 1/30/2020); and Lithotripsy (Left, 6/16/2021).     Social History:  reports that she quit smoking about 53 years ago. Her smoking use included cigarettes. She has a 2.00 pack-year smoking history. She has never used smokeless tobacco. She reports current alcohol use. She reports that she does not use drugs. Family History: family history includes Asthma in her son; Cancer in an other family member; Diabetes in an other family member; Heart Attack in her father; Other in her father. The patients home medications have been reviewed. Allergies: Patient has no known allergies. ---------------------------------------------------PHYSICAL EXAM--------------------------------------    Constitutional/General: Alert and oriented x3, well appearing, non toxic in NAD  Head: Normocephalic and atraumatic  Eyes: PERRL, EOMI  Mouth: Oropharynx clear, handling secretions, no trismus  Neck: Supple, full ROM, non tender to palpation in the midline, no stridor, no crepitus, no meningeal signs  Pulmonary: Lungs clear to auscultation bilaterally, no wheezes, rales, or rhonchi. Not in respiratory distress  Cardiovascular:  Regular rate. Regular rhythm. No murmurs, gallops, or rubs. 2+ distal pulses  Chest: no chest wall tenderness  Abdomen: Tenderness on left flank. Soft. Non tender. Non distended. +BS. No rebound, guarding, or rigidity. No pulsatile masses appreciated. Musculoskeletal: Moves all extremities x 4. Warm and well perfused, no clubbing, cyanosis, or edema. Capillary refill <3 seconds  Skin: warm and dry. No rashes. Neurologic: GCS 15, CN 2-12 grossly intact, no focal deficits, symmetric strength 5/5 in the upper and lower extremities bilaterally  Psych: Normal Affect    -------------------------------------------------- RESULTS -------------------------------------------------  I have personally reviewed all laboratory and imaging results for this patient. Results are listed below.      LABS:  Results for orders placed or performed during the hospital encounter of 08/27/22   COVID-19, Rapid    Specimen: Nasopharyngeal Swab   Result Value Ref Range    SARS-CoV-2, NAAT Not Detected Not Detected   Rapid influenza A/B antigens    Specimen: Nasopharyngeal   Result Value Ref Range    Influenza A by PCR Not Detected Not Detected    Influenza B by PCR Not Detected Not Detected   Respiratory Panel, Molecular, with COVID-19 (Restricted: peds pts or suitable admitted adults)    Specimen: Nasopharyngeal   Result Value Ref Range    Adenovirus by PCR Not Detected Not Detected    Bordetella parapertussis by PCR Not Detected Not Detected    Bordetella pertussis by PCR Not Detected Not Detected    Chlamydophilia pneumoniae by PCR Not Detected Not Detected    Coronavirus 229E by PCR Not Detected Not Detected    Coronavirus HKU1 by PCR Not Detected Not Detected    Coronavirus NL63 by PCR Not Detected Not Detected    Coronavirus OC43 by PCR Not Detected Not Detected    SARS-CoV-2, PCR Not Detected Not Detected    Human Metapneumovirus by PCR Not Detected Not Detected    Human Rhinovirus/Enterovirus by PCR Not Detected Not Detected    Influenza A by PCR Not Detected Not Detected    Influenza B by PCR Not Detected Not Detected    Mycoplasma pneumoniae by PCR Not Detected Not Detected    Parainfluenza Virus 1 by PCR Not Detected Not Detected    Parainfluenza Virus 2 by PCR Not Detected Not Detected    Parainfluenza Virus 3 by PCR Not Detected Not Detected    Parainfluenza Virus 4 by PCR Not Detected Not Detected    Respiratory Syncytial Virus by PCR Not Detected Not Detected   Troponin   Result Value Ref Range    Troponin, High Sensitivity 12 (H) 0 - 9 ng/L   CBC with Auto Differential   Result Value Ref Range    WBC 10.5 4.5 - 11.5 E9/L    RBC 4.51 3.50 - 5.50 E12/L    Hemoglobin 13.7 11.5 - 15.5 g/dL    Hematocrit 40.5 34.0 - 48.0 %    MCV 89.8 80.0 - 99.9 fL    MCH 30.4 26.0 - 35.0 pg    MCHC 33.8 32.0 - 34.5 %    RDW 13.6 11.5 - 15.0 fL    Platelets 074 785 - 052 E9/L    MPV 9.9 7.0 - 12.0 fL    Neutrophils % 72.0 43.0 - 80.0 % Rate 98 BPM    Atrial Rate 98 BPM    P-R Interval 156 ms    QRS Duration 140 ms    Q-T Interval 396 ms    QTc Calculation (Bazett) 505 ms    P Axis 23 degrees    R Axis 58 degrees    T Axis 147 degrees       RADIOLOGY:  Interpreted by Radiologist.  CT ABDOMEN PELVIS W IV CONTRAST Additional Contrast? None   Final Result   Consolidation/pneumonia in the left lower lobe. Nonobstructing renal calculus in the right lower pole. Hiatal hernia. XR CHEST PORTABLE   Final Result   No acute process. EKG: This EKG is signed and interpreted by me. Rate: 98  Rhythm: Sinus  Interpretation: non-specific EKG  Comparison: stable as compared to patient's most recent EKG        ------------------------- NURSING NOTES AND VITALS REVIEWED ---------------------------   The nursing notes within the ED encounter and vital signs as below have been reviewed by myself. /76   Pulse 81   Temp 98.3 °F (36.8 °C)   Resp 20   Wt 145 lb (65.8 kg)   SpO2 95%   BMI 29.29 kg/m²   Oxygen Saturation Interpretation: Normal    The patients available past medical records and past encounters were reviewed. ------------------------------ ED COURSE/MEDICAL DECISION MAKING----------------------  Medications   acetaminophen (TYLENOL) tablet 1,000 mg (1,000 mg Oral Given 8/27/22 2334)   0.9 % sodium chloride bolus (0 mLs IntraVENous Stopped 8/27/22 2329)   ibuprofen (ADVIL;MOTRIN) tablet 800 mg (800 mg Oral Given 8/27/22 2344)   iopamidol (ISOVUE-370) 76 % injection 75 mL (75 mLs IntraVENous Given 8/28/22 0029)   cefTRIAXone (ROCEPHIN) 1,000 mg in sterile water 10 mL IV syringe (1,000 mg IntraVENous Given 8/28/22 0248)   doxycycline (VIBRAMYCIN) 100 mg in dextrose 5 % 100 mL IVPB (Eulm7Yve) (0 mg IntraVENous Stopped 8/28/22 0353)             Medical Decision Making:               Patient here for left flank pain and fever, States he has had dry cough for past weeks/ months .   UA negative for UTI, Influenza and covid negative. CBC unremarkable with no leukocytosis. CT showing consolidation/pneumonia in the left lower lobe. She does present tachycardic and with fevers here in the ED, and has been having dry cough with fever and fatigue. Blood cultures obtained before she was started on Vibramycin 100 mg and Rocephin 1,000mg for pneumonia. Case discussed with Family medicine, Dr. Helene Bustos. Plan is to discharge with Omnicef and Vibramycin treatment as outpatient with close follow up with Dr Helene Bustos on following Tuesday. If patient does not present improvement or develops worsening of her current symptoms she was instructed to return to the ED. She understands and agrees with plan. Re-Evaluations:             Re-evaluation. Patient able to ambulate saturating 95% . Afebrile and heart rate 85. Consultations:             Family medicine    Critical Care: This patient's ED course included: a personal history and physicial eaxmination    This patient has been closely monitored during their ED course. Counseling: The emergency provider has spoken with the patient and discussed todays results, in addition to providing specific details for the plan of care and counseling regarding the diagnosis and prognosis. Questions are answered at this time and they are agreeable with the plan.       --------------------------------- IMPRESSION AND DISPOSITION ---------------------------------    IMPRESSION  1. Pneumonia due to infectious organism, unspecified laterality, unspecified part of lung        DISPOSITION  Disposition:  Discharge to home  Patient condition is stable        NOTE: This report was transcribed using voice recognition software.  Every effort was made to ensure accuracy; however, inadvertent computerized transcription errors may be present          Ellen Cruz MD  Resident  08/28/22 2327  ATTENDING PROVIDER ATTESTATION:     I have personally performed and/or participated in the history, exam, medical decision making, and procedures and agree with all pertinent clinical information. I have also reviewed and agree with the past medical, family and social history unless otherwise noted. I have discussed this patient in detail with the resident, and provided the instruction and education regarding fever. My findings/Plan: I was the primary provider for patient. Patient presenting here because of fevers as earlier today. Patient reports she was hiking with her . Patient reporting she had some mild dry cough she reports ongoing pain in her flank was several months. Patient has seen urology in the past patient reporting no runny nose she reports no chest pain or shortness of breath. Patient here is awake alert. She is afebrile. Patient lungs are normal abdomen is soft and nontender. Patient is in no respiratory distress. Patient does have some mild tenderness to her left lateral flank region. Patient has no edema. Labs and EKG noted reviewed. EKG sinus nonspecific looks unchanged. Patient did have respiratory panel which was negative. CT abdomen pelvis was done due to her fever as well as her flank pain. CT showed no intra-abdominal pathology but does show. Patient vital signs have improved and was given Tylenol patient given IV antibiotics. We did speak to her primary care physician and he would prefer the patient to go home. Patient pneumonia score is a 1. Patient ambulated in the emergency room without difficulty pulse ox was stable.   Patient nontoxic-appearing patient will be discharged with oral antibiotic she is to return if symptoms worsen or persist she is to follow-up with  on tuesday       Brian Jaimes MD  08/28/22 [address], [place]  08/28/22 0978

## 2022-08-28 NOTE — ED NOTES
Department of Emergency Medicine  FIRST PROVIDER TRIAGE NOTE             Independent MLP           8/27/22  9:51 PM EDT    Date of Encounter: 8/27/22   MRN: [id number]      HPI: Aminata Kaminski is a 74 y.o. female who presents to the ED for Fatigue and Fever (Pt states that she was walking today when she had an abrupt onset of weakness. She states when she got home she took her temp and she had a fever. Pt also states that she is having left sided flank pain for several weeks. Pt states that it flared up again when she was walking today.  )       ROS: Negative for cp or sob. PE: Gen Appearance/Constitutional: alert  HEENT: NC/NT. PERRLA,  Airway patent. Initial Plan of Care: All treatment areas with department are currently occupied. Plan to order/Initiate the following while awaiting opening in ED: labs, EKG, imaging studies, and COVID-19 testing.   Initiate Treatment-Testing, Proceed toTreatment Area When Bed Available for ED Attending/MLP to Continue Care    Electronically signed by SALVADOR Patrick CNP   DD: 8/27/22       SALVADOR Patrick CNP  08/27/22 2135  ATTENDING PROVIDER ATTESTATION:     Supervising Physician, on-site, available for consultation, non-participatory in the evaluation or care of this patient       Maximilian Steven MD  08/28/22 2992

## 2022-08-28 NOTE — ED NOTES
Pt ambulated around hallway with pulse ox reading at 94 and 95 throughout ambulation.      Janet Lunch, [place]  08/28/22 1906

## 2022-08-29 LAB
EKG ATRIAL RATE: 98 BPM
EKG P AXIS: 23 DEGREES
EKG P-R INTERVAL: 156 MS
EKG Q-T INTERVAL: 396 MS
EKG QRS DURATION: 140 MS
EKG QTC CALCULATION (BAZETT): 505 MS
EKG R AXIS: 58 DEGREES
EKG T AXIS: 147 DEGREES
EKG VENTRICULAR RATE: 98 BPM

## 2022-08-30 ENCOUNTER — OFFICE VISIT (OUTPATIENT)
Dept: FAMILY MEDICINE CLINIC | Age: 72
End: 2022-08-30
Payer: MEDICARE

## 2022-08-30 ENCOUNTER — HOSPITAL ENCOUNTER (INPATIENT)
Age: 72
LOS: 3 days | Discharge: HOME | DRG: 194 | End: 2022-09-02
Attending: EMERGENCY MEDICINE | Admitting: HOSPITALIST
Payer: MEDICARE

## 2022-08-30 ENCOUNTER — APPOINTMENT (OUTPATIENT)
Dept: CT IMAGING | Age: 72
DRG: 194 | End: 2022-08-30
Payer: MEDICARE

## 2022-08-30 VITALS
HEART RATE: 96 BPM | HEIGHT: 59 IN | DIASTOLIC BLOOD PRESSURE: 72 MMHG | RESPIRATION RATE: 16 BRPM | TEMPERATURE: 97 F | BODY MASS INDEX: 29.43 KG/M2 | OXYGEN SATURATION: 96 % | WEIGHT: 146 LBS | SYSTOLIC BLOOD PRESSURE: 128 MMHG

## 2022-08-30 DIAGNOSIS — J18.9 COMMUNITY ACQUIRED PNEUMONIA OF LEFT LOWER LOBE OF LUNG: Primary | ICD-10-CM

## 2022-08-30 DIAGNOSIS — M19.042 LOCALIZED OSTEOARTHRITIS OF LEFT HAND: ICD-10-CM

## 2022-08-30 DIAGNOSIS — J18.9 PNEUMONIA OF LEFT LOWER LOBE DUE TO INFECTIOUS ORGANISM: Primary | ICD-10-CM

## 2022-08-30 PROBLEM — N12 PYELONEPHRITIS OF LEFT KIDNEY: Status: ACTIVE | Noted: 2022-08-30

## 2022-08-30 PROBLEM — Z16.20 THERAPY FAILURE DUE TO ANTIBIOTIC RESISTANCE: Status: ACTIVE | Noted: 2022-08-30

## 2022-08-30 PROBLEM — J16.0 CAP (COMMUNITY ACQUIRED PNEUMONIA) DUE TO CHLAMYDIA SPECIES: Status: ACTIVE | Noted: 2022-08-30

## 2022-08-30 LAB
ALBUMIN SERPL-MCNC: 3.8 G/DL (ref 3.5–5.2)
ALP BLD-CCNC: 93 U/L (ref 35–104)
ALT SERPL-CCNC: 14 U/L (ref 0–32)
ANION GAP SERPL CALCULATED.3IONS-SCNC: 11 MMOL/L (ref 7–16)
AST SERPL-CCNC: 32 U/L (ref 0–31)
BASOPHILS ABSOLUTE: 0.02 E9/L (ref 0–0.2)
BASOPHILS RELATIVE PERCENT: 0.2 % (ref 0–2)
BILIRUB SERPL-MCNC: 0.7 MG/DL (ref 0–1.2)
BUN BLDV-MCNC: 13 MG/DL (ref 6–23)
CALCIUM SERPL-MCNC: 9.4 MG/DL (ref 8.6–10.2)
CHLORIDE BLD-SCNC: 97 MMOL/L (ref 98–107)
CO2: 27 MMOL/L (ref 22–29)
CREAT SERPL-MCNC: 0.7 MG/DL (ref 0.5–1)
D DIMER: 767 NG/ML DDU
EKG ATRIAL RATE: 83 BPM
EKG P AXIS: 43 DEGREES
EKG P-R INTERVAL: 170 MS
EKG Q-T INTERVAL: 444 MS
EKG QRS DURATION: 152 MS
EKG QTC CALCULATION (BAZETT): 521 MS
EKG R AXIS: 0 DEGREES
EKG T AXIS: -17 DEGREES
EKG VENTRICULAR RATE: 83 BPM
EOSINOPHILS ABSOLUTE: 0.04 E9/L (ref 0.05–0.5)
EOSINOPHILS RELATIVE PERCENT: 0.4 % (ref 0–6)
GFR AFRICAN AMERICAN: >60
GFR NON-AFRICAN AMERICAN: >60 ML/MIN/1.73
GLUCOSE BLD-MCNC: 105 MG/DL (ref 74–99)
HCT VFR BLD CALC: 39.3 % (ref 34–48)
HEMOGLOBIN: 13.3 G/DL (ref 11.5–15.5)
IMMATURE GRANULOCYTES #: 0.05 E9/L
IMMATURE GRANULOCYTES %: 0.5 % (ref 0–5)
LACTIC ACID: 0.9 MMOL/L (ref 0.5–2.2)
LIPASE: 36 U/L (ref 13–60)
LYMPHOCYTES ABSOLUTE: 1.75 E9/L (ref 1.5–4)
LYMPHOCYTES RELATIVE PERCENT: 16.9 % (ref 20–42)
MAGNESIUM: 2 MG/DL (ref 1.6–2.6)
MCH RBC QN AUTO: 30.2 PG (ref 26–35)
MCHC RBC AUTO-ENTMCNC: 33.8 % (ref 32–34.5)
MCV RBC AUTO: 89.1 FL (ref 80–99.9)
MONOCYTES ABSOLUTE: 1.07 E9/L (ref 0.1–0.95)
MONOCYTES RELATIVE PERCENT: 10.3 % (ref 2–12)
NEUTROPHILS ABSOLUTE: 7.45 E9/L (ref 1.8–7.3)
NEUTROPHILS RELATIVE PERCENT: 71.7 % (ref 43–80)
PDW BLD-RTO: 13.2 FL (ref 11.5–15)
PLATELET # BLD: 200 E9/L (ref 130–450)
PMV BLD AUTO: 9.6 FL (ref 7–12)
POTASSIUM REFLEX MAGNESIUM: 3.3 MMOL/L (ref 3.5–5)
RBC # BLD: 4.41 E12/L (ref 3.5–5.5)
SODIUM BLD-SCNC: 135 MMOL/L (ref 132–146)
TOTAL PROTEIN: 7.2 G/DL (ref 6.4–8.3)
TROPONIN, HIGH SENSITIVITY: 12 NG/L (ref 0–9)
WBC # BLD: 10.4 E9/L (ref 4.5–11.5)

## 2022-08-30 PROCEDURE — 83735 ASSAY OF MAGNESIUM: CPT

## 2022-08-30 PROCEDURE — 99222 1ST HOSP IP/OBS MODERATE 55: CPT | Performed by: HOSPITALIST

## 2022-08-30 PROCEDURE — 1200000000 HC SEMI PRIVATE

## 2022-08-30 PROCEDURE — 93005 ELECTROCARDIOGRAM TRACING: CPT | Performed by: EMERGENCY MEDICINE

## 2022-08-30 PROCEDURE — 99285 EMERGENCY DEPT VISIT HI MDM: CPT

## 2022-08-30 PROCEDURE — 6360000004 HC RX CONTRAST MEDICATION: Performed by: RADIOLOGY

## 2022-08-30 PROCEDURE — 81001 URINALYSIS AUTO W/SCOPE: CPT

## 2022-08-30 PROCEDURE — 83690 ASSAY OF LIPASE: CPT

## 2022-08-30 PROCEDURE — 99214 OFFICE O/P EST MOD 30 MIN: CPT | Performed by: FAMILY MEDICINE

## 2022-08-30 PROCEDURE — 96365 THER/PROPH/DIAG IV INF INIT: CPT

## 2022-08-30 PROCEDURE — G0378 HOSPITAL OBSERVATION PER HR: HCPCS

## 2022-08-30 PROCEDURE — 87040 BLOOD CULTURE FOR BACTERIA: CPT

## 2022-08-30 PROCEDURE — 2580000003 HC RX 258: Performed by: HOSPITALIST

## 2022-08-30 PROCEDURE — 80053 COMPREHEN METABOLIC PANEL: CPT

## 2022-08-30 PROCEDURE — 71275 CT ANGIOGRAPHY CHEST: CPT

## 2022-08-30 PROCEDURE — 83605 ASSAY OF LACTIC ACID: CPT

## 2022-08-30 PROCEDURE — 85378 FIBRIN DEGRADE SEMIQUANT: CPT

## 2022-08-30 PROCEDURE — 85025 COMPLETE CBC W/AUTO DIFF WBC: CPT

## 2022-08-30 PROCEDURE — 6360000002 HC RX W HCPCS: Performed by: HOSPITALIST

## 2022-08-30 PROCEDURE — 2580000003 HC RX 258: Performed by: EMERGENCY MEDICINE

## 2022-08-30 PROCEDURE — 87449 NOS EACH ORGANISM AG IA: CPT

## 2022-08-30 PROCEDURE — 6370000000 HC RX 637 (ALT 250 FOR IP): Performed by: EMERGENCY MEDICINE

## 2022-08-30 PROCEDURE — 87088 URINE BACTERIA CULTURE: CPT

## 2022-08-30 PROCEDURE — 1123F ACP DISCUSS/DSCN MKR DOCD: CPT | Performed by: FAMILY MEDICINE

## 2022-08-30 PROCEDURE — 84484 ASSAY OF TROPONIN QUANT: CPT

## 2022-08-30 PROCEDURE — 96375 TX/PRO/DX INJ NEW DRUG ADDON: CPT

## 2022-08-30 RX ORDER — ATORVASTATIN CALCIUM 10 MG/1
10 TABLET, FILM COATED ORAL NIGHTLY
Status: DISCONTINUED | OUTPATIENT
Start: 2022-08-30 | End: 2022-09-02 | Stop reason: HOSPADM

## 2022-08-30 RX ORDER — FAMOTIDINE 20 MG/1
20 TABLET, FILM COATED ORAL NIGHTLY
Status: DISCONTINUED | OUTPATIENT
Start: 2022-08-30 | End: 2022-09-02 | Stop reason: HOSPADM

## 2022-08-30 RX ORDER — DILTIAZEM HYDROCHLORIDE 240 MG/1
240 CAPSULE, COATED, EXTENDED RELEASE ORAL DAILY
Status: DISCONTINUED | OUTPATIENT
Start: 2022-08-31 | End: 2022-09-02 | Stop reason: HOSPADM

## 2022-08-30 RX ORDER — MULTIVITAMIN WITH IRON
1 TABLET ORAL DAILY
Status: DISCONTINUED | OUTPATIENT
Start: 2022-08-31 | End: 2022-09-02 | Stop reason: HOSPADM

## 2022-08-30 RX ORDER — SODIUM CHLORIDE 0.9 % (FLUSH) 0.9 %
SYRINGE (ML) INJECTION
Status: DISPENSED
Start: 2022-08-30 | End: 2022-08-31

## 2022-08-30 RX ORDER — SODIUM CHLORIDE 9 MG/ML
INJECTION, SOLUTION INTRAVENOUS ONCE
Status: COMPLETED | OUTPATIENT
Start: 2022-08-30 | End: 2022-08-30

## 2022-08-30 RX ORDER — PROMETHAZINE HYDROCHLORIDE 25 MG/1
25 TABLET ORAL EVERY 6 HOURS PRN
Status: DISCONTINUED | OUTPATIENT
Start: 2022-08-30 | End: 2022-09-02 | Stop reason: HOSPADM

## 2022-08-30 RX ORDER — FLUTICASONE PROPIONATE 50 MCG
2 SPRAY, SUSPENSION (ML) NASAL DAILY
Status: DISCONTINUED | OUTPATIENT
Start: 2022-08-31 | End: 2022-09-02 | Stop reason: HOSPADM

## 2022-08-30 RX ORDER — AZELASTINE 1 MG/ML
2 SPRAY, METERED NASAL 2 TIMES DAILY PRN
Status: DISCONTINUED | OUTPATIENT
Start: 2022-08-30 | End: 2022-08-30 | Stop reason: CLARIF

## 2022-08-30 RX ORDER — ASPIRIN 81 MG/1
81 TABLET ORAL DAILY
Status: DISCONTINUED | OUTPATIENT
Start: 2022-08-31 | End: 2022-09-02 | Stop reason: HOSPADM

## 2022-08-30 RX ORDER — PANTOPRAZOLE SODIUM 40 MG/1
40 TABLET, DELAYED RELEASE ORAL
Status: DISCONTINUED | OUTPATIENT
Start: 2022-08-31 | End: 2022-09-02 | Stop reason: HOSPADM

## 2022-08-30 RX ORDER — DICYCLOMINE HYDROCHLORIDE 10 MG/1
10 CAPSULE ORAL 3 TIMES DAILY PRN
Status: DISCONTINUED | OUTPATIENT
Start: 2022-08-30 | End: 2022-09-02 | Stop reason: HOSPADM

## 2022-08-30 RX ORDER — ACETAMINOPHEN 325 MG/1
650 TABLET ORAL ONCE
Status: COMPLETED | OUTPATIENT
Start: 2022-08-30 | End: 2022-08-30

## 2022-08-30 RX ADMIN — WATER 2000 MG: 1 INJECTION INTRAMUSCULAR; INTRAVENOUS; SUBCUTANEOUS at 21:52

## 2022-08-30 RX ADMIN — AZITHROMYCIN DIHYDRATE 500 MG: 500 INJECTION, POWDER, LYOPHILIZED, FOR SOLUTION INTRAVENOUS at 21:55

## 2022-08-30 RX ADMIN — ACETAMINOPHEN 650 MG: 325 TABLET ORAL at 20:01

## 2022-08-30 RX ADMIN — SODIUM CHLORIDE: 9 INJECTION, SOLUTION INTRAVENOUS at 21:50

## 2022-08-30 RX ADMIN — IOPAMIDOL 75 ML: 755 INJECTION, SOLUTION INTRAVENOUS at 18:15

## 2022-08-30 ASSESSMENT — PAIN DESCRIPTION - ORIENTATION: ORIENTATION: LEFT

## 2022-08-30 ASSESSMENT — PAIN SCALES - GENERAL: PAINLEVEL_OUTOF10: 2

## 2022-08-30 ASSESSMENT — PAIN DESCRIPTION - LOCATION: LOCATION: RIB CAGE

## 2022-08-30 ASSESSMENT — ENCOUNTER SYMPTOMS
SHORTNESS OF BREATH: 0
COUGH: 1

## 2022-08-30 ASSESSMENT — PAIN - FUNCTIONAL ASSESSMENT
PAIN_FUNCTIONAL_ASSESSMENT: 0-10
PAIN_FUNCTIONAL_ASSESSMENT: NONE - DENIES PAIN

## 2022-08-30 NOTE — PROGRESS NOTES
FM Progress Note    Subjective:   Chills, sweats. No sob. Cough. Small sputum, yellow. Does not feel Abx are helping. Temp 103.5 o/n. Aching. No cp, pleuritic or otherwise. Some fogginess, but no confusion or forgetfulness. Vomited at 7:30 this AM. No blood. L flank pain went away after ER, but then came back last night. Health Maintenance Due   Topic Date Due    DTaP/Tdap/Td vaccine (1 - Tdap) Never done    Annual Wellness Visit (AWV)  06/03/2021    Lipids  03/16/2022    COVID-19 Vaccine (4 - Booster for Moderna series) 03/19/2022         Objective:   /72   Pulse 96   Temp 97 °F (36.1 °C)   Resp 16   Ht 4' 11\" (1.499 m)   Wt 146 lb (66.2 kg)   SpO2 96%   BMI 29.49 kg/m²   General appearance: NAD, alert and interacting appropriately  HEENT: NCAT, PERRLA, EOMI   Resp: LLL crackles. CVS: RRR, no MRG  Abdomen: BS +, SNDNT  Extremities: No clubbing, cyanosis, or edema. Warm. Dry. I have reviewed this patient's previous records. I have reviewed this patient's labs. I have reviewed this patient's imaging reports. I have reviewed this patient's medications. Assessment/Plan:    Magan Solares was seen today for menopause, sweats and pneumonia. Diagnoses and all orders for this visit:    Community acquired pneumonia of left lower lobe of lung    D/w ER physician. Send back to hospital for failure of outpt Tx of CAP      There are no Patient Instructions on file for this visit. No follow-ups on file.           Electronically signed by Sultana Salvador MD on 8/30/2022 at 10:43 AM

## 2022-08-31 LAB
BACTERIA: ABNORMAL /HPF
BILIRUBIN URINE: NEGATIVE
BLOOD, URINE: ABNORMAL
CLARITY: CLEAR
COLOR: YELLOW
GLUCOSE URINE: NEGATIVE MG/DL
KETONES, URINE: 40 MG/DL
LEUKOCYTE ESTERASE, URINE: NEGATIVE
NITRITE, URINE: NEGATIVE
PH UA: 7.5 (ref 5–9)
PROCALCITONIN: 0.12 NG/ML (ref 0–0.08)
PROTEIN UA: 100 MG/DL
RBC UA: >20 /HPF (ref 0–2)
SPECIFIC GRAVITY UA: 1.01 (ref 1–1.03)
STREP PNEUMONIAE ANTIGEN, URINE: NORMAL
UROBILINOGEN, URINE: 0.2 E.U./DL
WBC UA: ABNORMAL /HPF (ref 0–5)

## 2022-08-31 PROCEDURE — G0378 HOSPITAL OBSERVATION PER HR: HCPCS

## 2022-08-31 PROCEDURE — 96376 TX/PRO/DX INJ SAME DRUG ADON: CPT

## 2022-08-31 PROCEDURE — 99232 SBSQ HOSP IP/OBS MODERATE 35: CPT | Performed by: PHYSICIAN ASSISTANT

## 2022-08-31 PROCEDURE — 96366 THER/PROPH/DIAG IV INF ADDON: CPT

## 2022-08-31 PROCEDURE — 6360000002 HC RX W HCPCS: Performed by: HOSPITALIST

## 2022-08-31 PROCEDURE — 84145 PROCALCITONIN (PCT): CPT

## 2022-08-31 PROCEDURE — 2580000003 HC RX 258: Performed by: HOSPITALIST

## 2022-08-31 PROCEDURE — 1200000000 HC SEMI PRIVATE

## 2022-08-31 PROCEDURE — 6370000000 HC RX 637 (ALT 250 FOR IP): Performed by: HOSPITALIST

## 2022-08-31 PROCEDURE — 36415 COLL VENOUS BLD VENIPUNCTURE: CPT

## 2022-08-31 PROCEDURE — 96372 THER/PROPH/DIAG INJ SC/IM: CPT

## 2022-08-31 PROCEDURE — 6370000000 HC RX 637 (ALT 250 FOR IP): Performed by: NURSE PRACTITIONER

## 2022-08-31 PROCEDURE — 97161 PT EVAL LOW COMPLEX 20 MIN: CPT

## 2022-08-31 PROCEDURE — 6370000000 HC RX 637 (ALT 250 FOR IP): Performed by: INTERNAL MEDICINE

## 2022-08-31 RX ORDER — POTASSIUM CHLORIDE 7.45 MG/ML
10 INJECTION INTRAVENOUS PRN
Status: DISCONTINUED | OUTPATIENT
Start: 2022-08-31 | End: 2022-09-02 | Stop reason: HOSPADM

## 2022-08-31 RX ORDER — MAGNESIUM SULFATE IN WATER 40 MG/ML
2000 INJECTION, SOLUTION INTRAVENOUS PRN
Status: DISCONTINUED | OUTPATIENT
Start: 2022-08-31 | End: 2022-09-02 | Stop reason: HOSPADM

## 2022-08-31 RX ORDER — ACETAMINOPHEN 325 MG/1
650 TABLET ORAL EVERY 6 HOURS PRN
Status: DISCONTINUED | OUTPATIENT
Start: 2022-08-31 | End: 2022-09-02 | Stop reason: HOSPADM

## 2022-08-31 RX ORDER — SODIUM CHLORIDE 0.9 % (FLUSH) 0.9 %
5-40 SYRINGE (ML) INJECTION EVERY 12 HOURS SCHEDULED
Status: DISCONTINUED | OUTPATIENT
Start: 2022-08-31 | End: 2022-09-02 | Stop reason: HOSPADM

## 2022-08-31 RX ORDER — POTASSIUM CHLORIDE 20 MEQ/1
40 TABLET, EXTENDED RELEASE ORAL PRN
Status: DISCONTINUED | OUTPATIENT
Start: 2022-08-31 | End: 2022-09-02 | Stop reason: HOSPADM

## 2022-08-31 RX ORDER — POLYETHYLENE GLYCOL 3350 17 G/17G
17 POWDER, FOR SOLUTION ORAL DAILY PRN
Status: DISCONTINUED | OUTPATIENT
Start: 2022-08-31 | End: 2022-09-02 | Stop reason: HOSPADM

## 2022-08-31 RX ORDER — SODIUM CHLORIDE 9 MG/ML
INJECTION, SOLUTION INTRAVENOUS PRN
Status: DISCONTINUED | OUTPATIENT
Start: 2022-08-31 | End: 2022-09-02 | Stop reason: HOSPADM

## 2022-08-31 RX ORDER — CALCIUM CARBONATE 200(500)MG
500 TABLET,CHEWABLE ORAL 3 TIMES DAILY PRN
Status: DISCONTINUED | OUTPATIENT
Start: 2022-08-31 | End: 2022-09-02 | Stop reason: HOSPADM

## 2022-08-31 RX ORDER — LANOLIN ALCOHOL/MO/W.PET/CERES
5 CREAM (GRAM) TOPICAL NIGHTLY PRN
Status: DISCONTINUED | OUTPATIENT
Start: 2022-08-31 | End: 2022-09-02 | Stop reason: HOSPADM

## 2022-08-31 RX ORDER — ENOXAPARIN SODIUM 100 MG/ML
40 INJECTION SUBCUTANEOUS DAILY
Status: DISCONTINUED | OUTPATIENT
Start: 2022-08-31 | End: 2022-09-02 | Stop reason: HOSPADM

## 2022-08-31 RX ORDER — LEVOCETIRIZINE DIHYDROCHLORIDE 5 MG/1
5 TABLET, FILM COATED ORAL NIGHTLY
COMMUNITY

## 2022-08-31 RX ORDER — SODIUM CHLORIDE 0.9 % (FLUSH) 0.9 %
5-40 SYRINGE (ML) INJECTION PRN
Status: DISCONTINUED | OUTPATIENT
Start: 2022-08-31 | End: 2022-09-02 | Stop reason: HOSPADM

## 2022-08-31 RX ORDER — ONDANSETRON 2 MG/ML
4 INJECTION INTRAMUSCULAR; INTRAVENOUS EVERY 6 HOURS PRN
Status: DISCONTINUED | OUTPATIENT
Start: 2022-08-31 | End: 2022-08-31

## 2022-08-31 RX ORDER — ONDANSETRON 4 MG/1
4 TABLET, ORALLY DISINTEGRATING ORAL EVERY 8 HOURS PRN
Status: DISCONTINUED | OUTPATIENT
Start: 2022-08-31 | End: 2022-08-31

## 2022-08-31 RX ORDER — POTASSIUM CHLORIDE 20 MEQ/1
40 TABLET, EXTENDED RELEASE ORAL ONCE
Status: COMPLETED | OUTPATIENT
Start: 2022-08-31 | End: 2022-08-31

## 2022-08-31 RX ORDER — IBUPROFEN 400 MG/1
400 TABLET ORAL EVERY 8 HOURS PRN
COMMUNITY

## 2022-08-31 RX ORDER — GUAIFENESIN/DEXTROMETHORPHAN 100-10MG/5
10 SYRUP ORAL EVERY 6 HOURS PRN
Status: DISCONTINUED | OUTPATIENT
Start: 2022-08-31 | End: 2022-09-02 | Stop reason: HOSPADM

## 2022-08-31 RX ORDER — ACETAMINOPHEN 650 MG/1
650 SUPPOSITORY RECTAL EVERY 6 HOURS PRN
Status: DISCONTINUED | OUTPATIENT
Start: 2022-08-31 | End: 2022-09-02 | Stop reason: HOSPADM

## 2022-08-31 RX ADMIN — SODIUM CHLORIDE, PRESERVATIVE FREE 10 ML: 5 INJECTION INTRAVENOUS at 08:05

## 2022-08-31 RX ADMIN — SODIUM CHLORIDE, PRESERVATIVE FREE 10 ML: 5 INJECTION INTRAVENOUS at 20:42

## 2022-08-31 RX ADMIN — ATORVASTATIN CALCIUM 10 MG: 10 TABLET, FILM COATED ORAL at 20:41

## 2022-08-31 RX ADMIN — GUAIFENESIN AND DEXTROMETHORPHAN 10 ML: 100; 10 SYRUP ORAL at 11:20

## 2022-08-31 RX ADMIN — ASPIRIN 81 MG: 81 TABLET, COATED ORAL at 08:04

## 2022-08-31 RX ADMIN — GUAIFENESIN AND DEXTROMETHORPHAN 10 ML: 100; 10 SYRUP ORAL at 17:34

## 2022-08-31 RX ADMIN — ENOXAPARIN SODIUM 40 MG: 100 INJECTION SUBCUTANEOUS at 08:04

## 2022-08-31 RX ADMIN — ATORVASTATIN CALCIUM 10 MG: 10 TABLET, FILM COATED ORAL at 00:56

## 2022-08-31 RX ADMIN — FAMOTIDINE 20 MG: 20 TABLET ORAL at 20:41

## 2022-08-31 RX ADMIN — FAMOTIDINE 20 MG: 20 TABLET ORAL at 00:56

## 2022-08-31 RX ADMIN — MULTIVITAMIN TABLET 1 TABLET: TABLET at 08:04

## 2022-08-31 RX ADMIN — Medication 4.5 MG: at 02:22

## 2022-08-31 RX ADMIN — GUAIFENESIN AND DEXTROMETHORPHAN 10 ML: 100; 10 SYRUP ORAL at 04:49

## 2022-08-31 RX ADMIN — WATER 2000 MG: 1 INJECTION INTRAMUSCULAR; INTRAVENOUS; SUBCUTANEOUS at 20:41

## 2022-08-31 RX ADMIN — ACETAMINOPHEN 650 MG: 325 TABLET ORAL at 04:13

## 2022-08-31 RX ADMIN — DILTIAZEM HYDROCHLORIDE 240 MG: 240 CAPSULE, COATED, EXTENDED RELEASE ORAL at 08:04

## 2022-08-31 RX ADMIN — POTASSIUM CHLORIDE 40 MEQ: 1500 TABLET, EXTENDED RELEASE ORAL at 11:20

## 2022-08-31 RX ADMIN — Medication 4.5 MG: at 20:41

## 2022-08-31 RX ADMIN — AZITHROMYCIN DIHYDRATE 250 MG: 500 INJECTION, POWDER, LYOPHILIZED, FOR SOLUTION INTRAVENOUS at 20:44

## 2022-08-31 ASSESSMENT — ENCOUNTER SYMPTOMS
COUGH: 1
ABDOMINAL PAIN: 0
BACK PAIN: 0

## 2022-08-31 ASSESSMENT — PAIN SCALES - GENERAL: PAINLEVEL_OUTOF10: 3

## 2022-08-31 ASSESSMENT — PAIN DESCRIPTION - LOCATION: LOCATION: HEAD

## 2022-08-31 ASSESSMENT — PAIN DESCRIPTION - DESCRIPTORS: DESCRIPTORS: ACHING

## 2022-08-31 NOTE — PATIENT CARE CONFERENCE
P Quality Flow/Interdisciplinary Rounds Progress Note        Quality Flow Rounds held on August 31, 2022    Disciplines Attending:  Bedside Nurse, , , and Nursing Unit [address] [place] [address] Lurdes was admitted on 8/30/2022  2:05 PM    Anticipated Discharge Date:       Disposition:    Julián Score:  Julián Scale Score: 22    Readmission Risk              Risk of Unplanned Readmission:  10           Discussed patient goal for the day, patient clinical progression, and barriers to discharge.   The following Goal(s) of the Day/Commitment(s) have been identified:  Labs - Report Results      Yulia Ohara RN  August 31, 2022

## 2022-08-31 NOTE — PROGRESS NOTES
Spoke with Oral [place] regarding patients requests of cough medication. Orders received.   Electronically signed by Leann Bedolla RN on 8/31/2022 at 4:21 AM

## 2022-08-31 NOTE — PROGRESS NOTES
[facility] Progress Note    Admitting Date and Time: 8/30/2022  2:05 PM  Admit Dx: CAP (community acquired pneumonia) due to Chlamydia species [J16.0]    Subjective:  Patient is being followed for CAP (community acquired pneumonia) due to Chlamydia species [J16.0]     Patient was sitting in wheel chair in no acute distress. She continues to report a severe dry cough.  Denies any SOB or CP.     ROS: denies fever, chills, cp, sob, n/v, HA unless stated above.      sodium chloride flush  5-40 mL IntraVENous 2 times per day    enoxaparin  40 mg SubCUTAneous Daily    azithromycin  250 mg IntraVENous Q24H    cefTRIAXone (ROCEPHIN) IV  2,000 mg IntraVENous Q24H    aspirin EC  81 mg Oral Daily    atorvastatin  10 mg Oral Nightly    dilTIAZem  240 mg Oral Daily    famotidine  20 mg Oral Nightly    fluticasone  2 spray Each Nostril Daily    multivitamin  1 tablet Oral Daily    pantoprazole  40 mg Oral QAM AC     sodium chloride flush, 5-40 mL, PRN  sodium chloride, , PRN  acetaminophen, 650 mg, Q6H PRN   Or  acetaminophen, 650 mg, Q6H PRN  potassium chloride, 40 mEq, PRN   Or  potassium alternative oral replacement, 40 mEq, PRN   Or  potassium chloride, 10 mEq, PRN  magnesium sulfate, 2,000 mg, PRN  polyethylene glycol, 17 g, Daily PRN  melatonin, 4.5 mg, Nightly PRN  calcium carbonate, 500 mg, TID PRN  guaiFENesin-dextromethorphan, 10 mL, Q6H PRN  dicyclomine, 10 mg, TID PRN  diclofenac sodium, 2 g, BID PRN  sodium chloride, 1 spray, PRN  promethazine, 25 mg, Q6H PRN         Objective:    /60   Pulse 85   Temp 98.2 °F (36.8 °C) (Oral)   Resp 18   Ht 4' 11.5\" (1.511 m)   Wt 145 lb (65.8 kg)   SpO2 98%   BMI 28.80 kg/m²   General Appearance: alert and oriented to person, place and time and in no acute distress  Skin: warm and dry  Head: normocephalic and atraumatic  Eyes: pupils equal, round, and reactive to light, extraocular eye movements intact, conjunctivae normal  Neck: neck supple and non tender without mass   Pulmonary/Chest: wheezes in RLLL, no rales or rhonchi, normal air movement, no respiratory distress  Cardiovascular: normal rate, normal S1 and S2 and no carotid bruits  Abdomen: soft, non-tender, non-distended, normal bowel sounds, no masses or organomegaly  Extremities: no cyanosis, no clubbing and no edema  Neurologic: no cranial nerve deficit and speech normal        Recent Labs     08/30/22  1506      K 3.3*   CL 97*   CO2 27   BUN 13   CREATININE 0.7   GLUCOSE 105*   CALCIUM 9.4       Recent Labs     08/30/22  1506   WBC 10.4   RBC 4.41   HGB 13.3   HCT 39.3   MCV 89.1   MCH 30.2   MCHC 33.8   RDW 13.2      MPV 9.6       Radiology:   EXAMINATION:  CTA OF THE CHEST 8/30/2022 6:16 pm     TECHNIQUE:  CTA of the chest was performed after the administration of intravenous  contrast.  Multiplanar reformatted images are provided for review. MIP  images are provided for review. Automated exposure control, iterative  reconstruction, and/or weight based adjustment of the mA/kV was utilized to  reduce the radiation dose to as low as reasonably achievable. COMPARISON:  None. CONTRAST:  75 cc Isovue-370 was injected intravenously. HISTORY:  ORDERING SYSTEM PROVIDED HISTORY: SOB, Pneumonia  TECHNOLOGIST PROVIDED HISTORY:  Reason for exam:->SOB, Pneumonia  Decision Support Exception - unselect if not a suspected or confirmed  emergency medical condition->Emergency Medical Condition (MA)     FINDINGS:  Pulmonary Arteries: Pulmonary arteries are adequately opacified for  evaluation. No evidence of intraluminal filling defect to suggest pulmonary  embolism. Main pulmonary artery is normal in caliber. Mediastinum: No evidence of mediastinal lymphadenopathy. The heart and  pericardium demonstrate no acute abnormality. There is no acute abnormality  of the thoracic aorta.      Lungs/pleura: A prominent pulmonary infiltrate is seen in the left lower lobe  which likely have personally participated in the history, exam, medical decision making with Navjotal Rehana on the date of service and I agree with all of the pertinent clinical information unless otherwise noted. I have also reviewed and agree with the past medical, family, and social history unless otherwise noted. Patient was admitted with cough and fever    PHYSICAL EXAM:  Vitals:  /60   Pulse 85   Temp 98.2 °F (36.8 °C) (Oral)   Resp 18   Ht 4' 11.5\" (1.511 m)   Wt 145 lb (65.8 kg)   SpO2 98%   BMI 28.80 kg/m²   Gen: awake, alert, NAD  Lungs: clear to auscultation bilaterally no crackles no wheezing. Heart: RRR, no murmur   Abdomen: soft nontender nondistended positive bowel sounds. Extremities: full range of motion no peripheral edema. Impression:  Principal Problem:    CAP (community acquired pneumonia) due to Chlamydia species  Active Problems:    Therapy failure due to antibiotic resistance    Pyelonephritis of left kidney  Resolved Problems:    * No resolved hospital problems. *      My findings/plan include:    74year old female with no history of asthma or copd presents with LLL PNA and failed outpatient antibiotics. She has been started on IV rocephin and azithromycin. Previous respiratory viral panel is negative. She is to follow up with urology for nephrolithiasis. NOTE: This report was transcribed using voice recognition software. Every effort was made to ensure accuracy; however, inadvertent computerized transcription errors may be present.   Electronically signed by David Armstrong DO on 8/31/2022 at 3:00 PM

## 2022-08-31 NOTE — PROGRESS NOTES
Physical Therapy  Facility/Department: [facility] MED SURG  Physical Therapy Initial Assessment    Name: Bin Galvan  :   MRN: [id number]  Date of Service: 2022    Attending Provider:  Alexis Malik DO    Evaluating PT:  Kaykay Hercules, P.T. Room #:  0513/0513-A  Diagnosis:  CAP (community acquired pneumonia) due to Chlamydia species [J16.0]  Precautions:  none    SUBJECTIVE:    Pt lives with her  and son in a 1 story home with 1 step to enter. Pt ambulated with no AD PTA. OBJECTIVE:   Initial Evaluation  Date: 22   Was pt agreeable to Eval/treatment? yes   Does pt have pain? C/o pain L side low back   Bed Mobility  Rolling: Independent  Supine to sit: Independent  Sit to supine: Independent  Scooting: Independent   Transfers Sit to stand: Independent  Stand to sit: Independent  Stand pivot: Independent   Ambulation   50 feet with no AD Independent    Stair negotiation: ascended and descended 3 steps with 1 rail Independent    AM-PAC 6 Clicks 93/98     BLE ROM is WFL. BLE strength is grossly WFL. Sensation:  Pt denies numbness and tingling to extremities  Edema:  none noted  Balance: sitting is Independent and standing with no AD is Independent   Endurance: fair+    ASSESSMENT:    Comments:  Pt is Independent with functional mobility at this time and has no skilled PT needs. Pt's/ family goals   1. To feel better and go home. Patient and or family understand(s) diagnosis, prognosis, and plan of care. PHYSICAL THERAPY PLAN OF CARE:    PT will discharge pt from our service at this time.       Referring provider/PT Order:  PT eval and treat  Diagnosis:  CAP (community acquired pneumonia) due to Chlamydia species [J16.0]    Time in  08:40  Time out  08:50    Evaluation Time includes thorough review of current medical information, gathering information on past medical history/social history and prior level of function, completion of standardized testing/informal observation of tasks, assessment of data and education on plan of care and goals. CPT codes:  [x] Low Complexity PT evaluation 95810  [] Moderate Complexity PT evaluation 31590  [] High Complexity PT evaluation 58735  [] PT Re-evaluation 48200  [] Gait training 55565 ** minutes  [] Manual therapy 31354 ** minutes  [] Therapeutic activities 94464 ** minutes  [] Therapeutic exercises 09702 ** minutes  [] Neuromuscular reeducation 25538 ** minutes     Ish Reyes Bound., P.T.   License Number: [id number]

## 2022-08-31 NOTE — ED PROVIDER NOTES
Effort: Pulmonary effort is normal.      Breath sounds: No wheezing, rhonchi or rales. Chest:      Chest wall: No tenderness. Abdominal:      General: There is no distension. Palpations: Abdomen is soft. Tenderness: There is no abdominal tenderness. There is no guarding or rebound. Hernia: No hernia is present. Musculoskeletal:      Cervical back: Normal range of motion and neck supple. Right lower leg: No edema. Left lower leg: No edema. Skin:     General: Skin is warm and dry. Capillary Refill: Capillary refill takes less than 2 seconds. Neurological:      General: No focal deficit present. Mental Status: She is alert and oriented to person, place, and time. Cranial Nerves: No cranial nerve deficit. Psychiatric:         Mood and Affect: Mood normal.         Behavior: Behavior normal.        Procedures     MDM  Number of Diagnoses or Management Options  Pneumonia of left lower lobe due to infectious organism  Diagnosis management comments: There was no evidence of PE and reviewing chart patient continued to have a LL Pnuemonia and was continuing to have symptoms despite ABx. My collegue admitted the patient to the medicine team for further evaluation         ED Course as of 08/31/22 1843   Tue Aug 30, 2022   1528 EKG: This EKG is signed and interpreted by me. Rate: 83  Rhythm: Sinus  Interpretation: left bundle branch block, no scarbossa criteria met, Qtc at 521, no ectopy  Comparison: was normal  [BP]   1626 4:26 PM EDT  I have signed this patient out to the oncoming physician, Dr. Yumiko Fiore  I have discussed the patient's initial exam, treatment and plan of care with the on coming physician. I have notified the patient / family of the change in treating physician and answered their questions to this point.  [BP]   2055 Reviewed all work-up for the patient revealing pneumonia.   Patient is already been on antibiotics for 3 days without improvement is actually stating she is getting worse. Is felt patient has failed outpatient therapy and will be admitted to the hospitalist for further evaluation Rocephin azithromycin has been ordered. Blood cultures were already drawn prior to my revision of the case. Case discussed with Dr. Juan Colindres who will admit the patient. [CF]      ED Course User Index  [BP] Elmer Goins DO  [CF] Celeste Hamilton DO         ED Course as of 08/31/22 1843   Tue Aug 30, 2022   1528 EKG: This EKG is signed and interpreted by me. Rate: 83  Rhythm: Sinus  Interpretation: left bundle branch block, no scarbossa criteria met, Qtc at 521, no ectopy  Comparison: was normal  [BP]   1626 4:26 PM EDT  I have signed this patient out to the oncoming physician, Dr. Shawn Hammer  I have discussed the patient's initial exam, treatment and plan of care with the on coming physician. I have notified the patient / family of the change in treating physician and answered their questions to this point.  [BP]   2055 Reviewed all work-up for the patient revealing pneumonia. Patient is already been on antibiotics for 3 days without improvement is actually stating she is getting worse. Is felt patient has failed outpatient therapy and will be admitted to the hospitalist for further evaluation Rocephin azithromycin has been ordered. Blood cultures were already drawn prior to my revision of the case. Case discussed with Dr. Juan Colindres who will admit the patient. [CF]      ED Course User Index  [BP] Elmer Goins DO  [CF] Celeste Hamilton DO       --------------------------------------------- PAST HISTORY ---------------------------------------------  Past Medical History:  has a past medical history of Arthritis, Cancer ([facility] [address] History of kidney stones, HTN (hypertension), Hyperlipidemia, LBBB (left bundle branch block), Mitral regurgitation, PONV (postoperative nausea and vomiting), Tachycardia, Thyroid disease, and Tobacco abuse, in remission.     Past Surgical History: has a past surgical history that includes Diagnostic Cardiac Cath Lab Procedure (11/09/2001); Tonsillectomy; Lithotripsy; Breast biopsy; Skin cancer excision; Upper gastrointestinal endoscopy (06/17/2015); vulvar/perineal biopsy (1985); pr esophageal motility study w/interp&rpt (N/A, 08/17/2018); Colonoscopy; Endoscopy, colon, diagnostic; pr lap, repair paraesophageal hernia, incl fundoplasty w/ mesh (N/A, 10/03/2018); Lithotripsy (Left, 01/30/2020); and Lithotripsy (Left, 06/16/2021). Social History:  reports that she quit smoking about 53 years ago. Her smoking use included cigarettes. She has a 2.00 pack-year smoking history. She has never used smokeless tobacco. She reports current alcohol use. She reports that she does not use drugs. Family History: family history includes Asthma in her son; Cancer in an other family member; Finesse Earnest in her brother; Diabetes in her brother and another family member; Heart Attack in her father; Heart Disease in her brother; Lymphoma in her mother; Other in her brother and father. The patients home medications have been reviewed. Allergies: Patient has no known allergies. -------------------------------------------------- RESULTS -------------------------------------------------    LABS:  Results for orders placed or performed during the hospital encounter of 08/30/22   STREP PNEUMONIAE ANTIGEN    Specimen: Urine, clean catch   Result Value Ref Range    STREP PNEUMONIAE ANTIGEN, URINE       Presumptive negative- suggests no current or recent  pneumococcal infection.   Infection due to Strep pneumoniae cannot be  ruled out since the antigen present in the sample  may be below the detection limit of the test.  Normal Range:Presumptive Negative     Comprehensive Metabolic Panel w/ Reflex to MG   Result Value Ref Range    Sodium 135 132 - 146 mmol/L    Potassium reflex Magnesium 3.3 (L) 3.5 - 5.0 mmol/L    Chloride 97 (L) 98 - 107 mmol/L    CO2 27 22 - 29 mmol/L Protein,  (A) Negative mg/dL    Urobilinogen, Urine 0.2 <2.0 E.U./dL    Nitrite, Urine Negative Negative    Leukocyte Esterase, Urine Negative Negative   Microscopic Urinalysis   Result Value Ref Range    WBC, UA NONE 0 - 5 /HPF    RBC, UA >20 0 - 2 /HPF    Bacteria, UA RARE (A) None Seen /HPF   Procalcitonin   Result Value Ref Range    Procalcitonin 0.12 (H) 0.00 - 0.08 ng/mL   EKG 12 Lead   Result Value Ref Range    Ventricular Rate 83 BPM    Atrial Rate 83 BPM    P-R Interval 170 ms    QRS Duration 152 ms    Q-T Interval 444 ms    QTc Calculation (Bazett) 521 ms    P Axis 43 degrees    R Axis 0 degrees    T Axis -17 degrees       RADIOLOGY:  CTA PULMONARY W CONTRAST   Final Result   1. No pulmonary embolism. 2. Left lower lobe pneumonia. 3. Left nephrolithiasis. RECOMMENDATIONS:   Unavailable                   ------------------------- NURSING NOTES AND VITALS REVIEWED ---------------------------  Date / Time Roomed:  8/30/2022  2:05 PM  ED Bed Assignment:  [id number]/[id number]-D    The nursing notes within the ED encounter and vital signs as below have been reviewed. Patient Vitals for the past 24 hrs:   BP Temp Temp src Pulse Resp SpO2 Weight   08/31/22 0757 125/60 98.2 °F (36.8 °C) Oral 85 18 98 % --   08/31/22 0102 -- -- -- -- -- -- 145 lb (65.8 kg)   08/31/22 0000 (!) 151/64 99.4 °F (37.4 °C) Oral 84 16 94 % --   08/30/22 2115 (!) 148/55 98.6 °F (37 °C) Oral 91 14 94 % --   08/30/22 1930 (!) 142/72 (!) 101.4 °F (38.6 °C) Oral 95 -- 100 % --       Oxygen Saturation Interpretation: Normal    ------------------------------------------ PROGRESS NOTES ------------------------------------------  Re-evaluation(s):  Time: 402  Patients symptoms show no change  Repeat physical examination is not changed    Counseling:  I have spoken with the patient and discussed todays results, in addition to providing specific details for the plan of care and counseling regarding the diagnosis and prognosis.   Their questions are answered at this time and they are agreeable with the plan of admission.    --------------------------------- ADDITIONAL PROVIDER NOTES ---------------------------------  Consultations:  Marilou Busch spoke with Dr. Aubrey Greene Discussed case. They will admit the patient. This patient's ED course included: a personal history and physicial examination, re-evaluation prior to disposition, multiple bedside re-evaluations, IV medications, cardiac monitoring, continuous pulse oximetry, and complex medical decision making and emergency management    This patient has remained hemodynamically stable during their ED course. Diagnosis:  1. Pneumonia of left lower lobe due to infectious organism        Disposition:  Patient's disposition: Admit to med/surg floor  Patient's condition is stable.          Jono Castillo,   08/31/22 9627

## 2022-08-31 NOTE — H&P
[facility] History and Physical    Patient Information:  Patient: Kevon Blanchard  MRN: [id number]   Acct: [de-identified]  YOB: 1950  Admit Date: 8/30/2022      Date of Service: 8/30/2022  Primary Care Physician: Mare Choudhury MD  Advance Directive: DNR  Health Care Proxy: her , Mr. Lamonte Curran, [phone] & [phone]        SUBJECTIVE:    Chief Complaint   Patient presents with    Pneumonia     Saw pcp today and sent in for pneumonia, symptoms started Saturday / 98% RA     Fever     103.5 at home      EP Sign Out:  ABx failure  LLL CAP    HPI:  Mrs. Radha Gotti is a pleasant young-for-age 74year old  american lady from home. She was hiking recently and states that she has had a cough since then. She states that she took an easy walk and had hurting in her left side at 8/10. She states that she was feeling so weak that she did not think she could make it back to the car, she had st stop multiple times. As she had not felt dyspneic. She took her temperature at the home and it was 102.4F. She states that then she went to the [facility] ED that Saturday. They gave her a round of IV ABx and sent her home on oral medication. She states that she has not been improving with the oral medications and that while she felt better after the IV ABx down [facility] she soon returned to being miserable. Review of Systems:   Review of Systems   Constitutional:  Positive for chills, diaphoresis, fatigue and fever. HENT:  Positive for sneezing. Respiratory:  Positive for cough. Negative for shortness of breath. Cardiovascular:  Positive for chest pain (with cough only). Neurological:  Positive for weakness and headaches. Negative for light-headedness. Psychiatric/Behavioral:  Positive for confusion.       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 Rebecca Lott yearly    Mitral regurgitation     follows with Dr. Rebecca Lott yearly    PONV (postoperative nausea and vomiting)     Tachycardia     follows with Dr. Rebecca Lott yearly    Thyroid disease     'enlarged thyroid' ; no meds present    Tobacco abuse, in remission      Past Psychiatric History:  Deferred    Past Surgical History:   Procedure Laterality Date    BREAST BIOPSY      benign    COLONOSCOPY      DIAGNOSTIC CARDIAC CATH LAB PROCEDURE  11/09/2001    [facility]    ENDOSCOPY, COLON, DIAGNOSTIC      LITHOTRIPSY      VERY SMALL URETER,TERRIBLE TIME GETTING STENTS IN, ALOT OF ANESTH, VERY SICK AFTER    LITHOTRIPSY Left 01/30/2020    LEFT ESWL EXTRACORPOREAL SHOCK WAVE LITHOTRIPSY performed by Almaz So MD at [address]    LITHOTRIPSY Left 06/16/2021    CYSTOSCOPY RETROGRADE PYELOGRAM URETEROSCOPY J STENT LASER LITHOTRIPSY LEFT performed by Ora Henry DO at [address] [facility] W/INTERP&RPT N/A 08/17/2018    ESOPHAGEAL MOTILITY/MANOMETRY STUDY performed by Craig Mora DO at [facility], REPAIR PARAESOPHAGEAL HERNIA, INCL FUNDOPLASTY W/ MESH N/A 10/03/2018    LAPAROSCOPIC HIATAL HERNIA REPAIR WITH MESH performed by Anni Velasco MD at [facility]      as a kid    UPPER GASTROINTESTINAL ENDOSCOPY  06/17/2015    VULVAR/PERINEAL BIOPSY  1985     Social History       Tobacco History       Smoking Status  Former Quit Date  1/1/1969 Smoking Frequency  0.50 packs/day for 4.00 years (2.00 pk-yrs) Smoking Tobacco Type  Cigarettes quit in 1/1/1969      Smokeless Tobacco Use  Never      Tobacco Comments  1/2 PPD for 4 years while in college              Alcohol History       Alcohol Use Status  Yes Comment  rare glass of wine              Drug Use       Drug Use Status  Never              Sexual Activity       Sexually Active  Not Asked Comment  has a son, is              CODE STATUS: DNR  HEALTH CARE PROXY: her , Mr. Anahy Levine, [phone] & [phone]  AMBULATES: independently normally  DOMICILED: has no stairs in the home, lives with her  and son, has 3 cats     Family History   Problem Relation Age of Onset    Lymphoma Mother     Heart Attack Father     Other Father         bowel disorder    Colon Cancer Brother     Diabetes Brother     Heart Disease Brother         mnever smoker, MD    Other Brother         stomach problems    Asthma Son     Cancer Other     Diabetes Other      Allergies:   No Known Allergies    Home Medications:  Prior to Admission medications    Medication Sig Start Date End Date Taking?  Authorizing Provider   cefdinir (OMNICEF) 300 MG capsule Take 1 capsule by mouth 2 times daily for 7 days 8/28/22 9/4/22  Inessa South MD   doxycycline hyclate (VIBRA-TABS) 100 MG tablet Take 1 tablet by mouth 2 times daily for 10 days 8/28/22 9/7/22  Inessa South MD   diphenhydrAMINE-APAP, sleep, (TYLENOL PM EXTRA STRENGTH PO) Take by mouth nightly    Historical Provider, MD   NONFORMULARY Shaklee vitamins  daily    Historical Provider, MD   promethazine (PHENERGAN) 25 MG tablet Take 25 mg by mouth every 6 hours as needed for Nausea    Historical Provider, MD   diphenhydrAMINE-APAP, sleep, (TYLENOL PM EXTRA STRENGTH)  MG tablet Take 1 tablet by mouth nightly as needed for Sleep    Historical Provider, MD   sodium chloride (OCEAN, BABY AYR) 0.65 % nasal spray 1 spray by Nasal route as needed for Congestion    Historical Provider, MD   Blood Pressure KIT 1 each by Does not apply route in the morning and at bedtime 6/28/22   [place] MD Nataliia   omeprazole (PRILOSEC) 40 MG delayed release capsule TAKE 1 CAPSULE BY MOUTH ONCE DAILY IN THE MORNING 4/29/22   Historical Provider, MD   Nutritional Supplements (NUTRITIONAL SUPPLEMENT PO) Take by mouth daily    Historical Provider, MD dilTIAZem (CARDIZEM CD) 240 MG extended release capsule Take 1 capsule by mouth once daily 6/13/22   Jessa Ramon MD   dicyclomine (BENTYL) 10 MG capsule Take 1 capsule by mouth 3 times daily as needed (diarrhea) 4/4/22   SALVADOR Cabrera CNP   atorvastatin (LIPITOR) 10 MG tablet Take 1 tablet by mouth once daily 2/17/22   Jorge L Flores MD   fluticasone [facility]) 50 MCG/ACT nasal spray 2 sprays by Each Nostril route daily 9/29/21   SALVADOR Nunez CNP   azelastine (ASTELIN) 0.1 % nasal spray 1-2 sprays by Nasal route 2 times daily as needed for Rhinitis Use in each nostril as directed 9/29/21   SALVADOR Nunez CNP   albuterol sulfate  (90 Base) MCG/ACT inhaler Inhale 2 puffs into the lungs every 4-6 hours as needed for Wheezing or Shortness of Breath 9/29/21   SALVADOR Nunez CNP   famotidine (PEPCID) 20 MG tablet Take 20 mg by mouth nightly    Historical Provider, MD   diclofenac sodium (VOLTAREN) 1 % GEL Apply topically 2 times daily  Patient taking differently: Apply topically as needed 3/16/21   Jorge L Flores MD   aspirin EC 81 MG EC tablet Take 81 mg by mouth daily To verify holding w dr Viki Andino Provider, MD         OBJECTIVE:    Jacqueline List:    08/30/22 2115   BP: (!) 148/55   Pulse: 91   Resp: 14   Temp: 98.6 °F (37 °C)   SpO2: 94%   Breathing room air    BP (!) 148/55   Pulse 91   Temp 98.6 °F (37 °C) (Oral)   Resp 14   Ht 4' 11.5\" (1.511 m)   Wt 145 lb (65.8 kg)   SpO2 94%   BMI 28.80 kg/m²     No intake or output data in the 24 hours ending 08/30/22 2138    Physical Exam  Vitals reviewed. Constitutional:       General: She is not in acute distress. Appearance: Normal appearance. She is normal weight. She is not ill-appearing or toxic-appearing. HENT:      Head: Normocephalic and atraumatic. Nose: No congestion or rhinorrhea. Eyes:      General:         Right eye: No discharge. Left eye: No discharge.    Neck:      Comments: Supple, trachea appears midline  Cardiovascular:      Rate and Rhythm: Normal rate and regular rhythm. Heart sounds: No murmur heard. No friction rub. No gallop. Pulmonary:      Effort: Pulmonary effort is normal. No respiratory distress. Breath sounds: No stridor. No wheezing, rhonchi or rales. Chest:      Chest wall: No tenderness. Abdominal:      General: Bowel sounds are normal.      Palpations: Abdomen is soft. Tenderness: There is no abdominal tenderness. There is no guarding or rebound. Musculoskeletal:         General: No tenderness or signs of injury. Right lower leg: No edema. Left lower leg: No edema. Skin:     General: Skin is warm. Comments: nondiaphoretic   Neurological:      Mental Status: She is alert and oriented to person, place, and time. Psychiatric:         Mood and Affect: Mood normal.         Behavior: Behavior normal.        LABORATORY DATA:    CBC:   Recent Labs     08/27/22 2157 08/30/22  1506   WBC 10.5 10.4   HGB 13.7 13.3   HCT 40.5 39.3    200     BMP:   Recent Labs     08/27/22 2157 08/30/22  1506    135   K 3.9 3.3*    97*   CO2 19* 27   BUN 11 13   CREATININE 0.8 0.7   CALCIUM 9.1 9.4     Hepatic Profile:   Recent Labs     08/27/22 2157 08/30/22  1506   AST 38* 32*   ALT 12 14   BILITOT 0.8 0.7   ALKPHOS 95 93     Coag Panel: No results for input(s): INR, PROTIME, APTT in the last 72 hours. Cardiac Enzymes: No results for input(s): Vinh Pears in the last 72 hours. Pro-BNP: No results for input(s): PROBNP in the last 72 hours. A1C: No results for input(s): LABA1C in the last 72 hours. TSH: Invalid input(s): LABTSH  ABG: No results for input(s): PHART, EUF8GHN, PO2ART, XQB2NPV, BEART, HGBAE, Y4FDVLTN, CARBOXHGBART in the last 72 hours.     Urinalysis:   Lab Results   Component Value Date/Time    NITRU Negative 08/27/2022 09:57 PM    WBCUA 1-3 08/27/2022 09:57 PM    BACTERIA NONE SEEN 08/27/2022 09:57 PM RBCUA 0-1 08/27/2022 09:57 PM    BLOODU TRACE-INTACT 08/27/2022 09:57 PM    SPECGRAV 1.010 08/27/2022 09:57 PM    GLUCOSEU Negative 08/27/2022 09:57 PM       IMAGING:  CTA PULMONARY W CONTRAST  Result Date: 8/30/2022  1. No pulmonary embolism. 2. Left lower lobe pneumonia. 3. Left nephrolithiasis. RECOMMENDATIONS: Unavailable         ASESSMENTS & PLANS:    LLL CAP:  Outpatient ABx failure:  Left Sided Flank Pain:  Bilateral Neophrolitiasis:  Suspected Pyelonephritis: UA still pending collection  Admit to medical barr  Rocephin 2g IV Q24h  Azithromycin 500mg IV now and then from tomorrow evening Q24 250mg doses x 4  CBC with Diff daily  BMP with Mag reflex daily    Chronic medical problems:  Continue home regimen   [START ON 8/31/2022] aspirin EC  81 mg Oral Daily    atorvastatin  10 mg Oral Nightly    [START ON 8/31/2022] dilTIAZem  240 mg Oral Daily    famotidine  20 mg Oral Nightly    [START ON 8/31/2022] fluticasone  2 spray Each Nostril Daily    [START ON 8/31/2022] multivitamin  1 tablet Oral Daily    [START ON 8/31/2022] pantoprazole  40 mg Oral QAM AC     Supoportive and Prophylactic Txx:  DVT Prophylaxis: lovenox SQ  GI (PUD) Ppx: not indicated as such but covered as per home  PT consult for evalutation and Txx as indicated: indicated as per age and admitted status  Cardiac diet  dicyclomine, diclofenac sodium, sodium chloride, promethazine      Care time of >50 minutes  Pt seen/examined and admitted to inpatient status. Inpatient status is used for patients with an expected LOS extending past two midnights due to medical therapy and or critical care needs, otherwise patients are placed to OBServation status. Signed:  Electronically signed by Beverlie Dubin, MD on 8/30/22 at 9:53 PM EDT.

## 2022-08-31 NOTE — PROGRESS NOTES
Pharmacy Note    An order for Zofran has been discontinued for this patient due to the risk of QT prolongation. If an antiemetic is indicated for your patient, please consider use of Tigan or Compazine. Current QTc = 521  Please contact the Pharmacy with any questions.   Tuyet Campbell [facility]  8/31/2022  12:30 AM

## 2022-08-31 NOTE — CARE COORDINATION
Social Work discharge planning     Sw spoke to pt for discharge planning. She lives with her  and son in 1 story. Pt reports being independent. Her PT [facility] today was 24/24. Pt's PCP is Dr Ronda Durán. Pt said she uses [facility] in [facility] for her pharmacy. Discharge needs not identified now. Social Work to follow for support and discharge planning with CM.   Electronically signed by Grace Morales on 8/31/2022 at 11:36 AM

## 2022-08-31 NOTE — PROGRESS NOTES
Pharmacy Note    This patient was ordered azelastine (ASTELIN) 0.1 % nasal spray. Per the [address] [place] [address] Dr, this medication is non-formulary and not stocked by pharmacy for the reason indicated below. The medication can be reordered at discharge.      Medications in which risks outweigh benefits during hospitalization:           -  oral bisphosphonates         -  raloxifene (Evista)        -  SGLT2 inhibitors (ordered in the hospital for an indication other than heart failure or chronic kidney disease)    Medications that lack necessity during an acute hospital stay:        -  nasal antihistamines: azelastine (ASTELIN) 0.1 % nasal spray        -  nasal ipratropium 0.03% and 0.06%        -  nasal miacalcin        -  acyclovir topical cream/ointment orders for herpes labialis (cold sores)    Hortensia Watt [facility]  8/30/2022  9:09 PM

## 2022-09-01 ENCOUNTER — TELEPHONE (OUTPATIENT)
Dept: FAMILY MEDICINE CLINIC | Age: 72
End: 2022-09-01

## 2022-09-01 LAB
ANION GAP SERPL CALCULATED.3IONS-SCNC: 9 MMOL/L (ref 7–16)
BASOPHILS ABSOLUTE: 0.04 E9/L (ref 0–0.2)
BASOPHILS RELATIVE PERCENT: 0.5 % (ref 0–2)
BLOOD CULTURE, ROUTINE: NORMAL
BUN BLDV-MCNC: 10 MG/DL (ref 6–23)
CALCIUM SERPL-MCNC: 9 MG/DL (ref 8.6–10.2)
CHLORIDE BLD-SCNC: 105 MMOL/L (ref 98–107)
CO2: 24 MMOL/L (ref 22–29)
CREAT SERPL-MCNC: 0.6 MG/DL (ref 0.5–1)
EOSINOPHILS ABSOLUTE: 0.25 E9/L (ref 0.05–0.5)
EOSINOPHILS RELATIVE PERCENT: 3 % (ref 0–6)
GFR AFRICAN AMERICAN: >60
GFR NON-AFRICAN AMERICAN: >60 ML/MIN/1.73
GLUCOSE BLD-MCNC: 92 MG/DL (ref 74–99)
HCT VFR BLD CALC: 34.3 % (ref 34–48)
HEMOGLOBIN: 11.8 G/DL (ref 11.5–15.5)
IMMATURE GRANULOCYTES #: 0.06 E9/L
IMMATURE GRANULOCYTES %: 0.7 % (ref 0–5)
LYMPHOCYTES ABSOLUTE: 1.79 E9/L (ref 1.5–4)
LYMPHOCYTES RELATIVE PERCENT: 21.8 % (ref 20–42)
MCH RBC QN AUTO: 30.3 PG (ref 26–35)
MCHC RBC AUTO-ENTMCNC: 34.4 % (ref 32–34.5)
MCV RBC AUTO: 87.9 FL (ref 80–99.9)
MONOCYTES ABSOLUTE: 0.7 E9/L (ref 0.1–0.95)
MONOCYTES RELATIVE PERCENT: 8.5 % (ref 2–12)
NEUTROPHILS ABSOLUTE: 5.38 E9/L (ref 1.8–7.3)
NEUTROPHILS RELATIVE PERCENT: 65.5 % (ref 43–80)
PDW BLD-RTO: 13.3 FL (ref 11.5–15)
PLATELET # BLD: 236 E9/L (ref 130–450)
PMV BLD AUTO: 9.8 FL (ref 7–12)
POTASSIUM REFLEX MAGNESIUM: 3.8 MMOL/L (ref 3.5–5)
RBC # BLD: 3.9 E12/L (ref 3.5–5.5)
SODIUM BLD-SCNC: 138 MMOL/L (ref 132–146)
WBC # BLD: 8.2 E9/L (ref 4.5–11.5)

## 2022-09-01 PROCEDURE — 96376 TX/PRO/DX INJ SAME DRUG ADON: CPT

## 2022-09-01 PROCEDURE — 6370000000 HC RX 637 (ALT 250 FOR IP): Performed by: HOSPITALIST

## 2022-09-01 PROCEDURE — 99232 SBSQ HOSP IP/OBS MODERATE 35: CPT | Performed by: INTERNAL MEDICINE

## 2022-09-01 PROCEDURE — 2580000003 HC RX 258: Performed by: STUDENT IN AN ORGANIZED HEALTH CARE EDUCATION/TRAINING PROGRAM

## 2022-09-01 PROCEDURE — 96375 TX/PRO/DX INJ NEW DRUG ADDON: CPT

## 2022-09-01 PROCEDURE — 6360000002 HC RX W HCPCS: Performed by: NURSE PRACTITIONER

## 2022-09-01 PROCEDURE — 36415 COLL VENOUS BLD VENIPUNCTURE: CPT

## 2022-09-01 PROCEDURE — 2580000003 HC RX 258: Performed by: HOSPITALIST

## 2022-09-01 PROCEDURE — 1200000000 HC SEMI PRIVATE

## 2022-09-01 PROCEDURE — 87449 NOS EACH ORGANISM AG IA: CPT

## 2022-09-01 PROCEDURE — 6370000000 HC RX 637 (ALT 250 FOR IP): Performed by: STUDENT IN AN ORGANIZED HEALTH CARE EDUCATION/TRAINING PROGRAM

## 2022-09-01 PROCEDURE — 6360000002 HC RX W HCPCS: Performed by: STUDENT IN AN ORGANIZED HEALTH CARE EDUCATION/TRAINING PROGRAM

## 2022-09-01 PROCEDURE — G0378 HOSPITAL OBSERVATION PER HR: HCPCS

## 2022-09-01 PROCEDURE — 6370000000 HC RX 637 (ALT 250 FOR IP): Performed by: NURSE PRACTITIONER

## 2022-09-01 PROCEDURE — 85025 COMPLETE CBC W/AUTO DIFF WBC: CPT

## 2022-09-01 PROCEDURE — 80048 BASIC METABOLIC PNL TOTAL CA: CPT

## 2022-09-01 RX ORDER — METRONIDAZOLE 500 MG/1
500 TABLET ORAL EVERY 8 HOURS SCHEDULED
Status: DISCONTINUED | OUTPATIENT
Start: 2022-09-01 | End: 2022-09-02 | Stop reason: HOSPADM

## 2022-09-01 RX ORDER — KETOROLAC TROMETHAMINE 30 MG/ML
30 INJECTION, SOLUTION INTRAMUSCULAR; INTRAVENOUS ONCE
Status: COMPLETED | OUTPATIENT
Start: 2022-09-01 | End: 2022-09-01

## 2022-09-01 RX ADMIN — METRONIDAZOLE 500 MG: 500 TABLET ORAL at 13:29

## 2022-09-01 RX ADMIN — METRONIDAZOLE 500 MG: 500 TABLET ORAL at 21:45

## 2022-09-01 RX ADMIN — GUAIFENESIN AND DEXTROMETHORPHAN 10 ML: 100; 10 SYRUP ORAL at 08:29

## 2022-09-01 RX ADMIN — ACETAMINOPHEN 650 MG: 325 TABLET ORAL at 17:52

## 2022-09-01 RX ADMIN — DILTIAZEM HYDROCHLORIDE 240 MG: 240 CAPSULE, COATED, EXTENDED RELEASE ORAL at 08:26

## 2022-09-01 RX ADMIN — MULTIVITAMIN TABLET 1 TABLET: TABLET at 08:26

## 2022-09-01 RX ADMIN — GUAIFENESIN AND DEXTROMETHORPHAN 10 ML: 100; 10 SYRUP ORAL at 17:52

## 2022-09-01 RX ADMIN — KETOROLAC TROMETHAMINE 30 MG: 30 INJECTION, SOLUTION INTRAMUSCULAR at 21:42

## 2022-09-01 RX ADMIN — WATER 1000 MG: 1 INJECTION INTRAMUSCULAR; INTRAVENOUS; SUBCUTANEOUS at 21:41

## 2022-09-01 RX ADMIN — SODIUM CHLORIDE, PRESERVATIVE FREE 10 ML: 5 INJECTION INTRAVENOUS at 08:26

## 2022-09-01 RX ADMIN — Medication 4.5 MG: at 22:08

## 2022-09-01 RX ADMIN — SODIUM CHLORIDE, PRESERVATIVE FREE 10 ML: 5 INJECTION INTRAVENOUS at 21:43

## 2022-09-01 RX ADMIN — FAMOTIDINE 20 MG: 20 TABLET ORAL at 21:41

## 2022-09-01 RX ADMIN — ATORVASTATIN CALCIUM 10 MG: 10 TABLET, FILM COATED ORAL at 21:41

## 2022-09-01 RX ADMIN — ASPIRIN 81 MG: 81 TABLET, COATED ORAL at 08:26

## 2022-09-01 ASSESSMENT — PAIN - FUNCTIONAL ASSESSMENT: PAIN_FUNCTIONAL_ASSESSMENT: ACTIVITIES ARE NOT PREVENTED

## 2022-09-01 ASSESSMENT — PAIN DESCRIPTION - DESCRIPTORS
DESCRIPTORS: ACHING;DISCOMFORT
DESCRIPTORS: SHARP;SHOOTING

## 2022-09-01 ASSESSMENT — PAIN DESCRIPTION - ORIENTATION
ORIENTATION: LEFT
ORIENTATION: LEFT

## 2022-09-01 ASSESSMENT — PAIN DESCRIPTION - LOCATION
LOCATION: BACK
LOCATION: ABDOMEN

## 2022-09-01 ASSESSMENT — PAIN SCALES - GENERAL
PAINLEVEL_OUTOF10: 6
PAINLEVEL_OUTOF10: 4

## 2022-09-01 NOTE — PROGRESS NOTES
[facility] Progress Note    --------------------------------------------------------------------------------------  Assessment  LLL pneumonia, suspect aspiration, without sepsis  PMH HTN, HPL, mitral regurg, hypothyroid    Plan  Afebrile, on room air, no leukocytosis, procal neg at 0.1 but despite all this CT w LLL pna - ID consulted today to help determine need for abx  Per ID - cont Rocephin, add PO Flagyl for likely aspiration  SLP to see  Continue supportive care / satting fine on RA  Please see orders for further plan of care    Code status  [facility]  DVT prophylaxis Lovenox  Disposition  Likely home when ready  --------------------------------------------------------------------------------------    Admission Date  8/30/2022  2:05 PM  Chief Complaint Pneumonia     Subjective  History of Present Illness  72F PMH HTN, HPL, mitral regurg, hypothyroid admitted 8/30 w cough presumably due to LLL CAP. Seen in ED 8/27 for same, s/p doses of Rocephin and doxy then sent home w [address] Had c/o L flank pain as well though etiology was unclear. Saw PCP 8/30, still w fevers, chills, cough, but no SOB; L flank pain had also recurred. Pt presented to ED where she was afebrile, satting high 90s on room air, without leukocytosis, though w CTA showing new LLL pna.       Breathing easy on room air  ID in seeing pt at my request this AM  No new issues identified today  Case was discussed with nursing    Review of Systems - 12-point review of systems has been reviewed and is otherwise negative except as listed in the HPI    Objective  Physical Exam  Vitals: BP (!) 120/56   Pulse 91   Temp 97.9 °F (36.6 °C) (Oral)   Resp 18   Ht 4' 11.5\" (1.511 m)   Wt 145 lb (65.8 kg)   SpO2 93%   BMI 28.80 kg/m²   General: well-developed, well-nourished, no acute distress, cooperative  Skin: generally warm, dry, and intact, with normal color  HEENT: normocephalic, atraumatic, no gross abnormalities  Respiratory: clear to auscultation bilaterally without respiratory distress  Cardiovascular: regular rate and rhythm without murmur / rub / gallop  Abdominal: soft, nontender, nondistended, normoactive bowel sounds  Extremities: no obvious edema or deformity  Neurologic: awake, alert, no gross deficits  Psychiatric: normal affect, cooperative    Electronically signed by Corey Ardon DO on 9/1/2022 at 2:00 PM

## 2022-09-01 NOTE — CONSULTS
[address] [place] [address] [facility]    Consultation Note     Admit Date: 8/30/2022  2:05 PM    Reason for Consult:   request eval for need for abx. In with pneumonia and Pyelo but work up not impressive    Attending Physician:  Luke Breaux DO     Chief Complaint: left sided back pain    HISTORY OF PRESENT ILLNESS:   The patient is a 74 y.o.  female not known to the Infectious Diseases service. The patient was hiking on 8/27 started having fever and fatigue,. She had left sided Flank pain and dry cough for few months. She had covid early august but she mostly had upper resp symptoms. She presented to the ER on 8/27was diagnosed with pneumonia. Patient received ceftriaxone and IV doxycycline in the ER that day and was discharged on Omnicef and doxycycline. She came back to ER on 8/30 with not feeling any better. she had hx of aspiration events few months ago before she developed symptoms. She did not have any fevers or abdominal pain. She has on and off diarrhea with hx of colitis. No leg swelling or skin rash. Her  also had covid at the same time but no other sick contacts. Since admission she is febrile to 101.4, hemodynamically stable saturating well on room air. Labs showed normal CBC, renal function test, LFTs showed mildly elevated AST. Procalcitonin is 0.12. And pelvis on 8/27 showed right-sided nonobstructing nephrolithiasis. RVP was negative blood culture from 8/27 is negative. Blood cultures from 8/30 is negative so far UA looks clean urine culture is in process. Legionella and strep pneumo antigens were negative. CT chest on 8/30 showed left lower lobe pneumonia. Patient is on ceftriaxone and IV azithromycin and I got consult further recommendations.     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     Social History:   Social History     Socioeconomic History    Marital status:      Spouse name: Mr. Jessica Iglesias    Number of children: 1    Years of education: None    Highest education level: None   Occupational History    Occupation: worked at [employer] for 30 years     Comment: retired    Occupation: worked at[employer] for 25 years     Comment: retired    Occupation: [employer]     Comment: full time   Tobacco Use    Smoking status: Former     Packs/day: 0.50     Years: 4.00     Pack years: 2.00     Types: Cigarettes     Quit date: 1969     Years since quittin.7    Smokeless tobacco: Never    Tobacco comments:     12 PPD for 4 years while in college   Vaping Use    Vaping Use: Never used   Substance and Sexual Activity    Alcohol use: Yes     Comment: rare glass of wine    Drug use: Never   Social History Narrative    CODE STATUS: DNR    HEALTH CARE PROXY: her , Mr. Jessica Iglesias, [phone] & [phone]    AMBULATES: independently normally    DOMICILED: has no stairs in the home, lives with her  and son, has 3 cats        Denies caffeine     Social Determinants of Health     Financial Resource Strain: Low Risk     Difficulty of Paying Living Expenses: Not hard at all   Food Insecurity: No Food Insecurity    Worried About Running Out of Food in the Last Year: Never true    [address] in the Last Year: Never true       Family History:       Problem Relation Age of Onset    Lymphoma Mother     Heart Attack Father     Other Father         bowel disorder    Colon Cancer Brother     Diabetes Brother Heart Disease Brother         evy smoker, MD    Other Brother         stomach problems    Asthma Son     Cancer Other     Diabetes Other    . Otherwise non-pertinent to the chief complaint. REVIEW OF SYSTEMS:    As mentioned in HPI, all other systems negative. PHYSICAL EXAM:    Vitals:    BP (!) 120/56   Pulse 91   Temp 97.9 °F (36.6 °C) (Oral)   Resp 18   Ht 4' 11.5\" (1.511 m)   Wt 145 lb (65.8 kg)   SpO2 93%   BMI 28.80 kg/m²   Constitutional: The patient is awake, alert, and oriented. Skin: Warm and dry. No rashes were noted. No jaundice. HEENT: Eyes show round, and reactive pupils. Moist mucous membranes, no ulcerations, no thrush. Neck: Supple to movements. No lymphadenopathy. Chest: left LL crackles with tenderness at the same place. Cardiovascular: S1 and S2 are rhythmic and regular. No murmurs appreciated. Abdomen: soft, nontender  Extremities: No clubbing, no cyanosis, no edema.   Musculoskeletal: no gross focal abnormalities  Neurological: alert, oriented x 3  Lines: peripheral      CBC+dif:  Recent Labs     08/30/22  1506 09/01/22  0134   WBC 10.4 8.2   HGB 13.3 11.8   HCT 39.3 34.3   MCV 89.1 87.9    236   NEUTROABS 7.45* 5.38     Lab Results   Component Value Date    CRP 4.5 (H) 03/01/2021     No results found for: [facility]  Lab Results   Component Value Date    SEDRATE 17 03/01/2021     Lab Results   Component Value Date    ALT 14 08/30/2022    AST 32 (H) 08/30/2022    ALKPHOS 93 08/30/2022    BILITOT 0.7 08/30/2022     Lab Results   Component Value Date/Time     09/01/2022 01:34 AM    K 3.8 09/01/2022 01:34 AM     09/01/2022 01:34 AM    CO2 24 09/01/2022 01:34 AM    BUN 10 09/01/2022 01:34 AM    CREATININE 0.6 09/01/2022 01:34 AM    GFRAA >60 09/01/2022 01:34 AM    LABGLOM >60 09/01/2022 01:34 AM    GLUCOSE 92 09/01/2022 01:34 AM    GLUCOSE 83 02/16/2011 11:18 AM    PROT 7.2 08/30/2022 03:06 PM    LABALBU 3.8 08/30/2022 03:06 PM    LABALBU 4.1 02/16/2011 11:18 AM    CALCIUM 9.0 09/01/2022 01:34 AM    BILITOT 0.7 08/30/2022 03:06 PM    ALKPHOS 93 08/30/2022 03:06 PM    AST 32 08/30/2022 03:06 PM    ALT 14 08/30/2022 03:06 PM       Lab Results   Component Value Date/Time    PROTIME 12.2 02/28/2021 04:20 PM    INR 1.1 02/28/2021 04:20 PM       Lab Results   Component Value Date/Time    TSH 2.670 06/28/2022 09:41 AM       Lab Results   Component Value Date/Time    NITRITE negative 04/04/2022 03:19 PM    COLORU Yellow 08/30/2022 10:58 PM    PHUR 7.5 08/30/2022 10:58 PM    WBCUA NONE 08/30/2022 10:58 PM    RBCUA >20 08/30/2022 10:58 PM    BACTERIA RARE 08/30/2022 10:58 PM    CLARITYU Clear 08/30/2022 10:58 PM    SPECGRAV 1.010 08/30/2022 10:58 PM    LEUKOCYTESUR Negative 08/30/2022 10:58 PM    UROBILINOGEN 0.2 08/30/2022 10:58 PM    BILIRUBINUR Negative 08/30/2022 10:58 PM    BILIRUBINUR negative 04/04/2022 03:19 PM    BLOODU LARGE 08/30/2022 10:58 PM    GLUCOSEU Negative 08/30/2022 10:58 PM       No results found for: VLJ1KGY, BEART, K7QPEZRE, PHART, THGBART, UGH8PLO, PO2ART, THT2DQC  Radiology:  CTA PULMONARY W CONTRAST   Final Result   1. No pulmonary embolism. 2. Left lower lobe pneumonia. 3. Left nephrolithiasis. RECOMMENDATIONS:   Unavailable             Microbiology:  Pending  Recent Labs     08/30/22  1506   BC 24 Hours no growth     No results for input(s): ORG in the last 72 hours. Recent Labs     08/30/22  1506   BLOODCULT2 24 Hours no growth     Recent Labs     08/30/22  2258   STREPNEUMAGU Presumptive negative- suggests no current or recent  pneumococcal infection. Infection due to Strep pneumoniae cannot be  ruled out since the antigen present in the sample  may be below the detection limit of the test.  Normal Range:Presumptive Negative       No results for input(s): LP1UAG in the last 72 hours. No results for input(s): ASO in the last 72 hours. No results for input(s): CULTRESP in the last 72 hours.     Assessment:  Left LL aspiration pneumonia: left sided back pain is from pneumonia itself. She doesnot have any signs of UTI. Plan:    Cont ceftriaxone reduced dose of 1gr IV daily, added Po flagyl for anaerobic coverage since she did not improve with strep coverage alone. Stop azithromycin   Legionella urine ag  Will follow with you    Thank you for having us see this patient in consultation. Discussed with primary.       Electronically signed by Jonathon Carpenter MD on 9/1/2022 at 11:53 AM

## 2022-09-01 NOTE — TELEPHONE ENCOUNTER
If patient wishes to maximize her protection against pneumonia in the future, this would be reasonable to receive Prevnar 20. She can schedule for nurse visit to receive this if she wishes. Thanks.

## 2022-09-01 NOTE — PLAN OF CARE
Problem: Skin/Tissue Integrity  Goal: Absence of new skin breakdown  Description: 1. Monitor for areas of redness and/or skin breakdown  2. Assess vascular access sites hourly  3. Every 4-6 hours minimum:  Change oxygen saturation probe site  4. Every 4-6 hours:  If on nasal continuous positive airway pressure, respiratory therapy assess nares and determine need for appliance change or resting period.   Outcome: Progressing     Problem: Discharge Planning  Goal: Discharge to home or other facility with appropriate resources  Outcome: Progressing

## 2022-09-01 NOTE — PATIENT CARE CONFERENCE
P Quality Flow/Interdisciplinary Rounds Progress Note        Quality Flow Rounds held on September 1, 2022    Disciplines Attending:      Haylee Cobian was admitted on 8/30/2022  2:05 PM    Anticipated Discharge Date:       Disposition:    Julián Score:  Julián Scale Score: 20    Readmission Risk              Risk of Unplanned Readmission:  11           Discussed patient goal for the day, patient clinical progression, and barriers to discharge.   The following Goal(s) of the Day/Commitment(s) have been identified:  Diagnostics - Report Results      Zamzam Dee RN  September 1, 2022

## 2022-09-01 NOTE — TELEPHONE ENCOUNTER
Pt called in would like to know if there is a second pneumonia shot that pt should get.  Please advise

## 2022-09-02 VITALS
HEIGHT: 60 IN | DIASTOLIC BLOOD PRESSURE: 75 MMHG | WEIGHT: 155.5 LBS | OXYGEN SATURATION: 95 % | SYSTOLIC BLOOD PRESSURE: 143 MMHG | HEART RATE: 85 BPM | RESPIRATION RATE: 18 BRPM | BODY MASS INDEX: 30.53 KG/M2 | TEMPERATURE: 98.5 F

## 2022-09-02 LAB
ANION GAP SERPL CALCULATED.3IONS-SCNC: 11 MMOL/L (ref 7–16)
BASOPHILS ABSOLUTE: 0.04 E9/L (ref 0–0.2)
BASOPHILS RELATIVE PERCENT: 0.6 % (ref 0–2)
BUN BLDV-MCNC: 9 MG/DL (ref 6–23)
CALCIUM SERPL-MCNC: 9.3 MG/DL (ref 8.6–10.2)
CHLORIDE BLD-SCNC: 104 MMOL/L (ref 98–107)
CO2: 25 MMOL/L (ref 22–29)
CREAT SERPL-MCNC: 0.6 MG/DL (ref 0.5–1)
EOSINOPHILS ABSOLUTE: 0.3 E9/L (ref 0.05–0.5)
EOSINOPHILS RELATIVE PERCENT: 4.4 % (ref 0–6)
GFR AFRICAN AMERICAN: >60
GFR NON-AFRICAN AMERICAN: >60 ML/MIN/1.73
GLUCOSE BLD-MCNC: 93 MG/DL (ref 74–99)
HCT VFR BLD CALC: 37.8 % (ref 34–48)
HEMOGLOBIN: 12.9 G/DL (ref 11.5–15.5)
IMMATURE GRANULOCYTES #: 0.1 E9/L
IMMATURE GRANULOCYTES %: 1.5 % (ref 0–5)
L. PNEUMOPHILA SEROGP 1 UR AG: NORMAL
LYMPHOCYTES ABSOLUTE: 2.1 E9/L (ref 1.5–4)
LYMPHOCYTES RELATIVE PERCENT: 30.7 % (ref 20–42)
MAGNESIUM: 2 MG/DL (ref 1.6–2.6)
MCH RBC QN AUTO: 30.4 PG (ref 26–35)
MCHC RBC AUTO-ENTMCNC: 34.1 % (ref 32–34.5)
MCV RBC AUTO: 88.9 FL (ref 80–99.9)
MONOCYTES ABSOLUTE: 0.67 E9/L (ref 0.1–0.95)
MONOCYTES RELATIVE PERCENT: 9.8 % (ref 2–12)
NEUTROPHILS ABSOLUTE: 3.64 E9/L (ref 1.8–7.3)
NEUTROPHILS RELATIVE PERCENT: 53 % (ref 43–80)
PDW BLD-RTO: 13.4 FL (ref 11.5–15)
PLATELET # BLD: 273 E9/L (ref 130–450)
PMV BLD AUTO: 9.6 FL (ref 7–12)
POTASSIUM REFLEX MAGNESIUM: 3.5 MMOL/L (ref 3.5–5)
RBC # BLD: 4.25 E12/L (ref 3.5–5.5)
SODIUM BLD-SCNC: 140 MMOL/L (ref 132–146)
URINE CULTURE, ROUTINE: NORMAL
WBC # BLD: 6.9 E9/L (ref 4.5–11.5)

## 2022-09-02 PROCEDURE — 85025 COMPLETE CBC W/AUTO DIFF WBC: CPT

## 2022-09-02 PROCEDURE — G0378 HOSPITAL OBSERVATION PER HR: HCPCS

## 2022-09-02 PROCEDURE — 36415 COLL VENOUS BLD VENIPUNCTURE: CPT

## 2022-09-02 PROCEDURE — 92610 EVALUATE SWALLOWING FUNCTION: CPT

## 2022-09-02 PROCEDURE — 80048 BASIC METABOLIC PNL TOTAL CA: CPT

## 2022-09-02 PROCEDURE — 6360000002 HC RX W HCPCS: Performed by: HOSPITALIST

## 2022-09-02 PROCEDURE — 2580000003 HC RX 258: Performed by: HOSPITALIST

## 2022-09-02 PROCEDURE — 83735 ASSAY OF MAGNESIUM: CPT

## 2022-09-02 PROCEDURE — 6370000000 HC RX 637 (ALT 250 FOR IP): Performed by: STUDENT IN AN ORGANIZED HEALTH CARE EDUCATION/TRAINING PROGRAM

## 2022-09-02 PROCEDURE — 6370000000 HC RX 637 (ALT 250 FOR IP): Performed by: HOSPITALIST

## 2022-09-02 PROCEDURE — 92526 ORAL FUNCTION THERAPY: CPT

## 2022-09-02 PROCEDURE — 99232 SBSQ HOSP IP/OBS MODERATE 35: CPT | Performed by: INTERNAL MEDICINE

## 2022-09-02 PROCEDURE — 96372 THER/PROPH/DIAG INJ SC/IM: CPT

## 2022-09-02 RX ORDER — METRONIDAZOLE 500 MG/1
500 TABLET ORAL EVERY 8 HOURS SCHEDULED
Qty: 27 TABLET | Refills: 0 | Status: SHIPPED | OUTPATIENT
Start: 2022-09-02 | End: 2022-09-11

## 2022-09-02 RX ORDER — CEFDINIR 300 MG/1
300 CAPSULE ORAL 2 TIMES DAILY
Qty: 8 CAPSULE | Refills: 0 | Status: SHIPPED | OUTPATIENT
Start: 2022-09-02 | End: 2022-09-06

## 2022-09-02 RX ADMIN — METRONIDAZOLE 500 MG: 500 TABLET ORAL at 13:39

## 2022-09-02 RX ADMIN — PANTOPRAZOLE SODIUM 40 MG: 40 TABLET, DELAYED RELEASE ORAL at 05:34

## 2022-09-02 RX ADMIN — ACETAMINOPHEN 650 MG: 325 TABLET ORAL at 04:07

## 2022-09-02 RX ADMIN — SODIUM CHLORIDE, PRESERVATIVE FREE 10 ML: 5 INJECTION INTRAVENOUS at 09:03

## 2022-09-02 RX ADMIN — ENOXAPARIN SODIUM 40 MG: 100 INJECTION SUBCUTANEOUS at 09:03

## 2022-09-02 RX ADMIN — MULTIVITAMIN TABLET 1 TABLET: TABLET at 09:02

## 2022-09-02 RX ADMIN — DILTIAZEM HYDROCHLORIDE 240 MG: 240 CAPSULE, COATED, EXTENDED RELEASE ORAL at 09:03

## 2022-09-02 RX ADMIN — METRONIDAZOLE 500 MG: 500 TABLET ORAL at 05:35

## 2022-09-02 RX ADMIN — ASPIRIN 81 MG: 81 TABLET, COATED ORAL at 09:03

## 2022-09-02 ASSESSMENT — PAIN SCALES - GENERAL
PAINLEVEL_OUTOF10: 0
PAINLEVEL_OUTOF10: 5

## 2022-09-02 ASSESSMENT — PAIN DESCRIPTION - ORIENTATION: ORIENTATION: LEFT

## 2022-09-02 ASSESSMENT — PAIN - FUNCTIONAL ASSESSMENT: PAIN_FUNCTIONAL_ASSESSMENT: PREVENTS OR INTERFERES SOME ACTIVE ACTIVITIES AND ADLS

## 2022-09-02 ASSESSMENT — PAIN DESCRIPTION - DESCRIPTORS: DESCRIPTORS: ACHING

## 2022-09-02 ASSESSMENT — PAIN DESCRIPTION - LOCATION: LOCATION: BACK

## 2022-09-02 NOTE — PROGRESS NOTES
[facility] Progress Note    --------------------------------------------------------------------------------------  Assessment  LLL pneumonia, suspect aspiration, without sepsis  PMH HTN, HPL, mitral regurg, hypothyroid    Plan  Cont IV Rocephin and PO Flagyl for likely aspiration per ID  SLP to see - no overt evidence of aspiration but pt has chronic cough and talks w mouth full - would need MBSS to answer the question of aspiration   Continue supportive care / satting fine on RA  Please see orders for further plan of care    Code status  [facility]  DVT prophylaxis Lovenox  Disposition  Likely home when ready  --------------------------------------------------------------------------------------    Admission Date  8/30/2022  2:05 PM  Chief Complaint Pneumonia     Subjective  History of Present Illness  72F PMH HTN, HPL, mitral regurg, hypothyroid admitted 8/30 w cough presumably due to LLL CAP. Seen in ED 8/27 for same, s/p doses of Rocephin and doxy then sent home w [address] Had c/o L flank pain as well though etiology was unclear. Saw PCP 8/30, still w fevers, chills, cough, but no SOB; L flank pain had also recurred. Pt presented to ED where she was afebrile, satting high 90s on room air, without leukocytosis, though w CTA showing new LLL pna.       Breathing easy on room air  ID in seeing pt at my request this AM  No new issues identified today  Case was discussed with nursing    Review of Systems - 12-point review of systems has been reviewed and is otherwise negative except as listed in the HPI    Objective  Physical Exam  Vitals: BP (!) 143/75   Pulse 85   Temp 98.5 °F (36.9 °C) (Oral)   Resp 18   Ht 4' 11.5\" (1.511 m)   Wt 155 lb 8 oz (70.5 kg)   SpO2 95%   BMI 30.88 kg/m²   General: well-developed, well-nourished, no acute distress, cooperative  Skin: generally warm, dry, and intact, with normal color  HEENT: normocephalic, atraumatic, no gross abnormalities  Respiratory: clear to auscultation bilaterally without respiratory distress  Cardiovascular: regular rate and rhythm without murmur / rub / gallop  Abdominal: soft, nontender, nondistended, normoactive bowel sounds  Extremities: no obvious edema or deformity  Neurologic: awake, alert, no gross deficits  Psychiatric: normal affect, cooperative    Electronically signed by Romeo Levy DO on 9/2/2022 at 12:42 PM

## 2022-09-02 NOTE — PATIENT CARE CONFERENCE
P Quality Flow/Interdisciplinary Rounds Progress Note        Quality Flow Rounds held on September 2, 2022    Disciplines Attending:  Bedside Nurse, , , and Nursing Unit Leadership    Sarah Weiner was admitted on 8/30/2022  2:05 PM    Anticipated Discharge Date:       Disposition:    Julián Score:  Julián Scale Score: 22    Readmission Risk              Risk of Unplanned Readmission:  11           Discussed patient goal for the day, patient clinical progression, and barriers to discharge.   The following Goal(s) of the Day/Commitment(s) have been identified:  Labs - Report Results      Lasha Joaquin RN  September 2, 2022

## 2022-09-02 NOTE — DISCHARGE SUMMARY
[facility] Physician Discharge Summary     Nelly Lincoln MD  [address] [place] [address]  [address] [facility] [address]  [phone]    Follow up  As needed    Activity level   As tolerated    Disposition   Home      Condition on discharge Stable    Patient ID   Scar Lindsey, 74 y. o.female /  1950  / MRN [id number]    Admit date   2022    Discharge date  2022  2:56 PM    Admission diagnoses Principal Problem:    CAP (community acquired pneumonia) due to Chlamydia species  Active Problems:    Therapy failure due to antibiotic resistance    Pyelonephritis of left kidney  Resolved Problems:    * No resolved hospital problems. *    Discharge diagnoses Same    Consults   IP CONSULT TO INFECTIOUS DISEASES    Procedures   See hospital course    Hospital Course  72F PMH HTN, HPL, mitral regurg, hypothyroid admitted  w cough presumably due to LLL CAP. Seen in ED  for same, s/p doses of Rocephin and doxy then sent home w [facility]. Had c/o L flank pain as well though etiology was unclear. Saw PCP , still w fevers, chills, cough, but no SOB; L flank pain had also recurred. Pt presented to ED where she was afebrile, satting high 90s on room air, without leukocytosis, though w CTA showing new LLL pna. There was some concern for pyelo initially though do not feel pt actually has pyelo / UTI. Initially started Rocephin and azithromycin, ID consulted for abx recommendations (stopped azt, started Flagyl over concern for aspiration). SLP evaluation has concluded that pt likely not aspirating but her baseline chronic cough makes eval difficult. She has continued to improve and has been switched to [facility] and Flagyl and is stable for dc home at this time.      Discharge Exam  Vitals: BP (!) 143/75   Pulse 85   Temp 98.5 °F (36.9 °C) (Oral)   Resp 18   Ht 4' 11.5\" (1.511 m)   Wt 155 lb 8 oz (70.5 kg)   SpO2 95%   BMI 30.88 kg/m²   General: well-developed, well-nourished, no acute distress, cooperative  Skin: generally warm, dry, and intact, with normal color  HEENT: normocephalic, atraumatic, no gross abnormalities  Respiratory: clear to auscultation bilaterally without respiratory distress  Cardiovascular: regular rate and rhythm without murmur / rub / gallop  Abdominal: soft, nontender, nondistended, normoactive bowel sounds  Extremities: no obvious edema or deformity  Neurologic: awake, alert, no gross deficits  Psychiatric: normal affect, cooperative    I/O last 3 completed shifts: In: 900 [P.O.:900]  Out: -   No intake/output data recorded. Labs  Recent Labs     08/30/22  1506 09/01/22  0134 09/02/22  0427    138 140   K 3.3* 3.8 3.5   CL 97* 105 104   CO2 27 24 25   BUN 13 10 9   CREATININE 0.7 0.6 0.6   GLUCOSE 105* 92 93   CALCIUM 9.4 9.0 9.3   WBC 10.4 8.2 6.9   RBC 4.41 3.90 4.25   HGB 13.3 11.8 12.9   HCT 39.3 34.3 37.8   MCV 89.1 87.9 88.9   MCH 30.2 30.3 30.4   MCHC 33.8 34.4 34.1   RDW 13.2 13.3 13.4    236 273   MPV 9.6 9.8 9.6       Imaging  CT ABDOMEN PELVIS W IV CONTRAST Additional Contrast? None    Result Date: 8/28/2022  Consolidation/pneumonia in the left lower lobe. Nonobstructing renal calculus in the right lower pole. Hiatal hernia. XR CHEST PORTABLE    Result Date: 8/27/2022  No acute process. CTA PULMONARY W CONTRAST    Result Date: 8/30/2022  1. No pulmonary embolism. 2. Left lower lobe pneumonia. 3. Left nephrolithiasis.  RECOMMENDATIONS: Unavailable     Patient Instructions     Medication List        START taking these medications      metroNIDAZOLE 500 MG tablet  Commonly known as: FLAGYL  Take 1 tablet by mouth every 8 hours for 9 days            CHANGE how you take these medications      diclofenac sodium 1 % Gel  Commonly known as: VOLTAREN  Apply topically 2 times daily  What changed:   when to take this  reasons to take this            CONTINUE taking these medications      albuterol sulfate  (90 Base) MCG/ACT inhaler  Commonly known as: PROVENTIL;VENTOLIN;PROAIR  Inhale 2 puffs into the lungs every 4-6 hours as needed for Wheezing or Shortness of Breath     aspirin EC 81 MG EC tablet     atorvastatin 10 MG tablet  Commonly known as: LIPITOR  Take 1 tablet by mouth once daily     azelastine 0.1 % nasal spray  Commonly known as: ASTELIN  1-2 sprays by Nasal route 2 times daily as needed for Rhinitis Use in each nostril as directed     Blood Pressure Kit  1 each by Does not apply route in the morning and at bedtime     cefdinir 300 MG capsule  Commonly known as: OMNICEF  Take 1 capsule by mouth 2 times daily for 4 days     dicyclomine 10 MG capsule  Commonly known as: Bentyl  Take 1 capsule by mouth 3 times daily as needed (diarrhea)     dilTIAZem 240 MG extended release capsule  Commonly known as: CARDIZEM CD  Take 1 capsule by mouth once daily     famotidine 20 MG tablet  Commonly known as: PEPCID     fluticasone 50 MCG/ACT nasal spray  Commonly known as: FLONASE  2 sprays by Each Nostril route daily     ibuprofen 400 MG tablet  Commonly known as: ADVIL;MOTRIN     levocetirizine 5 MG tablet  Commonly known as: XYZAL     NONFORMULARY     NUTRITIONAL SUPPLEMENT PO     omeprazole 40 MG delayed release capsule  Commonly known as: PRILOSEC     promethazine 25 MG tablet  Commonly known as: PHENERGAN     sodium chloride 0.65 % nasal spray  Commonly known as: OCEAN, BABY AYR     * Tylenol PM Extra Strength  MG tablet  Generic drug: diphenhydrAMINE-APAP (sleep)     * TYLENOL PM EXTRA STRENGTH PO           * This list has 2 medication(s) that are the same as other medications prescribed for you. Read the directions carefully, and ask your doctor or other care provider to review them with you.                 STOP taking these medications      doxycycline hyclate 100 MG tablet  Commonly known as: VIBRA-TABS               Where to Get Your Medications        These medications were sent to [address] [facility] [address]  [address]      Phone: [phone]   cefdinir 300 MG capsule  metroNIDAZOLE 500 MG tablet       Note that more than 30 minutes was spent in preparing discharge papers, discussing discharge with patient, medication review, etc.    Electronically signed by Kaushik Mccarty DO on 9/2/2022 at 2:56 PM

## 2022-09-02 NOTE — PROGRESS NOTES
[address] [place] [address] [facility]  JAVIER  Progress Note    SUBJECTIVE:  Chief Complaint   Patient presents with    Pneumonia     Saw pcp today and sent in for pneumonia, symptoms started Saturday / 98% RA     Fever     103.5 at home      Patient is tolerating medications. No nausea, vomiting, diarrhea. Sitting on the couch, listening to her radio  Feeling better, feels like her breathing has improved  Still coughing but not having as much sputum production  Afebrile     Review of systems:  As stated above in the chief complaint, otherwise negative. Medications:  Scheduled Meds:   metroNIDAZOLE  500 mg Oral 3 times per day    cefTRIAXone (ROCEPHIN) IV  1,000 mg IntraVENous Q24H    sodium chloride flush  5-40 mL IntraVENous 2 times per day    enoxaparin  40 mg SubCUTAneous Daily    aspirin EC  81 mg Oral Daily    atorvastatin  10 mg Oral Nightly    dilTIAZem  240 mg Oral Daily    famotidine  20 mg Oral Nightly    fluticasone  2 spray Each Nostril Daily    multivitamin  1 tablet Oral Daily    pantoprazole  40 mg Oral QAM AC     Continuous Infusions:   sodium chloride       PRN Meds:sodium chloride flush, sodium chloride, acetaminophen **OR** acetaminophen, potassium chloride **OR** potassium alternative oral replacement **OR** potassium chloride, magnesium sulfate, polyethylene glycol, melatonin, calcium carbonate, guaiFENesin-dextromethorphan, dicyclomine, diclofenac sodium, sodium chloride, promethazine    OBJECTIVE:  BP (!) 143/75   Pulse 85   Temp 98.5 °F (36.9 °C) (Oral)   Resp 18   Ht 4' 11.5\" (1.511 m)   Wt 155 lb 8 oz (70.5 kg)   SpO2 95%   BMI 30.88 kg/m²   Temp  Av.6 °F (37 °C)  Min: 98.5 °F (36.9 °C)  Max: 98.7 °F (37.1 °C)  Constitutional: The patient is awake, alert, and oriented. Sitting on couch. In no distress. Skin: Warm and dry. No rashes were noted. HEENT: Round and reactive pupils. Moist mucous membranes. No ulcerations or thrush. Neck: Supple to movements. Chest: No use of accessory muscles to breathe. Symmetrical expansion. LLL crackles   Cardiovascular: S1 and S2 are rhythmic and regular. No murmurs appreciated. Abdomen: Positive bowel sounds to auscultation. Benign to palpation. Extremities: No clubbing, no cyanosis, no edema.   Lines: peripheral    Laboratory and Tests Review:  Lab Results   Component Value Date    WBC 6.9 09/02/2022    WBC 8.2 09/01/2022    WBC 10.4 08/30/2022    HGB 12.9 09/02/2022    HCT 37.8 09/02/2022    MCV 88.9 09/02/2022     09/02/2022     Lab Results   Component Value Date    NEUTROABS 3.64 09/02/2022    NEUTROABS 5.38 09/01/2022    NEUTROABS 7.45 (H) 08/30/2022     No results found for: [facility] [[email or web address]]  Lab Results   Component Value Date    ALT 14 08/30/2022    AST 32 (H) 08/30/2022    ALKPHOS 93 08/30/2022    BILITOT 0.7 08/30/2022     Lab Results   Component Value Date/Time     09/02/2022 04:27 AM    K 3.5 09/02/2022 04:27 AM     09/02/2022 04:27 AM    CO2 25 09/02/2022 04:27 AM    BUN 9 09/02/2022 04:27 AM    CREATININE 0.6 09/02/2022 04:27 AM    CREATININE 0.6 09/01/2022 01:34 AM    CREATININE 0.7 08/30/2022 03:06 PM    GFRAA >60 09/02/2022 04:27 AM    LABGLOM >60 09/02/2022 04:27 AM    GLUCOSE 93 09/02/2022 04:27 AM    GLUCOSE 83 02/16/2011 11:18 AM    PROT 7.2 08/30/2022 03:06 PM    LABALBU 3.8 08/30/2022 03:06 PM    LABALBU 4.1 02/16/2011 11:18 AM    CALCIUM 9.3 09/02/2022 04:27 AM    BILITOT 0.7 08/30/2022 03:06 PM    ALKPHOS 93 08/30/2022 03:06 PM    AST 32 08/30/2022 03:06 PM    ALT 14 08/30/2022 03:06 PM     Lab Results   Component Value Date    CRP 4.5 (H) 03/01/2021     Lab Results   Component Value Date    SEDRATE 17 03/01/2021     Radiology:      Microbiology:   Blood Cultures 8/30/22: negative so far  Urine Culture 8/30/22: negative so far   Strep Pneumoniae Urine Ag 8/30/22: negative  Legionella Urine Ag 8/30/22: negative      ASSESSMENT:  Left LL aspiration pneumonia: left sided back pain is from pneumonia itself. She doesnot have any signs of UTI. PLAN:  Continue IV Ceftriaxone 1g daily and oral Flagyl while inpatient  Will discharge on oral Omnicef and Flagyl for a total of 10 days   Check final cultures  Monitor labs  Okay to DC from ID POV, scripts in chart     SALVADOR Zelaya CNP  9:15 AM  9/2/2022     Pt seen and examined. Above discussed agree with advanced practice nurse. Labs, cultures, and radiographs reviewed. Face to Face encounter occurred. Changes made as necessary.      Madiha Bales MD

## 2022-09-02 NOTE — PROGRESS NOTES
SPEECH/LANGUAGE PATHOLOGY  CLINICAL ASSESSMENT OF SWALLOWING FUNCTION   and PLAN OF CARE    PATIENT NAME:  Abhi Darnell  (female)     MRN:  [id number]    :  1950  (89 y.o.)  STATUS:  Inpatient: Room 2165/5565-X    TODAY'S DATE:  2022  REFERRING PROVIDER:   Kenn Proctor DO  SPECIFIC PROVIDER ORDER: SLP eval and treat Date of order:  22  REASON FOR REFERRAL: To assess oropharyngeal swallow fx. EVALUATING THERAPIST: JENNIFER Rivera                 RESULTS:    DYSPHAGIA DIAGNOSIS:   Clinical indicators of functional oral stage, questionable pharyngeal phase dysphagia     Pt presented with baseline dry cough and dry throat clear prior to initiation of PO trials. Therefore, bedside assessment is somewhat limited at this time. Aspiration cannot be ruled out at bedside. If aspiration is suspected based on medical presentation, a Modified Barium Swallow Study (MBSS) is recommended. Nursing informed. DIET RECOMMENDATIONS:  Regular consistency solids (IDDSI level 7) with  thin liquids (IDDSI level 0)     FEEDING RECOMMENDATIONS:     Assistance level:  Supervision is needed during all oral intake      Compensatory strategies recommended: Small bites/sips and Alternate solids and liquids      Discussed recommendations with nursing and/or faxed report to referring provider: Yes-informed pt's charge nurse re: rec for regular/thin, of rec for reminders to swallow strategy use during PO intake, and that if aspiration is suspected, that MBSS is recommended.      SPEECH THERAPY  PLAN OF CARE   The dysphagia POC is established based on physician order, dysphagia diagnosis and results of clinical assessment     Skilled SLP intervention for dysphagia management on acute care 3-5 x per week until goals met, pt plateaus in function and/or discharged from hospital    Conditions Requiring Skilled Therapeutic Intervention for dysphagia:    Patient is performing below functional baseline d/t  current acute Potential/Prognosis: fair                    ADMITTING DIAGNOSIS: CAP (community acquired pneumonia) due to Chlamydia species [J16.0]    VISIT DIAGNOSIS:   Visit Diagnoses         Codes    Pneumonia of left lower lobe due to infectious organism    -  Primary J18.9             PATIENT REPORT/COMPLAINT: Pt reported \"occasional\" swallow difficulty when pt attempts to speak/eat at same time. Pt denied consistent difficulty with any specific food/liquid item. RN cleared patient for participation in assessment     yes; RN reported no swallow issues noted to this time. PRIOR LEVEL OF SWALLOW FUNCTION:    PAST HISTORY OF DYSPHAGIA?: none reported    Home diet: Regular consistency solids (IDDSI level 7) with  thin liquids (IDDSI level 0)  Current Diet Order:  ADULT DIET; Regular; Low Fat/Low Chol/High Fiber/2 gm Na; Low Sodium (2 gm)    PROCEDURE:  Consistencies Administered During the Evaluation   Liquids: thin liquid   Solids:  pureed foods and solid foods      Method of Intake:   cup, straw, spoon  Self fed      Position:   Seated, upright    CLINICAL ASSESSMENT:  Oral Stage:       Pt is able to achieve adequate cohesive bolus prep as evidenced by satisfactory mastication patterns, timely A-P bolus propulsion, and minimal oral residuals. Pharyngeal Stage:    Pt presented with baseline dry cough and dry throat clear prior to initiation of PO trials. Therefore, bedside assessment is somewhat limited at this time. W/ thin liquid: pt tolerated 5/5 sips from cup and 2/2 from straw w/o overt s/s of aspiration noted  W/ puree: pt tolerated 3/3 trials w/o overt s/s of aspiration noted  W/ reg texture:  pt tolerated 3/3 trials w/o overt s/s of aspiration noted    Pt presented with occasional delayed dry throat clear/cough following some (thin liquid, reg solid) PO trials; however, these did not appear directly related to the swallow and pt denied feeling as though food/liquid \"went down wrong pipe. \" Pt reported having sinus drainage as well. Pt presented with what appeared to be heaving toward end of eval; pt reported this to be baseline for pt and caused by GERD. Nursing informed of this and of sinus drainage. Cognition:   Within functional limits for this exam    Oral Peripheral Examination   Adequate lingual/labial strength     Current Respiratory Status    room air     Parameters of Speech Production  Respiration:  Adequate for speech production  Quality:   Strained  Intensity: Within functional limits    Volitional Swallow: present     Volitional Cough:   DNT    Pain: No pain reported. EDUCATION:   The Speech Language Pathologist (SLP) completed education regarding results of evaluation and that intervention is warranted at this time. Learner: Patient  Education: Reviewed results and recommendations of this evaluation, Reviewed diet and strategies, and Reviewed signs, symptoms and risks of aspiration  Evaluation of Education:  Verbalizes understanding and Needs further instruction    This plan may be re-evaluated and revised as warranted. Evaluation Time includes thorough review of current medical information, gathering information on past medical history/social history and prior level of function, completion of standardized testing/informal observation of tasks, assessment of data and education on plan of care and goals. [x]The admitting diagnosis and active problem list, have been reviewed prior to initiation of this evaluation.         ACTIVE PROBLEM LIST:   Patient Active Problem List   Diagnosis    MR (mitral regurgitation)    HTN (hypertension)    Hyperlipidemia    GERD (gastroesophageal reflux disease)    Osteoporosis    Vitamin D deficiency    Enlarged thyroid    Somatic dysfunction of lumbar region    Somatic dysfunction of cervical region    Weakness of both hips    Aden's esophagus    Renal stones    Hiatal hernia    GI bleed    Acute hemorrhagic colitis    Lower GI bleed    CAP (community acquired pneumonia) due to Chlamydia species    Therapy failure due to antibiotic resistance    Pyelonephritis of left kidney         CPT code:  82423  bedside swallow eval    Tx note (05116): SLP educated pt re: rec for regular/thin at this time, of ST POC, of rec for consistent use of swallow strategies including small bites/sips, not speaking while eating, assuring oral clearance prior to taking next bite, etc; fair pt response to such and pt reported need for use of swallow strategies. Materials discarded following session.

## 2022-09-02 NOTE — PLAN OF CARE
Problem: Safety - Adult  Goal: Free from fall injury  Outcome: Progressing     Problem: Skin/Tissue Integrity  Goal: Absence of new skin breakdown  Description: 1. Monitor for areas of redness and/or skin breakdown  2. Assess vascular access sites hourly  3. Every 4-6 hours minimum:  Change oxygen saturation probe site  4. Every 4-6 hours:  If on nasal continuous positive airway pressure, respiratory therapy assess nares and determine need for appliance change or resting period. 9/1/2022 2353 by Ximena Garcia RN  Outcome: Progressing     Problem: Skin/Tissue Integrity  Goal: Absence of new skin breakdown  Description: 1. Monitor for areas of redness and/or skin breakdown  2. Assess vascular access sites hourly  3. Every 4-6 hours minimum:  Change oxygen saturation probe site  4. Every 4-6 hours:  If on nasal continuous positive airway pressure, respiratory therapy assess nares and determine need for appliance change or resting period.   9/1/2022 2353 by Ximena Garcia RN  Outcome: Progressing     Problem: Pain  Goal: Verbalizes/displays adequate comfort level or baseline comfort level  Outcome: Progressing

## 2022-09-04 LAB
BLOOD CULTURE, ROUTINE: NORMAL
CULTURE, BLOOD 2: NORMAL

## 2022-09-20 ENCOUNTER — HOSPITAL ENCOUNTER (OUTPATIENT)
Age: 72
Discharge: HOME | End: 2022-09-22
Payer: MEDICARE

## 2022-09-20 ENCOUNTER — HOSPITAL ENCOUNTER (OUTPATIENT)
Dept: GENERAL RADIOLOGY | Age: 72
Discharge: HOME | End: 2022-09-22
Payer: MEDICARE

## 2022-09-20 DIAGNOSIS — J16.0 CAP (COMMUNITY ACQUIRED PNEUMONIA) DUE TO CHLAMYDIA SPECIES: ICD-10-CM

## 2022-09-20 PROCEDURE — 71046 X-RAY EXAM CHEST 2 VIEWS: CPT

## 2022-09-29 ENCOUNTER — CLINICAL DOCUMENTATION (OUTPATIENT)
Dept: INFECTIOUS DISEASES | Age: 72
End: 2022-09-29

## 2022-09-29 NOTE — PROGRESS NOTES
Patient called office today still complaining of left chest pain. Chest xray reviewed with Dr Nash Beasley showing improvement. No new orders at this time.  If she develops fever and chills would check ct of chest.

## 2022-11-14 DIAGNOSIS — E78.5 HYPERLIPIDEMIA, UNSPECIFIED HYPERLIPIDEMIA TYPE: ICD-10-CM

## 2022-11-15 RX ORDER — ATORVASTATIN CALCIUM 10 MG/1
TABLET, FILM COATED ORAL
Qty: 90 TABLET | Refills: 0 | Status: SHIPPED | OUTPATIENT
Start: 2022-11-15

## 2022-12-04 ENCOUNTER — HOSPITAL ENCOUNTER (EMERGENCY)
Age: 72
Discharge: HOME | End: 2022-12-04
Attending: EMERGENCY MEDICINE
Payer: MEDICARE

## 2022-12-04 ENCOUNTER — APPOINTMENT (OUTPATIENT)
Dept: GENERAL RADIOLOGY | Age: 72
End: 2022-12-04
Payer: MEDICARE

## 2022-12-04 VITALS
HEART RATE: 87 BPM | OXYGEN SATURATION: 99 % | SYSTOLIC BLOOD PRESSURE: 159 MMHG | HEIGHT: 60 IN | TEMPERATURE: 97.4 F | WEIGHT: 144 LBS | RESPIRATION RATE: 18 BRPM | BODY MASS INDEX: 28.27 KG/M2 | DIASTOLIC BLOOD PRESSURE: 96 MMHG

## 2022-12-04 DIAGNOSIS — M25.552 ACUTE HIP PAIN, LEFT: Primary | ICD-10-CM

## 2022-12-04 PROCEDURE — 96372 THER/PROPH/DIAG INJ SC/IM: CPT

## 2022-12-04 PROCEDURE — 6360000002 HC RX W HCPCS: Performed by: EMERGENCY MEDICINE

## 2022-12-04 PROCEDURE — 73502 X-RAY EXAM HIP UNI 2-3 VIEWS: CPT

## 2022-12-04 PROCEDURE — 99284 EMERGENCY DEPT VISIT MOD MDM: CPT

## 2022-12-04 PROCEDURE — 73552 X-RAY EXAM OF FEMUR 2/>: CPT

## 2022-12-04 RX ORDER — CYCLOBENZAPRINE HCL 10 MG
10 TABLET ORAL 3 TIMES DAILY PRN
Qty: 21 TABLET | Refills: 0 | Status: SHIPPED | OUTPATIENT
Start: 2022-12-04 | End: 2022-12-14

## 2022-12-04 RX ORDER — ORPHENADRINE CITRATE 30 MG/ML
60 INJECTION INTRAMUSCULAR; INTRAVENOUS ONCE
Status: COMPLETED | OUTPATIENT
Start: 2022-12-04 | End: 2022-12-04

## 2022-12-04 RX ORDER — KETOROLAC TROMETHAMINE 30 MG/ML
30 INJECTION, SOLUTION INTRAMUSCULAR; INTRAVENOUS ONCE
Status: COMPLETED | OUTPATIENT
Start: 2022-12-04 | End: 2022-12-04

## 2022-12-04 RX ADMIN — KETOROLAC TROMETHAMINE 30 MG: 30 INJECTION, SOLUTION INTRAMUSCULAR at 19:28

## 2022-12-04 RX ADMIN — ORPHENADRINE CITRATE 60 MG: 30 INJECTION INTRAMUSCULAR; INTRAVENOUS at 19:28

## 2022-12-04 ASSESSMENT — PAIN DESCRIPTION - FREQUENCY: FREQUENCY: CONTINUOUS

## 2022-12-04 ASSESSMENT — PAIN DESCRIPTION - DESCRIPTORS: DESCRIPTORS: SHARP;ACHING

## 2022-12-04 ASSESSMENT — PAIN DESCRIPTION - PAIN TYPE: TYPE: ACUTE PAIN

## 2022-12-04 ASSESSMENT — PAIN DESCRIPTION - ORIENTATION: ORIENTATION: LEFT

## 2022-12-04 ASSESSMENT — PAIN SCALES - GENERAL
PAINLEVEL_OUTOF10: 6
PAINLEVEL_OUTOF10: 2

## 2022-12-04 ASSESSMENT — PAIN DESCRIPTION - ONSET: ONSET: SUDDEN

## 2022-12-04 ASSESSMENT — PAIN - FUNCTIONAL ASSESSMENT: PAIN_FUNCTIONAL_ASSESSMENT: 0-10

## 2022-12-04 ASSESSMENT — PAIN DESCRIPTION - LOCATION: LOCATION: BUTTOCKS;LEG

## 2022-12-04 NOTE — ED PROVIDER NOTES
HPI:  12/4/22,   Time: 6:48 PM JAYSON Ott is a 74 y.o. female presenting to the ED for left hip pain, beginning 2 days ago. The complaint has been persistent, mild in severity, and worsened by walking. Patient says that she woke up Friday morning with pain to the left hip. She says that she has been able to walk but the pain is worse when she ambulates. No alleviating factors. Patient says that the pain starts in the left hip and goes down the left femur. She denies any knee pain. She has full range of motion of the hip despite the pain. She has been taking Tylenol with minimal relief. She denies any known trauma or injury. She says that she does do nighttime stretching for her back. She is not sure if she might of pulled a muscle. Denies any back pain. She denies any numbness, tingling, weakness, bowel or bladder incontinence. ROS:   Pertinent positives and negatives are stated within HPI, all other systems reviewed and are negative.  --------------------------------------------- PAST HISTORY ---------------------------------------------  Past Medical History:  has a past medical history of Arthritis, Cancer ([facility] [address] History of kidney stones, HTN (hypertension), Hyperlipidemia, LBBB (left bundle branch block), Mitral regurgitation, PONV (postoperative nausea and vomiting), Tachycardia, Thyroid disease, and Tobacco abuse, in remission. Past Surgical History:  has a past surgical history that includes Diagnostic Cardiac Cath Lab Procedure (11/09/2001); Tonsillectomy; Lithotripsy; Breast biopsy; Skin cancer excision; Upper gastrointestinal endoscopy (06/17/2015); vulvar/perineal biopsy (1985); pr esophageal motility study w/interp&rpt (N/A, 08/17/2018); Colonoscopy; Endoscopy, colon, diagnostic; pr lap, repair paraesophageal hernia, incl fundoplasty w/ mesh (N/A, 10/03/2018); Lithotripsy (Left, 01/30/2020); and Lithotripsy (Left, 06/16/2021).     Social History:  reports that she quit smoking about 53 years ago. Her smoking use included cigarettes. She has a 2.00 pack-year smoking history. She has never used smokeless tobacco. She reports current alcohol use. She reports that she does not use drugs. Family History: family history includes Asthma in her son; Cancer in an other family member; Mehul Mighty in her brother; Diabetes in her brother and another family member; Heart Attack in her father; Heart Disease in her brother; Lymphoma in her mother; Other in her brother and father. The patients home medications have been reviewed. Allergies: Patient has no known allergies. -------------------------------------------------- RESULTS -------------------------------------------------  All laboratory and radiology results have been personally reviewed by myself   LABS:  No results found for this visit on 12/04/22. RADIOLOGY:  Interpreted by Radiologist.  XR HIP 2-3 VW W PELVIS LEFT   Final Result   No acute osseous abnormality. XR FEMUR LEFT (MIN 2 VIEWS)   Final Result   No acute osseous abnormality.             ------------------------- NURSING NOTES AND VITALS REVIEWED ---------------------------   The nursing notes within the ED encounter and vital signs as below have been reviewed. BP (!) 159/96   Pulse 87   Temp 97.4 °F (36.3 °C) (Oral)   Resp 18   Ht 4' 11.5\" (1.511 m)   Wt 144 lb (65.3 kg)   SpO2 99%   BMI 28.60 kg/m²   Oxygen Saturation Interpretation: Normal      ---------------------------------------------------PHYSICAL EXAM--------------------------------------      Constitutional/General: Alert and oriented x3, well appearing, non toxic in NAD  Head: NC/AT  Eyes: PERRL, EOMI  Mouth: Oropharynx clear, handling secretions, no trismus  Neck: Supple, full ROM, no meningeal signs  Pulmonary: Lungs clear to auscultation bilaterally, no wheezes, rales, or rhonchi.  Not in respiratory distress  Cardiovascular:  Regular rate and rhythm, no murmurs, gallops, or rubs. 2+ distal pulses  Abdomen: Soft, non tender, non distended,   Extremities: Moves all extremities x 4. Warm and well perfused. Full range of motion of the left hip. Negative straight leg test. Mild tenderness to palpation to the left hip and left lateral femur. Full range of motion of the left knee. Compartments are soft. Skin: warm and dry without rash  Neurologic: GCS 15,  Psych: Normal Affect      ------------------------------ ED COURSE/MEDICAL DECISION MAKING----------------------  Medications   orphenadrine (NORFLEX) injection 60 mg (60 mg IntraMUSCular Given 12/4/22 1928)   ketorolac (TORADOL) injection 30 mg (30 mg IntraMUSCular Given 12/4/22 1928)         Medical Decision Making:    X-rays obtained. No acute process identified. Patient given Toradol and Norflex in the emergency department. She is able to ambulate. I told her to continue taking the Tylenol and I will give her a prescription for Flexeril. I instructed her to follow-up with her primary care physician and orthopedic surgery this week for reevaluation. I told her to return to the emergency department for any worsening pain. Patient is agreeable with plan of care. Counseling: The emergency provider has spoken with the patient and discussed todays results, in addition to providing specific details for the plan of care and counseling regarding the diagnosis and prognosis. Questions are answered at this time and they are agreeable with the plan.      --------------------------------- IMPRESSION AND DISPOSITION ---------------------------------    IMPRESSION  1.  Acute hip pain, left        DISPOSITION  Disposition: Discharge to home  Patient condition is stable                 Elsa Bermudez MD  12/04/22 1942

## 2023-01-17 ENCOUNTER — TELEPHONE (OUTPATIENT)
Dept: CARDIOLOGY | Age: 73
End: 2023-01-17

## 2023-01-24 ENCOUNTER — TELEPHONE (OUTPATIENT)
Dept: CARDIOLOGY | Age: 73
End: 2023-01-24

## 2023-01-24 NOTE — TELEPHONE ENCOUNTER
Called patient to schedule an Echo. I had to l/m.   Electronically signed by Armand Marin on 1/24/2023 at 2:24 PM

## 2023-02-13 DIAGNOSIS — E78.5 HYPERLIPIDEMIA, UNSPECIFIED HYPERLIPIDEMIA TYPE: ICD-10-CM

## 2023-02-13 RX ORDER — ATORVASTATIN CALCIUM 10 MG/1
TABLET, FILM COATED ORAL
Qty: 90 TABLET | Refills: 0 | Status: SHIPPED | OUTPATIENT
Start: 2023-02-13

## 2023-03-20 ENCOUNTER — HOSPITAL ENCOUNTER (OUTPATIENT)
Dept: CARDIOLOGY | Age: 73
Discharge: HOME | End: 2023-03-20
Payer: MEDICARE

## 2023-03-20 LAB
LV EF: 48 %
LVEF MODALITY: NORMAL

## 2023-03-20 PROCEDURE — 93306 TTE W/DOPPLER COMPLETE: CPT

## 2023-04-07 ENCOUNTER — OFFICE VISIT (OUTPATIENT)
Dept: FAMILY MEDICINE CLINIC | Age: 73
End: 2023-04-07

## 2023-04-07 ENCOUNTER — HOSPITAL ENCOUNTER (OUTPATIENT)
Age: 73
Discharge: HOME | End: 2023-04-07

## 2023-04-07 VITALS
TEMPERATURE: 98.4 F | SYSTOLIC BLOOD PRESSURE: 133 MMHG | WEIGHT: 146 LBS | BODY MASS INDEX: 28.66 KG/M2 | OXYGEN SATURATION: 95 % | HEIGHT: 60 IN | DIASTOLIC BLOOD PRESSURE: 78 MMHG | RESPIRATION RATE: 19 BRPM | HEART RATE: 78 BPM

## 2023-04-07 DIAGNOSIS — I50.20 HFREF (HEART FAILURE WITH REDUCED EJECTION FRACTION) (HCC): ICD-10-CM

## 2023-04-07 DIAGNOSIS — M79.605 LEFT LEG PAIN: ICD-10-CM

## 2023-04-07 DIAGNOSIS — K52.9 COLITIS: ICD-10-CM

## 2023-04-07 DIAGNOSIS — I10 ESSENTIAL HYPERTENSION: Primary | ICD-10-CM

## 2023-04-07 DIAGNOSIS — E78.5 HYPERLIPIDEMIA, UNSPECIFIED HYPERLIPIDEMIA TYPE: ICD-10-CM

## 2023-04-07 DIAGNOSIS — K21.9 GASTROESOPHAGEAL REFLUX DISEASE WITHOUT ESOPHAGITIS: ICD-10-CM

## 2023-04-07 RX ORDER — FAMOTIDINE 20 MG/1
20 TABLET, FILM COATED ORAL DAILY
Qty: 60 TABLET | Status: SHIPPED | COMMUNITY
Start: 2023-04-07 | End: 2023-04-07 | Stop reason: SDUPTHER

## 2023-04-07 RX ORDER — FAMOTIDINE 40 MG/1
TABLET, FILM COATED ORAL
COMMUNITY
Start: 2023-02-06 | End: 2023-04-07

## 2023-04-07 RX ORDER — FAMOTIDINE 20 MG/1
20 TABLET, FILM COATED ORAL DAILY
Qty: 90 TABLET | Refills: 0 | Status: SHIPPED | OUTPATIENT
Start: 2023-04-07

## 2023-04-07 RX ORDER — ATORVASTATIN CALCIUM 10 MG/1
10 TABLET, FILM COATED ORAL DAILY
Qty: 90 TABLET | Refills: 0 | Status: SHIPPED | OUTPATIENT
Start: 2023-04-07

## 2023-04-07 SDOH — ECONOMIC STABILITY: HOUSING INSECURITY
IN THE LAST 12 MONTHS, WAS THERE A TIME WHEN YOU DID NOT HAVE A STEADY PLACE TO SLEEP OR SLEPT IN A SHELTER (INCLUDING NOW)?: NO

## 2023-04-07 SDOH — ECONOMIC STABILITY: INCOME INSECURITY: HOW HARD IS IT FOR YOU TO PAY FOR THE VERY BASICS LIKE FOOD, HOUSING, MEDICAL CARE, AND HEATING?: NOT HARD AT ALL

## 2023-04-07 SDOH — ECONOMIC STABILITY: FOOD INSECURITY: WITHIN THE PAST 12 MONTHS, THE FOOD YOU BOUGHT JUST DIDN'T LAST AND YOU DIDN'T HAVE MONEY TO GET MORE.: NEVER TRUE

## 2023-04-07 SDOH — ECONOMIC STABILITY: FOOD INSECURITY: WITHIN THE PAST 12 MONTHS, YOU WORRIED THAT YOUR FOOD WOULD RUN OUT BEFORE YOU GOT MONEY TO BUY MORE.: NEVER TRUE

## 2023-04-07 ASSESSMENT — PATIENT HEALTH QUESTIONNAIRE - PHQ9
SUM OF ALL RESPONSES TO PHQ QUESTIONS 1-9: 0
1. LITTLE INTEREST OR PLEASURE IN DOING THINGS: 0
SUM OF ALL RESPONSES TO PHQ QUESTIONS 1-9: 0
SUM OF ALL RESPONSES TO PHQ QUESTIONS 1-9: 0
2. FEELING DOWN, DEPRESSED OR HOPELESS: 0
SUM OF ALL RESPONSES TO PHQ QUESTIONS 1-9: 0
SUM OF ALL RESPONSES TO PHQ9 QUESTIONS 1 & 2: 0

## 2023-04-07 NOTE — PROGRESS NOTES
FM Progress Note    Subjective:   HFrEF with MRTaran Sees Dr. Elan Garcia. Echo 3/20/23:   Summary   Technically adequate study. Mildly dilated left atrium. Mildly dilated left ventricle with moderate systolic dysfunction. Moderate-severe centrally directed mitral regurgitation. Mild aortic regurgitation. EF 35+/- 5%. The EF has decreased from 45-50% to 35+/-5 % compared to study done on   6/9/21. Never tried entresto. BP controlled. Only sx is CP, lasts minutes, not related to exertion, nonradiating, no associated sxs. Hikes regularly with  and no sob or cp or any sxs. Next appt with Cards     Colitis. Diarrhea all week. Dr. Helen Aragon sent in Rx per pt, not sure which med. GERD. Worsening acid reflux lately. Omeprazole daily. Pepcid nightly. HTN. Controlled. Cardizem. HLD. Lipitor. LLE pain. Chronic. Hip and leg. Xrays 12/4/22. CBD during day. Melatonin at night to help sleep. Planning trip to [facility]  Partially retired, working 2-3 days per wk. Health Maintenance Due   Topic Date Due    DTaP/Tdap/Td vaccine (1 - Tdap) Never done    Annual Wellness Visit (AWV)  06/03/2021    COVID-19 Vaccine (4 - Booster for Moderna series) 01/14/2022    Lipids  03/16/2022           Objective:   /78   Pulse 78   Temp 98.4 °F (36.9 °C)   Resp 19   Ht 4' 11.5\" (1.511 m)   Wt 146 lb (66.2 kg)   SpO2 95%   BMI 28.99 kg/m²   General appearance: NAD, alert and interacting appropriately  HEENT: NCAT, PERRLA, EOMI   Resp: CTAB, no WRC  CVS: RRR, no MRG  Abdomen: BS +, SNDNT  Extremities: No clubbing, cyanosis, or edema. Warm. Dry. I have reviewed this patient's previous records. I have reviewed this patient's labs. I have reviewed this patient's imaging reports. I have reviewed this patient's medications. Assessment/Plan:    Gladys Iqbal was seen today for hypertension and leg pain.     Diagnoses and all orders for this visit:    Essential

## 2023-04-07 NOTE — PATIENT INSTRUCTIONS
Follow up with Dr. Laurent Hernandez for the worsening heart function. Call Dr. Crystal Negron to get the medicine for the colitis.

## 2023-04-24 ENCOUNTER — TELEPHONE (OUTPATIENT)
Dept: CARDIOLOGY CLINIC | Age: 73
End: 2023-04-24

## 2023-04-24 DIAGNOSIS — I10 HYPERTENSION, UNSPECIFIED TYPE: Primary | ICD-10-CM

## 2023-04-24 DIAGNOSIS — Z01.810 PREPROCEDURAL CARDIOVASCULAR EXAMINATION: ICD-10-CM

## 2023-04-24 DIAGNOSIS — I34.0 MITRAL VALVE INSUFFICIENCY, UNSPECIFIED ETIOLOGY: Primary | ICD-10-CM

## 2023-06-01 ENCOUNTER — OFFICE VISIT (OUTPATIENT)
Dept: ORTHOPEDIC SURGERY | Age: 73
End: 2023-06-01

## 2023-06-01 VITALS — BODY MASS INDEX: 28.66 KG/M2 | WEIGHT: 146 LBS | HEIGHT: 60 IN

## 2023-06-01 DIAGNOSIS — M79.605 LEFT LEG PAIN: Primary | ICD-10-CM

## 2023-06-01 NOTE — PROGRESS NOTES
New Knee Patient     Referring Provider:   Mimi Poster, [address] [place] [address] Lydia Kirby  [facility]    CHIEF COMPLAINT:   Chief Complaint   Patient presents with    New Patient     Left leg pain lateral side shin, hiking last August 2022 and she fell but didn't think she hurt anything then, several incidents where she had a slight injury, pain is worse when she gets up in the morning, she has been using a tens unit on and off, been to the chiropractor, wears a copper sleeve on her left lower extremity,         HPI:    Nneka Bocanegra is a 74y.o. year old female with left-sided lateral leg pain down the side of her shin. She states that she fell in August 2022 hiking and has had several incidents where she fell with mild injuries. She has been using a TENS unit and by the chiropractor. She also wears a copper sleeve. She states she has lateral sided leg and ankle pain that is worse at rest.  It does not seem to bother her with activity. Pain is sharp and nonradiating located on the lateral side of her mid leg. Denies fevers and chills. Denies numbness tingling.     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 Gail Jay yearly    Mitral regurgitation     follows with Dr. Gail Jay yearly    PONV (postoperative nausea and vomiting)     Tachycardia     follows with Dr. Gail Jay yearly    Thyroid disease     'enlarged thyroid' ; no meds present    Tobacco abuse, in remission        PAST SURGICAL HISTORY  Past Surgical History:   Procedure Laterality Date    BREAST BIOPSY      benign    COLONOSCOPY      DIAGNOSTIC CARDIAC CATH LAB PROCEDURE  11/09/2001    [facility]    ENDOSCOPY, COLON, DIAGNOSTIC      LITHOTRIPSY      VERY SMALL URETER,TERRIBLE TIME GETTING STENTS IN, ALOT OF

## 2023-06-05 ENCOUNTER — HOSPITAL ENCOUNTER (OUTPATIENT)
Age: 73
Discharge: HOME | End: 2023-06-05
Payer: MEDICARE

## 2023-06-05 DIAGNOSIS — Z01.810 PREPROCEDURAL CARDIOVASCULAR EXAMINATION: ICD-10-CM

## 2023-06-05 DIAGNOSIS — I10 HYPERTENSION, UNSPECIFIED TYPE: ICD-10-CM

## 2023-06-05 LAB
ANION GAP SERPL CALCULATED.3IONS-SCNC: 12 MMOL/L (ref 7–16)
BUN SERPL-MCNC: 20 MG/DL (ref 6–23)
CALCIUM SERPL-MCNC: 9.8 MG/DL (ref 8.6–10.2)
CHLORIDE SERPL-SCNC: 103 MMOL/L (ref 98–107)
CO2 SERPL-SCNC: 27 MMOL/L (ref 22–29)
CREAT SERPL-MCNC: 0.8 MG/DL (ref 0.5–1)
GLUCOSE SERPL-MCNC: 96 MG/DL (ref 74–99)
POTASSIUM SERPL-SCNC: 4 MMOL/L (ref 3.5–5)
SODIUM SERPL-SCNC: 142 MMOL/L (ref 132–146)

## 2023-06-05 PROCEDURE — 36415 COLL VENOUS BLD VENIPUNCTURE: CPT

## 2023-06-05 PROCEDURE — 80048 BASIC METABOLIC PNL TOTAL CA: CPT

## 2023-06-09 ENCOUNTER — HOSPITAL ENCOUNTER (OUTPATIENT)
Dept: MRI IMAGING | Age: 73
End: 2023-06-09
Payer: MEDICARE

## 2023-06-09 DIAGNOSIS — I34.0 MITRAL VALVE INSUFFICIENCY, UNSPECIFIED ETIOLOGY: ICD-10-CM

## 2023-06-09 PROCEDURE — A9579 GAD-BASE MR CONTRAST NOS,1ML: HCPCS | Performed by: RADIOLOGY

## 2023-06-09 PROCEDURE — 75561 CARDIAC MRI FOR MORPH W/DYE: CPT

## 2023-06-09 PROCEDURE — 6360000004 HC RX CONTRAST MEDICATION: Performed by: RADIOLOGY

## 2023-06-09 RX ADMIN — GADOTERIDOL 26 ML: 279.3 INJECTION, SOLUTION INTRAVENOUS at 10:44

## 2023-06-19 ENCOUNTER — OFFICE VISIT (OUTPATIENT)
Dept: PRIMARY CARE CLINIC | Age: 73
End: 2023-06-19
Payer: MEDICARE

## 2023-06-19 VITALS
BODY MASS INDEX: 28.16 KG/M2 | DIASTOLIC BLOOD PRESSURE: 77 MMHG | RESPIRATION RATE: 18 BRPM | SYSTOLIC BLOOD PRESSURE: 157 MMHG | TEMPERATURE: 97.7 F | HEIGHT: 60 IN | OXYGEN SATURATION: 98 % | HEART RATE: 82 BPM | WEIGHT: 143.4 LBS

## 2023-06-19 DIAGNOSIS — J06.9 ACUTE URI: ICD-10-CM

## 2023-06-19 LAB
INFLUENZA A ANTIGEN, POC: NEGATIVE
INFLUENZA B ANTIGEN, POC: NEGATIVE
LOT EXPIRE DATE: NORMAL
LOT KIT NUMBER: NORMAL
SARS-COV-2, POC: NORMAL
VALID INTERNAL CONTROL: YES
VENDOR AND KIT NAME POC: NORMAL

## 2023-06-19 PROCEDURE — 99213 OFFICE O/P EST LOW 20 MIN: CPT | Performed by: NURSE PRACTITIONER

## 2023-06-19 PROCEDURE — 1123F ACP DISCUSS/DSCN MKR DOCD: CPT | Performed by: NURSE PRACTITIONER

## 2023-06-19 PROCEDURE — 3074F SYST BP LT 130 MM HG: CPT | Performed by: NURSE PRACTITIONER

## 2023-06-19 PROCEDURE — 87428 SARSCOV & INF VIR A&B AG IA: CPT | Performed by: NURSE PRACTITIONER

## 2023-06-19 PROCEDURE — 3078F DIAST BP <80 MM HG: CPT | Performed by: NURSE PRACTITIONER

## 2023-06-19 RX ORDER — DOXYCYCLINE HYCLATE 100 MG
100 TABLET ORAL 2 TIMES DAILY
Qty: 20 TABLET | Refills: 0 | Status: SHIPPED | OUTPATIENT
Start: 2023-06-19 | End: 2023-06-21

## 2023-06-19 NOTE — PROGRESS NOTES
Chief Complaint:   Fatigue, Pharyngitis, Other (Exposed to covid/flu A/B/Patients son tested positive for all the above yesterday), and Fever      History of Present Illness   Source of history provided by:  patientTaran Sr is a 74 y.o. 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 Denise Souza yearly    Mitral regurgitation     follows with Dr. Denise Souza yearly    PONV (postoperative nausea and vomiting)     Tachycardia     follows with Dr. Denise Souza yearly    Thyroid disease     'enlarged thyroid' ; no meds present    Tobacco abuse, in remission        Pt  presents to the [facility] care with fatigue, sore throat, congestion for the past 1 day. Pt stated her son was diagnosed with Covid and Influenza and she did have recent contact. No  fever noted. Denies any N/V/D, abdominal pain, CP, progressive SOB, dizziness, or any other concerning issues      ROS    Unless otherwise stated in this report or unable to obtain because of the patient's clinical or mental status as evidenced by the medical record, this patients's positive and negative responses for Review of Systems, constitutional, psych, eyes, ENT, cardiovascular, respiratory, gastrointestinal, neurological, genitourinary, musculoskeletal, integument systems and systems related to the presenting problem are either stated in the preceding or were not pertinent or were negative for the symptoms and/or complaints related to the medical problem. Past Surgical History:  has a past surgical history that includes Diagnostic Cardiac Cath Lab Procedure (11/09/2001); Tonsillectomy; Lithotripsy; Breast biopsy; Skin cancer excision;  Upper gastrointestinal endoscopy (06/17/2015); vulvar/perineal biopsy (1985); pr

## 2023-06-21 ENCOUNTER — TELEPHONE (OUTPATIENT)
Dept: CARDIOLOGY CLINIC | Age: 73
End: 2023-06-21

## 2023-06-21 ENCOUNTER — HOSPITAL ENCOUNTER (EMERGENCY)
Age: 73
Discharge: ANOTHER HOSPITAL | End: 2023-06-21
Attending: EMERGENCY MEDICINE
Payer: MEDICARE

## 2023-06-21 ENCOUNTER — HOSPITAL ENCOUNTER (OUTPATIENT)
Age: 73
Setting detail: OBSERVATION
Discharge: ANOTHER HOSPITAL | End: 2023-06-22
Attending: STUDENT IN AN ORGANIZED HEALTH CARE EDUCATION/TRAINING PROGRAM | Admitting: STUDENT IN AN ORGANIZED HEALTH CARE EDUCATION/TRAINING PROGRAM
Payer: MEDICARE

## 2023-06-21 ENCOUNTER — APPOINTMENT (OUTPATIENT)
Dept: GENERAL RADIOLOGY | Age: 73
End: 2023-06-21
Payer: MEDICARE

## 2023-06-21 VITALS
SYSTOLIC BLOOD PRESSURE: 154 MMHG | OXYGEN SATURATION: 96 % | RESPIRATION RATE: 16 BRPM | TEMPERATURE: 98.8 F | HEART RATE: 81 BPM | DIASTOLIC BLOOD PRESSURE: 76 MMHG

## 2023-06-21 DIAGNOSIS — I50.20 SYSTOLIC CONGESTIVE HEART FAILURE, UNSPECIFIED HF CHRONICITY (HCC): Primary | ICD-10-CM

## 2023-06-21 DIAGNOSIS — R07.9 CHEST PAIN, UNSPECIFIED TYPE: Primary | ICD-10-CM

## 2023-06-21 DIAGNOSIS — I42.9 CARDIOMYOPATHY, UNSPECIFIED TYPE (HCC): ICD-10-CM

## 2023-06-21 LAB
ALBUMIN SERPL-MCNC: 4.4 G/DL (ref 3.5–5.2)
ALP SERPL-CCNC: 113 U/L (ref 35–104)
ALT SERPL-CCNC: 16 U/L (ref 0–32)
ANION GAP SERPL CALCULATED.3IONS-SCNC: 11 MMOL/L (ref 7–16)
AST SERPL-CCNC: 30 U/L (ref 0–31)
BASOPHILS # BLD: 0.04 E9/L (ref 0–0.2)
BASOPHILS NFR BLD: 0.7 % (ref 0–2)
BILIRUB SERPL-MCNC: 0.4 MG/DL (ref 0–1.2)
BUN SERPL-MCNC: 22 MG/DL (ref 6–23)
CALCIUM SERPL-MCNC: 10.1 MG/DL (ref 8.6–10.2)
CHLORIDE SERPL-SCNC: 102 MMOL/L (ref 98–107)
CO2 SERPL-SCNC: 27 MMOL/L (ref 22–29)
CREAT SERPL-MCNC: 0.8 MG/DL (ref 0.5–1)
EOSINOPHIL # BLD: 0.05 E9/L (ref 0.05–0.5)
EOSINOPHIL NFR BLD: 0.8 % (ref 0–6)
ERYTHROCYTE [DISTWIDTH] IN BLOOD BY AUTOMATED COUNT: 13.6 FL (ref 11.5–15)
GLUCOSE SERPL-MCNC: 100 MG/DL (ref 74–99)
HCT VFR BLD AUTO: 44.5 % (ref 34–48)
HGB BLD-MCNC: 15.3 G/DL (ref 11.5–15.5)
IMM GRANULOCYTES # BLD: 0.02 E9/L
IMM GRANULOCYTES NFR BLD: 0.3 % (ref 0–5)
INFLUENZA A: NOT DETECTED
INFLUENZA B: NOT DETECTED
INR BLD: 1.1
LACTATE BLDV-SCNC: 0.9 MMOL/L (ref 0.5–2.2)
LIPASE: 59 U/L (ref 13–60)
LYMPHOCYTES # BLD: 1.89 E9/L (ref 1.5–4)
LYMPHOCYTES NFR BLD: 32.1 % (ref 20–42)
MAGNESIUM SERPL-MCNC: 1.9 MG/DL (ref 1.6–2.6)
MCH RBC QN AUTO: 30.2 PG (ref 26–35)
MCHC RBC AUTO-ENTMCNC: 34.4 % (ref 32–34.5)
MCV RBC AUTO: 87.8 FL (ref 80–99.9)
MONOCYTES # BLD: 0.66 E9/L (ref 0.1–0.95)
MONOCYTES NFR BLD: 11.2 % (ref 2–12)
NEUTROPHILS # BLD: 3.23 E9/L (ref 1.8–7.3)
NEUTS SEG NFR BLD: 54.9 % (ref 43–80)
PLATELET # BLD AUTO: 237 E9/L (ref 130–450)
PMV BLD AUTO: 9.2 FL (ref 7–12)
POTASSIUM SERPL-SCNC: 3.6 MMOL/L (ref 3.5–5)
PROT SERPL-MCNC: 7.5 G/DL (ref 6.4–8.3)
PROTHROMBIN TIME: 11.9 SEC (ref 9.3–12.4)
RBC # BLD AUTO: 5.07 E12/L (ref 3.5–5.5)
SARS-COV-2 RNA, RT PCR: NOT DETECTED
SODIUM SERPL-SCNC: 140 MMOL/L (ref 132–146)
TROPONIN, HIGH SENSITIVITY: 9 NG/L (ref 0–9)
TROPONIN, HIGH SENSITIVITY: 9 NG/L (ref 0–9)
WBC # BLD: 5.9 E9/L (ref 4.5–11.5)

## 2023-06-21 PROCEDURE — G0378 HOSPITAL OBSERVATION PER HR: HCPCS

## 2023-06-21 PROCEDURE — 99222 1ST HOSP IP/OBS MODERATE 55: CPT | Performed by: STUDENT IN AN ORGANIZED HEALTH CARE EDUCATION/TRAINING PROGRAM

## 2023-06-21 PROCEDURE — 83605 ASSAY OF LACTIC ACID: CPT

## 2023-06-21 PROCEDURE — 84484 ASSAY OF TROPONIN QUANT: CPT

## 2023-06-21 PROCEDURE — 36415 COLL VENOUS BLD VENIPUNCTURE: CPT

## 2023-06-21 PROCEDURE — 85610 PROTHROMBIN TIME: CPT

## 2023-06-21 PROCEDURE — 71045 X-RAY EXAM CHEST 1 VIEW: CPT

## 2023-06-21 PROCEDURE — 93005 ELECTROCARDIOGRAM TRACING: CPT | Performed by: EMERGENCY MEDICINE

## 2023-06-21 PROCEDURE — 83690 ASSAY OF LIPASE: CPT

## 2023-06-21 PROCEDURE — 80053 COMPREHEN METABOLIC PANEL: CPT

## 2023-06-21 PROCEDURE — 99285 EMERGENCY DEPT VISIT HI MDM: CPT

## 2023-06-21 PROCEDURE — 85025 COMPLETE CBC W/AUTO DIFF WBC: CPT

## 2023-06-21 PROCEDURE — G0379 DIRECT REFER HOSPITAL OBSERV: HCPCS

## 2023-06-21 PROCEDURE — 6370000000 HC RX 637 (ALT 250 FOR IP): Performed by: EMERGENCY MEDICINE

## 2023-06-21 PROCEDURE — 83735 ASSAY OF MAGNESIUM: CPT

## 2023-06-21 PROCEDURE — 87636 SARSCOV2 & INF A&B AMP PRB: CPT

## 2023-06-21 RX ORDER — ONDANSETRON 4 MG/1
4 TABLET, ORALLY DISINTEGRATING ORAL EVERY 8 HOURS PRN
Status: DISCONTINUED | OUTPATIENT
Start: 2023-06-21 | End: 2023-06-22 | Stop reason: HOSPADM

## 2023-06-21 RX ORDER — ATORVASTATIN CALCIUM 10 MG/1
10 TABLET, FILM COATED ORAL DAILY
Status: DISCONTINUED | OUTPATIENT
Start: 2023-06-22 | End: 2023-06-22 | Stop reason: HOSPADM

## 2023-06-21 RX ORDER — ACETAMINOPHEN 650 MG/1
650 SUPPOSITORY RECTAL EVERY 6 HOURS PRN
Status: DISCONTINUED | OUTPATIENT
Start: 2023-06-21 | End: 2023-06-22 | Stop reason: HOSPADM

## 2023-06-21 RX ORDER — POLYETHYLENE GLYCOL 3350 17 G/17G
17 POWDER, FOR SOLUTION ORAL DAILY PRN
Status: DISCONTINUED | OUTPATIENT
Start: 2023-06-21 | End: 2023-06-22 | Stop reason: HOSPADM

## 2023-06-21 RX ORDER — DILTIAZEM HYDROCHLORIDE 240 MG/1
240 CAPSULE, COATED, EXTENDED RELEASE ORAL DAILY
Status: DISCONTINUED | OUTPATIENT
Start: 2023-06-22 | End: 2023-06-22

## 2023-06-21 RX ORDER — NITROGLYCERIN 0.4 MG/1
0.4 TABLET SUBLINGUAL ONCE
Status: COMPLETED | OUTPATIENT
Start: 2023-06-21 | End: 2023-06-21

## 2023-06-21 RX ORDER — SODIUM CHLORIDE 0.9 % (FLUSH) 0.9 %
5-40 SYRINGE (ML) INJECTION PRN
Status: DISCONTINUED | OUTPATIENT
Start: 2023-06-21 | End: 2023-06-22 | Stop reason: HOSPADM

## 2023-06-21 RX ORDER — SODIUM CHLORIDE 9 MG/ML
INJECTION, SOLUTION INTRAVENOUS PRN
Status: DISCONTINUED | OUTPATIENT
Start: 2023-06-21 | End: 2023-06-22 | Stop reason: HOSPADM

## 2023-06-21 RX ORDER — ENOXAPARIN SODIUM 100 MG/ML
40 INJECTION SUBCUTANEOUS DAILY
Status: DISCONTINUED | OUTPATIENT
Start: 2023-06-22 | End: 2023-06-22 | Stop reason: HOSPADM

## 2023-06-21 RX ORDER — FAMOTIDINE 20 MG/1
20 TABLET, FILM COATED ORAL DAILY
Status: DISCONTINUED | OUTPATIENT
Start: 2023-06-22 | End: 2023-06-22 | Stop reason: HOSPADM

## 2023-06-21 RX ORDER — ONDANSETRON 2 MG/ML
4 INJECTION INTRAMUSCULAR; INTRAVENOUS EVERY 6 HOURS PRN
Status: DISCONTINUED | OUTPATIENT
Start: 2023-06-21 | End: 2023-06-22 | Stop reason: HOSPADM

## 2023-06-21 RX ORDER — ASPIRIN 325 MG
325 TABLET ORAL ONCE
Status: COMPLETED | OUTPATIENT
Start: 2023-06-21 | End: 2023-06-21

## 2023-06-21 RX ORDER — ASPIRIN 81 MG/1
81 TABLET ORAL DAILY
Status: DISCONTINUED | OUTPATIENT
Start: 2023-06-22 | End: 2023-06-22 | Stop reason: HOSPADM

## 2023-06-21 RX ORDER — PANTOPRAZOLE SODIUM 40 MG/1
40 TABLET, DELAYED RELEASE ORAL
Status: DISCONTINUED | OUTPATIENT
Start: 2023-06-22 | End: 2023-06-22 | Stop reason: HOSPADM

## 2023-06-21 RX ORDER — SODIUM CHLORIDE 0.9 % (FLUSH) 0.9 %
5-40 SYRINGE (ML) INJECTION EVERY 12 HOURS SCHEDULED
Status: DISCONTINUED | OUTPATIENT
Start: 2023-06-21 | End: 2023-06-22 | Stop reason: HOSPADM

## 2023-06-21 RX ORDER — ACETAMINOPHEN 325 MG/1
650 TABLET ORAL EVERY 6 HOURS PRN
Status: DISCONTINUED | OUTPATIENT
Start: 2023-06-21 | End: 2023-06-22 | Stop reason: HOSPADM

## 2023-06-21 RX ADMIN — ASPIRIN 325 MG: 325 TABLET ORAL at 15:34

## 2023-06-21 RX ADMIN — NITROGLYCERIN 0.4 MG: 0.4 TABLET, ORALLY DISINTEGRATING SUBLINGUAL at 15:34

## 2023-06-21 RX ADMIN — NITROGLYCERIN 0.5 INCH: 20 OINTMENT TOPICAL at 18:52

## 2023-06-21 RX ADMIN — NITROGLYCERIN 0.4 MG: 0.4 TABLET, ORALLY DISINTEGRATING SUBLINGUAL at 16:39

## 2023-06-21 ASSESSMENT — PAIN DESCRIPTION - ORIENTATION: ORIENTATION: LEFT

## 2023-06-21 ASSESSMENT — ENCOUNTER SYMPTOMS
COUGH: 0
SHORTNESS OF BREATH: 0
SORE THROAT: 1
PHOTOPHOBIA: 0
BACK PAIN: 0
ABDOMINAL PAIN: 0

## 2023-06-21 ASSESSMENT — PAIN SCALES - GENERAL
PAINLEVEL_OUTOF10: 3
PAINLEVEL_OUTOF10: 2

## 2023-06-21 ASSESSMENT — PAIN DESCRIPTION - LOCATION: LOCATION: CHEST;BREAST

## 2023-06-21 ASSESSMENT — PAIN DESCRIPTION - DESCRIPTORS: DESCRIPTORS: PRESSURE;DISCOMFORT

## 2023-06-21 ASSESSMENT — PAIN DESCRIPTION - ONSET: ONSET: SUDDEN

## 2023-06-21 ASSESSMENT — PAIN DESCRIPTION - FREQUENCY: FREQUENCY: CONTINUOUS

## 2023-06-21 ASSESSMENT — PAIN DESCRIPTION - PAIN TYPE: TYPE: ACUTE PAIN

## 2023-06-21 ASSESSMENT — PAIN - FUNCTIONAL ASSESSMENT: PAIN_FUNCTIONAL_ASSESSMENT: 0-10

## 2023-06-21 NOTE — TELEPHONE ENCOUNTER
I CALLED TO TELL DOMINICK WILKS'S INSTRUCTIONS PER THE CARDIAC MRI. SHE HAS MODERATE MITRAL REGURGITATION AND HER EF IS DECREASED AT 31%. DR. Mikael Cueva NEEDS TO SEE HER FOR MEDICATION CHANGES. HE NEEDS TO SEE HER AND GET HER BP. HE WILL REFER HER TO THE CHF CLINIC. I CALLED TO GIVE THIS INFORMATION TO DOMINICK AND SHE WANTS TO KNOW WHAT TO DO WITH THE CHEST DISCOMFORT SHE IS HAVING NOW. I ASKED HER TO DESCRIBE THE PAIN AND LOCATION. SHE SAID THAT IT IS BELOW HER LEFT BREAST. NO RADIATION OF PAIN, NO SOB, NO LIGHTHEADEDNESS. I GAVE THE ABOVE INFORMATION TO DR Mikael Cueva AND HE SAID TO TELL HER TO GO TO ER. WHEN I CALLED DOMINICK AND GAVE HER THE ABOVE INFORMATION SHE TOLD ME THAT SHE DID NOT WANT TO GO TO ER. I EXPLAINED THAT WITHOUT AN EKG AND BP DR WILKS CAN NOT ADVISE HER. SHE SAID THAT SHE WANTED TO COME IN. I TOLD HER THAT I HAVE NO OPENINGS. SHE SAID THAT IS NOT HAPPY ABOUT THIS.   RLL

## 2023-06-21 NOTE — ED PROVIDER NOTES
History:   Diagnosis Date    Arthritis     History of kidney stones     LBBB (left bundle branch block)     Mitral regurgitation     PONV (postoperative nausea and vomiting)     Tachycardia     Thyroid disease     Tobacco abuse, in remission          CONSULTS: (Who and What was discussed)  None    Discussion with Other Profesionals : Admitting Team dr Bouchra Mcleod    Social Determinants : None    Records Reviewed : Outpatient Notes cardiology    CC/HPI Summary, DDx, ED Course, and Reassessment: Is a 40-year-old female who presents with complaint of left-sided chest pain please see HPI above. Differential diagnosis includes angina, noncardiac chest pain, PE, dissection, musculoskeletal to name a few. Patient's describe her pain as pressure and improves with nitroglycerin. I seriously doubt she has a PE or dissection. She appears to have anginal type symptoms. She was given aspirin heart score is moderate risk. Patient will require cardiology consultation and further work-up that I cannot provide at this facility. Patient be transferred to higher level of care. Patient be transferred to [facility].      HEART Score For Major Cardiac Events  (Max Score 10 Points)  HISTORY       []   Slightly Suspicious  0       []   Moderately Suspicious  +1       []   Highly Suspicious  +2    EK point: No ST depression but LBBB, LVH repolarization changes (ex:digoxin);               2 points: ST depression/elevation not due to LBBB, LVH or digoxin         []   Normal  0       [x]   Nonspecific Repolarization Disturbance  +1       []   Significant ST Depression  +2    AGE       []   <45  0       []   45-64  +1       [x]    >65  +2    RISK FACTORS:  1. HTN    2. Hypercholesterolemia    3. DM     4. Cigarette smoking (current or cessation < 3 mos)    5. Positive family history  (parent or sibling with CVD before age 72).    6. Obesity (BMI >30kg/m2)         []   No Risk factors Known  0       []   1-2 Risk Factors

## 2023-06-22 ENCOUNTER — HOSPITAL ENCOUNTER (OUTPATIENT)
Age: 73
Setting detail: OBSERVATION
Discharge: HOME | End: 2023-06-22
Attending: INTERNAL MEDICINE | Admitting: STUDENT IN AN ORGANIZED HEALTH CARE EDUCATION/TRAINING PROGRAM
Payer: MEDICARE

## 2023-06-22 VITALS
HEART RATE: 73 BPM | SYSTOLIC BLOOD PRESSURE: 133 MMHG | RESPIRATION RATE: 16 BRPM | DIASTOLIC BLOOD PRESSURE: 81 MMHG | OXYGEN SATURATION: 96 % | TEMPERATURE: 97.9 F

## 2023-06-22 VITALS
WEIGHT: 143 LBS | SYSTOLIC BLOOD PRESSURE: 131 MMHG | HEART RATE: 84 BPM | RESPIRATION RATE: 18 BRPM | BODY MASS INDEX: 28.07 KG/M2 | DIASTOLIC BLOOD PRESSURE: 75 MMHG | OXYGEN SATURATION: 96 % | TEMPERATURE: 98.3 F | HEIGHT: 60 IN

## 2023-06-22 PROBLEM — Z98.890 S/P CARDIAC CATH: Status: ACTIVE | Noted: 2023-06-22

## 2023-06-22 PROBLEM — I50.20 HFREF (HEART FAILURE WITH REDUCED EJECTION FRACTION) (HCC): Status: ACTIVE | Noted: 2023-06-22

## 2023-06-22 LAB
ABO + RH BLD: NORMAL
ANION GAP SERPL CALCULATED.3IONS-SCNC: 9 MMOL/L (ref 7–16)
BASOPHILS # BLD: 0.05 E9/L (ref 0–0.2)
BASOPHILS NFR BLD: 0.8 % (ref 0–2)
BLD GP AB SCN SERPL QL: NORMAL
BUN SERPL-MCNC: 19 MG/DL (ref 6–23)
CALCIUM SERPL-MCNC: 9.4 MG/DL (ref 8.6–10.2)
CHLORIDE SERPL-SCNC: 105 MMOL/L (ref 98–107)
CHOLESTEROL, FASTING: 190 MG/DL (ref 0–199)
CO2 SERPL-SCNC: 27 MMOL/L (ref 22–29)
CREAT SERPL-MCNC: 0.8 MG/DL (ref 0.5–1)
EKG ATRIAL RATE: 82 BPM
EKG P AXIS: 55 DEGREES
EKG P-R INTERVAL: 164 MS
EKG Q-T INTERVAL: 442 MS
EKG QRS DURATION: 150 MS
EKG QTC CALCULATION (BAZETT): 516 MS
EKG R AXIS: -29 DEGREES
EKG T AXIS: -7 DEGREES
EKG VENTRICULAR RATE: 82 BPM
EOSINOPHIL # BLD: 0.09 E9/L (ref 0.05–0.5)
EOSINOPHIL NFR BLD: 1.5 % (ref 0–6)
ERYTHROCYTE [DISTWIDTH] IN BLOOD BY AUTOMATED COUNT: 13.6 FL (ref 11.5–15)
GLUCOSE SERPL-MCNC: 125 MG/DL (ref 74–99)
HBA1C MFR BLD: 5.3 % (ref 4–5.6)
HCT VFR BLD AUTO: 40.9 % (ref 34–48)
HDLC SERPL-MCNC: 51 MG/DL
HGB BLD-MCNC: 14 G/DL (ref 11.5–15.5)
IMM GRANULOCYTES # BLD: 0.02 E9/L
IMM GRANULOCYTES NFR BLD: 0.3 % (ref 0–5)
LDL CHOLESTEROL CALCULATED: 129 MG/DL (ref 0–99)
LYMPHOCYTES # BLD: 2.1 E9/L (ref 1.5–4)
LYMPHOCYTES NFR BLD: 35.5 % (ref 20–42)
MCH RBC QN AUTO: 30.4 PG (ref 26–35)
MCHC RBC AUTO-ENTMCNC: 34.2 % (ref 32–34.5)
MCV RBC AUTO: 88.9 FL (ref 80–99.9)
MONOCYTES # BLD: 0.65 E9/L (ref 0.1–0.95)
MONOCYTES NFR BLD: 11 % (ref 2–12)
NEUTROPHILS # BLD: 3.01 E9/L (ref 1.8–7.3)
NEUTS SEG NFR BLD: 50.9 % (ref 43–80)
PLATELET # BLD AUTO: 224 E9/L (ref 130–450)
PMV BLD AUTO: 9.7 FL (ref 7–12)
POTASSIUM SERPL-SCNC: 3.6 MMOL/L (ref 3.5–5)
RBC # BLD AUTO: 4.6 E12/L (ref 3.5–5.5)
SODIUM SERPL-SCNC: 141 MMOL/L (ref 132–146)
TRIGLYCERIDE, FASTING: 51 MG/DL (ref 0–149)
TROPONIN, HIGH SENSITIVITY: 13 NG/L (ref 0–9)
TSH SERPL-MCNC: 1.87 UIU/ML (ref 0.27–4.2)
VLDLC SERPL CALC-MCNC: 10 MG/DL
WBC # BLD: 5.9 E9/L (ref 4.5–11.5)

## 2023-06-22 PROCEDURE — 84484 ASSAY OF TROPONIN QUANT: CPT

## 2023-06-22 PROCEDURE — C1769 GUIDE WIRE: HCPCS

## 2023-06-22 PROCEDURE — 83036 HEMOGLOBIN GLYCOSYLATED A1C: CPT

## 2023-06-22 PROCEDURE — 6370000000 HC RX 637 (ALT 250 FOR IP): Performed by: CLINICAL NURSE SPECIALIST

## 2023-06-22 PROCEDURE — 6370000000 HC RX 637 (ALT 250 FOR IP): Performed by: NURSE PRACTITIONER

## 2023-06-22 PROCEDURE — 2500000003 HC RX 250 WO HCPCS

## 2023-06-22 PROCEDURE — 99223 1ST HOSP IP/OBS HIGH 75: CPT | Performed by: INTERNAL MEDICINE

## 2023-06-22 PROCEDURE — 6360000002 HC RX W HCPCS: Performed by: CLINICAL NURSE SPECIALIST

## 2023-06-22 PROCEDURE — C1894 INTRO/SHEATH, NON-LASER: HCPCS

## 2023-06-22 PROCEDURE — 36415 COLL VENOUS BLD VENIPUNCTURE: CPT

## 2023-06-22 PROCEDURE — 86900 BLOOD TYPING SEROLOGIC ABO: CPT

## 2023-06-22 PROCEDURE — 84443 ASSAY THYROID STIM HORMONE: CPT

## 2023-06-22 PROCEDURE — APPSS60 APP SPLIT SHARED TIME 46-60 MINUTES: Performed by: CLINICAL NURSE SPECIALIST

## 2023-06-22 PROCEDURE — APPSS30 APP SPLIT SHARED TIME 16-30 MINUTES: Performed by: NURSE PRACTITIONER

## 2023-06-22 PROCEDURE — G0378 HOSPITAL OBSERVATION PER HR: HCPCS

## 2023-06-22 PROCEDURE — 86850 RBC ANTIBODY SCREEN: CPT

## 2023-06-22 PROCEDURE — 85025 COMPLETE CBC W/AUTO DIFF WBC: CPT

## 2023-06-22 PROCEDURE — 6370000000 HC RX 637 (ALT 250 FOR IP): Performed by: STUDENT IN AN ORGANIZED HEALTH CARE EDUCATION/TRAINING PROGRAM

## 2023-06-22 PROCEDURE — 80061 LIPID PANEL: CPT

## 2023-06-22 PROCEDURE — 86901 BLOOD TYPING SEROLOGIC RH(D): CPT

## 2023-06-22 PROCEDURE — 80048 BASIC METABOLIC PNL TOTAL CA: CPT

## 2023-06-22 PROCEDURE — 2709999900 HC NON-CHARGEABLE SUPPLY

## 2023-06-22 PROCEDURE — 93458 L HRT ARTERY/VENTRICLE ANGIO: CPT

## 2023-06-22 PROCEDURE — 6360000002 HC RX W HCPCS

## 2023-06-22 PROCEDURE — 2580000003 HC RX 258: Performed by: STUDENT IN AN ORGANIZED HEALTH CARE EDUCATION/TRAINING PROGRAM

## 2023-06-22 PROCEDURE — 93458 L HRT ARTERY/VENTRICLE ANGIO: CPT | Performed by: INTERNAL MEDICINE

## 2023-06-22 PROCEDURE — 96374 THER/PROPH/DIAG INJ IV PUSH: CPT

## 2023-06-22 PROCEDURE — 93010 ELECTROCARDIOGRAM REPORT: CPT | Performed by: INTERNAL MEDICINE

## 2023-06-22 RX ORDER — CARVEDILOL 6.25 MG/1
3.12 TABLET ORAL 2 TIMES DAILY WITH MEALS
Status: DISCONTINUED | OUTPATIENT
Start: 2023-06-22 | End: 2023-06-22 | Stop reason: HOSPADM

## 2023-06-22 RX ORDER — CARVEDILOL 3.12 MG/1
3.12 TABLET ORAL 2 TIMES DAILY
Qty: 60 TABLET | Refills: 3 | Status: SHIPPED | OUTPATIENT
Start: 2023-06-22

## 2023-06-22 RX ORDER — ONDANSETRON 2 MG/ML
4 INJECTION INTRAMUSCULAR; INTRAVENOUS EVERY 6 HOURS PRN
Status: CANCELLED | OUTPATIENT
Start: 2023-06-22

## 2023-06-22 RX ORDER — SODIUM CHLORIDE 0.9 % (FLUSH) 0.9 %
5-40 SYRINGE (ML) INJECTION EVERY 12 HOURS SCHEDULED
Status: CANCELLED | OUTPATIENT
Start: 2023-06-22

## 2023-06-22 RX ORDER — SODIUM CHLORIDE 9 MG/ML
INJECTION, SOLUTION INTRAVENOUS PRN
Status: CANCELLED | OUTPATIENT
Start: 2023-06-22

## 2023-06-22 RX ORDER — MAGNESIUM SULFATE 1 G/100ML
1000 INJECTION INTRAVENOUS ONCE
Status: COMPLETED | OUTPATIENT
Start: 2023-06-22 | End: 2023-06-22

## 2023-06-22 RX ORDER — FAMOTIDINE 20 MG/1
20 TABLET, FILM COATED ORAL DAILY
Status: CANCELLED | OUTPATIENT
Start: 2023-06-23

## 2023-06-22 RX ORDER — ASPIRIN 81 MG/1
81 TABLET ORAL DAILY
Status: DISCONTINUED | OUTPATIENT
Start: 2023-06-23 | End: 2023-06-22 | Stop reason: HOSPADM

## 2023-06-22 RX ORDER — FAMOTIDINE 20 MG/1
20 TABLET, FILM COATED ORAL ONCE
Status: COMPLETED | OUTPATIENT
Start: 2023-06-22 | End: 2023-06-22

## 2023-06-22 RX ORDER — ONDANSETRON 4 MG/1
4 TABLET, ORALLY DISINTEGRATING ORAL EVERY 8 HOURS PRN
Status: DISCONTINUED | OUTPATIENT
Start: 2023-06-22 | End: 2023-06-22 | Stop reason: HOSPADM

## 2023-06-22 RX ORDER — SODIUM CHLORIDE 0.9 % (FLUSH) 0.9 %
5-40 SYRINGE (ML) INJECTION PRN
Status: CANCELLED | OUTPATIENT
Start: 2023-06-22

## 2023-06-22 RX ORDER — LANOLIN ALCOHOL/MO/W.PET/CERES
3 CREAM (GRAM) TOPICAL ONCE
Status: COMPLETED | OUTPATIENT
Start: 2023-06-22 | End: 2023-06-22

## 2023-06-22 RX ORDER — SODIUM CHLORIDE 0.9 % (FLUSH) 0.9 %
5-40 SYRINGE (ML) INJECTION EVERY 12 HOURS SCHEDULED
Status: DISCONTINUED | OUTPATIENT
Start: 2023-06-22 | End: 2023-06-22 | Stop reason: HOSPADM

## 2023-06-22 RX ORDER — POLYETHYLENE GLYCOL 3350 17 G/17G
17 POWDER, FOR SOLUTION ORAL DAILY PRN
Status: CANCELLED | OUTPATIENT
Start: 2023-06-22

## 2023-06-22 RX ORDER — PANTOPRAZOLE SODIUM 40 MG/1
40 TABLET, DELAYED RELEASE ORAL
Status: DISCONTINUED | OUTPATIENT
Start: 2023-06-23 | End: 2023-06-22 | Stop reason: HOSPADM

## 2023-06-22 RX ORDER — ACETAMINOPHEN 650 MG/1
650 SUPPOSITORY RECTAL EVERY 6 HOURS PRN
Status: CANCELLED | OUTPATIENT
Start: 2023-06-22

## 2023-06-22 RX ORDER — ATORVASTATIN CALCIUM 10 MG/1
10 TABLET, FILM COATED ORAL ONCE
Status: COMPLETED | OUTPATIENT
Start: 2023-06-22 | End: 2023-06-22

## 2023-06-22 RX ORDER — ATORVASTATIN CALCIUM 20 MG/1
10 TABLET, FILM COATED ORAL DAILY
Status: DISCONTINUED | OUTPATIENT
Start: 2023-06-23 | End: 2023-06-22 | Stop reason: HOSPADM

## 2023-06-22 RX ORDER — ASPIRIN 81 MG/1
81 TABLET ORAL DAILY
Status: CANCELLED | OUTPATIENT
Start: 2023-06-23

## 2023-06-22 RX ORDER — ACETAMINOPHEN 650 MG/1
650 SUPPOSITORY RECTAL EVERY 6 HOURS PRN
Status: DISCONTINUED | OUTPATIENT
Start: 2023-06-22 | End: 2023-06-22 | Stop reason: HOSPADM

## 2023-06-22 RX ORDER — ACETAMINOPHEN 325 MG/1
650 TABLET ORAL EVERY 6 HOURS PRN
Status: CANCELLED | OUTPATIENT
Start: 2023-06-22

## 2023-06-22 RX ORDER — POLYETHYLENE GLYCOL 3350 17 G/17G
17 POWDER, FOR SOLUTION ORAL DAILY PRN
Status: DISCONTINUED | OUTPATIENT
Start: 2023-06-22 | End: 2023-06-22 | Stop reason: HOSPADM

## 2023-06-22 RX ORDER — ATORVASTATIN CALCIUM 10 MG/1
10 TABLET, FILM COATED ORAL DAILY
Status: CANCELLED | OUTPATIENT
Start: 2023-06-23

## 2023-06-22 RX ORDER — ACETAMINOPHEN 325 MG/1
650 TABLET ORAL EVERY 6 HOURS PRN
Status: DISCONTINUED | OUTPATIENT
Start: 2023-06-22 | End: 2023-06-22 | Stop reason: HOSPADM

## 2023-06-22 RX ORDER — SODIUM CHLORIDE 0.9 % (FLUSH) 0.9 %
5-40 SYRINGE (ML) INJECTION PRN
Status: DISCONTINUED | OUTPATIENT
Start: 2023-06-22 | End: 2023-06-22 | Stop reason: HOSPADM

## 2023-06-22 RX ORDER — PANTOPRAZOLE SODIUM 40 MG/1
40 TABLET, DELAYED RELEASE ORAL
Status: CANCELLED | OUTPATIENT
Start: 2023-06-23

## 2023-06-22 RX ORDER — CARVEDILOL 3.12 MG/1
3.12 TABLET ORAL 2 TIMES DAILY WITH MEALS
Status: CANCELLED | OUTPATIENT
Start: 2023-06-22

## 2023-06-22 RX ORDER — SODIUM CHLORIDE 9 MG/ML
INJECTION, SOLUTION INTRAVENOUS PRN
Status: DISCONTINUED | OUTPATIENT
Start: 2023-06-22 | End: 2023-06-22 | Stop reason: HOSPADM

## 2023-06-22 RX ORDER — POTASSIUM CHLORIDE 20 MEQ/1
40 TABLET, EXTENDED RELEASE ORAL ONCE
Status: COMPLETED | OUTPATIENT
Start: 2023-06-22 | End: 2023-06-22

## 2023-06-22 RX ORDER — FAMOTIDINE 20 MG/1
20 TABLET, FILM COATED ORAL DAILY
Status: DISCONTINUED | OUTPATIENT
Start: 2023-06-23 | End: 2023-06-22 | Stop reason: HOSPADM

## 2023-06-22 RX ORDER — ONDANSETRON 2 MG/ML
4 INJECTION INTRAMUSCULAR; INTRAVENOUS EVERY 6 HOURS PRN
Status: DISCONTINUED | OUTPATIENT
Start: 2023-06-22 | End: 2023-06-22 | Stop reason: HOSPADM

## 2023-06-22 RX ORDER — ONDANSETRON 4 MG/1
4 TABLET, ORALLY DISINTEGRATING ORAL EVERY 8 HOURS PRN
Status: CANCELLED | OUTPATIENT
Start: 2023-06-22

## 2023-06-22 RX ORDER — CARVEDILOL 3.12 MG/1
3.12 TABLET ORAL 2 TIMES DAILY WITH MEALS
Status: DISCONTINUED | OUTPATIENT
Start: 2023-06-22 | End: 2023-06-22 | Stop reason: HOSPADM

## 2023-06-22 RX ADMIN — ASPIRIN 81 MG: 81 TABLET, COATED ORAL at 10:09

## 2023-06-22 RX ADMIN — Medication 10 ML: at 10:52

## 2023-06-22 RX ADMIN — CARVEDILOL 3.12 MG: 3.12 TABLET, FILM COATED ORAL at 10:10

## 2023-06-22 RX ADMIN — MAGNESIUM SULFATE HEPTAHYDRATE 1000 MG: 1 INJECTION, SOLUTION INTRAVENOUS at 10:19

## 2023-06-22 RX ADMIN — Medication 3 MG: at 00:30

## 2023-06-22 RX ADMIN — FAMOTIDINE 20 MG: 20 TABLET ORAL at 00:30

## 2023-06-22 RX ADMIN — CARVEDILOL 3.12 MG: 6.25 TABLET, FILM COATED ORAL at 19:36

## 2023-06-22 RX ADMIN — ATORVASTATIN CALCIUM 10 MG: 10 TABLET, FILM COATED ORAL at 00:30

## 2023-06-22 RX ADMIN — Medication 10 ML: at 00:31

## 2023-06-22 RX ADMIN — POTASSIUM CHLORIDE 40 MEQ: 1500 TABLET, EXTENDED RELEASE ORAL at 10:09

## 2023-06-22 ASSESSMENT — PAIN SCALES - GENERAL
PAINLEVEL_OUTOF10: 0
PAINLEVEL_OUTOF10: 0

## 2023-06-22 NOTE — PLAN OF CARE
Problem: Discharge Planning  Goal: Discharge to home or other facility with appropriate resources  6/22/2023 1242 by Angus Miles RN  Outcome: Adequate for Discharge  6/22/2023 0104 by Darren Holland RN  Outcome: Progressing  Flowsheets (Taken 6/21/2023 2209 by Donnell Dougherty RN)  Discharge to home or other facility with appropriate resources: Identify barriers to discharge with patient and caregiver     Problem: Safety - Adult  Goal: Free from fall injury  6/22/2023 1242 by Angus Miles RN  Outcome: Adequate for Discharge  6/22/2023 0104 by Darren Holland RN  Outcome: Progressing     Problem: Pain  Goal: Verbalizes/displays adequate comfort level or baseline comfort level  6/22/2023 1242 by Angus Miles RN  Outcome: Progressing  6/22/2023 0104 by Darren Holland RN  Outcome: Progressing

## 2023-06-22 NOTE — PROGRESS NOTES
Please NOTE : the patient's QTc is 516 . The use of Zofran may further prolong the QT interval. Please DC the Zofran at this time. If an antiemetic is needed, Compazine IV/IM/PO is an alternative. Thank you.

## 2023-06-22 NOTE — H&P
Patient seen and examined. Chart, labs, imaging studies, EKG and rhythm strips reviewed.   For H&P see cardiology consult from today      Electronically signed by Gustabo Wilcox MD on 6/22/2023 at 12:43 PM

## 2023-06-22 NOTE — PLAN OF CARE
Problem: Discharge Planning  Goal: Discharge to home or other facility with appropriate resources  Outcome: Progressing  Flowsheets (Taken 6/21/2023 2209 by Karlene Em RN)  Discharge to home or other facility with appropriate resources: Identify barriers to discharge with patient and caregiver     Problem: Safety - Adult  Goal: Free from fall injury  Outcome: Progressing     Problem: Pain  Goal: Verbalizes/displays adequate comfort level or baseline comfort level  Outcome: Progressing

## 2023-06-22 NOTE — PROGRESS NOTES
Dr. Marisa Munroe covering for Dr. Shahana Birmingham, message sent via [employer] of pts third trop at 15.

## 2023-06-22 NOTE — H&P
[facility] History and Physical      CHIEF COMPLAINT: Chest pain    History of Present Illness:    Ms. David Hall is a 40-year-old female with past medical history significant for cardiomyopathy (LVEF 30-35%), chronic LBBB, hypertension, hyperlipidemia, hypothyroidism, mitral regurgitation, nephrolithiasis, osteoarthritis who presents with chest pain for the last day. In talking with Ms. Calderon, she states yesterday she noticed onset of chest pain in her left lower chest.  She reports the pain is 6 out of 10 in severity. It is located substernally below her left breast.  She states nothing seems to make it better or worse. It is not worse with exertion or emotional stress. She denies shortness of breath, orthopnea, leg swelling. She follows with a cardiologist, but was recently exposed to a COVID-19 positive as well as influenza family member, and was due to follow-up with her cardiologist yesterday but canceled her appointment due to the exposure to the sick contact. She was referred to the emergency department by her cardiologist for further evaluation. Of note, she recently underwent cardiac MRI in early June, 2023 which revealed LVEF 31%, mild mitral regurgitation and mild aortic regurgitation. Also, she has a chronic left bundle branch block. She presented to the [facility] ED for further evaluation. In the [facility] ED, vitals on presentation were temp 98.8, RR 18, HR 94, /71, O2 sat 95% on room air. Lab work was obtained which revealed serum sodium 140, potassium 3.6, bicarb 27, creatinine 0.8, magnesium 1.9, lactic acid 0.9, blood glucose 100, high-sensitivity troponin 9 followed by 9. CBC was obtained which revealed WBC 5.9, hemoglobin 15.3, platelets 102A. EKG was obtained which revealed normal sinus rhythm, chronic LBBB, HR 82, QTc 516 MS. Chest x-ray was obtained which revealed no acute process.     Decision was made to hospitalize the patient and

## 2023-06-22 NOTE — DISCHARGE SUMMARY
[facility] Physician Discharge Summary       Patient transferred to [facility] for cardiac cath     Condition on discharge: Stable     Patient ID:  Sinan Fitzgerald  [id number]  89 y.o.  1950    Admit date: 6/21/2023    Discharge date and time:  6/22/2023  2:23 PM    Admission Diagnoses:   Principal Problem:    Chest pain  Active Problems:    HFrEF (heart failure with reduced ejection fraction) ([address]  Resolved Problems:    * No resolved hospital problems. *      Discharge Diagnoses:   Principal Problem:    Chest pain  Active Problems:    HFrEF (heart failure with reduced ejection fraction) ([facility])  Resolved Problems:    * No resolved hospital problems. *      Consults:  IP CONSULT TO CARDIOLOGY    Hospital Course:   Patient Sinan Fitzgerald is a 74 y.o. presented with Chest pain [R07.9]   Patient presented to the ER with left sided chest pain that started the day prior to ER. She has been following outpatient with cardiology for valvular disease and cardiomyopathy. She had a recent cardiac MRI outpt with Dr. Brad Cuadra which showed EF 31%. Appears she was called by cardiology the day she presented to the ER with cardiac MRI results ans he told office she was having chest pain- she was then referred to ER. Troponin 9-->-->9--> 13. EKG ST T wave changes. Also to note she had been in Urgent care earlier in the week for fatigue/ pharyngitis and concern for flu/ covid as she had an exposure. Rapid flu/ covid negative. Please note that patient was not seen or examined by myself. When I came to floor to round on pt- I was told that she was just picked up / transported main campus for stat cardiac cath. Pt was followed and treated for;      1. Atypical Chest pain r/o ACS; pt presented to the ER with left sided chest pain day priro to ER. Did not worsen with exertion. Nothing seemed to make better or worse- including nitroglycerin. EKG ST- T wave changes. Troponin 9-> 9--> 13.   History of known

## 2023-06-22 NOTE — PROGRESS NOTES
4 Eyes Skin Assessment     NAME:  Soila Hernandez  YOB: 1950  MEDICAL RECORD NUMBER:  [id number]    The patient is being assessed for  Admission    I agree that at least one RN has performed a thorough Head to Toe Skin Assessment on the patient. ALL assessment sites listed below have been assessed. Areas assessed by both nurses:    Head, Face, Ears, Shoulders, Back, Chest, Arms, Elbows, Hands, Sacrum. Buttock, Coccyx, Ischium, Legs. Feet and Heels, and Under Medical Devices         Does the Patient have a Wound? No noted wound(s)    Bruising bilateral knees, abrasion on left dunn.        Julián Prevention initiated by RN: Yes  Wound Care Orders initiated by RN: Yes    Pressure Injury (Stage 3,4, Unstageable, DTI, NWPT, and Complex wounds) if present, place Wound referral order by RN under : No    New Ostomies, if present place, Ostomy referral order under : No     Nurse 1 eSignature: Electronically signed by Rosa Ross RN on 6/21/23 at 10:33 PM EDT    **SHARE this note so that the co-signing nurse can place an eSignature**    Nurse 2 eSignature: Electronically signed by Danny Culp RN on 6/21/23 at 10:37 PM EDT

## 2023-06-22 NOTE — PROCEDURES
[address]                  [address] [place],  [facility]                            CARDIAC CATHETERIZATION    PATIENT NAME: Miah Arthur                    :        1950  MED REC NO:   [id number]                            ROOM:       6321  ACCOUNT NO:   [de-identified]                           ADMIT DATE: 2023  PROVIDER:     Rogelio Mayen MD    DATE OF PROCEDURE:  2023    PROCEDURES PERFORMED:  Left heart catheterization and selective coronary  angiography. The procedure was done through right radial approach using ultrasound  guidance. The patient received intravenous Versed and intravenous fentanyl for  sedation. INDICATION:  Cardiomyopathy with worsening LV systolic function. Chest  pain. AUC:  8-4. PRESSURES:  Aorta 105/66 with a mean of 83. Left ventricular systolic pressure 846. Left ventricle end-diastolic  pressure 9. There was no significant gradient across the aortic valve. CORONARY ANGIOGRAPHY:  Left main:  The left main artery did not appear to have any significant  angiographic disease. LAD:  The left anterior descending artery did not appear to have any  significant angiographic disease. A large diagonal branch had around  40% ostial/proximal vessel narrowing. LCX:  The left circumflex did not appear to have any significant  angiographic disease. RCA:  The right coronary artery is a large dominant vessel, which did  not appear to have any significant angiographic disease. The right radial arterial sheath was removed at the end of the procedure  and a TR Band was applied with adequate hemostasis and with preservation  of pulse. The patient tolerated the procedure well and left the cardiac  catheterization laboratory in stable condition. ESTIMATED BLOOD LOSS:  10 mL. CONTRAST USED:  40 mL.     CONCLUSIONS:  1.  Mild coronary artery disease involving the diagonal branch of the  LAD

## 2023-06-22 NOTE — CONSULTS
Inpatient Cardiology Consultation      Reason for Consult:  Chest Pain    Consulting Physician: Emely Velasco    Requesting Physician:  Dr. Kendall Schulz    Date of Consultation: 6/22/2023    HISTORY OF PRESENT ILLNESS:   Ms. Tanja De Paz is a 74 yr old white female, known to [facility] cardiology and followed by DR. Brewer. Seen last in office 7/22/2022. In his note, he mentions \"switched her Cardizem to Toprol and lisinopril during last visit due to mild systolic dysfunction. .  Patient said that she did not tolerate the medication. Was placed back on her Cardizem. 6/19/2023 visit with PCP:  exposure to [place] - tested negative, but less than 24 hours from exposure - fatigue, sore throat, congestion - script for Doxycycline    PMH: CMP / HFrEF LVEF 35+/-5 %, HTN, HLD, VHD, LBBB, Former Tobacco, \"enlarged thyroid\", GERD / Hiatal Hernia / Aden's esophagus. [facility] ED: 6/21/2023 - chest pain   Arrival vitals: T 98.8 HR 94 RR18 /71 SPO2 95% RA BMI 28.4   Significant Labs: Troponin 9 >>13, BUN 19 Cr 0.8 K 3.6 Mag 1.9 Alk Phos 113 WBC 5.9 Hgb 14.0   RAD: CXR 1 view: no acute process  COVID / Influenza negative   ED treatment:  NTG SL X 2 - NTG Paste 0.5 inch, Aspirin 325mg   Transferred to [facility]. Patient seen and examined. Recent vitals: T 97.8 RR 18 HR 75 /55 SPO2 93% RA  Patient is active - she hikes 1-2 times a week and swims 1/2 mile every other week. Usually with no chest pain or MAGAÑA. Tuesday afternoon (6/20/23), she began having chest pain - under left breast \"squeezing sensation\" intermittent - lasting a few minutes at a time. Rated 2-3 / 10 to 5-6/10 on scale. Pain happened both at rest and with walking. No worse with walking & pain is not reproducible with palpation, cough, deep breath. Pain radiated yesterday to her back for the first time. Associated symptoms - mild palpitations & ? Sweating.   Denies SOB, MAGAÑA, N/V/Indigestion, orthopnea, PND, lower extremity

## 2023-06-23 ENCOUNTER — TELEPHONE (OUTPATIENT)
Dept: FAMILY MEDICINE CLINIC | Age: 73
End: 2023-06-23

## 2023-06-23 ENCOUNTER — CARE COORDINATION (OUTPATIENT)
Dept: CASE MANAGEMENT | Age: 73
End: 2023-06-23

## 2023-06-23 LAB
EKG ATRIAL RATE: 80 BPM
EKG P AXIS: 48 DEGREES
EKG P-R INTERVAL: 168 MS
EKG Q-T INTERVAL: 430 MS
EKG QRS DURATION: 150 MS
EKG QTC CALCULATION (BAZETT): 495 MS
EKG R AXIS: -32 DEGREES
EKG T AXIS: -20 DEGREES
EKG VENTRICULAR RATE: 80 BPM

## 2023-06-23 NOTE — PROGRESS NOTES
Extended Stay Recovery Discharge Documentation    Final procedure site check completed, stable for discharge- Yes  Ambulated without issue post recovery completion, gait steady. Peripheral IV sites removed (see IV Flowsheet) and site asymptomatic- Yes  Patient dressed self without assistance. Discharge instructions reviewed with Patient and verbalized understanding. Patient discharged Home with spouse at PM  Monitor cleaned and placed back in nurses' station- Yes        t.

## 2023-06-23 NOTE — CARE COORDINATION
[facility] Care Transitions Initial Follow Up Call    Call within 2 business days of discharge: Yes    Patient: Alysa Singh Patient : [id number]   MRN: [id number]  Reason for Admission: CP, s/p heart cath   Discharge Date: 23 RARS: Readmission Risk Score: 6.4      Last Discharge  Street       Date Complaint Diagnosis Description Type Department Provider    23   Admission (Discharged) from OP Visit [facility] Queenie Mijares MD     First attempt to reach the patient for initial Care Transition call post hospital discharge. Message left with CTN's contact information requesting return phone call. Will route a message to Dr. Izabela Bellamy office requesting that an attempt be made to contact the patient to schedule TCM visit within 7 days of discharge, discharge date 23.      Follow Up  Future Appointments   Date Time Provider Ebenezer Parks   2023  9:30 AM [facility] PMR [facility]   7/3/2023  9:15 AM [facility] CHF ROOM 1 SEYZ CHF [facility]   2024  2:30 PM Sunil Knox MD [facility]     Jimena Heck RN

## 2023-06-26 ENCOUNTER — CARE COORDINATION (OUTPATIENT)
Dept: CASE MANAGEMENT | Age: 73
End: 2023-06-26

## 2023-06-26 DIAGNOSIS — I25.83 CORONARY ARTERY DISEASE DUE TO LIPID RICH PLAQUE: ICD-10-CM

## 2023-06-26 DIAGNOSIS — I25.10 CORONARY ARTERY DISEASE DUE TO LIPID RICH PLAQUE: ICD-10-CM

## 2023-06-26 DIAGNOSIS — K21.9 GASTROESOPHAGEAL REFLUX DISEASE WITHOUT ESOPHAGITIS: ICD-10-CM

## 2023-06-26 DIAGNOSIS — I50.20 HFREF (HEART FAILURE WITH REDUCED EJECTION FRACTION) (HCC): Primary | ICD-10-CM

## 2023-06-26 RX ORDER — FAMOTIDINE 20 MG/1
TABLET, FILM COATED ORAL
Qty: 90 TABLET | Refills: 0 | Status: SHIPPED | OUTPATIENT
Start: 2023-06-26

## 2023-06-28 ENCOUNTER — OFFICE VISIT (OUTPATIENT)
Dept: PHYSICAL MEDICINE AND REHAB | Age: 73
End: 2023-06-28
Payer: MEDICARE

## 2023-06-28 VITALS
HEIGHT: 60 IN | SYSTOLIC BLOOD PRESSURE: 142 MMHG | DIASTOLIC BLOOD PRESSURE: 82 MMHG | WEIGHT: 142 LBS | BODY MASS INDEX: 27.88 KG/M2 | HEART RATE: 81 BPM

## 2023-06-28 DIAGNOSIS — M79.605 LEFT LEG PAIN: Primary | ICD-10-CM

## 2023-06-28 DIAGNOSIS — M54.17 RADICULOPATHY, LUMBOSACRAL REGION: ICD-10-CM

## 2023-06-28 PROCEDURE — 1123F ACP DISCUSS/DSCN MKR DOCD: CPT | Performed by: PHYSICAL MEDICINE & REHABILITATION

## 2023-06-28 PROCEDURE — 99204 OFFICE O/P NEW MOD 45 MIN: CPT | Performed by: PHYSICAL MEDICINE & REHABILITATION

## 2023-06-28 PROCEDURE — 3077F SYST BP >= 140 MM HG: CPT | Performed by: PHYSICAL MEDICINE & REHABILITATION

## 2023-06-28 PROCEDURE — 3079F DIAST BP 80-89 MM HG: CPT | Performed by: PHYSICAL MEDICINE & REHABILITATION

## 2023-07-03 ENCOUNTER — HOSPITAL ENCOUNTER (OUTPATIENT)
Dept: OTHER | Age: 73
Setting detail: THERAPIES SERIES
Discharge: HOME | End: 2023-07-03

## 2023-07-06 ENCOUNTER — OFFICE VISIT (OUTPATIENT)
Dept: CARDIOLOGY CLINIC | Age: 73
End: 2023-07-06
Payer: MEDICARE

## 2023-07-06 VITALS
HEIGHT: 60 IN | BODY MASS INDEX: 27.88 KG/M2 | WEIGHT: 142 LBS | HEART RATE: 92 BPM | RESPIRATION RATE: 16 BRPM | SYSTOLIC BLOOD PRESSURE: 136 MMHG | DIASTOLIC BLOOD PRESSURE: 80 MMHG

## 2023-07-06 DIAGNOSIS — I50.20 HFREF (HEART FAILURE WITH REDUCED EJECTION FRACTION) (HCC): Primary | ICD-10-CM

## 2023-07-06 DIAGNOSIS — I34.0 MITRAL VALVE INSUFFICIENCY, UNSPECIFIED ETIOLOGY: ICD-10-CM

## 2023-07-06 DIAGNOSIS — I34.0 MITRAL VALVE INSUFFICIENCY, UNSPECIFIED ETIOLOGY: Primary | ICD-10-CM

## 2023-07-06 DIAGNOSIS — R93.1 DECREASED CARDIAC EJECTION FRACTION: ICD-10-CM

## 2023-07-06 PROCEDURE — 93000 ELECTROCARDIOGRAM COMPLETE: CPT | Performed by: INTERNAL MEDICINE

## 2023-07-06 PROCEDURE — 3079F DIAST BP 80-89 MM HG: CPT | Performed by: INTERNAL MEDICINE

## 2023-07-06 PROCEDURE — 99214 OFFICE O/P EST MOD 30 MIN: CPT | Performed by: INTERNAL MEDICINE

## 2023-07-06 PROCEDURE — 3075F SYST BP GE 130 - 139MM HG: CPT | Performed by: INTERNAL MEDICINE

## 2023-07-06 PROCEDURE — 1123F ACP DISCUSS/DSCN MKR DOCD: CPT | Performed by: INTERNAL MEDICINE

## 2023-07-06 RX ORDER — CARVEDILOL 6.25 MG/1
6.25 TABLET ORAL 2 TIMES DAILY
Qty: 60 TABLET | Refills: 5 | Status: SHIPPED | OUTPATIENT
Start: 2023-07-06

## 2023-07-06 RX ORDER — SPIRONOLACTONE 25 MG
25 TABLET ORAL DAILY
Qty: 30 TABLET | Refills: 5
Start: 2023-07-06

## 2023-07-06 ASSESSMENT — ENCOUNTER SYMPTOMS
WHEEZING: 0
NAUSEA: 1
DIARRHEA: 0
COUGH: 0
ABDOMINAL PAIN: 0
BLOOD IN STOOL: 0
BACK PAIN: 1
CONSTIPATION: 0
SHORTNESS OF BREATH: 0
VOMITING: 1

## 2023-07-06 NOTE — PROGRESS NOTES
OUTPATIENT CARDIOLOGY FOLLOW-UP    HPI:    Name: Dwain Ramirez    Age: 74 y.o. Primary Care Physician: Jr Ko MD    Date of Service: 7/6/2023    Chief Complaint: Follow-up after recent hospitalization       History of present illness :   75-year-old female who comes today for follow-up visit after recent hospitalization. Patient was seen in the hospital in consultation by Dr. Jono Disla on 6/21/2023 due to chest pain. Underwent a left heart catheterization by Dr. Aristeo Mckenzie. She was found to have mild disease in the diagonal branch. She has history of hypertension, hyperlipidemia, mitral regurgitation, cardiomyopathy with mild systolic dysfunction, grade 1 diastolic dysfunction, left bundle branch block, thyroid disease, GERD and hiatal hernia, Aden's esophagus, kidney stone and and arthritis. I have switched her Cardizem to Toprol and lisinopril during last visit due to mild systolic dysfunction. .  Patient said that she did not tolerate the medication. Was placed back on her Cardizem. Patient has been fairly active. She has been hiking once a week and she used to swim with no chest pain or dyspnea on exertion. She denies palpitations, dizziness or syncope. She denies lower extremity edema. EKG done today revealed sinus rhythm at 92 bpm, left atrial enlargement, incomplete left bundle branch block. Review of Systems:   Review of Systems   Constitutional:  Negative for chills, fatigue and fever. HENT:  Negative for nosebleeds. Respiratory:  Negative for cough, shortness of breath and wheezing. Gastrointestinal:  Positive for nausea and vomiting. Negative for abdominal pain, blood in stool, constipation and diarrhea. Genitourinary:  Negative for dysuria and hematuria. Musculoskeletal:  Positive for back pain. Negative for joint swelling and myalgias. Neurological:  Negative for syncope and light-headedness.    Psychiatric/Behavioral:  The patient is not

## 2023-07-07 ENCOUNTER — TELEPHONE (OUTPATIENT)
Dept: OTHER | Age: 73
End: 2023-07-07

## 2023-07-07 ENCOUNTER — TELEPHONE (OUTPATIENT)
Dept: FAMILY MEDICINE CLINIC | Age: 73
End: 2023-07-07

## 2023-07-10 NOTE — TELEPHONE ENCOUNTER
We can send her to any Cardiologist. If she would like to see one who is more willing to take her, we can place a new referral asap. Will await her list of concerns, thanks.

## 2023-07-12 ENCOUNTER — TELEPHONE (OUTPATIENT)
Dept: CARDIOLOGY CLINIC | Age: 73
End: 2023-07-12

## 2023-07-13 ENCOUNTER — HOSPITAL ENCOUNTER (OUTPATIENT)
Age: 73
Discharge: HOME | End: 2023-07-13
Payer: MEDICARE

## 2023-07-13 DIAGNOSIS — I50.20 HEART FAILURE WITH REDUCED EJECTION FRACTION (HCC): Primary | ICD-10-CM

## 2023-07-13 DIAGNOSIS — I50.20 HEART FAILURE WITH REDUCED EJECTION FRACTION (HCC): ICD-10-CM

## 2023-07-13 DIAGNOSIS — I34.0 MITRAL VALVE INSUFFICIENCY, UNSPECIFIED ETIOLOGY: ICD-10-CM

## 2023-07-13 LAB
ANION GAP SERPL CALCULATED.3IONS-SCNC: 11 MMOL/L (ref 7–16)
BNP BLD-MCNC: 614 PG/ML (ref 0–450)
BUN SERPL-MCNC: 20 MG/DL (ref 6–23)
CALCIUM SERPL-MCNC: 9.8 MG/DL (ref 8.6–10.2)
CHLORIDE SERPL-SCNC: 104 MMOL/L (ref 98–107)
CO2 SERPL-SCNC: 26 MMOL/L (ref 22–29)
CREAT SERPL-MCNC: 0.9 MG/DL (ref 0.5–1)
GLUCOSE SERPL-MCNC: 102 MG/DL (ref 74–99)
POTASSIUM SERPL-SCNC: 4.1 MMOL/L (ref 3.5–5)
SODIUM SERPL-SCNC: 141 MMOL/L (ref 132–146)

## 2023-07-13 PROCEDURE — 80048 BASIC METABOLIC PNL TOTAL CA: CPT

## 2023-07-13 PROCEDURE — 36415 COLL VENOUS BLD VENIPUNCTURE: CPT

## 2023-07-13 PROCEDURE — 83880 ASSAY OF NATRIURETIC PEPTIDE: CPT

## 2023-07-17 RX ORDER — SPIRONOLACTONE 25 MG/1
25 TABLET ORAL DAILY
Qty: 30 TABLET | Refills: 5 | Status: SHIPPED | OUTPATIENT
Start: 2023-07-17

## 2023-07-20 ENCOUNTER — OFFICE VISIT (OUTPATIENT)
Dept: CARDIOLOGY CLINIC | Age: 73
End: 2023-07-20
Payer: MEDICARE

## 2023-07-20 VITALS
HEIGHT: 59 IN | RESPIRATION RATE: 14 BRPM | SYSTOLIC BLOOD PRESSURE: 152 MMHG | DIASTOLIC BLOOD PRESSURE: 90 MMHG | HEART RATE: 71 BPM | WEIGHT: 142 LBS | BODY MASS INDEX: 28.63 KG/M2

## 2023-07-20 DIAGNOSIS — I34.0 NONRHEUMATIC MITRAL VALVE REGURGITATION: ICD-10-CM

## 2023-07-20 DIAGNOSIS — I42.8 NON-ISCHEMIC CARDIOMYOPATHY (HCC): Primary | ICD-10-CM

## 2023-07-20 PROBLEM — J16.0 CAP (COMMUNITY ACQUIRED PNEUMONIA) DUE TO CHLAMYDIA SPECIES: Status: RESOLVED | Noted: 2022-08-30 | Resolved: 2023-07-20

## 2023-07-20 PROBLEM — K92.2 LOWER GI BLEED: Status: RESOLVED | Noted: 2021-02-28 | Resolved: 2023-07-20

## 2023-07-20 PROBLEM — K92.2 GI BLEED: Status: RESOLVED | Noted: 2021-02-28 | Resolved: 2023-07-20

## 2023-07-20 PROBLEM — R29.898 WEAKNESS OF BOTH HIPS: Status: RESOLVED | Noted: 2017-04-18 | Resolved: 2023-07-20

## 2023-07-20 PROBLEM — Z16.20 THERAPY FAILURE DUE TO ANTIBIOTIC RESISTANCE: Status: RESOLVED | Noted: 2022-08-30 | Resolved: 2023-07-20

## 2023-07-20 PROBLEM — R07.9 CHEST PAIN: Status: RESOLVED | Noted: 2023-06-21 | Resolved: 2023-07-20

## 2023-07-20 PROBLEM — Z98.890 S/P CARDIAC CATH: Status: RESOLVED | Noted: 2023-06-22 | Resolved: 2023-07-20

## 2023-07-20 PROBLEM — K52.9 ACUTE HEMORRHAGIC COLITIS: Status: RESOLVED | Noted: 2021-02-28 | Resolved: 2023-07-20

## 2023-07-20 PROCEDURE — 3077F SYST BP >= 140 MM HG: CPT | Performed by: INTERNAL MEDICINE

## 2023-07-20 PROCEDURE — 1123F ACP DISCUSS/DSCN MKR DOCD: CPT | Performed by: INTERNAL MEDICINE

## 2023-07-20 PROCEDURE — 93000 ELECTROCARDIOGRAM COMPLETE: CPT | Performed by: INTERNAL MEDICINE

## 2023-07-20 PROCEDURE — 3080F DIAST BP >= 90 MM HG: CPT | Performed by: INTERNAL MEDICINE

## 2023-07-20 PROCEDURE — 99215 OFFICE O/P EST HI 40 MIN: CPT | Performed by: INTERNAL MEDICINE

## 2023-07-20 NOTE — PROGRESS NOTES
Chief Complaint   Patient presents with    Cardiomyopathy    Valvular Heart Disease       Patient Active Problem List    Diagnosis Date Noted    Pyelonephritis of left kidney 08/30/2022     Priority: Medium    Non-ischemic cardiomyopathy ([address]) 07/20/2023     Overview Note:     A. Cath 6/2023: no obstructive lesions  B. CMR 6/9/2023 [facility]): EF 31%, no inflammatory pattern, moderate MR        Nonrheumatic mitral valve regurgitation 07/20/2023    HFrEF (heart failure with reduced ejection fraction) ([address]) 06/22/2023    Hiatal hernia 03/28/2018    Aden's esophagus 01/11/2018    Renal stones 01/11/2018    Somatic dysfunction of lumbar region 04/18/2017    Somatic dysfunction of cervical region 04/18/2017    HTN (hypertension) 01/27/2014    Hyperlipidemia 01/27/2014    GERD (gastroesophageal reflux disease) 01/27/2014    Vitamin D deficiency 01/27/2014       Current Outpatient Medications   Medication Sig Dispense Refill    spironolactone (ALDACTONE) 25 MG tablet Take 1 tablet by mouth daily 30 tablet 5    carvedilol (COREG) 6.25 MG tablet Take 1 tablet by mouth 2 times daily 60 tablet 5    famotidine (PEPCID) 20 MG tablet Take 1 tablet by mouth once daily 90 tablet 0    sacubitril-valsartan (ENTRESTO) 24-26 MG per tablet Take 1 tablet by mouth 2 times daily 60 tablet 3    atorvastatin (LIPITOR) 10 MG tablet Take 1 tablet by mouth daily 90 tablet 0    aspirin EC 81 MG EC tablet Take 1 tablet by mouth daily To verify holding w dr       No current facility-administered medications for this visit. No Known Allergies    Vitals:    07/20/23 1029   BP: (!) 152/90   Pulse: 71   Resp: 14   Weight: 142 lb (64.4 kg)   Height: 4' 11\" (1.499 m)                 SUBJECTIVE: Dilma Hanna presents to the office today for f/u.    Patient of dr Anders Carrero wishing to transfer care  I reviewed all cardiology notes dating back to 2019, and actual cath and recent echo images     She complains of  no cardiac symptoms  and denies

## 2023-07-23 DIAGNOSIS — E78.5 HYPERLIPIDEMIA, UNSPECIFIED HYPERLIPIDEMIA TYPE: ICD-10-CM

## 2023-07-24 RX ORDER — ATORVASTATIN CALCIUM 10 MG/1
TABLET, FILM COATED ORAL
Qty: 90 TABLET | Refills: 1 | Status: SHIPPED | OUTPATIENT
Start: 2023-07-24

## 2023-07-25 ENCOUNTER — TELEPHONE (OUTPATIENT)
Dept: CARDIOLOGY CLINIC | Age: 73
End: 2023-07-25

## 2023-07-25 NOTE — TELEPHONE ENCOUNTER
----- Message from Huy Calderon sent at 7/25/2023  3:06 PM EDT -----  Regarding: Re: Vacation  Contact: [phone]  Dr. Mariola Mcdaniel, I am giving you some follow up info:  I am doing well in tolerating the additional dosage in my heart medicines. I have not had any side effects. I have gotten additional info about wearing the heart vest on my raft trip. The Extension [employer] all agree I could wear the vest on the raft trip as long as it is wrapped in plastic. Therefore, it would seem I could wear the vest on my trip to [facility] [place] without it impacting my activities as none other activities involve water. That would allow more time for me to wear the vest if needed for my care. I need to make the final payment on my trip by 7-31-23 and I would like to book the raft trip ASAP. Please give your OK if you think I can safely do those, wearing the vest if still needed. Also, FYI - Besides hiking about once a week, I had also gone back to swimming for about 3 months before I had the life vest put on. I had worked my way up to a half mile. Thanks for your consideration.   Davida Zavala

## 2023-07-27 ENCOUNTER — TELEPHONE (OUTPATIENT)
Dept: CARDIOLOGY CLINIC | Age: 73
End: 2023-07-27

## 2023-07-27 RX ORDER — CARVEDILOL 12.5 MG/1
12.5 TABLET ORAL 2 TIMES DAILY
Qty: 60 TABLET | Refills: 5 | Status: SHIPPED | OUTPATIENT
Start: 2023-07-27

## 2023-07-27 NOTE — TELEPHONE ENCOUNTER
----- Message from Huy Calderon sent at 7/26/2023  4:57 PM EDT -----  Regarding: Re: Vacation  Contact: [phone]  Dr. Mariola Mcdaniel, Do you see it being safe for me to go on vacation to [facility] [place] this year and if not how long?   Thanks, Davida Zavala

## 2023-08-01 ENCOUNTER — HOSPITAL ENCOUNTER (OUTPATIENT)
Dept: OTHER | Age: 73
Setting detail: THERAPIES SERIES
Discharge: HOME | End: 2023-08-01

## 2023-08-01 NOTE — PLAN OF CARE
Patient's CHF Clinic appointment cancelled after speaking to [facility] Cardiology.

## 2023-08-07 ENCOUNTER — OFFICE VISIT (OUTPATIENT)
Dept: CARDIOLOGY CLINIC | Age: 73
End: 2023-08-07
Payer: MEDICARE

## 2023-08-07 ENCOUNTER — TELEPHONE (OUTPATIENT)
Dept: CARDIOLOGY CLINIC | Age: 73
End: 2023-08-07

## 2023-08-07 VITALS
BODY MASS INDEX: 28.02 KG/M2 | HEART RATE: 69 BPM | SYSTOLIC BLOOD PRESSURE: 153 MMHG | WEIGHT: 139 LBS | DIASTOLIC BLOOD PRESSURE: 83 MMHG | HEIGHT: 59 IN | RESPIRATION RATE: 16 BRPM

## 2023-08-07 DIAGNOSIS — I42.8 NON-ISCHEMIC CARDIOMYOPATHY (HCC): Primary | ICD-10-CM

## 2023-08-07 DIAGNOSIS — I34.0 NONRHEUMATIC MITRAL VALVE REGURGITATION: ICD-10-CM

## 2023-08-07 DIAGNOSIS — I42.8 NON-ISCHEMIC CARDIOMYOPATHY (HCC): ICD-10-CM

## 2023-08-07 PROBLEM — N12 PYELONEPHRITIS OF LEFT KIDNEY: Status: RESOLVED | Noted: 2022-08-30 | Resolved: 2023-08-07

## 2023-08-07 PROBLEM — M99.03 SOMATIC DYSFUNCTION OF LUMBAR REGION: Status: RESOLVED | Noted: 2017-04-18 | Resolved: 2023-08-07

## 2023-08-07 PROBLEM — M99.01 SOMATIC DYSFUNCTION OF CERVICAL REGION: Status: RESOLVED | Noted: 2017-04-18 | Resolved: 2023-08-07

## 2023-08-07 LAB
ANION GAP SERPL CALCULATED.3IONS-SCNC: 16 MMOL/L (ref 7–16)
BUN BLDV-MCNC: 19 MG/DL (ref 6–23)
CALCIUM SERPL-MCNC: 10 MG/DL (ref 8.6–10.2)
CHLORIDE BLD-SCNC: 106 MMOL/L (ref 98–107)
CO2: 21 MMOL/L (ref 22–29)
CREAT SERPL-MCNC: 1.1 MG/DL (ref 0.5–1)
GFR SERPL CREATININE-BSD FRML MDRD: 55 ML/MIN/1.73M2
GLUCOSE BLD-MCNC: 110 MG/DL (ref 74–99)
POTASSIUM SERPL-SCNC: 4.4 MMOL/L (ref 3.5–5)
SODIUM BLD-SCNC: 143 MMOL/L (ref 132–146)

## 2023-08-07 PROCEDURE — 3077F SYST BP >= 140 MM HG: CPT | Performed by: INTERNAL MEDICINE

## 2023-08-07 PROCEDURE — 36415 COLL VENOUS BLD VENIPUNCTURE: CPT | Performed by: INTERNAL MEDICINE

## 2023-08-07 PROCEDURE — 93000 ELECTROCARDIOGRAM COMPLETE: CPT | Performed by: INTERNAL MEDICINE

## 2023-08-07 PROCEDURE — 1123F ACP DISCUSS/DSCN MKR DOCD: CPT | Performed by: INTERNAL MEDICINE

## 2023-08-07 PROCEDURE — 3079F DIAST BP 80-89 MM HG: CPT | Performed by: INTERNAL MEDICINE

## 2023-08-07 PROCEDURE — 99214 OFFICE O/P EST MOD 30 MIN: CPT | Performed by: INTERNAL MEDICINE

## 2023-08-07 RX ORDER — OMEPRAZOLE 40 MG/1
CAPSULE, DELAYED RELEASE ORAL
COMMUNITY
Start: 2022-01-01

## 2023-08-07 NOTE — TELEPHONE ENCOUNTER
I don't want to forget to ask you in the visit tomorrow - I need to refill both these meds and I have refillls available. I want to make sure you are leaving these meds as is before I refill.     Thanks,      Joey Randle

## 2023-08-07 NOTE — PROGRESS NOTES
Chief Complaint   Patient presents with    Cardiomyopathy       Patient Active Problem List    Diagnosis Date Noted    Non-ischemic cardiomyopathy ([address]) 07/20/2023     Overview Note:     A. Cath 6/2023: no obstructive lesions  B. CMR 6/9/2023 Caryle Man): EF 31%, no inflammatory pattern, moderate MR        Nonrheumatic mitral valve regurgitation 07/20/2023    HFrEF (heart failure with reduced ejection fraction) ([address]) 06/22/2023    Hiatal hernia 03/28/2018    Aden's esophagus 01/11/2018    Renal stones 01/11/2018    HTN (hypertension) 01/27/2014    Hyperlipidemia 01/27/2014    Vitamin D deficiency 01/27/2014       Current Outpatient Medications   Medication Sig Dispense Refill    omeprazole (PRILOSEC) 40 MG delayed release capsule Take 1 capsule every day by oral route. carvedilol (COREG) 12.5 MG tablet Take 1 tablet by mouth 2 times daily 60 tablet 5    sacubitril-valsartan (ENTRESTO) 49-51 MG per tablet Take 1 tablet by mouth 2 times daily 60 tablet 5    atorvastatin (LIPITOR) 10 MG tablet Take 1 tablet by mouth once daily 90 tablet 1    empagliflozin (JARDIANCE) 10 MG tablet Take 1 tablet by mouth daily 30 tablet 5    spironolactone (ALDACTONE) 25 MG tablet Take 1 tablet by mouth daily 30 tablet 5    famotidine (PEPCID) 20 MG tablet Take 1 tablet by mouth once daily (Patient taking differently: 2 tablets) 90 tablet 0    aspirin EC 81 MG EC tablet Take 1 tablet by mouth daily To verify holding w dr       No current facility-administered medications for this visit. No Known Allergies    Vitals:    08/07/23 1305   BP: (!) 153/83   Pulse: 69   Resp: 16   Weight: 139 lb (63 kg)   Height: 4' 11\" (1.499 m)                 SUBJECTIVE: Jean Day presents to the office today for f/u. Has adjusted her BB and ARNI as asked       She complains of  no cardiac symptoms  and denies   dyspnea, exertional chest pressure/discomfort, orthopnea, palpitations, paroxysmal nocturnal dyspnea, and syncope.   Compliant

## 2023-08-08 ENCOUNTER — TELEPHONE (OUTPATIENT)
Dept: CARDIOLOGY CLINIC | Age: 73
End: 2023-08-08

## 2023-08-08 RX ORDER — SPIRONOLACTONE 25 MG/1
25 TABLET ORAL DAILY
Qty: 30 TABLET | Refills: 5 | Status: SHIPPED | OUTPATIENT
Start: 2023-08-08

## 2023-08-08 NOTE — TELEPHONE ENCOUNTER
----- Message from Robbin Gomez MD sent at 8/8/2023  6:56 AM EDT -----  Let her know labs good  I want her to go to [facility] 97/103 bid  Likely will need new script      ----- Message -----  From: [id number] - [facility]coming [facility] Results From [place]  Sent: 8/7/2023  10:17 PM EDT  To: Robbin oGmez MD

## 2023-08-21 ENCOUNTER — OFFICE VISIT (OUTPATIENT)
Dept: CARDIOLOGY CLINIC | Age: 73
End: 2023-08-21
Payer: MEDICARE

## 2023-08-21 VITALS
RESPIRATION RATE: 16 BRPM | BODY MASS INDEX: 27.86 KG/M2 | DIASTOLIC BLOOD PRESSURE: 81 MMHG | WEIGHT: 138.2 LBS | HEIGHT: 59 IN | SYSTOLIC BLOOD PRESSURE: 149 MMHG | HEART RATE: 72 BPM

## 2023-08-21 DIAGNOSIS — I50.20 HFREF (HEART FAILURE WITH REDUCED EJECTION FRACTION) (HCC): ICD-10-CM

## 2023-08-21 DIAGNOSIS — I10 PRIMARY HYPERTENSION: Primary | ICD-10-CM

## 2023-08-21 DIAGNOSIS — I34.0 NONRHEUMATIC MITRAL VALVE REGURGITATION: ICD-10-CM

## 2023-08-21 PROCEDURE — 3079F DIAST BP 80-89 MM HG: CPT | Performed by: INTERNAL MEDICINE

## 2023-08-21 PROCEDURE — 3077F SYST BP >= 140 MM HG: CPT | Performed by: INTERNAL MEDICINE

## 2023-08-21 PROCEDURE — 1123F ACP DISCUSS/DSCN MKR DOCD: CPT | Performed by: INTERNAL MEDICINE

## 2023-08-21 PROCEDURE — 99214 OFFICE O/P EST MOD 30 MIN: CPT | Performed by: INTERNAL MEDICINE

## 2023-08-21 PROCEDURE — 93000 ELECTROCARDIOGRAM COMPLETE: CPT | Performed by: INTERNAL MEDICINE

## 2023-08-21 NOTE — PROGRESS NOTES
Chief Complaint   Patient presents with    Cardiomyopathy       Patient Active Problem List    Diagnosis Date Noted    Non-ischemic cardiomyopathy ([address]) 07/20/2023     Overview Note:     A. Cath 6/2023: no obstructive lesions  B. CMR 6/9/2023 Sridhar Samuels): EF 31%, no inflammatory pattern, moderate MR        Nonrheumatic mitral valve regurgitation 07/20/2023    HFrEF (heart failure with reduced ejection fraction) ([address]) 06/22/2023    Hiatal hernia 03/28/2018    Aden's esophagus 01/11/2018    Renal stones 01/11/2018    HTN (hypertension) 01/27/2014    Hyperlipidemia 01/27/2014    Vitamin D deficiency 01/27/2014       Current Outpatient Medications   Medication Sig Dispense Refill    empagliflozin (JARDIANCE) 10 MG tablet Take 1 tablet by mouth daily 30 tablet 5    spironolactone (ALDACTONE) 25 MG tablet Take 1 tablet by mouth daily 30 tablet 5    sacubitril-valsartan (ENTRESTO)  MG per tablet Take 1 tablet by mouth 2 times daily 60 tablet 3    omeprazole (PRILOSEC) 40 MG delayed release capsule Take 1 capsule every day by oral route. carvedilol (COREG) 12.5 MG tablet Take 1 tablet by mouth 2 times daily 60 tablet 5    atorvastatin (LIPITOR) 10 MG tablet Take 1 tablet by mouth once daily 90 tablet 1    famotidine (PEPCID) 20 MG tablet Take 1 tablet by mouth once daily (Patient taking differently: 2 tablets) 90 tablet 0    aspirin EC 81 MG EC tablet Take 1 tablet by mouth daily To verify holding w dr       Current Facility-Administered Medications   Medication Dose Route Frequency Provider Last Rate Last Admin    perflutren lipid microspheres (DEFINITY) injection 1.5 mL  1.5 mL IntraVENous ONCE PRN Alexandra Crenshaw MD            No Known Allergies    Vitals:    08/21/23 1306   BP: (!) 149/81   Pulse: 72   Resp: 16   Weight: 138 lb 3.2 oz (62.7 kg)   Height: 4' 11\" (1.499 m)                 SUBJECTIVE: Braulio Peralta presents to the office today for f/u.     Has adjusted her  ARNI as asked       She complains of

## 2023-09-01 ENCOUNTER — TELEPHONE (OUTPATIENT)
Dept: CARDIOLOGY CLINIC | Age: 73
End: 2023-09-01

## 2023-09-01 ENCOUNTER — HOSPITAL ENCOUNTER (OUTPATIENT)
Dept: CARDIOLOGY | Age: 73
Discharge: HOME | End: 2023-09-01
Payer: MEDICARE

## 2023-09-01 DIAGNOSIS — I50.20 HFREF (HEART FAILURE WITH REDUCED EJECTION FRACTION) (HCC): Primary | ICD-10-CM

## 2023-09-01 DIAGNOSIS — I50.20 HFREF (HEART FAILURE WITH REDUCED EJECTION FRACTION) (HCC): ICD-10-CM

## 2023-09-01 DIAGNOSIS — I10 PRIMARY HYPERTENSION: ICD-10-CM

## 2023-09-01 DIAGNOSIS — I34.0 NONRHEUMATIC MITRAL VALVE REGURGITATION: ICD-10-CM

## 2023-09-01 LAB
LV EF: 36 %
LVEF MODALITY: NORMAL

## 2023-09-01 PROCEDURE — 93306 TTE W/DOPPLER COMPLETE: CPT

## 2023-09-01 NOTE — TELEPHONE ENCOUNTER
MD Natividad Mace  Her echo shows heart muscle a little stronger than the cardiac mr showed, and leak of mitral valve less   However, she should be seen by EP for possible Bi-V - ICD in place of Life Vest     Left message for patient to call the office

## 2023-09-10 NOTE — CARE COORDINATION
Transition of Care-Met with patient at the bedside, introduced myself and CM role in planning. Patient is independent from home, lives with her  in a one story home. No history of DME, HHC or SNF stay. PCP is Dr. Ruth Rebolledo, saw less than 6 months ago, preferred pharmacy [facility]. Plan for Cardiac Cath lab today at [facility]. She does not anticipate any discharge needs at this time. Family to transplant home.     Yamila MCKEONN, RN  [facility]  Previously Declined (within the last year)

## 2023-09-12 ENCOUNTER — TELEPHONE (OUTPATIENT)
Dept: FAMILY MEDICINE CLINIC | Age: 73
End: 2023-09-12

## 2023-09-12 DIAGNOSIS — G47.00 INSOMNIA, UNSPECIFIED TYPE: Primary | ICD-10-CM

## 2023-09-12 RX ORDER — HYDROXYZINE HYDROCHLORIDE 25 MG/1
25 TABLET, FILM COATED ORAL NIGHTLY
Qty: 14 TABLET | Refills: 0 | Status: SHIPPED | OUTPATIENT
Start: 2023-09-12 | End: 2023-10-12

## 2023-09-15 ENCOUNTER — TELEPHONE (OUTPATIENT)
Dept: FAMILY MEDICINE CLINIC | Age: 73
End: 2023-09-15

## 2023-09-20 DIAGNOSIS — K21.9 GASTROESOPHAGEAL REFLUX DISEASE WITHOUT ESOPHAGITIS: ICD-10-CM

## 2023-09-22 RX ORDER — FAMOTIDINE 20 MG/1
TABLET, FILM COATED ORAL
Qty: 90 TABLET | Refills: 1 | Status: SHIPPED | OUTPATIENT
Start: 2023-09-22

## 2023-09-28 ENCOUNTER — TELEPHONE (OUTPATIENT)
Dept: FAMILY MEDICINE CLINIC | Age: 73
End: 2023-09-28

## 2023-09-28 NOTE — TELEPHONE ENCOUNTER
----- Message from Candance Jersey sent at 9/22/2023  4:05 PM EDT -----  Subject: Message to Provider    QUESTIONS  Information for Provider? patient refused triage for shortness of breath,   onset of 9/21, patient made appt with Dr. Lexie Rivera for 11/21  ---------------------------------------------------------------------------  --------------  [address]  [phone]; OK to leave message on voicemail  ---------------------------------------------------------------------------  --------------  SCRIPT ANSWERS  Relationship to Patient?  Self

## 2023-10-03 ENCOUNTER — HOSPITAL ENCOUNTER (OUTPATIENT)
Age: 73
Discharge: HOME | End: 2023-10-05
Payer: MEDICARE

## 2023-10-03 ENCOUNTER — HOSPITAL ENCOUNTER (OUTPATIENT)
Dept: GENERAL RADIOLOGY | Age: 73
Discharge: HOME | End: 2023-10-05
Payer: MEDICARE

## 2023-10-03 DIAGNOSIS — R19.7 DIARRHEA, UNSPECIFIED TYPE: ICD-10-CM

## 2023-10-03 DIAGNOSIS — R10.84 ABDOMINAL PAIN, GENERALIZED: ICD-10-CM

## 2023-10-03 PROCEDURE — 74018 RADEX ABDOMEN 1 VIEW: CPT

## 2023-10-10 ENCOUNTER — TELEPHONE (OUTPATIENT)
Dept: OTHER | Age: 73
End: 2023-10-10

## 2023-10-10 ENCOUNTER — TELEPHONE (OUTPATIENT)
Dept: CARDIOLOGY CLINIC | Age: 73
End: 2023-10-10

## 2023-10-10 NOTE — TELEPHONE ENCOUNTER
WHILE IN THE HOSPITAL DR Ale Yusuf SAW DOMINICK AND REQUESTED THAT SHE SEE THE CHF CLINIC. THE NURSES AT THE CHF CLINIC CALLED TO SCHEDULE THIS. DOMINICK INFORMED THEM THAT DR Ale Yusuf IS NOT HER DOCTOR AND SHE IS NOT GOING TO THE CHF CLINIC. I DISCUSSED THIS WITH THE NURSES AND TOLD THEM TO DISREGARD THE ORDER.   RLL

## 2023-10-10 NOTE — TELEPHONE ENCOUNTER
Voice message left at home number and cell number to call CHF Clinic per Dr. Yamile Contreras request, to set up an appt.

## 2023-10-11 NOTE — PROGRESS NOTES
murmurs, rubs, or gallops. PMI is nondisplaced  Lungs: clear to auscultation bilaterally, normal respiratory effort without used of accessory muscles  Abdomen: soft, non-tender, bowel sounds present, no masses or hepatomegaly   Musculoskeletal: no digital clubbing, no edema   Skin: warm, no rashes     I have personally reviewed the laboratory, cardiac diagnostic and radiographic testing as outlined below:    Data:    No results for input(s): \"WBC\", \"HGB\", \"HCT\", \"PLT\" in the last 72 hours. No results for input(s): \"NA\", \"K\", \"CL\", \"CO2\", \"BUN\", \"CREATININE\", \"GLU\", \"CALCIUM\" in the last 72 hours. Invalid input(s): \"MAGNESIUM\"   Lab Results   Component Value Date/Time    MG 1.9 06/21/2023 03:15 PM     No results for input(s): \"TSH\" in the last 72 hours. No results for input(s): \"INR\" in the last 72 hours. CXR: 6/21/2023  FINDINGS:  The lungs are without acute focal process. There is no effusion or  pneumothorax. The cardiomediastinal silhouette is without acute process. The  osseous structures are without acute process. IMPRESSION:  No acute process. EKG: 10/17/23: Sinus rhythm 68 bpm, LBBB,  ms - Please see scan in Cardiology. TTE: 3/20/2023   Summary   Technically adequate study. Mildly dilated left atrium. Mildly dilated left ventricle with moderate systolic dysfunction. Moderate-severe centrally directed mitral regurgitation. Mild aortic regurgitation. EF 35+/- 5%. The EF has decreased from 45-50% to 35+/-5 % compared to study done on   6/9/21. Echocardiogram: 9/1/2023  Findings      Left Ventricle   Mildly dilated left ventricle. LVIDD 5.3 cm   Normal left ventricle wall thickness. Septal motion consistent with conduction abnormality . Stage I diastolic dysfunction. Bi-plane LVEF 36%      Right Ventricle   Normal right ventricle structure and function. Left Atrium   Normal left atrium. Interatrial septum not well visualized.       Right Atrium   Normal right

## 2023-10-17 ENCOUNTER — TELEPHONE (OUTPATIENT)
Dept: FAMILY MEDICINE CLINIC | Age: 73
End: 2023-10-17

## 2023-10-17 ENCOUNTER — OFFICE VISIT (OUTPATIENT)
Dept: NON INVASIVE DIAGNOSTICS | Age: 73
End: 2023-10-17
Payer: MEDICARE

## 2023-10-17 ENCOUNTER — TELEPHONE (OUTPATIENT)
Dept: NON INVASIVE DIAGNOSTICS | Age: 73
End: 2023-10-17

## 2023-10-17 VITALS
HEART RATE: 68 BPM | WEIGHT: 140 LBS | SYSTOLIC BLOOD PRESSURE: 142 MMHG | HEIGHT: 59 IN | DIASTOLIC BLOOD PRESSURE: 82 MMHG | BODY MASS INDEX: 28.22 KG/M2

## 2023-10-17 DIAGNOSIS — I50.20 HFREF (HEART FAILURE WITH REDUCED EJECTION FRACTION) (HCC): Primary | ICD-10-CM

## 2023-10-17 PROCEDURE — 3077F SYST BP >= 140 MM HG: CPT | Performed by: INTERNAL MEDICINE

## 2023-10-17 PROCEDURE — 99205 OFFICE O/P NEW HI 60 MIN: CPT | Performed by: INTERNAL MEDICINE

## 2023-10-17 PROCEDURE — 93000 ELECTROCARDIOGRAM COMPLETE: CPT | Performed by: INTERNAL MEDICINE

## 2023-10-17 PROCEDURE — 3079F DIAST BP 80-89 MM HG: CPT | Performed by: INTERNAL MEDICINE

## 2023-10-17 PROCEDURE — 1123F ACP DISCUSS/DSCN MKR DOCD: CPT | Performed by: INTERNAL MEDICINE

## 2023-10-17 RX ORDER — BUDESONIDE 3 MG/1
CAPSULE, COATED PELLETS ORAL
COMMUNITY
Start: 2023-10-05

## 2023-10-17 NOTE — TELEPHONE ENCOUNTER
ERIN  Pt called today, wanted Cy to know she is scheduled to have heart defibrillator put in 11/27/23

## 2023-10-17 NOTE — TELEPHONE ENCOUNTER
Please check prior auth.     Date:11/27/23    Doc:[email or web address]    Procedure:  CRT-D implant - MDT    CPT: 04368, 12452  ICD: I50.2    Electronically signed by Sylvain Horn MA on 10/17/2023 at 10:40 AM

## 2023-10-26 ENCOUNTER — TELEPHONE (OUTPATIENT)
Dept: NON INVASIVE DIAGNOSTICS | Age: 73
End: 2023-10-26

## 2023-11-06 ENCOUNTER — TELEPHONE (OUTPATIENT)
Dept: NON INVASIVE DIAGNOSTICS | Age: 73
End: 2023-11-06

## 2023-11-06 NOTE — TELEPHONE ENCOUNTER
Called patient to ask if she can move up procedure to 11/10/23. Patient will get back to me.     Electronically signed by Girtha Bamberger, MA on 11/6/2023 at 3:47 PM

## 2023-11-07 ENCOUNTER — TELEPHONE (OUTPATIENT)
Dept: NON INVASIVE DIAGNOSTICS | Age: 73
End: 2023-11-07

## 2023-11-07 NOTE — TELEPHONE ENCOUNTER
Patient called to see if she would be able to use a TENS unit with her planned ICD. I told her generally this is okay as long as the TENS unit is 6 inches or more from the device. I asked her to notify the physician who manages the TENS unit she will be getting an ICD implant. Patient told me she bought the device herself.     Ellen Lee RN, BSN  [address] [facility]

## 2023-11-07 NOTE — TELEPHONE ENCOUNTER
Patient wants to keep original appointment date of 11/29/2023.     Electronically signed by Jh Nogueira MA on 11/7/2023 at 9:33 AM

## 2023-11-09 ENCOUNTER — TELEPHONE (OUTPATIENT)
Dept: NON INVASIVE DIAGNOSTICS | Age: 73
End: 2023-11-09

## 2023-11-09 NOTE — TELEPHONE ENCOUNTER
Patient called and stated she is worried about after the surgery (device implant). She is worried about the bed rest, the thought of it gives her high anxiety/panic attacks. Patient wanted to know if she could be given something prn after surgery if she has anxiety due to th thought of bed rest ( states she worried about not being able to get out of bed).

## 2023-11-14 ENCOUNTER — TELEPHONE (OUTPATIENT)
Dept: CARDIOLOGY CLINIC | Age: 73
End: 2023-11-14

## 2023-11-14 ENCOUNTER — TELEPHONE (OUTPATIENT)
Dept: NON INVASIVE DIAGNOSTICS | Age: 73
End: 2023-11-14

## 2023-11-14 ENCOUNTER — TELEPHONE (OUTPATIENT)
Dept: PHARMACY | Facility: CLINIC | Age: 73
End: 2023-11-14

## 2023-11-14 NOTE — TELEPHONE ENCOUNTER
----- Message from Huy Calderon sent at 11/14/2023 12:59 PM EST -----  Regarding: Heart medication after surgery  Contact: [phone]  Please let me know if I will still need the 3 heart medications you earlier started me on after my heart surgery. Am I to take those indefinitely or will I no longer need them after the surgery?   Thanks, Tenzin Padron

## 2023-11-14 NOTE — TELEPHONE ENCOUNTER
----- Message from Huy Calderon sent at 11/14/2023 12:50 PM EST -----  Regarding: Time frame for not lift over 5 pounds and not lift arm above shoulders  Contact: [phone]  Please let me know what the time period is for not being able to lift over 5 pounds and for not being able to lift the left arm above the shoulders.   Thanks, Alexander Riojas

## 2023-11-14 NOTE — TELEPHONE ENCOUNTER
[facility] CLINICAL PHARMACY: ADHERENCE REVIEW  Identified care gap per [employer]: fills at [facility]: Diabetes and Statin adherence    ASSESSMENT  DIABETES ADHERENCE    Insurance Records claims through 10/17/23 (Prior Year  [address] = not reported; YTD [address] = 100%; Potential Fail Date: 23):   Jardiance last filled on 23 for 30 day supply. Next refill due: 10/18/23    Prescribed si tablet/capsule daily    Lab Results   Component Value Date    LABA1C 5.3 2023       STATIN ADHERENCE    Atorvastatin - PASSED    Lab Results   Component Value Date    CHOL 172 2021    TRIG 109 2021    HDL 51 2023    LDLCALC 129 (H) 2023     ALT   Date Value Ref Range Status   2023 16 0 - 32 U/L Final     AST   Date Value Ref Range Status   2023 30 0 - 31 U/L Final     The 10-year ASCVD risk score (Colby SUBRAMANIAN, et al., 2019) is: 25.2%    Values used to calculate the score:      Age: 74 years      Sex: Female      Is Non- : No      Diabetic: No      Tobacco smoker: No      Systolic Blood Pressure: 392 mmHg      Is BP treated: Yes      HDL Cholesterol: 51 mg/dL      Total Cholesterol: 190 mg/dL     PLAN  Per insurer report, LIS-0 - co-pays are based on tiers and patient is subject to coverage gap. The following are interventions that have been identified:   Patient overdue refilling jardiance and active on home medication list.     Reached patient for review. Patient takes jardiance for heart failure and was having trouble paying for jardiance and eliquis. She is not getting both these medications through PAP program (help program). She also mentioned she is having heart surgery and may not be staying on these meds post surgery. Patient thanked me for the call.      Last Visit: 23  Next Visit: 23    Benard Mortimer, PharmD, [address] Pharmacist  Department: [facility]

## 2023-11-21 ENCOUNTER — OFFICE VISIT (OUTPATIENT)
Dept: FAMILY MEDICINE CLINIC | Age: 73
End: 2023-11-21

## 2023-11-21 VITALS
BODY MASS INDEX: 27.78 KG/M2 | HEART RATE: 71 BPM | SYSTOLIC BLOOD PRESSURE: 132 MMHG | DIASTOLIC BLOOD PRESSURE: 82 MMHG | HEIGHT: 59 IN | RESPIRATION RATE: 22 BRPM | OXYGEN SATURATION: 97 % | WEIGHT: 137.8 LBS | TEMPERATURE: 97 F

## 2023-11-21 DIAGNOSIS — Z23 NEED FOR TDAP VACCINATION: ICD-10-CM

## 2023-11-21 DIAGNOSIS — Z71.89 ACP (ADVANCE CARE PLANNING): ICD-10-CM

## 2023-11-21 DIAGNOSIS — Z00.00 MEDICARE ANNUAL WELLNESS VISIT, SUBSEQUENT: Primary | ICD-10-CM

## 2023-11-21 DIAGNOSIS — K52.9 COLITIS: ICD-10-CM

## 2023-11-21 DIAGNOSIS — G47.00 INSOMNIA, UNSPECIFIED TYPE: ICD-10-CM

## 2023-11-21 DIAGNOSIS — I50.20 HFREF (HEART FAILURE WITH REDUCED EJECTION FRACTION) (HCC): ICD-10-CM

## 2023-11-21 DIAGNOSIS — E78.5 HYPERLIPIDEMIA, UNSPECIFIED HYPERLIPIDEMIA TYPE: ICD-10-CM

## 2023-11-21 RX ORDER — CARVEDILOL 12.5 MG/1
12.5 TABLET ORAL 2 TIMES DAILY
Qty: 60 TABLET | Refills: 5 | Status: SHIPPED | OUTPATIENT
Start: 2023-11-21

## 2023-11-21 RX ORDER — DOXEPIN HYDROCHLORIDE 10 MG/1
10 CAPSULE ORAL NIGHTLY
Qty: 14 CAPSULE | Refills: 0 | Status: SHIPPED | OUTPATIENT
Start: 2023-11-21

## 2023-11-21 RX ORDER — ATORVASTATIN CALCIUM 10 MG/1
10 TABLET, FILM COATED ORAL DAILY
Qty: 90 TABLET | Refills: 1 | Status: SHIPPED | OUTPATIENT
Start: 2023-11-21

## 2023-11-21 RX ORDER — BUDESONIDE 9 MG/1
TABLET, FILM COATED, EXTENDED RELEASE ORAL
COMMUNITY
Start: 2023-10-02

## 2023-11-21 SDOH — ECONOMIC STABILITY: HOUSING INSECURITY: IN THE LAST 12 MONTHS, HOW MANY PLACES HAVE YOU LIVED?: 1

## 2023-11-21 SDOH — ECONOMIC STABILITY: INCOME INSECURITY: IN THE LAST 12 MONTHS, WAS THERE A TIME WHEN YOU WERE NOT ABLE TO PAY THE MORTGAGE OR RENT ON TIME?: NO

## 2023-11-21 SDOH — ECONOMIC STABILITY: TRANSPORTATION INSECURITY
IN THE PAST 12 MONTHS, HAS THE LACK OF TRANSPORTATION KEPT YOU FROM MEDICAL APPOINTMENTS OR FROM GETTING MEDICATIONS?: NO

## 2023-11-21 SDOH — ECONOMIC STABILITY: TRANSPORTATION INSECURITY
IN THE PAST 12 MONTHS, HAS LACK OF TRANSPORTATION KEPT YOU FROM MEETINGS, WORK, OR FROM GETTING THINGS NEEDED FOR DAILY LIVING?: NO

## 2023-11-21 ASSESSMENT — SOCIAL DETERMINANTS OF HEALTH (SDOH)
DO YOU BELONG TO ANY CLUBS OR ORGANIZATIONS SUCH AS CHURCH GROUPS UNIONS, FRATERNAL OR ATHLETIC GROUPS, OR SCHOOL GROUPS?: NO
WITHIN THE LAST YEAR, HAVE YOU BEEN AFRAID OF YOUR PARTNER OR EX-PARTNER?: NO
IN A TYPICAL WEEK, HOW MANY TIMES DO YOU TALK ON THE PHONE WITH FAMILY, FRIENDS, OR NEIGHBORS?: MORE THAN THREE TIMES A WEEK
HOW OFTEN DO YOU GET TOGETHER WITH FRIENDS OR RELATIVES?: MORE THAN THREE TIMES A WEEK
WITHIN THE LAST YEAR, HAVE YOU BEEN KICKED, HIT, SLAPPED, OR OTHERWISE PHYSICALLY HURT BY YOUR PARTNER OR EX-PARTNER?: NO
WITHIN THE LAST YEAR, HAVE TO BEEN RAPED OR FORCED TO HAVE ANY KIND OF SEXUAL ACTIVITY BY YOUR PARTNER OR EX-PARTNER?: NO
WITHIN THE LAST YEAR, HAVE YOU BEEN HUMILIATED OR EMOTIONALLY ABUSED IN OTHER WAYS BY YOUR PARTNER OR EX-PARTNER?: NO
HOW OFTEN DO YOU ATTEND CHURCH OR RELIGIOUS SERVICES?: NEVER
HOW OFTEN DO YOU ATTENT MEETINGS OF THE CLUB OR ORGANIZATION YOU BELONG TO?: NEVER

## 2023-11-21 ASSESSMENT — PATIENT HEALTH QUESTIONNAIRE - PHQ9
1. LITTLE INTEREST OR PLEASURE IN DOING THINGS: 0
2. FEELING DOWN, DEPRESSED OR HOPELESS: 0
SUM OF ALL RESPONSES TO PHQ QUESTIONS 1-9: 0
SUM OF ALL RESPONSES TO PHQ9 QUESTIONS 1 & 2: 0
SUM OF ALL RESPONSES TO PHQ QUESTIONS 1-9: 0

## 2023-11-21 ASSESSMENT — LIFESTYLE VARIABLES
HOW MANY STANDARD DRINKS CONTAINING ALCOHOL DO YOU HAVE ON A TYPICAL DAY: PATIENT DOES NOT DRINK
HOW OFTEN DO YOU HAVE A DRINK CONTAINING ALCOHOL: NEVER

## 2023-11-21 NOTE — PROGRESS NOTES
Medardo Rand MD   omeprazole (PRILOSEC) 40 MG delayed release capsule Take 1 capsule every day by oral route.  Yes Cornelius Wyatt MD   carvedilol (COREG) 12.5 MG tablet Take 1 tablet by mouth 2 times daily Yes Becky Sagastume MD   atorvastatin (LIPITOR) 10 MG tablet Take 1 tablet by mouth once daily Yes Hanny Sullivan MD   aspirin EC 81 MG EC tablet Take 1 tablet by mouth daily To verify holding w dr Yes Provider, MD Cornelius       CareTeam (Including outside providers/suppliers regularly involved in providing care):   Patient Care Team:  Hanny Sullivan MD as PCP - General (Family Medicine)  Hanny Sullivan MD as PCP - Empaneled Provider  Alfredito Fulton MD as Consulting Physician (Cardiology)  Ching Perez MD as Consulting Physician (Dermatology)  Autumn Morton MD as Surgeon (Gastroenterology)  Lord Cecil MD as Obstetrician (Obstetrics & Gynecology)  Carl, Kendrick Sargent DO as Surgeon (Urology)  Jonny Espinoza DPM as Consulting Physician (Podiatry)  Javad López MD as Consulting Physician (Physical Medicine and Rehab)     Reviewed and updated this visit:  Tobacco  Allergies  Meds  Med Hx  Surg Hx  Soc Hx  Fam Hx

## 2023-11-24 ENCOUNTER — TELEPHONE (OUTPATIENT)
Dept: FAMILY MEDICINE CLINIC | Age: 73
End: 2023-11-24

## 2023-11-24 NOTE — TELEPHONE ENCOUNTER
Pt calling to see if she can get something for anxiety for when she comes out of surgery. She states she only needs something for one day in case she is too anxious to lay still when she gets home. Please advise

## 2023-11-29 ENCOUNTER — HOSPITAL ENCOUNTER (OUTPATIENT)
Age: 73
Setting detail: OBSERVATION
Discharge: HOME | End: 2023-11-30
Attending: INTERNAL MEDICINE | Admitting: INTERNAL MEDICINE
Payer: MEDICARE

## 2023-11-29 ENCOUNTER — ANESTHESIA EVENT (OUTPATIENT)
Age: 73
End: 2023-11-29
Payer: MEDICARE

## 2023-11-29 ENCOUNTER — APPOINTMENT (OUTPATIENT)
Dept: GENERAL RADIOLOGY | Age: 73
End: 2023-11-29
Attending: INTERNAL MEDICINE
Payer: MEDICARE

## 2023-11-29 ENCOUNTER — ANESTHESIA (OUTPATIENT)
Age: 73
End: 2023-11-29
Payer: MEDICARE

## 2023-11-29 DIAGNOSIS — I42.9 CARDIOMYOPATHY, UNSPECIFIED TYPE (HCC): ICD-10-CM

## 2023-11-29 DIAGNOSIS — I50.20 HFREF (HEART FAILURE WITH REDUCED EJECTION FRACTION) (HCC): ICD-10-CM

## 2023-11-29 PROBLEM — Z95.810 S/P ICD (INTERNAL CARDIAC DEFIBRILLATOR) PROCEDURE: Status: ACTIVE | Noted: 2023-11-29

## 2023-11-29 LAB
ABO + RH BLD: NORMAL
ANION GAP SERPL CALCULATED.3IONS-SCNC: 13 MMOL/L (ref 7–16)
ARM BAND NUMBER: NORMAL
BASOPHILS # BLD: 0.04 K/UL (ref 0–0.2)
BASOPHILS NFR BLD: 0 % (ref 0–2)
BLOOD BANK SAMPLE EXPIRATION: NORMAL
BLOOD GROUP ANTIBODIES SERPL: NEGATIVE
BUN SERPL-MCNC: 30 MG/DL (ref 6–23)
CALCIUM SERPL-MCNC: 9.8 MG/DL (ref 8.6–10.2)
CHLORIDE SERPL-SCNC: 103 MMOL/L (ref 98–107)
CO2 SERPL-SCNC: 27 MMOL/L (ref 22–29)
CREAT SERPL-MCNC: 1 MG/DL (ref 0.5–1)
ECHO BSA: 1.69 M2
EKG ATRIAL RATE: 69 BPM
EKG P AXIS: 52 DEGREES
EKG P-R INTERVAL: 172 MS
EKG Q-T INTERVAL: 436 MS
EKG QRS DURATION: 146 MS
EKG QTC CALCULATION (BAZETT): 467 MS
EKG R AXIS: -17 DEGREES
EKG T AXIS: -96 DEGREES
EKG VENTRICULAR RATE: 69 BPM
EOSINOPHIL # BLD: 0.1 K/UL (ref 0.05–0.5)
EOSINOPHILS RELATIVE PERCENT: 1 % (ref 0–6)
ERYTHROCYTE [DISTWIDTH] IN BLOOD BY AUTOMATED COUNT: 13.6 % (ref 11.5–15)
GFR SERPL CREATININE-BSD FRML MDRD: >60 ML/MIN/1.73M2
GLUCOSE SERPL-MCNC: 87 MG/DL (ref 74–99)
HCT VFR BLD AUTO: 48.1 % (ref 34–48)
HGB BLD-MCNC: 16 G/DL (ref 11.5–15.5)
IMM GRANULOCYTES # BLD AUTO: 0.04 K/UL (ref 0–0.58)
IMM GRANULOCYTES NFR BLD: 0 % (ref 0–5)
LYMPHOCYTES NFR BLD: 2.1 K/UL (ref 1.5–4)
LYMPHOCYTES RELATIVE PERCENT: 20 % (ref 20–42)
MAGNESIUM SERPL-MCNC: 2.2 MG/DL (ref 1.6–2.6)
MCH RBC QN AUTO: 31.4 PG (ref 26–35)
MCHC RBC AUTO-ENTMCNC: 33.3 G/DL (ref 32–34.5)
MCV RBC AUTO: 94.5 FL (ref 80–99.9)
MONOCYTES NFR BLD: 0.76 K/UL (ref 0.1–0.95)
MONOCYTES NFR BLD: 7 % (ref 2–12)
NEUTROPHILS NFR BLD: 72 % (ref 43–80)
NEUTS SEG NFR BLD: 7.66 K/UL (ref 1.8–7.3)
PLATELET # BLD AUTO: 217 K/UL (ref 130–450)
PMV BLD AUTO: 9.6 FL (ref 7–12)
POTASSIUM SERPL-SCNC: 4.7 MMOL/L (ref 3.5–5)
RBC # BLD AUTO: 5.09 M/UL (ref 3.5–5.5)
SODIUM SERPL-SCNC: 143 MMOL/L (ref 132–146)
WBC OTHER # BLD: 10.7 K/UL (ref 4.5–11.5)

## 2023-11-29 PROCEDURE — 7100000011 HC PHASE II RECOVERY - ADDTL 15 MIN: Performed by: INTERNAL MEDICINE

## 2023-11-29 PROCEDURE — 6360000002 HC RX W HCPCS: Performed by: INTERNAL MEDICINE

## 2023-11-29 PROCEDURE — 99223 1ST HOSP IP/OBS HIGH 75: CPT | Performed by: INTERNAL MEDICINE

## 2023-11-29 PROCEDURE — C1898 LEAD, PMKR, OTHER THAN TRANS: HCPCS | Performed by: INTERNAL MEDICINE

## 2023-11-29 PROCEDURE — 93005 ELECTROCARDIOGRAM TRACING: CPT | Performed by: INTERNAL MEDICINE

## 2023-11-29 PROCEDURE — 33249 INSJ/RPLCMT DEFIB W/LEAD(S): CPT | Performed by: INTERNAL MEDICINE

## 2023-11-29 PROCEDURE — 33225 L VENTRIC PACING LEAD ADD-ON: CPT | Performed by: INTERNAL MEDICINE

## 2023-11-29 PROCEDURE — C1889 IMPLANT/INSERT DEVICE, NOC: HCPCS | Performed by: INTERNAL MEDICINE

## 2023-11-29 PROCEDURE — C1882 AICD, OTHER THAN SING/DUAL: HCPCS | Performed by: INTERNAL MEDICINE

## 2023-11-29 PROCEDURE — 86901 BLOOD TYPING SEROLOGIC RH(D): CPT

## 2023-11-29 PROCEDURE — 2709999900 HC NON-CHARGEABLE SUPPLY: Performed by: INTERNAL MEDICINE

## 2023-11-29 PROCEDURE — 2500000003 HC RX 250 WO HCPCS: Performed by: INTERNAL MEDICINE

## 2023-11-29 PROCEDURE — 6370000000 HC RX 637 (ALT 250 FOR IP): Performed by: INTERNAL MEDICINE

## 2023-11-29 PROCEDURE — 2580000003 HC RX 258: Performed by: INTERNAL MEDICINE

## 2023-11-29 PROCEDURE — 83735 ASSAY OF MAGNESIUM: CPT

## 2023-11-29 PROCEDURE — 2500000003 HC RX 250 WO HCPCS: Performed by: NURSE ANESTHETIST, CERTIFIED REGISTERED

## 2023-11-29 PROCEDURE — C1777 LEAD, AICD, ENDO SINGLE COIL: HCPCS | Performed by: INTERNAL MEDICINE

## 2023-11-29 PROCEDURE — G0378 HOSPITAL OBSERVATION PER HR: HCPCS

## 2023-11-29 PROCEDURE — 86900 BLOOD TYPING SEROLOGIC ABO: CPT

## 2023-11-29 PROCEDURE — 86850 RBC ANTIBODY SCREEN: CPT

## 2023-11-29 PROCEDURE — 6360000002 HC RX W HCPCS: Performed by: NURSE ANESTHETIST, CERTIFIED REGISTERED

## 2023-11-29 PROCEDURE — 7100000010 HC PHASE II RECOVERY - FIRST 15 MIN: Performed by: INTERNAL MEDICINE

## 2023-11-29 PROCEDURE — C1730 CATH, EP, 19 OR FEW ELECT: HCPCS | Performed by: INTERNAL MEDICINE

## 2023-11-29 PROCEDURE — C1892 INTRO/SHEATH,FIXED,PEEL-AWAY: HCPCS | Performed by: INTERNAL MEDICINE

## 2023-11-29 PROCEDURE — C1900 LEAD, CORONARY VENOUS: HCPCS | Performed by: INTERNAL MEDICINE

## 2023-11-29 PROCEDURE — 85025 COMPLETE CBC W/AUTO DIFF WBC: CPT

## 2023-11-29 PROCEDURE — C1769 GUIDE WIRE: HCPCS | Performed by: INTERNAL MEDICINE

## 2023-11-29 PROCEDURE — 3700000000 HC ANESTHESIA ATTENDED CARE: Performed by: INTERNAL MEDICINE

## 2023-11-29 PROCEDURE — 2720000010 HC SURG SUPPLY STERILE: Performed by: INTERNAL MEDICINE

## 2023-11-29 PROCEDURE — 80048 BASIC METABOLIC PNL TOTAL CA: CPT

## 2023-11-29 PROCEDURE — 3700000001 HC ADD 15 MINUTES (ANESTHESIA): Performed by: INTERNAL MEDICINE

## 2023-11-29 PROCEDURE — 6360000004 HC RX CONTRAST MEDICATION: Performed by: INTERNAL MEDICINE

## 2023-11-29 PROCEDURE — 2580000003 HC RX 258: Performed by: NURSE ANESTHETIST, CERTIFIED REGISTERED

## 2023-11-29 PROCEDURE — 71045 X-RAY EXAM CHEST 1 VIEW: CPT

## 2023-11-29 DEVICE — ENVELOPE CMRM6133 ABSORB LRG MR
Type: IMPLANTABLE DEVICE | Status: FUNCTIONAL
Brand: TYRX™

## 2023-11-29 DEVICE — LEAD 5076-52 MRI US RCMCRD
Type: IMPLANTABLE DEVICE | Site: HEART | Status: FUNCTIONAL
Brand: CAPSUREFIX NOVUS MRI™ SURESCAN®

## 2023-11-29 DEVICE — CRTD DTPA2QQ COBALT XT HF QUAD MRI DF4
Type: IMPLANTABLE DEVICE | Site: CHEST  WALL | Status: FUNCTIONAL
Brand: COBALT™ XT HF QUAD CRT-D MRI SURESCAN™

## 2023-11-29 DEVICE — LEAD 6935M62 QUATTRO SECURE S MRI US
Type: IMPLANTABLE DEVICE | Site: HEART | Status: FUNCTIONAL
Brand: SPRINT QUATTRO SECURE S MRI™ SURESCAN™

## 2023-11-29 DEVICE — LEAD 459888 MRI S-TIP US
Type: IMPLANTABLE DEVICE | Site: HEART | Status: FUNCTIONAL
Brand: ATTAIN PERFORMA™ S MRI SURESCAN™

## 2023-11-29 RX ORDER — CARVEDILOL 6.25 MG/1
12.5 TABLET ORAL 2 TIMES DAILY
Status: DISCONTINUED | OUTPATIENT
Start: 2023-11-29 | End: 2023-11-30 | Stop reason: HOSPADM

## 2023-11-29 RX ORDER — SODIUM CHLORIDE 0.9 % (FLUSH) 0.9 %
5-40 SYRINGE (ML) INJECTION PRN
Status: DISCONTINUED | OUTPATIENT
Start: 2023-11-29 | End: 2023-11-30 | Stop reason: HOSPADM

## 2023-11-29 RX ORDER — SODIUM CHLORIDE 9 MG/ML
INJECTION, SOLUTION INTRAVENOUS CONTINUOUS PRN
Status: DISCONTINUED | OUTPATIENT
Start: 2023-11-29 | End: 2023-11-29 | Stop reason: SDUPTHER

## 2023-11-29 RX ORDER — DOXEPIN HYDROCHLORIDE 10 MG/1
10 CAPSULE ORAL NIGHTLY
Status: DISCONTINUED | OUTPATIENT
Start: 2023-11-29 | End: 2023-11-30 | Stop reason: HOSPADM

## 2023-11-29 RX ORDER — PROPOFOL 10 MG/ML
INJECTION, EMULSION INTRAVENOUS PRN
Status: DISCONTINUED | OUTPATIENT
Start: 2023-11-29 | End: 2023-11-29 | Stop reason: SDUPTHER

## 2023-11-29 RX ORDER — ONDANSETRON 2 MG/ML
INJECTION INTRAMUSCULAR; INTRAVENOUS PRN
Status: DISCONTINUED | OUTPATIENT
Start: 2023-11-29 | End: 2023-11-29 | Stop reason: SDUPTHER

## 2023-11-29 RX ORDER — ASPIRIN 81 MG/1
81 TABLET ORAL DAILY
Status: DISCONTINUED | OUTPATIENT
Start: 2023-11-30 | End: 2023-11-30 | Stop reason: HOSPADM

## 2023-11-29 RX ORDER — SODIUM CHLORIDE 9 MG/ML
INJECTION, SOLUTION INTRAVENOUS PRN
Status: DISCONTINUED | OUTPATIENT
Start: 2023-11-29 | End: 2023-11-30 | Stop reason: HOSPADM

## 2023-11-29 RX ORDER — SPIRONOLACTONE 25 MG/1
25 TABLET ORAL DAILY
Status: DISCONTINUED | OUTPATIENT
Start: 2023-11-29 | End: 2023-11-30 | Stop reason: HOSPADM

## 2023-11-29 RX ORDER — FENTANYL CITRATE 50 UG/ML
INJECTION, SOLUTION INTRAMUSCULAR; INTRAVENOUS PRN
Status: DISCONTINUED | OUTPATIENT
Start: 2023-11-29 | End: 2023-11-29 | Stop reason: SDUPTHER

## 2023-11-29 RX ORDER — FAMOTIDINE 20 MG/1
20 TABLET, FILM COATED ORAL DAILY
Status: DISCONTINUED | OUTPATIENT
Start: 2023-11-30 | End: 2023-11-30 | Stop reason: HOSPADM

## 2023-11-29 RX ORDER — ATORVASTATIN CALCIUM 10 MG/1
10 TABLET, FILM COATED ORAL NIGHTLY
Status: DISCONTINUED | OUTPATIENT
Start: 2023-11-29 | End: 2023-11-30 | Stop reason: HOSPADM

## 2023-11-29 RX ORDER — PANTOPRAZOLE SODIUM 40 MG/1
40 TABLET, DELAYED RELEASE ORAL
Status: DISCONTINUED | OUTPATIENT
Start: 2023-11-30 | End: 2023-11-30 | Stop reason: HOSPADM

## 2023-11-29 RX ORDER — BUDESONIDE 3 MG/1
9 CAPSULE, COATED PELLETS ORAL DAILY
Status: DISCONTINUED | OUTPATIENT
Start: 2023-11-29 | End: 2023-11-30 | Stop reason: HOSPADM

## 2023-11-29 RX ORDER — ACETAMINOPHEN 325 MG/1
650 TABLET ORAL EVERY 4 HOURS PRN
Status: DISCONTINUED | OUTPATIENT
Start: 2023-11-29 | End: 2023-11-30 | Stop reason: HOSPADM

## 2023-11-29 RX ORDER — MIDAZOLAM HYDROCHLORIDE 1 MG/ML
INJECTION INTRAMUSCULAR; INTRAVENOUS PRN
Status: DISCONTINUED | OUTPATIENT
Start: 2023-11-29 | End: 2023-11-29 | Stop reason: SDUPTHER

## 2023-11-29 RX ORDER — DOXYCYCLINE HYCLATE 100 MG
100 TABLET ORAL 2 TIMES DAILY
Qty: 14 TABLET | Refills: 0 | Status: SHIPPED | OUTPATIENT
Start: 2023-11-29 | End: 2023-12-06

## 2023-11-29 RX ORDER — CEFAZOLIN SODIUM 1 G/3ML
INJECTION, POWDER, FOR SOLUTION INTRAMUSCULAR; INTRAVENOUS PRN
Status: DISCONTINUED | OUTPATIENT
Start: 2023-11-29 | End: 2023-11-29 | Stop reason: SDUPTHER

## 2023-11-29 RX ORDER — EPHEDRINE SULFATE/0.9% NACL/PF 50 MG/5 ML
SYRINGE (ML) INTRAVENOUS PRN
Status: DISCONTINUED | OUTPATIENT
Start: 2023-11-29 | End: 2023-11-29 | Stop reason: SDUPTHER

## 2023-11-29 RX ORDER — DEXAMETHASONE SODIUM PHOSPHATE 10 MG/ML
INJECTION INTRAMUSCULAR; INTRAVENOUS PRN
Status: DISCONTINUED | OUTPATIENT
Start: 2023-11-29 | End: 2023-11-29 | Stop reason: SDUPTHER

## 2023-11-29 RX ORDER — BUDESONIDE 9 MG/1
3 TABLET, FILM COATED, EXTENDED RELEASE ORAL DAILY
Status: DISCONTINUED | OUTPATIENT
Start: 2023-11-29 | End: 2023-11-29 | Stop reason: SDUPTHER

## 2023-11-29 RX ORDER — SODIUM CHLORIDE 0.9 % (FLUSH) 0.9 %
5-40 SYRINGE (ML) INJECTION EVERY 12 HOURS SCHEDULED
Status: DISCONTINUED | OUTPATIENT
Start: 2023-11-29 | End: 2023-11-30 | Stop reason: HOSPADM

## 2023-11-29 RX ORDER — OMEPRAZOLE 40 MG/1
40 CAPSULE, DELAYED RELEASE ORAL EVERY MORNING
Status: DISCONTINUED | OUTPATIENT
Start: 2023-11-30 | End: 2023-11-29 | Stop reason: CLARIF

## 2023-11-29 RX ORDER — ONDANSETRON 2 MG/ML
4 INJECTION INTRAMUSCULAR; INTRAVENOUS EVERY 6 HOURS PRN
Status: DISCONTINUED | OUTPATIENT
Start: 2023-11-29 | End: 2023-11-30 | Stop reason: HOSPADM

## 2023-11-29 RX ADMIN — ONDANSETRON HYDROCHLORIDE 4 MG: 2 INJECTION, SOLUTION INTRAMUSCULAR; INTRAVENOUS at 12:03

## 2023-11-29 RX ADMIN — Medication 20 MG: at 12:47

## 2023-11-29 RX ADMIN — Medication 10 MG: at 12:23

## 2023-11-29 RX ADMIN — FENTANYL CITRATE 25 MCG: 50 INJECTION, SOLUTION INTRAMUSCULAR; INTRAVENOUS at 13:06

## 2023-11-29 RX ADMIN — CARVEDILOL 12.5 MG: 6.25 TABLET, FILM COATED ORAL at 21:52

## 2023-11-29 RX ADMIN — CEFAZOLIN 2000 MG: 2 INJECTION, POWDER, FOR SOLUTION INTRAMUSCULAR; INTRAVENOUS at 19:49

## 2023-11-29 RX ADMIN — DEXAMETHASONE SODIUM PHOSPHATE 10 MG: 10 INJECTION INTRAMUSCULAR; INTRAVENOUS at 12:20

## 2023-11-29 RX ADMIN — SODIUM CHLORIDE: 9 INJECTION, SOLUTION INTRAVENOUS at 12:05

## 2023-11-29 RX ADMIN — ACETAMINOPHEN 650 MG: 325 TABLET ORAL at 19:21

## 2023-11-29 RX ADMIN — FENTANYL CITRATE 50 MCG: 50 INJECTION, SOLUTION INTRAMUSCULAR; INTRAVENOUS at 12:20

## 2023-11-29 RX ADMIN — FENTANYL CITRATE 25 MCG: 50 INJECTION, SOLUTION INTRAMUSCULAR; INTRAVENOUS at 13:20

## 2023-11-29 RX ADMIN — ACETAMINOPHEN 650 MG: 325 TABLET ORAL at 23:51

## 2023-11-29 RX ADMIN — ATORVASTATIN CALCIUM 10 MG: 10 TABLET, FILM COATED ORAL at 21:52

## 2023-11-29 RX ADMIN — FENTANYL CITRATE 50 MCG: 50 INJECTION, SOLUTION INTRAMUSCULAR; INTRAVENOUS at 12:28

## 2023-11-29 RX ADMIN — FENTANYL CITRATE 50 MCG: 50 INJECTION, SOLUTION INTRAMUSCULAR; INTRAVENOUS at 12:08

## 2023-11-29 RX ADMIN — CEFAZOLIN 2 G: 1 INJECTION, POWDER, FOR SOLUTION INTRAMUSCULAR; INTRAVENOUS at 12:14

## 2023-11-29 RX ADMIN — PROPOFOL 50 MCG/KG/MIN: 10 INJECTION, EMULSION INTRAVENOUS at 12:12

## 2023-11-29 RX ADMIN — SODIUM CHLORIDE, PRESERVATIVE FREE 10 ML: 5 INJECTION INTRAVENOUS at 22:11

## 2023-11-29 RX ADMIN — MIDAZOLAM 2 MG: 1 INJECTION INTRAMUSCULAR; INTRAVENOUS at 12:03

## 2023-11-29 RX ADMIN — SPIRONOLACTONE 25 MG: 25 TABLET ORAL at 19:23

## 2023-11-29 RX ADMIN — PROPOFOL 20 MG: 10 INJECTION, EMULSION INTRAVENOUS at 12:10

## 2023-11-29 RX ADMIN — SACUBITRIL AND VALSARTAN 1 TABLET: 97; 103 TABLET, FILM COATED ORAL at 21:52

## 2023-11-29 RX ADMIN — DOXEPIN HYDROCHLORIDE 10 MG: 10 CAPSULE ORAL at 21:52

## 2023-11-29 RX ADMIN — SODIUM CHLORIDE, PRESERVATIVE FREE 10 ML: 5 INJECTION INTRAVENOUS at 19:49

## 2023-11-29 ASSESSMENT — PAIN DESCRIPTION - LOCATION
LOCATION: INCISION
LOCATION: CHEST

## 2023-11-29 ASSESSMENT — LIFESTYLE VARIABLES: SMOKING_STATUS: 0

## 2023-11-29 ASSESSMENT — PAIN DESCRIPTION - ORIENTATION: ORIENTATION: LEFT;UPPER

## 2023-11-29 ASSESSMENT — PAIN SCALES - GENERAL
PAINLEVEL_OUTOF10: 3
PAINLEVEL_OUTOF10: 5
PAINLEVEL_OUTOF10: 2

## 2023-11-29 ASSESSMENT — PAIN DESCRIPTION - PAIN TYPE: TYPE: SURGICAL PAIN

## 2023-11-29 ASSESSMENT — PAIN DESCRIPTION - DESCRIPTORS
DESCRIPTORS: ACHING
DESCRIPTORS: ACHING

## 2023-11-29 ASSESSMENT — PAIN DESCRIPTION - FREQUENCY: FREQUENCY: CONTINUOUS

## 2023-11-29 ASSESSMENT — PAIN DESCRIPTION - ONSET: ONSET: ON-GOING

## 2023-11-29 ASSESSMENT — PAIN - FUNCTIONAL ASSESSMENT: PAIN_FUNCTIONAL_ASSESSMENT: PREVENTS OR INTERFERES SOME ACTIVE ACTIVITIES AND ADLS

## 2023-11-29 NOTE — PROGRESS NOTES
4 Eyes Skin Assessment     NAME:  Tima Francois  YOB: 1950  MEDICAL RECORD NUMBER:  [id number]    The patient is being assessed for  Admission    I agree that at least one RN has performed a thorough Head to Toe Skin Assessment on the patient. ALL assessment sites listed below have been assessed. Areas assessed by both nurses:    Head, Face, Ears, Shoulders, Back, Chest, Arms, Elbows, Hands, Sacrum. Buttock, Coccyx, Ischium, Legs. Feet and Heels, and Under Medical Devices         Does the Patient have a Wound?  No noted wound(s)       Julián Prevention initiated by RN: Yes  Wound Care Orders initiated by RN: No    Pressure Injury (Stage 3,4, Unstageable, DTI, NWPT, and Complex wounds) if present, place Wound referral order by RN under : No    New Ostomies, if present place, Ostomy referral order under : No     Nurse 1 eSignature: Electronically signed by Virginia Grimes RN on 11/29/23 at 6:45 PM EST    **SHARE this note so that the co-signing nurse can place an eSignature**    Nurse 2 eSignature: Electronically signed by Velma Silva RN on 11/29/23 at 6:59 PM EST

## 2023-11-29 NOTE — DISCHARGE INSTRUCTIONS
[facility] Electrophysiology ICD Discharge Instructions      Medications: Your home medications will continue and you will take doxycycline (antibiotic) twice a day for 7 days. Incision Care:  Leave brown Aquacel dressing on for 1 week. Remove aquacel dressing on Wednesday 12/6/23. Do not remove the steri strips from your incision. They will be removed at your follow up appointment. Keep the incision dry. You may shower; but do not let the water run directly on the incision for 1 week. Check the area daily. Notify the office at [phone] if you develop any redness, swelling, drainage, warmth, or fever greater than 100 degrees. Activity:  You may continue regular activity; but limit strenuous or stretching movements of the arm closest to your ICD. Wear the arm immobilizer at all times for 48 hours and then at night for 4 weeks. Avoid pulling yourself up with that arm. Do not raise that elbow higher than shoulder height and do not lift anything weighing more than 2 pounds for 4 weeks. Do not do any activities such as golfing, vacuuming, or mowing the lawn for 4 weeks. Prevent any hard blows to the ICD. Driving: You may drive, if you feel up to it, in 14 days. Start with local/short trips to familiar places. Avoid highway/ high-speed driving for the first few days after you resume driving. DO NOT drive until you have stopped taking prescription pain medications. Possible Defibrillator Interferences:  Avoid high frequency ham radios, arc welders, battery powered tools, and strong electromagnetic fields. Avoid any device that may electrically stimulate your body; such as electric muscle stimulators or TENS units. (Please bring your TENS unit to the office for the 2-week device check so we can see if there is interference between the TENS unit and defibrillator, do not use the TENS unit prior to that time.)  Standing over a running car motor with the mayorga up may inhibit the ICD.

## 2023-11-30 ENCOUNTER — APPOINTMENT (OUTPATIENT)
Dept: GENERAL RADIOLOGY | Age: 73
End: 2023-11-30
Attending: INTERNAL MEDICINE
Payer: MEDICARE

## 2023-11-30 VITALS
TEMPERATURE: 98.3 F | WEIGHT: 135 LBS | SYSTOLIC BLOOD PRESSURE: 116 MMHG | HEART RATE: 73 BPM | HEIGHT: 66 IN | RESPIRATION RATE: 18 BRPM | BODY MASS INDEX: 21.69 KG/M2 | DIASTOLIC BLOOD PRESSURE: 57 MMHG | OXYGEN SATURATION: 96 %

## 2023-11-30 LAB
ANION GAP SERPL CALCULATED.3IONS-SCNC: 13 MMOL/L (ref 7–16)
BUN SERPL-MCNC: 27 MG/DL (ref 6–23)
CALCIUM SERPL-MCNC: 9.5 MG/DL (ref 8.6–10.2)
CHLORIDE SERPL-SCNC: 102 MMOL/L (ref 98–107)
CO2 SERPL-SCNC: 21 MMOL/L (ref 22–29)
CREAT SERPL-MCNC: 0.9 MG/DL (ref 0.5–1)
EKG ATRIAL RATE: 76 BPM
EKG ATRIAL RATE: 87 BPM
EKG P AXIS: 44 DEGREES
EKG P AXIS: 49 DEGREES
EKG P-R INTERVAL: 164 MS
EKG P-R INTERVAL: 166 MS
EKG Q-T INTERVAL: 440 MS
EKG Q-T INTERVAL: 450 MS
EKG QRS DURATION: 136 MS
EKG QRS DURATION: 138 MS
EKG QTC CALCULATION (BAZETT): 506 MS
EKG QTC CALCULATION (BAZETT): 529 MS
EKG R AXIS: -140 DEGREES
EKG R AXIS: 158 DEGREES
EKG T AXIS: -8 DEGREES
EKG T AXIS: -8 DEGREES
EKG VENTRICULAR RATE: 76 BPM
EKG VENTRICULAR RATE: 87 BPM
ERYTHROCYTE [DISTWIDTH] IN BLOOD BY AUTOMATED COUNT: 13.7 % (ref 11.5–15)
GFR SERPL CREATININE-BSD FRML MDRD: >60 ML/MIN/1.73M2
GLUCOSE SERPL-MCNC: 141 MG/DL (ref 74–99)
HCT VFR BLD AUTO: 44.6 % (ref 34–48)
HGB BLD-MCNC: 14.8 G/DL (ref 11.5–15.5)
MAGNESIUM SERPL-MCNC: 2.1 MG/DL (ref 1.6–2.6)
MCH RBC QN AUTO: 31.4 PG (ref 26–35)
MCHC RBC AUTO-ENTMCNC: 33.2 G/DL (ref 32–34.5)
MCV RBC AUTO: 94.5 FL (ref 80–99.9)
PLATELET # BLD AUTO: 190 K/UL (ref 130–450)
PMV BLD AUTO: 9.7 FL (ref 7–12)
POTASSIUM SERPL-SCNC: 4.5 MMOL/L (ref 3.5–5)
RBC # BLD AUTO: 4.72 M/UL (ref 3.5–5.5)
SODIUM SERPL-SCNC: 136 MMOL/L (ref 132–146)
WBC OTHER # BLD: 12.7 K/UL (ref 4.5–11.5)

## 2023-11-30 PROCEDURE — G0378 HOSPITAL OBSERVATION PER HR: HCPCS

## 2023-11-30 PROCEDURE — 85027 COMPLETE CBC AUTOMATED: CPT

## 2023-11-30 PROCEDURE — 93005 ELECTROCARDIOGRAM TRACING: CPT | Performed by: NURSE PRACTITIONER

## 2023-11-30 PROCEDURE — 83735 ASSAY OF MAGNESIUM: CPT

## 2023-11-30 PROCEDURE — 80048 BASIC METABOLIC PNL TOTAL CA: CPT

## 2023-11-30 PROCEDURE — 36415 COLL VENOUS BLD VENIPUNCTURE: CPT

## 2023-11-30 PROCEDURE — 2580000003 HC RX 258: Performed by: INTERNAL MEDICINE

## 2023-11-30 PROCEDURE — 6360000002 HC RX W HCPCS: Performed by: INTERNAL MEDICINE

## 2023-11-30 PROCEDURE — 71045 X-RAY EXAM CHEST 1 VIEW: CPT

## 2023-11-30 PROCEDURE — 6370000000 HC RX 637 (ALT 250 FOR IP): Performed by: INTERNAL MEDICINE

## 2023-11-30 RX ORDER — PANTOPRAZOLE SODIUM 40 MG/1
40 TABLET, DELAYED RELEASE ORAL
Qty: 30 TABLET | Refills: 3 | Status: SHIPPED | OUTPATIENT
Start: 2023-12-01

## 2023-11-30 RX ADMIN — BUDESONIDE 9 MG: 3 CAPSULE, COATED PELLETS ORAL at 10:00

## 2023-11-30 RX ADMIN — CEFAZOLIN 2000 MG: 2 INJECTION, POWDER, FOR SOLUTION INTRAMUSCULAR; INTRAVENOUS at 03:45

## 2023-11-30 RX ADMIN — SACUBITRIL AND VALSARTAN 1 TABLET: 97; 103 TABLET, FILM COATED ORAL at 10:00

## 2023-11-30 RX ADMIN — CARVEDILOL 12.5 MG: 6.25 TABLET, FILM COATED ORAL at 10:00

## 2023-11-30 RX ADMIN — FAMOTIDINE 20 MG: 20 TABLET, FILM COATED ORAL at 10:00

## 2023-11-30 RX ADMIN — PANTOPRAZOLE SODIUM 40 MG: 40 TABLET, DELAYED RELEASE ORAL at 07:06

## 2023-11-30 RX ADMIN — SODIUM CHLORIDE, PRESERVATIVE FREE 10 ML: 5 INJECTION INTRAVENOUS at 03:44

## 2023-11-30 RX ADMIN — ASPIRIN 81 MG: 81 TABLET, COATED ORAL at 10:00

## 2023-11-30 RX ADMIN — EMPAGLIFLOZIN 10 MG: 10 TABLET, FILM COATED ORAL at 10:00

## 2023-11-30 RX ADMIN — SPIRONOLACTONE 25 MG: 25 TABLET ORAL at 10:00

## 2023-11-30 RX ADMIN — ACETAMINOPHEN 650 MG: 325 TABLET ORAL at 03:45

## 2023-11-30 RX ADMIN — ACETAMINOPHEN 650 MG: 325 TABLET ORAL at 11:30

## 2023-11-30 ASSESSMENT — PAIN DESCRIPTION - ORIENTATION
ORIENTATION: LEFT

## 2023-11-30 ASSESSMENT — PAIN DESCRIPTION - ONSET: ONSET: ON-GOING

## 2023-11-30 ASSESSMENT — PAIN SCALES - GENERAL
PAINLEVEL_OUTOF10: 3
PAINLEVEL_OUTOF10: 0
PAINLEVEL_OUTOF10: 1
PAINLEVEL_OUTOF10: 3
PAINLEVEL_OUTOF10: 2
PAINLEVEL_OUTOF10: 3

## 2023-11-30 ASSESSMENT — PAIN DESCRIPTION - LOCATION
LOCATION: CHEST

## 2023-11-30 ASSESSMENT — PAIN DESCRIPTION - DIRECTION: RADIATING_TOWARDS: UPPER

## 2023-11-30 ASSESSMENT — PAIN DESCRIPTION - DESCRIPTORS
DESCRIPTORS: ACHING
DESCRIPTORS: DISCOMFORT

## 2023-11-30 ASSESSMENT — PAIN - FUNCTIONAL ASSESSMENT
PAIN_FUNCTIONAL_ASSESSMENT: ACTIVITIES ARE NOT PREVENTED

## 2023-11-30 ASSESSMENT — PAIN DESCRIPTION - PAIN TYPE
TYPE: SURGICAL PAIN
TYPE: SURGICAL PAIN

## 2023-11-30 ASSESSMENT — PAIN DESCRIPTION - FREQUENCY: FREQUENCY: CONTINUOUS

## 2023-11-30 NOTE — PROGRESS NOTES
Patient up to bathroom tolerated well voided clear yellow urine. Pt did have episode of pulse visible on left chest when returned to bed. Arm remains in immobilizer and L arm motion/movement precautions remain in place.  Michelle Parham RN

## 2023-11-30 NOTE — PROGRESS NOTES
Discharged to home goals met. Discharge instructions given. Verbalized an understanding. Monitor removed and placed in nurses station.

## 2023-11-30 NOTE — DISCHARGE SUMMARY
[address] [facility] Electrophysiology  Discharge Summary  Patient: Braulio Peralta  Medical Record Number: [id number]  Date of Procedure:  11/30/2023  Operating Electrophysiologist: Meme Prieto MD  Primary Electrophysiologist: Meme Prieto MD   Referring Physician: Eladia Huber MD    Admission Date:11/29/2023      Discharge Date:  11/30/2023    Patient Active Problem List   Diagnosis    HTN (hypertension)    Hyperlipidemia    Vitamin D deficiency    Aden's esophagus    Renal stones    Hiatal hernia    HFrEF (heart failure with reduced ejection fraction) ([facility])    Nonischemic cardiomyopathy ([address])    Nonrheumatic mitral valve regurgitation    S/P ICD (internal cardiac defibrillator) procedure          Medication List        START taking these medications      doxycycline hyclate 100 MG tablet  Commonly known as: VIBRA-TABS  Take 1 tablet by mouth 2 times daily for 7 days            CONTINUE taking these medications      aspirin EC 81 MG EC tablet     atorvastatin 10 MG tablet  Commonly known as: LIPITOR  Take 1 tablet by mouth daily     * Budesonide ER 9 MG Tb24     * budesonide 3 MG delayed release capsule  Commonly known as: ENTOCORT EC     carvedilol 12.5 MG tablet  Commonly known as: COREG  Take 1 tablet by mouth 2 times daily     doxepin 10 MG capsule  Commonly known as: SINEQUAN  Take 1 capsule by mouth nightly     empagliflozin 10 MG tablet  Commonly known as: Jardiance  Take 1 tablet by mouth daily     famotidine 20 MG tablet  Commonly known as: PEPCID  Take 1 tablet by mouth once daily     omeprazole 40 MG delayed release capsule  Commonly known as: PRILOSEC     sacubitril-valsartan  MG per tablet  Commonly known as: ENTRESTO  Take 1 tablet by mouth 2 times daily     spironolactone 25 MG tablet  Commonly known as: Aldactone  Take 1 tablet by mouth daily           * This list has 2 medication(s) that are the same as other

## 2023-11-30 NOTE — CARE COORDINATION
Chart reviewed for transition of care at d/c. Discharge order noted. Observation for S/P ICD procedure. Standard wound care and arm restrictions. Patient lives with  in a ranch home. Independent, and no history of SNF or HHC. PCP is Dr Norma Melchor, and pharmacy is [address] [place] [address] in [place].  to transport home at discharge with no current needs. Electronically signed by Bridget Terrell RN on 11/30/2023 at 12:03 PM    Case Management Assessment  Initial Evaluation    Date/Time of Evaluation: 11/30/2023 12:04 PM  Assessment Completed by: Bridget Terrell RN    If patient is discharged prior to next notation, then this note serves as note for discharge by case management. Patient Name: Reji Mathias                   YOB: 1950  Diagnosis: Cardiomyopathy, unspecified type ([address]) [I42.9]  Cardiomyopathy, unspecified [I42.9]  S/P ICD (internal cardiac defibrillator) procedure [Z95.810]                   Date / Time: 11/29/2023  9:54 AM    Patient Admission Status: Observation   Readmission Risk (Low < 19, Mod (19-27), High > 27): No data recorded  Current PCP: Khai Rockwell MD  PCP verified by ? Yes    Chart Reviewed: Yes      History Provided by: Patient  Patient Orientation: Alert and Oriented, Person, Place, Situation    Patient Cognition: Alert    Hospitalization in the last 30 days (Readmission):  No    If yes, Readmission Assessment in  Navigator will be completed.     Advance Directives:      Code Status: Full Code   Patient's Primary Decision Maker is: Legal Next of Kin    Primary Decision Maker: Kevin Calderon - Spouse - [phone]    Secondary Decision Maker: Benedicto Santamaria - [phone]    Secondary Decision Maker: Gini Velasquez - [phone]    Discharge Planning:    Patient lives with: Spouse/Significant Other Type of Home: House  Primary Care Giver: Self  Patient Support Systems include: Spouse/Significant Other   Current Financial resources:

## 2023-11-30 NOTE — PROGRESS NOTES
Admitted to room 6313. Alert and oriented. Denies pain. Incision to left chest has dressing of aquacel/duoderm and pressure dressing. No swelling or drainage.  at bedside.

## 2023-11-30 NOTE — NURSE NAVIGATOR
ARRHYTHMIA EDUCATION     Met with patient & patients  to review information relating to Non-Ischemic Cardiomyopathy, Sudden Cardiac Death, & CRT-D Insertion. Discussed the following topics: The Heart's Electrical System, Non-Ischemic Cardiomyopathy, Sudden Cardiac Death, CRT-D, Post Operative Care of CRT-D, CRT-D DC Instructions. Handouts given on above topics given & questions answered.  Patient verbalized understanding as evidenced by \"teach back\"     Time spent with patient: 40 minutes

## 2023-11-30 NOTE — PROGRESS NOTES
Dr. Mariajose Nguyen notified patient states she could feel her heart beat and see her chest and gown move with the beat. Dr. Oakes Binu to move patient position and tomorrow device will be interrogated for adjustment if needed.

## 2023-11-30 NOTE — ACP (ADVANCE CARE PLANNING)
Advance Care Planning   The patient has the following advanced directives on file:  Advance Directives       Power of  Living Will ACP-Advance Directive ACP-Power of     Not on File Filed on 11/28/23 Filed Not on File            The patient has appointed the following active healthcare agents:    Primary Decision Maker: YumikoKevin - Spouse - [phone]    Secondary Decision Maker: Sabrina James - Child - [phone]    Secondary Decision Maker: Robles Morales - [phone]    The Patient has the following current code status:    Code Status: Full Code      Sienna Catalan RN  11/30/2023

## 2023-12-01 ENCOUNTER — TELEPHONE (OUTPATIENT)
Dept: NON INVASIVE DIAGNOSTICS | Age: 73
End: 2023-12-01

## 2023-12-01 ENCOUNTER — CARE COORDINATION (OUTPATIENT)
Dept: CARE COORDINATION | Age: 73
End: 2023-12-01

## 2023-12-01 DIAGNOSIS — Z95.810 S/P ICD (INTERNAL CARDIAC DEFIBRILLATOR) PROCEDURE: Primary | ICD-10-CM

## 2023-12-01 PROCEDURE — 1111F DSCHRG MED/CURRENT MED MERGE: CPT | Performed by: FAMILY MEDICINE

## 2023-12-01 NOTE — CARE COORDINATION
Care Transitions Initial Follow Up Call    Call within 2 business days of discharge: Yes    Patient Current Location:  Home: [address] [place] [address]    Care Transition Nurse contacted the spouse/partner by telephone to perform post hospital discharge assessment. Verified name and  with spouse/partner as identifiers. Provided introduction to self, and explanation of the Care Transition Nurse role. Patient: Syed Heart Patient : [birth year]   MRN: [id number]  Reason for Admission: s/p ICD  Discharge Date: 23 RARS: No data recorded    Last Discharge Facility       Date Complaint Diagnosis Description Type Department Provider    23  Cardiomyopathy, unspecified type ([address] [facility]) . .. Admission (Discharged) SEYZ [id number] Arnaud Campa MD          Was this an external facility discharge? No Discharge Facility: [address] [place] [address] to be reviewed by the provider   Additional needs identified to be addressed with provider: No  none               Method of communication with provider: none. Spoke with patient spouse Kim Desouza) on communication release regarding initial MALLORY/hospital discharge (low risk) follow up. Kim Desouza states patient is sleeping and  vague in his answers. Kim Desouza states patient is feeling better since discharge and offers no complaints. Denies patient having any shortness of breath, chest pain, chest discomfort, nausea, vomiting, diarrhea, chills or fever. Confirmed with Kim Desouza that patient is wearing sling to LUE as directed. Denies any pain, redness or swelling at ICD site. Provided a review of meds with Kim Formica who confirms he obtained new meds from [address] [place] [address] that was ordered on discharge. Advised for patient to take Doxycyine as directed until finished. 1111F code entered. Confirmed with Kim Desouza that patient will call to schedule PCP f/u. CTN will route to [facility] front dek advising of need to schedule 7 day HFU appt with Dr. Raina Reyes (PCP).

## 2023-12-01 NOTE — TELEPHONE ENCOUNTER
I returned patient's call from [phone] today. I left a message. In my message I told her if she continues to have issues with her CRT-D (phrenic stim) and is uncomfortable she can go to the ED, otherwise, please call us on Monday, 12/4/2023 after 0800.     Blayne Mcguire RN, BSN  [facility]

## 2023-12-03 ENCOUNTER — APPOINTMENT (OUTPATIENT)
Dept: GENERAL RADIOLOGY | Age: 73
End: 2023-12-03
Payer: MEDICARE

## 2023-12-03 ENCOUNTER — HOSPITAL ENCOUNTER (EMERGENCY)
Age: 73
Discharge: HOME | End: 2023-12-04
Attending: STUDENT IN AN ORGANIZED HEALTH CARE EDUCATION/TRAINING PROGRAM
Payer: MEDICARE

## 2023-12-03 DIAGNOSIS — R07.9 CHEST PAIN, UNSPECIFIED TYPE: Primary | ICD-10-CM

## 2023-12-03 LAB
ALBUMIN SERPL-MCNC: 4.1 G/DL (ref 3.5–5.2)
ALBUMIN SERPL-MCNC: 4.3 G/DL (ref 3.5–5.2)
ALP SERPL-CCNC: 71 U/L (ref 35–104)
ALP SERPL-CCNC: 80 U/L (ref 35–104)
ALT SERPL-CCNC: 11 U/L (ref 0–32)
ALT SERPL-CCNC: 14 U/L (ref 0–32)
ANION GAP SERPL CALCULATED.3IONS-SCNC: 12 MMOL/L (ref 7–16)
ANION GAP SERPL CALCULATED.3IONS-SCNC: 13 MMOL/L (ref 7–16)
AST SERPL-CCNC: 34 U/L (ref 0–31)
AST SERPL-CCNC: 52 U/L (ref 0–31)
BASOPHILS # BLD: 0.04 K/UL (ref 0–0.2)
BASOPHILS NFR BLD: 0 % (ref 0–2)
BILIRUB SERPL-MCNC: 0.5 MG/DL (ref 0–1.2)
BILIRUB SERPL-MCNC: 0.6 MG/DL (ref 0–1.2)
BNP SERPL-MCNC: 617 PG/ML (ref 0–450)
BUN SERPL-MCNC: 32 MG/DL (ref 6–23)
BUN SERPL-MCNC: 32 MG/DL (ref 6–23)
CALCIUM SERPL-MCNC: 9.6 MG/DL (ref 8.6–10.2)
CALCIUM SERPL-MCNC: 9.9 MG/DL (ref 8.6–10.2)
CHLORIDE SERPL-SCNC: 103 MMOL/L (ref 98–107)
CHLORIDE SERPL-SCNC: 103 MMOL/L (ref 98–107)
CO2 SERPL-SCNC: 22 MMOL/L (ref 22–29)
CO2 SERPL-SCNC: 23 MMOL/L (ref 22–29)
CREAT SERPL-MCNC: 0.8 MG/DL (ref 0.5–1)
CREAT SERPL-MCNC: 0.8 MG/DL (ref 0.5–1)
EOSINOPHIL # BLD: 0.05 K/UL (ref 0.05–0.5)
EOSINOPHILS RELATIVE PERCENT: 1 % (ref 0–6)
ERYTHROCYTE [DISTWIDTH] IN BLOOD BY AUTOMATED COUNT: 13.5 % (ref 11.5–15)
GFR SERPL CREATININE-BSD FRML MDRD: >60 ML/MIN/1.73M2
GFR SERPL CREATININE-BSD FRML MDRD: >60 ML/MIN/1.73M2
GLUCOSE SERPL-MCNC: 101 MG/DL (ref 74–99)
GLUCOSE SERPL-MCNC: 117 MG/DL (ref 74–99)
HCT VFR BLD AUTO: 44.6 % (ref 34–48)
HGB BLD-MCNC: 14.8 G/DL (ref 11.5–15.5)
IMM GRANULOCYTES # BLD AUTO: 0.07 K/UL (ref 0–0.58)
IMM GRANULOCYTES NFR BLD: 1 % (ref 0–5)
LYMPHOCYTES NFR BLD: 1.65 K/UL (ref 1.5–4)
LYMPHOCYTES RELATIVE PERCENT: 19 % (ref 20–42)
MCH RBC QN AUTO: 30.9 PG (ref 26–35)
MCHC RBC AUTO-ENTMCNC: 33.2 G/DL (ref 32–34.5)
MCV RBC AUTO: 93.1 FL (ref 80–99.9)
MONOCYTES NFR BLD: 0.61 K/UL (ref 0.1–0.95)
MONOCYTES NFR BLD: 7 % (ref 2–12)
NEUTROPHILS NFR BLD: 73 % (ref 43–80)
NEUTS SEG NFR BLD: 6.47 K/UL (ref 1.8–7.3)
PLATELET, FLUORESCENCE: 137 K/UL (ref 130–450)
PMV BLD AUTO: 9.7 FL (ref 7–12)
POTASSIUM SERPL-SCNC: 4.2 MMOL/L (ref 3.5–5)
POTASSIUM SERPL-SCNC: 5.5 MMOL/L (ref 3.5–5)
PROT SERPL-MCNC: 7.2 G/DL (ref 6.4–8.3)
PROT SERPL-MCNC: 7.4 G/DL (ref 6.4–8.3)
RBC # BLD AUTO: 4.79 M/UL (ref 3.5–5.5)
SODIUM SERPL-SCNC: 137 MMOL/L (ref 132–146)
SODIUM SERPL-SCNC: 139 MMOL/L (ref 132–146)
TROPONIN I SERPL HS-MCNC: 15 NG/L (ref 0–9)
TROPONIN I SERPL HS-MCNC: 17 NG/L (ref 0–9)
TROPONIN I SERPL HS-MCNC: NORMAL NG/L (ref 0–14)
TROPONIN INTERP: NORMAL
TROPONIN T SERPL-MCNC: NORMAL NG/ML
WBC OTHER # BLD: 8.9 K/UL (ref 4.5–11.5)

## 2023-12-03 PROCEDURE — 71045 X-RAY EXAM CHEST 1 VIEW: CPT

## 2023-12-03 PROCEDURE — 99285 EMERGENCY DEPT VISIT HI MDM: CPT

## 2023-12-03 PROCEDURE — 93005 ELECTROCARDIOGRAM TRACING: CPT

## 2023-12-03 PROCEDURE — 84484 ASSAY OF TROPONIN QUANT: CPT

## 2023-12-03 PROCEDURE — 83880 ASSAY OF NATRIURETIC PEPTIDE: CPT

## 2023-12-03 PROCEDURE — 80053 COMPREHEN METABOLIC PANEL: CPT

## 2023-12-03 PROCEDURE — 85025 COMPLETE CBC W/AUTO DIFF WBC: CPT

## 2023-12-03 ASSESSMENT — LIFESTYLE VARIABLES
HOW OFTEN DO YOU HAVE A DRINK CONTAINING ALCOHOL: NEVER
HOW MANY STANDARD DRINKS CONTAINING ALCOHOL DO YOU HAVE ON A TYPICAL DAY: PATIENT DOES NOT DRINK

## 2023-12-03 NOTE — ED PROVIDER NOTES
[address] ENCOUNTER        Pt Name: Tereso Munson  MRN: [id number]  [facility] [birth year]  Date of evaluation: 12/3/2023  Provider: Dbe Pepe MD  PCP: Flakita Ramos MD  Note Started: 5:04 PM EST 12/3/23    CHIEF COMPLAINT       Chief Complaint   Patient presents with    Chest Pain     Recently had ICD placed here. Last night she began to have mid sternal chest pain and SOB. HISTORY OF PRESENT ILLNESS: 1 or more Elements   History From: Patient     Limitations to history : None    Tereso Munson is a 74 y.o. female who presents from home due to chest pain. Patient describes it as originally a sharp, \"pinching\" type pain in the low center of her chest. Now the patient describes it more like a pressure type pain. She recently had an ICD placed back on 11/29. She states that she felt well since the procedure just the normal \"healing pains\" and thought she was getting better until yesterday. Patient is also having some associated shortness of breath. She is able to work without stopping but states she feels like it is just off and cannot describe it. She is able to reproduce the pain in the chest and does not radiate. Has never had a pain like this before. Nursing Notes were all reviewed and agreed with or any disagreements were addressed in the HPI. REVIEW OF EXTERNAL NOTE :       Reviewed notes from recent procedure. REVIEW OF SYSTEMS :           Positives and Pertinent negatives as per HPI.      SURGICAL HISTORY     Past Surgical History:   Procedure Laterality Date    BREAST BIOPSY      benign    COLONOSCOPY      DIAGNOSTIC CARDIAC CATH LAB PROCEDURE  11/09/2001    [facility]    ENDOSCOPY, COLON, DIAGNOSTIC      EP DEVICE PROCEDURE N/A 11/29/2023    Medtronic CRT-D by Tawanna Andrade MD at [address] CATH LAB    LITHOTRIPSY      [facility] [place], ALOT OF

## 2023-12-04 VITALS
HEIGHT: 60 IN | WEIGHT: 135 LBS | RESPIRATION RATE: 16 BRPM | SYSTOLIC BLOOD PRESSURE: 138 MMHG | DIASTOLIC BLOOD PRESSURE: 89 MMHG | HEART RATE: 88 BPM | OXYGEN SATURATION: 99 % | TEMPERATURE: 97.1 F | BODY MASS INDEX: 26.5 KG/M2

## 2023-12-04 LAB
EKG ATRIAL RATE: 80 BPM
EKG P AXIS: 66 DEGREES
EKG P-R INTERVAL: 164 MS
EKG Q-T INTERVAL: 416 MS
EKG QRS DURATION: 130 MS
EKG QTC CALCULATION (BAZETT): 479 MS
EKG R AXIS: 142 DEGREES
EKG T AXIS: -45 DEGREES
EKG VENTRICULAR RATE: 80 BPM

## 2023-12-04 PROCEDURE — 6370000000 HC RX 637 (ALT 250 FOR IP): Performed by: STUDENT IN AN ORGANIZED HEALTH CARE EDUCATION/TRAINING PROGRAM

## 2023-12-04 PROCEDURE — 93010 ELECTROCARDIOGRAM REPORT: CPT | Performed by: INTERNAL MEDICINE

## 2023-12-04 RX ORDER — HYDROCODONE BITARTRATE AND ACETAMINOPHEN 5; 325 MG/1; MG/1
1 TABLET ORAL ONCE
Status: COMPLETED | OUTPATIENT
Start: 2023-12-04 | End: 2023-12-04

## 2023-12-04 RX ADMIN — HYDROCODONE BITARTRATE AND ACETAMINOPHEN 1 TABLET: 5; 325 TABLET ORAL at 02:14

## 2023-12-05 ENCOUNTER — CARE COORDINATION (OUTPATIENT)
Dept: CARE COORDINATION | Age: 73
End: 2023-12-05

## 2023-12-05 NOTE — CARE COORDINATION
ED Follow Up Call    2023    Patient: Rhonda Garg Patient :    MRN: [id number]  Reason for Admission: CP, recent ICD insertion 23  Discharge Date: ED 23     Spoke with Venus Harrison for care transition call post ED discharge. She reports that she is feeling \"fine\" today and stated she is having the \"normal post op pain\" today where the ICD was inserted but is no longer having mid sternal chest pain or SOB. Venus Harrison stated she is taking OTC Tylenol for the post op pain and it just takes the edge off. She has not notified Dr. Jose Coe, however, stated she did reach out to the [address] [place] [address] in the last few days, CTN updated her that they did reply yesterday but she may not have seen it yet. Provided contact information for Device Clinic per her request. She denies any concerns with her incision site or with wearing the immobilizer at nighttime. Venus Harrison expressed frustration over her ED visit and requested contact information for a [facility], CTN provided contact information for Patient Relations/Patient Advocate, she was appreciative of the contact information. Venus Harrison denies any other needs, questions, or concerns at this time. Care Transitions ED Follow Up    Care Transitions Interventions  No Identified Needs                            Do you have any ongoing symptoms?: Yes   Onset of Patient-reported symptoms:  In the past 7 days   Patient-reported symptoms: Other (Comment: post op pain somewhat managed by Tylenol)   Do you have all of your prescriptions and are they filled?: Yes   Were you discharged with any Home Care or Post Acute Services or do you currently have any active services?: No         Do you have any needs or concerns that I can assist you with?: Yes   Identified Barriers: None

## 2023-12-06 ENCOUNTER — TELEPHONE (OUTPATIENT)
Dept: NON INVASIVE DIAGNOSTICS | Age: 73
End: 2023-12-06

## 2023-12-06 NOTE — TELEPHONE ENCOUNTER
I returned patient's call from yesterday afternoon. I left a message asking her to call me back.      Fabricio Schultz RN, BSN  [address]

## 2023-12-08 DIAGNOSIS — Z95.810 CARDIAC RESYNCHRONIZATION THERAPY DEFIBRILLATOR (CRT-D) IN PLACE: Primary | ICD-10-CM

## 2023-12-08 RX ORDER — HYDROCODONE BITARTRATE AND ACETAMINOPHEN 10; 325 MG/1; MG/1
1 TABLET ORAL EVERY 8 HOURS PRN
Qty: 9 TABLET | Refills: 0 | Status: SHIPPED | OUTPATIENT
Start: 2023-12-08 | End: 2023-12-11

## 2023-12-12 ENCOUNTER — NURSE ONLY (OUTPATIENT)
Dept: NON INVASIVE DIAGNOSTICS | Age: 73
End: 2023-12-12

## 2023-12-12 ENCOUNTER — HOSPITAL ENCOUNTER (OUTPATIENT)
Age: 73
Discharge: HOME | End: 2023-12-12
Payer: MEDICARE

## 2023-12-12 DIAGNOSIS — Z95.810 ICD (IMPLANTABLE CARDIOVERTER-DEFIBRILLATOR) IN PLACE: Primary | ICD-10-CM

## 2023-12-12 PROCEDURE — 87324 CLOSTRIDIUM AG IA: CPT

## 2023-12-12 PROCEDURE — 87427 SHIGA-LIKE TOXIN AG IA: CPT

## 2023-12-12 PROCEDURE — 87045 FECES CULTURE AEROBIC BACT: CPT

## 2023-12-12 PROCEDURE — 83993 ASSAY FOR CALPROTECTIN FECAL: CPT

## 2023-12-12 PROCEDURE — 87046 STOOL CULTR AEROBIC BACT EA: CPT

## 2023-12-12 PROCEDURE — 87449 NOS EACH ORGANISM AG IA: CPT

## 2023-12-12 PROCEDURE — 36415 COLL VENOUS BLD VENIPUNCTURE: CPT

## 2023-12-12 PROCEDURE — 87205 SMEAR GRAM STAIN: CPT

## 2023-12-12 NOTE — PATIENT INSTRUCTIONS
Continue arm restrictions until 12.27.2023      Call if any signs or symptoms of infection [phone] ext: [phone]  Fevers, chills, redness, swelling or drainage. Hook up home  Monitor      [employer] Stay connected: [phone]    If you receive a shock    1 shock and you feel fine send a home transmission and call the office  [phone]   1 shock and you feel poorly after the shock go to the emergency room and do not drive. Multiple shocks report to the emergency room and do no drive.

## 2023-12-13 ENCOUNTER — CARE COORDINATION (OUTPATIENT)
Dept: CARE COORDINATION | Age: 73
End: 2023-12-13

## 2023-12-13 LAB
MICROORGANISM/AGENT SPEC: NORMAL
SPECIMEN DESCRIPTION: NORMAL

## 2023-12-13 NOTE — CARE COORDINATION
Care Transitions Follow Up Call    Patient Current Location:  Home: [facility] Coordinator contacted the patient by telephone to follow up after admission on -. Verified name and  with patient as identifiers. Patient: Erica Serrano  Patient : 1950   MRN: [id number]  Reason for Admission: s/p ICD placement  Discharge Date: 23 RARS: No data recorded    Needs to be reviewed by the provider   Additional needs identified to be addressed with provider: No  none             Method of communication with provider: none. LPN CC spoke with patient. States she is a little better. Has some pain to LUE at ICD site. Plans to discuss with surgeon. Denies fever/chills, swelling, redness. She does report, possibly, some dried blood to ICD incision site. LPN CC encouraged patient to keep area clean & dry. Educated to let water run over it & pat it dry. Advised she may use a warm compress for bruising. Denies needs. Next f/u 3/4. Karen Rodriguez LPN CC  Care Transitions  [phone]      Addressed changes since last contact:  none  Discussed follow-up appointments. If no appointment was previously scheduled, appointment scheduling offered: Yes. Is follow up appointment scheduled within 7 days of discharge? Yes. Follow Up  Future Appointments   Date Time Provider [facility] [address]   3/4/2024  1:30 PM SALVADOR Page - CNP ELECTRO PHYS [facility]   3/4/2024  1:30 PM SCHEDULE, DEVICE CLINIC 1 MARTINE ELECTRO PHYS [facility]   2024  2:30 PM Sundar [facility] MD [facility]   2024  2:45 PM [facility]     External follow up appointment(s): BARBARA    LPN Care Coordinator reviewed medical action plan and red flags with patient and discussed any barriers to care and/or understanding of plan of care after discharge.  Discussed appropriate site of care based on symptoms and resources available to patient

## 2023-12-14 ENCOUNTER — NURSE ONLY (OUTPATIENT)
Dept: NON INVASIVE DIAGNOSTICS | Age: 73
End: 2023-12-14

## 2023-12-14 LAB
CALPROTECTIN, FECAL: 138 UG/G
MICROORGANISM SPEC CULT: NORMAL
MICROORGANISM SPEC CULT: NORMAL
SPECIMEN DESCRIPTION: NORMAL

## 2023-12-14 NOTE — PROGRESS NOTES
Here for incision check. Patient wasn't sure if she had drainage from the incision. Leonel FRANCO assessed incision. Incision well approximated, free of erythema, edema, and drainage. What patient thought may have been drainage was just slight ecchymosis from steri strips. No drainage was present. Patient brought her TENS unit in to see if there was any KRISTIE with her device. When she applied the TENS and turned on there was no interference noted on her device. See Murj report.     Isabella De Paz RN, BSN  [facility]

## 2024-01-23 ENCOUNTER — OFFICE VISIT (OUTPATIENT)
Dept: PRIMARY CARE CLINIC | Age: 74
End: 2024-01-23
Payer: MEDICARE

## 2024-01-23 VITALS
WEIGHT: 143.8 LBS | BODY MASS INDEX: 28.23 KG/M2 | RESPIRATION RATE: 18 BRPM | TEMPERATURE: 97.3 F | HEIGHT: 60 IN | OXYGEN SATURATION: 98 % | HEART RATE: 84 BPM | SYSTOLIC BLOOD PRESSURE: 133 MMHG | DIASTOLIC BLOOD PRESSURE: 84 MMHG

## 2024-01-23 DIAGNOSIS — Z87.442 HISTORY OF RENAL STONE: ICD-10-CM

## 2024-01-23 DIAGNOSIS — R10.9 BILATERAL FLANK PAIN: Primary | ICD-10-CM

## 2024-01-23 DIAGNOSIS — R31.29 OTHER MICROSCOPIC HEMATURIA: ICD-10-CM

## 2024-01-23 LAB
BILIRUBIN, POC: NEGATIVE
BLOOD URINE, POC: NORMAL
CLARITY, POC: CLEAR
COLOR, POC: YELLOW
GLUCOSE URINE, POC: NORMAL
KETONES, POC: NEGATIVE
LEUKOCYTE EST, POC: NEGATIVE
NITRITE, POC: NEGATIVE
PH, POC: 6
PROTEIN, POC: NEGATIVE
SPECIFIC GRAVITY, POC: 1.01
UROBILINOGEN, POC: NORMAL

## 2024-01-23 PROCEDURE — 1123F ACP DISCUSS/DSCN MKR DOCD: CPT | Performed by: NURSE PRACTITIONER

## 2024-01-23 PROCEDURE — 3079F DIAST BP 80-89 MM HG: CPT | Performed by: NURSE PRACTITIONER

## 2024-01-23 PROCEDURE — 96372 THER/PROPH/DIAG INJ SC/IM: CPT | Performed by: NURSE PRACTITIONER

## 2024-01-23 PROCEDURE — 3075F SYST BP GE 130 - 139MM HG: CPT | Performed by: NURSE PRACTITIONER

## 2024-01-23 PROCEDURE — 99213 OFFICE O/P EST LOW 20 MIN: CPT | Performed by: NURSE PRACTITIONER

## 2024-01-23 PROCEDURE — 81002 URINALYSIS NONAUTO W/O SCOPE: CPT | Performed by: NURSE PRACTITIONER

## 2024-01-23 RX ORDER — CEFDINIR 300 MG/1
300 CAPSULE ORAL 2 TIMES DAILY
Qty: 14 CAPSULE | Refills: 0 | Status: SHIPPED | OUTPATIENT
Start: 2024-01-23 | End: 2024-01-30

## 2024-01-23 RX ORDER — KETOROLAC TROMETHAMINE 30 MG/ML
30 INJECTION, SOLUTION INTRAMUSCULAR; INTRAVENOUS ONCE
Status: COMPLETED | OUTPATIENT
Start: 2024-01-23 | End: 2024-01-23

## 2024-01-23 RX ORDER — KETOROLAC TROMETHAMINE 10 MG/1
10 TABLET, FILM COATED ORAL EVERY 6 HOURS PRN
Qty: 12 TABLET | Refills: 0 | Status: SHIPPED | OUTPATIENT
Start: 2024-01-23 | End: 2024-01-26

## 2024-01-23 RX ADMIN — KETOROLAC TROMETHAMINE 30 MG: 30 INJECTION, SOLUTION INTRAMUSCULAR; INTRAVENOUS at 15:21

## 2024-01-23 NOTE — PROGRESS NOTES
Chief Complaint:   Flank Pain and Hematuria      History of Present Illness   Source of history provided by:  patient.      Jazz Calderon is a 73 y.o. 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 Brewer yearly    Mitral regurgitation     follows with Dr. Brewer yearly    PONV (postoperative nausea and vomiting)     Tachycardia     follows with Dr. Brewer yearly    Thyroid disease     'enlarged thyroid' ; no meds present    Tobacco abuse, in remission        Pt  p[facility] for bilateral flank pain and bloody urine for the past few days.    Pt has a significant h/o Kidney Stones that required Lithotripsy. Pt does follow w/Urology.   Pt is afebrile and tolerating PO well.    Denies any vaginal discharge, vaginal bleeding, vomiting, diarrhea, bowel issues, or lethargy.   No LMP recorded. Patient is postmenopausal.      ROS    Unless otherwise stated in this report or unable to obtain because of the patient's clinical or mental status as evidenced by the medical record, this patients's positive and negative responses for Review of Systems, constitutional, psych, eyes, ENT, cardiovascular, respiratory, gastrointestinal, neurological, genitourinary, musculoskeletal, integument systems and systems related to the presenting problem are either stated in the preceding or were not pertinent or were negative for the symptoms and/or complaints related to the medical problem.    Past Surgical history:   Past Surgical History:   Procedure Laterality Date    BREAST BIOPSY      benign    COLONOSCOPY      DIAGNOSTIC CARDIAC CATH LAB PROCEDURE  11/09/2001    [facility]    ENDOSCOPY, COLON, DIAGNOSTIC      EP DEVICE PROCEDURE N/A 11/29/2023    Medtronic CRT-D by

## 2024-01-25 LAB
CULTURE: NORMAL
SPECIMEN DESCRIPTION: NORMAL

## 2024-01-29 ENCOUNTER — TELEPHONE (OUTPATIENT)
Dept: FAMILY MEDICINE CLINIC | Age: 74
End: 2024-01-29

## 2024-02-07 LAB — NON-GYN CYTOLOGY REPORT: NORMAL

## 2024-03-04 ENCOUNTER — OFFICE VISIT (OUTPATIENT)
Dept: NON INVASIVE DIAGNOSTICS | Age: 74
End: 2024-03-04
Payer: MEDICARE

## 2024-03-04 ENCOUNTER — TELEPHONE (OUTPATIENT)
Dept: NON INVASIVE DIAGNOSTICS | Age: 74
End: 2024-03-04

## 2024-03-04 VITALS
HEIGHT: 59 IN | HEART RATE: 87 BPM | DIASTOLIC BLOOD PRESSURE: 86 MMHG | WEIGHT: 144.6 LBS | BODY MASS INDEX: 29.15 KG/M2 | RESPIRATION RATE: 18 BRPM | SYSTOLIC BLOOD PRESSURE: 138 MMHG

## 2024-03-04 DIAGNOSIS — I50.20 HFREF (HEART FAILURE WITH REDUCED EJECTION FRACTION) (HCC): ICD-10-CM

## 2024-03-04 DIAGNOSIS — Z95.810 PRESENCE OF CARDIAC RESYNCHRONIZATION THERAPY DEFIBRILLATOR (CRT-D): ICD-10-CM

## 2024-03-04 DIAGNOSIS — I49.9 IRREGULAR HEART BEAT: Primary | ICD-10-CM

## 2024-03-04 DIAGNOSIS — I50.22 HEART FAILURE WITH MID-RANGE EJECTION FRACTION (HFMEF) (HCC): ICD-10-CM

## 2024-03-04 PROCEDURE — 3075F SYST BP GE 130 - 139MM HG: CPT | Performed by: NURSE PRACTITIONER

## 2024-03-04 PROCEDURE — 99214 OFFICE O/P EST MOD 30 MIN: CPT | Performed by: NURSE PRACTITIONER

## 2024-03-04 PROCEDURE — 93284 PRGRMG EVAL IMPLANTABLE DFB: CPT | Performed by: NURSE PRACTITIONER

## 2024-03-04 PROCEDURE — 1123F ACP DISCUSS/DSCN MKR DOCD: CPT | Performed by: NURSE PRACTITIONER

## 2024-03-04 PROCEDURE — 93000 ELECTROCARDIOGRAM COMPLETE: CPT | Performed by: NURSE PRACTITIONER

## 2024-03-04 PROCEDURE — 3079F DIAST BP 80-89 MM HG: CPT | Performed by: NURSE PRACTITIONER

## 2024-03-04 RX ORDER — CARVEDILOL 12.5 MG/1
12.5 TABLET ORAL 2 TIMES DAILY
Qty: 60 TABLET | Refills: 5
Start: 2024-03-04

## 2024-03-04 NOTE — PROGRESS NOTES
[facility]  Outpatient follow up.   PATIENT: Jazz Calderon  MEDICAL RECORD NUMBER: [id number]  DATE OF SERVICE:  3/4/2024  ATTENDING ELECTROPHYSIOLOGIST: Maribell Moreau MD  REFERRING PHYSICIAN: Kendrick Pierce MD  CHIEF COMPLAINT: NICM and HFrEF    HPI: This is a 73 y.o. female with a past medical history of nonischemic cardiomyopathy, chronic HFrEF on maximally tolerated guideline directed medical therapy, LBBB, mitral valve regurgitation, hypertension, hyperlipidemia, Aden's esophagus.  Due to failure of her LVEF to recover on maximally tolerated guideline directed medical therapy she underwent placement of a CRT-D (Medtronic) on 11/29/2023.  Postprocedure she noted diaphragmatic stimulation when laying on the right lateral position, this was relieved today with device programming with device programing LV1 to LV4. At the time of her 2 week check her TENS unit was found not to have any KRISTIE. Today she presents in AS- with 98.1 Bi-V pacing and notes improved dyspnea, outside of a time she ran out of Entresto. The device site is well healed and free of erosion and migration.           Patient Active Problem List    Diagnosis Date Noted    S/P ICD (internal cardiac defibrillator) procedure 11/29/2023     Priority: High     Overview Note:     Medtronic Primary prevention.       Nonischemic cardiomyopathy (HCC) 07/20/2023     Overview Note:     A. Cath 6/2023: no obstructive lesions  B. CMR 6/9/2023 (Ruby): EF 31%, no inflammatory pattern, moderate MR      Nonrheumatic mitral valve regurgitation 07/20/2023    HFrEF (heart failure with reduced ejection fraction) (HCC) 06/22/2023    Hiatal hernia 03/28/2018    Aden's esophagus 01/11/2018    Renal stones 01/11/2018    HTN (hypertension) 01/27/2014    Hyperlipidemia 01/27/2014    Vitamin D deficiency 01/27/2014       Past Medical History:   Diagnosis Date    Arthritis     Cancer

## 2024-03-04 NOTE — TELEPHONE ENCOUNTER
----- Message from David Negron, APRN - CNP sent at 3/4/2024  3:16 PM EST -----  Please let the patient's chiropractor know that she is ok to resume chiropractic care however she should not use a TENS unit unless evaluated in office. Her Chiropractor is Dr. Banda in [place], I did address this in my note as well. Thanks.

## 2024-03-04 NOTE — TELEPHONE ENCOUNTER
I spoke with the staff at Dr. Holder's office and advised them Jazz can return to chiropractic care but no TENS unit unless monitored in the office. Staff verbalized understanding.     Electronically signed by Luh Chau MA on 3/4/2024 at 3:39 PM

## 2024-03-18 ENCOUNTER — TELEPHONE (OUTPATIENT)
Dept: CARDIOLOGY | Age: 74
End: 2024-03-18

## 2024-03-18 NOTE — TELEPHONE ENCOUNTER
CALLED PATIENT AND LEFT MESSAGE TO SCHEDULE ECHO    Electronically signed by Alicia Jerez on 3/18/2024 at 9:56 AM

## 2024-03-22 DIAGNOSIS — K21.9 GASTROESOPHAGEAL REFLUX DISEASE WITHOUT ESOPHAGITIS: ICD-10-CM

## 2024-03-22 RX ORDER — FAMOTIDINE 20 MG/1
TABLET, FILM COATED ORAL
Qty: 90 TABLET | Refills: 0 | Status: SHIPPED | OUTPATIENT
Start: 2024-03-22

## 2024-04-05 NOTE — PROGRESS NOTES
[facility]... Occupational Therapy    OT eval and treat and orders received and chart reviewed. Attempt made but pt able to complete own self care and walk to and from bathroom. Pt with no acute OT needs at this time and no further questions or concerns. OT orders discontinued and pt agreed.      Julia Mitchell, OTR/L

## 2024-04-17 ENCOUNTER — TELEPHONE (OUTPATIENT)
Dept: NON INVASIVE DIAGNOSTICS | Age: 74
End: 2024-04-17

## 2024-04-17 NOTE — TELEPHONE ENCOUNTER
----- Message from SALVADOR Thapa CNP sent at 4/16/2024  3:03 PM EDT -----  Regarding: FW: Tenderness in surgery scar  Contact: [phone]  Can you possibly call the patient tomorrow and discuss if she needs to come to the device clinic to be evaluated or any advice?  It is not a new device so not sure why it all of a sudden started itching and I do not know what she means by a liquid.  It looks like she was just here with  last month so I do not think she probably needs an in person check but not sure what changed and if we should just bring her in to look at it.  ----- Message -----  From: Luh Chau MA  Sent: 4/16/2024   2:02 PM EDT  To: SALVADOR Thapa CNP  Subject: FW: Tenderness in surgery scar                     ----- Message -----  From: Jazz Calderon  Sent: 4/16/2024   1:44 PM EDT  To: Toyin Ruth Clinical Staff  Subject: Tenderness in surgery scar                       I started experiencing tenderness and some itching in my scar several weeks ago.  It is not severe, but it is annoying to the point I changed to bras with straps on the side.  I have used a generic Mederma but it is not helping.  Also, it is almost a liquid making it hard to apply.  Is it supposed to be that way?  Should I be concerned and is there anything to help?  Thanks for your help.  Jazz       See HPI See HPI See HPI See HPI See HPI See HPI

## 2024-04-17 NOTE — TELEPHONE ENCOUNTER
Call to Katie, patients daughter in law.  Katie aware that per Dr. De Dois an xray will need to be obtained prior to the follow up appointment.  This may or may not include an injection, this will be dependent on the xray results and the assessment performed by Dr. De Dios. Katie verbalized understanding.    Patients daughter in law reported it is left hip pain.  But then called the patient and reported it is right hip pain at this time.  Katie clarified with patient that the right hip is causing her pain. Xray ordered for right hip.  Katie aware that this can be done at the [facility].    Patient is not on blood thinners.    Appointment scheduled.    No further questions or concerns at this time.   I tried calling patient again. There is no answer. The phone number she left on [employer] is a previous employer. When I called [phone], I was told patient no longer works there.    Alicia Cross RN, BSN  [facility] [place]   Device Clinic

## 2024-04-17 NOTE — TELEPHONE ENCOUNTER
Attempted to contact patient regarding her message of incisional tenderness. No answer at her home number. Will try again later.    Alicia Cross RN, BSN  [facility] [place]   Device Clinic

## 2024-04-18 ENCOUNTER — HOSPITAL ENCOUNTER (OUTPATIENT)
Age: 74
Discharge: HOME | End: 2024-04-20
Payer: MEDICARE

## 2024-04-18 ENCOUNTER — HOSPITAL ENCOUNTER (OUTPATIENT)
Dept: GENERAL RADIOLOGY | Age: 74
Discharge: HOME | End: 2024-04-20
Payer: MEDICARE

## 2024-04-18 DIAGNOSIS — R19.7 DIARRHEA, UNSPECIFIED TYPE: ICD-10-CM

## 2024-04-18 PROCEDURE — 74018 RADEX ABDOMEN 1 VIEW: CPT

## 2024-04-18 NOTE — TELEPHONE ENCOUNTER
I left another message for patient to contact office regarding her symptoms.    Alicia Cross RN, BSN  [facility] [place]   Device Clinic

## 2024-04-19 NOTE — TELEPHONE ENCOUNTER
C/o itchiness and tenderness at her CRT-D device site. She denies redness or drainage. I told her she can try ice to the area for 20 minutes at a time. Make sure she wraps the ice bag in a towel. I asked her to call if she develops fever, chills, or drainage from site. She voiced understanding of above.    Alicia Cross RN, BSN  [facility] [place]   Device Clinic

## 2024-05-21 ENCOUNTER — HOSPITAL ENCOUNTER (OUTPATIENT)
Dept: CARDIOLOGY | Age: 74
Discharge: HOME | End: 2024-05-23
Payer: MEDICARE

## 2024-05-21 VITALS
WEIGHT: 144 LBS | HEIGHT: 59 IN | BODY MASS INDEX: 29.03 KG/M2 | DIASTOLIC BLOOD PRESSURE: 86 MMHG | SYSTOLIC BLOOD PRESSURE: 138 MMHG

## 2024-05-21 DIAGNOSIS — I50.22 HEART FAILURE WITH MID-RANGE EJECTION FRACTION (HFMEF) (HCC): ICD-10-CM

## 2024-05-21 DIAGNOSIS — I50.20 HFREF (HEART FAILURE WITH REDUCED EJECTION FRACTION) (HCC): ICD-10-CM

## 2024-05-21 PROCEDURE — 93308 TTE F-UP OR LMTD: CPT

## 2024-05-23 ENCOUNTER — TELEPHONE (OUTPATIENT)
Dept: NON INVASIVE DIAGNOSTICS | Age: 74
End: 2024-05-23

## 2024-05-23 LAB
ECHO AR MAX VEL PISA: 3.9 M/S
ECHO AV REGURGITANT PHT: 635.8 MILLISECOND
ECHO BSA: 1.65 M2
ECHO EST RA PRESSURE: 3 MMHG
ECHO LA DIAMETER INDEX: 2.69 CM/M2
ECHO LA DIAMETER: 4.3 CM
ECHO LA VOL A-L A2C: 23 ML (ref 22–52)
ECHO LA VOL A-L A4C: 22 ML (ref 22–52)
ECHO LA VOL MOD A2C: 22 ML (ref 22–52)
ECHO LA VOL MOD A4C: 20 ML (ref 22–52)
ECHO LA VOLUME AREA LENGTH: 23 ML
ECHO LA VOLUME INDEX A-L A2C: 14 ML/M2 (ref 16–34)
ECHO LA VOLUME INDEX A-L A4C: 14 ML/M2 (ref 16–34)
ECHO LA VOLUME INDEX AREA LENGTH: 14 ML/M2 (ref 16–34)
ECHO LA VOLUME INDEX MOD A2C: 14 ML/M2 (ref 16–34)
ECHO LA VOLUME INDEX MOD A4C: 13 ML/M2 (ref 16–34)
ECHO LV EJECTION FRACTION A2C: 44 %
ECHO LV EJECTION FRACTION A4C: 46 %
ECHO LV FRACTIONAL SHORTENING: 22 % (ref 28–44)
ECHO LV INTERNAL DIMENSION DIASTOLE INDEX: 2.81 CM/M2
ECHO LV INTERNAL DIMENSION DIASTOLIC: 4.5 CM (ref 3.9–5.3)
ECHO LV INTERNAL DIMENSION SYSTOLIC INDEX: 2.19 CM/M2
ECHO LV INTERNAL DIMENSION SYSTOLIC: 3.5 CM
ECHO LV IVSD: 1.1 CM (ref 0.6–0.9)
ECHO LV IVSS: 1.6 CM
ECHO LV MASS 2D: 175 G (ref 67–162)
ECHO LV MASS INDEX 2D: 109.4 G/M2 (ref 43–95)
ECHO LV POSTERIOR WALL DIASTOLIC: 1.1 CM (ref 0.6–0.9)
ECHO LV POSTERIOR WALL SYSTOLIC: 1.4 CM
ECHO LV RELATIVE WALL THICKNESS RATIO: 0.49
ECHO MV "A" WAVE DURATION: 124.6 MSEC
ECHO MV A VELOCITY: 0.88 M/S
ECHO MV E DECELERATION TIME (DT): 246.7 MS
ECHO MV E VELOCITY: 0.41 M/S
ECHO MV E/A RATIO: 0.47
ECHO RIGHT VENTRICULAR SYSTOLIC PRESSURE (RVSP): 21 MMHG
ECHO RV INTERNAL DIMENSION: 2.6 CM
ECHO TV REGURGITANT MAX VELOCITY: 2.1 M/S
ECHO TV REGURGITANT PEAK GRADIENT: 18 MMHG

## 2024-05-23 NOTE — TELEPHONE ENCOUNTER
Patient is sleeping, I will call back later.    Electronically signed by Luh Chau MA on 5/23/2024 at 9:01 AM

## 2024-05-23 NOTE — TELEPHONE ENCOUNTER
Pt notified of results and verbalized understanding.    Electronically signed by Luh Chau MA on 5/23/2024 at 3:21 PM

## 2024-05-23 NOTE — TELEPHONE ENCOUNTER
Kevin said the patient is not home right now. I advised Kevin to have the patient call the office for results. Kevin verbalized understanding.     Electronically signed by Luh Chau MA on 5/23/2024 at 1:34 PM

## 2024-05-23 NOTE — TELEPHONE ENCOUNTER
----- Message from SALVADOR Carr CNP sent at 5/23/2024  7:36 AM EDT -----  Please let the patient know that her LVEF has improved to 40-45 with the CRT, thanks.   ----- Message -----  From: Jose Decker MD  Sent: 5/23/2024   5:58 AM EDT  To: SALVADOR Carr CNP

## 2024-06-15 DIAGNOSIS — K21.9 GASTROESOPHAGEAL REFLUX DISEASE WITHOUT ESOPHAGITIS: ICD-10-CM

## 2024-06-17 DIAGNOSIS — K21.9 GASTROESOPHAGEAL REFLUX DISEASE WITHOUT ESOPHAGITIS: ICD-10-CM

## 2024-06-17 RX ORDER — FAMOTIDINE 20 MG/1
TABLET, FILM COATED ORAL
Qty: 90 TABLET | Refills: 0 | Status: SHIPPED | OUTPATIENT
Start: 2024-06-17

## 2024-06-17 RX ORDER — FAMOTIDINE 20 MG/1
20 TABLET, FILM COATED ORAL DAILY
Qty: 90 TABLET | Refills: 0 | OUTPATIENT
Start: 2024-06-17

## 2024-06-27 LAB — MAMMOGRAPHY, EXTERNAL: NORMAL

## 2024-07-07 ENCOUNTER — HOSPITAL ENCOUNTER (EMERGENCY)
Age: 74
Discharge: HOME | End: 2024-07-07
Payer: MEDICARE

## 2024-07-07 VITALS
HEART RATE: 74 BPM | OXYGEN SATURATION: 94 % | TEMPERATURE: 97.9 F | RESPIRATION RATE: 18 BRPM | SYSTOLIC BLOOD PRESSURE: 128 MMHG | DIASTOLIC BLOOD PRESSURE: 68 MMHG | BODY MASS INDEX: 28.8 KG/M2 | WEIGHT: 145 LBS

## 2024-07-07 DIAGNOSIS — M54.32 SCIATICA, LEFT SIDE: ICD-10-CM

## 2024-07-07 DIAGNOSIS — M54.50 ACUTE EXACERBATION OF CHRONIC LOW BACK PAIN: Primary | ICD-10-CM

## 2024-07-07 DIAGNOSIS — E78.5 HYPERLIPIDEMIA, UNSPECIFIED HYPERLIPIDEMIA TYPE: ICD-10-CM

## 2024-07-07 DIAGNOSIS — G89.29 ACUTE EXACERBATION OF CHRONIC LOW BACK PAIN: Primary | ICD-10-CM

## 2024-07-07 PROCEDURE — 6370000000 HC RX 637 (ALT 250 FOR IP): Performed by: PHYSICIAN ASSISTANT

## 2024-07-07 PROCEDURE — 96372 THER/PROPH/DIAG INJ SC/IM: CPT

## 2024-07-07 PROCEDURE — 6360000002 HC RX W HCPCS: Performed by: PHYSICIAN ASSISTANT

## 2024-07-07 PROCEDURE — 99284 EMERGENCY DEPT VISIT MOD MDM: CPT

## 2024-07-07 PROCEDURE — 2580000003 HC RX 258: Performed by: PHYSICIAN ASSISTANT

## 2024-07-07 RX ORDER — METHYLPREDNISOLONE 4 MG/1
TABLET ORAL
Qty: 21 TABLET | Refills: 0 | Status: SHIPPED | OUTPATIENT
Start: 2024-07-07 | End: 2024-07-13

## 2024-07-07 RX ORDER — LIDOCAINE 50 MG/G
1 PATCH TOPICAL EVERY 24 HOURS
Qty: 10 PATCH | Refills: 0 | Status: SHIPPED | OUTPATIENT
Start: 2024-07-07 | End: 2024-07-17

## 2024-07-07 RX ORDER — HYDROCODONE BITARTRATE AND ACETAMINOPHEN 5; 325 MG/1; MG/1
1 TABLET ORAL EVERY 12 HOURS PRN
Qty: 6 TABLET | Refills: 0 | Status: SHIPPED | OUTPATIENT
Start: 2024-07-07 | End: 2024-07-10

## 2024-07-07 RX ORDER — HYDROCODONE BITARTRATE AND ACETAMINOPHEN 5; 325 MG/1; MG/1
1 TABLET ORAL ONCE
Status: COMPLETED | OUTPATIENT
Start: 2024-07-07 | End: 2024-07-07

## 2024-07-07 RX ORDER — KETOROLAC TROMETHAMINE 30 MG/ML
15 INJECTION, SOLUTION INTRAMUSCULAR; INTRAVENOUS ONCE
Status: COMPLETED | OUTPATIENT
Start: 2024-07-07 | End: 2024-07-07

## 2024-07-07 RX ADMIN — KETOROLAC TROMETHAMINE 15 MG: 30 INJECTION, SOLUTION INTRAMUSCULAR at 14:42

## 2024-07-07 RX ADMIN — HYDROCODONE BITARTRATE AND ACETAMINOPHEN 1 TABLET: 5; 325 TABLET ORAL at 14:41

## 2024-07-07 RX ADMIN — WATER 125 MG: 1 INJECTION INTRAMUSCULAR; INTRAVENOUS; SUBCUTANEOUS at 14:42

## 2024-07-07 ASSESSMENT — PAIN DESCRIPTION - FREQUENCY: FREQUENCY: CONTINUOUS

## 2024-07-07 ASSESSMENT — PAIN DESCRIPTION - ONSET: ONSET: ON-GOING

## 2024-07-07 ASSESSMENT — PAIN SCALES - GENERAL
PAINLEVEL_OUTOF10: 10
PAINLEVEL_OUTOF10: 10

## 2024-07-07 ASSESSMENT — PAIN - FUNCTIONAL ASSESSMENT: PAIN_FUNCTIONAL_ASSESSMENT: 0-10

## 2024-07-07 ASSESSMENT — PAIN DESCRIPTION - LOCATION
LOCATION: BACK;LEG
LOCATION: BACK

## 2024-07-07 ASSESSMENT — PAIN DESCRIPTION - PAIN TYPE: TYPE: ACUTE PAIN

## 2024-07-07 ASSESSMENT — PAIN DESCRIPTION - ORIENTATION
ORIENTATION: LEFT;LOWER
ORIENTATION: LEFT;LOWER

## 2024-07-07 NOTE — ED PROVIDER NOTES
Independent KEISHA Visit.     [facility]  Department of Emergency Medicine   ED  Encounter Note  Admit Date/RoomTime: 2024  2:15 PM  ED Room:   NAME: Jazz Calderon  : 1950  MRN: [id number]     Chief Complaint:  Back Pain (Lower back pain, chronic issues, pain shooting down left leg)    HISTORY OF PRESENT ILLNESS        Jazz Calderon is a 74 y.o. female who presents to the ED with a complaint of left-sided low back pain with pain down her left leg.  Patient states she has severe degenerative disc disease of her spine.  History of chronic back pain.  States she had a flareup couple days ago.  Is localized to her left low back radiates down her left leg to the ankle.  Patient denies any fevers or chills.  Denies recent illness.  Patient denies nausea, vomiting or diarrhea.  Denies dysuria.  Pain does not radiate into abdomen or chest.  Patient is not a diabetic.  Denies any history of renal failure.  Denies a history of IV drug abuse.  Denies a recent history of cancer or immunocompromised system.  Patient is not on anticoagulants.  Patient denies bladder incontinence or retention.  Denies saddle anesthesia.  Denies bowel incontinence or retention.  Symptoms are moderate in severity.  She can only take Tylenol at home which is not helping recently.  She also tried her TENS unit last night that did not help.      ROS   Pertinent positives and negatives are stated within HPI, all other systems reviewed and are negative.    Past Medical History:  has a past medical history of Arthritis, Cancer (HCC), History of kidney stones, HTN (hypertension), Hyperlipidemia, LBBB (left bundle branch block), Mitral regurgitation, PONV (postoperative nausea and vomiting), Tachycardia, Thyroid disease, and Tobacco abuse, in remission.    Surgical History:  has a past surgical history that includes Diagnostic Cardiac Cath Lab Procedure (2001); Tonsillectomy; Lithotripsy; Breast biopsy; Skin

## 2024-07-08 ENCOUNTER — TELEPHONE (OUTPATIENT)
Dept: FAMILY MEDICINE CLINIC | Age: 74
End: 2024-07-08

## 2024-07-08 DIAGNOSIS — I50.20 HFREF (HEART FAILURE WITH REDUCED EJECTION FRACTION) (HCC): ICD-10-CM

## 2024-07-08 RX ORDER — ATORVASTATIN CALCIUM 10 MG/1
10 TABLET, FILM COATED ORAL DAILY
Qty: 90 TABLET | Refills: 0 | Status: SHIPPED | OUTPATIENT
Start: 2024-07-08

## 2024-07-08 RX ORDER — CARVEDILOL 12.5 MG/1
12.5 TABLET ORAL 2 TIMES DAILY
Qty: 60 TABLET | Refills: 5 | Status: SHIPPED | OUTPATIENT
Start: 2024-07-08

## 2024-07-08 NOTE — TELEPHONE ENCOUNTER
Pt went into er still in severe pain hasn't slept meds not helping advised to go back to er for further testing .

## 2024-07-11 ENCOUNTER — OFFICE VISIT (OUTPATIENT)
Dept: FAMILY MEDICINE CLINIC | Age: 74
End: 2024-07-11

## 2024-07-11 VITALS
DIASTOLIC BLOOD PRESSURE: 80 MMHG | SYSTOLIC BLOOD PRESSURE: 118 MMHG | HEART RATE: 84 BPM | HEIGHT: 60 IN | BODY MASS INDEX: 28.27 KG/M2 | TEMPERATURE: 97.3 F | WEIGHT: 144 LBS | OXYGEN SATURATION: 97 %

## 2024-07-11 DIAGNOSIS — M54.50 ACUTE LOW BACK PAIN, UNSPECIFIED BACK PAIN LATERALITY, UNSPECIFIED WHETHER SCIATICA PRESENT: Primary | ICD-10-CM

## 2024-07-11 RX ORDER — MELOXICAM 15 MG/1
15 TABLET ORAL DAILY PRN
Qty: 30 TABLET | Refills: 0 | Status: SHIPPED | OUTPATIENT
Start: 2024-07-11

## 2024-07-11 RX ORDER — GABAPENTIN 300 MG/1
300 CAPSULE ORAL 3 TIMES DAILY
Qty: 270 CAPSULE | Refills: 1 | Status: SHIPPED | OUTPATIENT
Start: 2024-07-11 | End: 2025-01-07

## 2024-07-11 NOTE — PROGRESS NOTES
FM Progress Note    Subjective:   Back pain. Acute on chronic. Saw ED. L sciatica. Given norco, lidocaine, medrol dosepak. Still having pain. Extreme. Says meds from ER did nothing. No red flags.       Health Maintenance Due   Topic Date Due    Respiratory Syncytial Virus (RSV) Pregnant or age 60 yrs+ (1 - 1-dose 60+ series) Never done    COVID-19 Vaccine (4 - 2023-24 season) 09/01/2023    Annual Wellness Visit (Medicare Advantage)  01/01/2024    Lipids  06/22/2024       Objective:   /80   Pulse 84   Temp 97.3 °F (36.3 °C)   Ht 1.511 m (4' 11.5\")   Wt 65.3 kg (144 lb)   SpO2 97%   BMI 28.60 kg/m²   General appearance: NAD, alert and interacting appropriately. Sitting comfortably   Back: minimal ttp L paraspinal.         I have reviewed this patient's previous records.    I have reviewed this patient's labs.    I have reviewed this patient's medications.      Assessment/Plan:    Jazz was seen today for lower back pain.    Diagnoses and all orders for this visit:    Acute low back pain, unspecified back pain laterality, unspecified whether sciatica present  -     XR LUMBAR SPINE (2-3 VIEWS); Future  -     gabapentin (NEURONTIN) 300 MG capsule; Take 1 capsule by mouth 3 times daily for 180 days. Intended supply: 90 days  -     meloxicam (MOBIC) 15 MG tablet; Take 1 tablet by mouth daily as needed for Pain        There are no Patient Instructions on file for this visit.     No follow-ups on file.  Rtc pending results           Electronically signed by Kendrick Pierce MD on 7/11/2024 at 3:50 PM

## 2024-07-13 ENCOUNTER — TELEPHONE (OUTPATIENT)
Dept: FAMILY MEDICINE CLINIC | Age: 74
End: 2024-07-13

## 2024-07-13 DIAGNOSIS — M54.50 ACUTE LOW BACK PAIN, UNSPECIFIED BACK PAIN LATERALITY, UNSPECIFIED WHETHER SCIATICA PRESENT: Primary | ICD-10-CM

## 2024-07-13 RX ORDER — TIZANIDINE 2 MG/1
2 TABLET ORAL 3 TIMES DAILY PRN
Qty: 21 TABLET | Refills: 0 | Status: SHIPPED | OUTPATIENT
Start: 2024-07-13 | End: 2024-07-20

## 2024-07-13 NOTE — TELEPHONE ENCOUNTER
Patient called and reported low back pain that radiated down to her feet. She states this has been ongoing for days, recently saw her PCP who prescribed her gabapentin and meloxicam, neither of which were effective. The patient denies red flag sx including bowel/bladder incontinence, saddle anesthesia, and inability to walk. Determined this did not necessitate emergency care, sent in tizanidine for 1 week for sx management. Discussed appropriate use of medication and how to take. Discussed possible adverse effects / side effects and answered all questions. Advised close f/u with PCP. Patient verbalized understanding.

## 2024-07-14 ENCOUNTER — PATIENT MESSAGE (OUTPATIENT)
Dept: FAMILY MEDICINE CLINIC | Age: 74
End: 2024-07-14

## 2024-07-15 RX ORDER — SPIRONOLACTONE 25 MG/1
25 TABLET ORAL DAILY
Qty: 30 TABLET | Refills: 5 | Status: SHIPPED | OUTPATIENT
Start: 2024-07-15

## 2024-07-15 NOTE — TELEPHONE ENCOUNTER
From: Jazz Calderon  To: Dr. Kendrick Pierce  Sent: 7/14/2024 7:50 PM EDT  Subject: HELP still in significant leg pain    I am still in significant back and leg pain with problems walking. None of the meds have helped. I cannot work where they really need me and I need the money. Should I be referred to an orthopedic doctor? HELP

## 2024-07-16 DIAGNOSIS — M54.50 ACUTE LOW BACK PAIN, UNSPECIFIED BACK PAIN LATERALITY, UNSPECIFIED WHETHER SCIATICA PRESENT: Primary | ICD-10-CM

## 2024-07-17 ENCOUNTER — HOSPITAL ENCOUNTER (OUTPATIENT)
Dept: CT IMAGING | Age: 74
Discharge: HOME | End: 2024-07-19
Attending: FAMILY MEDICINE
Payer: MEDICARE

## 2024-07-17 DIAGNOSIS — M54.50 ACUTE LOW BACK PAIN, UNSPECIFIED BACK PAIN LATERALITY, UNSPECIFIED WHETHER SCIATICA PRESENT: Primary | ICD-10-CM

## 2024-07-17 DIAGNOSIS — M54.50 ACUTE LOW BACK PAIN, UNSPECIFIED BACK PAIN LATERALITY, UNSPECIFIED WHETHER SCIATICA PRESENT: ICD-10-CM

## 2024-07-17 PROCEDURE — 72131 CT LUMBAR SPINE W/O DYE: CPT

## 2024-07-17 NOTE — TELEPHONE ENCOUNTER
Called to notify pt she is scheduled for stat mri at [facility] at noon arrival.

## 2024-07-18 DIAGNOSIS — M54.50 ACUTE LOW BACK PAIN, UNSPECIFIED BACK PAIN LATERALITY, UNSPECIFIED WHETHER SCIATICA PRESENT: Primary | ICD-10-CM

## 2024-07-18 RX ORDER — SPIRONOLACTONE 25 MG/1
25 TABLET ORAL DAILY
Qty: 90 TABLET | Refills: 0 | Status: SHIPPED | OUTPATIENT
Start: 2024-07-18

## 2024-07-25 DIAGNOSIS — M54.50 ACUTE LOW BACK PAIN, UNSPECIFIED BACK PAIN LATERALITY, UNSPECIFIED WHETHER SCIATICA PRESENT: ICD-10-CM

## 2024-07-25 RX ORDER — TIZANIDINE 2 MG/1
2 TABLET ORAL 3 TIMES DAILY PRN
Qty: 21 TABLET | Refills: 0 | Status: SHIPPED | OUTPATIENT
Start: 2024-07-25 | End: 2024-08-01

## 2024-07-31 ENCOUNTER — PREP FOR PROCEDURE (OUTPATIENT)
Dept: PAIN MANAGEMENT | Age: 74
End: 2024-07-31

## 2024-07-31 ENCOUNTER — OFFICE VISIT (OUTPATIENT)
Dept: PAIN MANAGEMENT | Age: 74
End: 2024-07-31
Payer: MEDICARE

## 2024-07-31 VITALS
BODY MASS INDEX: 29.03 KG/M2 | WEIGHT: 144 LBS | RESPIRATION RATE: 16 BRPM | HEIGHT: 59 IN | HEART RATE: 80 BPM | OXYGEN SATURATION: 95 % | SYSTOLIC BLOOD PRESSURE: 135 MMHG | TEMPERATURE: 97.5 F | DIASTOLIC BLOOD PRESSURE: 73 MMHG

## 2024-07-31 DIAGNOSIS — M54.16 LUMBAR RADICULOPATHY: ICD-10-CM

## 2024-07-31 DIAGNOSIS — G89.4 CHRONIC PAIN SYNDROME: Primary | ICD-10-CM

## 2024-07-31 DIAGNOSIS — M54.16 LUMBAR RADICULOPATHY: Primary | ICD-10-CM

## 2024-07-31 DIAGNOSIS — M54.42 CHRONIC LEFT-SIDED LOW BACK PAIN WITH LEFT-SIDED SCIATICA: ICD-10-CM

## 2024-07-31 DIAGNOSIS — G89.29 CHRONIC LEFT-SIDED LOW BACK PAIN WITH LEFT-SIDED SCIATICA: ICD-10-CM

## 2024-07-31 DIAGNOSIS — M51.9 LUMBAR DISC DISORDER: ICD-10-CM

## 2024-07-31 PROCEDURE — 1123F ACP DISCUSS/DSCN MKR DOCD: CPT | Performed by: PAIN MEDICINE

## 2024-07-31 PROCEDURE — 3078F DIAST BP <80 MM HG: CPT | Performed by: PAIN MEDICINE

## 2024-07-31 PROCEDURE — 99204 OFFICE O/P NEW MOD 45 MIN: CPT | Performed by: PAIN MEDICINE

## 2024-07-31 PROCEDURE — 3075F SYST BP GE 130 - 139MM HG: CPT | Performed by: PAIN MEDICINE

## 2024-07-31 PROCEDURE — 99204 OFFICE O/P NEW MOD 45 MIN: CPT

## 2024-07-31 NOTE — PROGRESS NOTES
Jazz Calderon presents to the [facility] on 7/31/2024. Jazz is complaining of pain in her low back and left leg. The pain is constant. The pain is described as aching, dull, and sharp. Pain is rated on her best day at a 5, on her worst day at a 10, and on average at a 6 on the VAS scale. She took her last dose of Neurontin today.     Any procedures since your last visit: No    Pacemaker or defibrillator: No    She is  on NSAIDS and is  on anticoagulation medications to include ASA and is managed by Kendrick Pierce MD  .     Medication Contract and Consent for Opioid Use Documents Filed        No documents found                    /73   Pulse 80   Temp 97.5 °F (36.4 °C) (Infrared)   Resp 16   Ht 1.499 m (4' 11\")   Wt 65.3 kg (144 lb)   SpO2 95%   BMI 29.08 kg/m²      No LMP recorded. Patient is postmenopausal.  
Jazz Calderon advised that procedure was scheduled for 8/8/2024 and that [facility] should call her a few days before for the pre op call and between 2:00 PM and 4:00 PM  the business day before with the arrival time. Instructed Jazz to hold ibuprofen for 24 hours, Celebrex, Mobic, and naprosyn for 4 days and any aspirin containing products, CoQ 10, or fish oil for 7 days. Instructed to call office back if any questions. Jazz verbalized understanding.    Electronically signed by Gallo Dennis RN on 7/31/2024 at 12:36 PM   
pain in L5 distribution  2024 CT lumbar w/o as above  PMHx: HTN, DLD, LBBB, tachycardia, and MR (has ICD, follows with Dr. Brewer), kidney stones, enlarged thyroid    Plan:    Pt cannot have MRI due to defib  Reviewed referral documents and imaging  Urine screen deferred  [place]ARRS report reviewed  On gabapentin 300 mg TID with mild relief  Left L5 TFESI - r/b and proc discussed   Consider PT once pain better controlled  Patient encouraged to stay active  Treatment plan discussed with the patient including medication and procedure side effects     CC:  Referring physician    MERRICK Doyle D.O.

## 2024-08-06 NOTE — PROGRESS NOTES
[facility] PAIN MANAGEMENT  INSTRUCTIONS  ...........................................................................................................................................     [x] Parking the day of Surgery is located in the Main Entrance lot.  Upon entering the door, make immediate right into the surgery reception room    [x]  Bring photo ID and insurance card     [x] You may have a light breakfast day of procedure    [x]  Wear loose comfortable clothing    [x]  Please follow instructions for medications as given per Dr's office    [x] You can expect a call the business day prior to procedure to notify you of your arrival time     [x] Please arrange for     []  Other instructions

## 2024-08-08 ENCOUNTER — HOSPITAL ENCOUNTER (OUTPATIENT)
Dept: GENERAL RADIOLOGY | Age: 74
Setting detail: OUTPATIENT SURGERY
Discharge: HOME | End: 2024-08-10
Attending: PAIN MEDICINE
Payer: MEDICARE

## 2024-08-08 ENCOUNTER — HOSPITAL ENCOUNTER (OUTPATIENT)
Age: 74
Setting detail: OUTPATIENT SURGERY
Discharge: HOME | End: 2024-08-08
Attending: PAIN MEDICINE | Admitting: PAIN MEDICINE
Payer: MEDICARE

## 2024-08-08 VITALS
WEIGHT: 147 LBS | SYSTOLIC BLOOD PRESSURE: 117 MMHG | TEMPERATURE: 97.9 F | HEIGHT: 60 IN | HEART RATE: 72 BPM | OXYGEN SATURATION: 97 % | RESPIRATION RATE: 18 BRPM | DIASTOLIC BLOOD PRESSURE: 56 MMHG | BODY MASS INDEX: 28.86 KG/M2

## 2024-08-08 DIAGNOSIS — R52 PAIN MANAGEMENT: ICD-10-CM

## 2024-08-08 LAB — GLUCOSE BLD-MCNC: 109 MG/DL (ref 74–99)

## 2024-08-08 PROCEDURE — 2709999900 HC NON-CHARGEABLE SUPPLY: Performed by: PAIN MEDICINE

## 2024-08-08 PROCEDURE — 3600000002 HC SURGERY LEVEL 2 BASE: Performed by: PAIN MEDICINE

## 2024-08-08 PROCEDURE — 2500000003 HC RX 250 WO HCPCS: Performed by: PAIN MEDICINE

## 2024-08-08 PROCEDURE — 6360000002 HC RX W HCPCS: Performed by: PAIN MEDICINE

## 2024-08-08 PROCEDURE — 64483 NJX AA&/STRD TFRM EPI L/S 1: CPT | Performed by: PAIN MEDICINE

## 2024-08-08 PROCEDURE — 7100000011 HC PHASE II RECOVERY - ADDTL 15 MIN: Performed by: PAIN MEDICINE

## 2024-08-08 PROCEDURE — 82962 GLUCOSE BLOOD TEST: CPT

## 2024-08-08 PROCEDURE — 7100000010 HC PHASE II RECOVERY - FIRST 15 MIN: Performed by: PAIN MEDICINE

## 2024-08-08 PROCEDURE — 6360000004 HC RX CONTRAST MEDICATION: Performed by: PAIN MEDICINE

## 2024-08-08 RX ORDER — IOPAMIDOL 612 MG/ML
INJECTION, SOLUTION INTRATHECAL PRN
Status: DISCONTINUED | OUTPATIENT
Start: 2024-08-08 | End: 2024-08-08 | Stop reason: ALTCHOICE

## 2024-08-08 RX ORDER — METHYLPREDNISOLONE ACETATE 40 MG/ML
INJECTION, SUSPENSION INTRA-ARTICULAR; INTRALESIONAL; INTRAMUSCULAR; SOFT TISSUE PRN
Status: DISCONTINUED | OUTPATIENT
Start: 2024-08-08 | End: 2024-08-08 | Stop reason: ALTCHOICE

## 2024-08-08 RX ORDER — LIDOCAINE HYDROCHLORIDE 5 MG/ML
INJECTION, SOLUTION INFILTRATION; INTRAVENOUS PRN
Status: DISCONTINUED | OUTPATIENT
Start: 2024-08-08 | End: 2024-08-08 | Stop reason: ALTCHOICE

## 2024-08-08 ASSESSMENT — PAIN DESCRIPTION - LOCATION
LOCATION: LEG

## 2024-08-08 ASSESSMENT — PAIN DESCRIPTION - ORIENTATION
ORIENTATION: LEFT

## 2024-08-08 ASSESSMENT — PAIN DESCRIPTION - DESCRIPTORS
DESCRIPTORS: ACHING;DISCOMFORT
DESCRIPTORS: ACHING;DISCOMFORT
DESCRIPTORS: ACHING;DISCOMFORT;GNAWING
DESCRIPTORS: ACHING;DISCOMFORT

## 2024-08-08 ASSESSMENT — PAIN DESCRIPTION - PAIN TYPE
TYPE: CHRONIC PAIN

## 2024-08-08 ASSESSMENT — PAIN - FUNCTIONAL ASSESSMENT: PAIN_FUNCTIONAL_ASSESSMENT: 0-10

## 2024-08-08 ASSESSMENT — PAIN SCALES - GENERAL
PAINLEVEL_OUTOF10: 5

## 2024-08-08 NOTE — OP NOTE
2024    Patient: Jazz Calderon  :  1950  Age:  74 y.o.  Sex:  female     PRE-OPERATIVE DIAGNOSIS: Lumbar disc displacement, lumbar neural foraminal stenosis, lumbar radiculopathy.     POST-OPERATIVE DIAGNOSIS: Same.    PROCEDURE: Left Transforaminal epidural steroid injection under fluoroscopic guidance at foraminal level L5.    SURGEON: MERRICK Doyle D.O.    ANESTHESIA: local    ESTIMATED BLOOD LOSS: None.  ______________________________________________________________________  BRIEF HISTORY: Jazz Calderon comes in today for the Left transforaminal epidural steroid injection under fluoroscopic guidance at foraminal level L5. The potential complications of this procedure were discussed with her again today.  She has elected to undergo the aforementioned procedure.     Jazz’s complete History & Physical examination were reviewed in depth, a copy of which is in the chart.      DESCRIPTION OF PROCEDURE:    After confirming written and informed consent, a time-out was performed and Jazz’s name and date of birth, the procedure to be performed as well as the plan for the location of the needle insertion were confirmed.    The patient was brought into the procedure room and placed in the prone position on the fluoroscopy table. Standard monitors were placed and vital signs were observed throughout the procedure. The area of the lumbar spine was prepped with chloraprep and draped in a sterile manner. The vertebral body was identified with AP fluoroscopy. An oblique view was obtained to better visualize the inferior junction of the pedicle and transverse process . The 6 o'clock position of the pedicle was marked and identified. The skin and subcutaneous tissue were anesthetized with 0.5% lidocaine. A # 22 gauge pencil point needle was directed toward the targeted point under fluoroscopy until bone was contacted. The needle was then walked inferiorly until the neural foramen was entered . A lateral

## 2024-08-08 NOTE — H&P
holding w Cornelius Villanueva MD       No Known Allergies    Social History     Socioeconomic History    Marital status:      Spouse name: Mr. Kevin Calderon    Number of children: 1    Years of education: Not on file    Highest education level: Not on file   Occupational History    Occupation: worked at [employer] for 30 years     Comment: retired    Occupation: worked [employer] for 25 years     Comment: retired    Occupation: [employer]     Comment: full time   Tobacco Use    Smoking status: Former     Current packs/day: 0.00     Average packs/day: 0.5 packs/day for 4.0 years (2.0 ttl pk-yrs)     Types: Cigarettes     Start date: 1965     Quit date: 1969     Years since quittin.6    Smokeless tobacco: Never    Tobacco comments:      PPD for 4 years while in college   Vaping Use    Vaping Use: Never used   Substance and Sexual Activity    Alcohol use: Yes     Comment: rare glass of wine    Drug use: Never    Sexual activity: Not on file     Comment: has a son, is    Other Topics Concern    Not on file   Social History Narrative    CODE STATUS: DNR    HEALTH CARE PROXY: her , Mr. Kevin Calderon, [phone] & [phone]    AMBULATES: independently normally    DOMICILED: has no stairs in the home, lives with her  and son, has 3 cats        Denies caffeine     Social Determinants of Health     Financial Resource Strain: Low Risk  (2023)    Overall Financial Resource Strain (CARDIA)     Difficulty of Paying Living Expenses: Not hard at all   Food Insecurity: No Food Insecurity (2023)    Hunger Vital Sign     Worried About Running Out of Food in the Last Year: Never true     Ran Out of Food in the Last Year: Never true   Transportation Needs: No Transportation Needs (2023)    PRAPARE - Transportation     Lack of Transportation (Medical): No     Lack of Transportation (Non-Medical): No   Physical Activity:

## 2024-08-08 NOTE — DISCHARGE INSTRUCTIONS
[facility] Management Department  [place]-[phone]  Dr. Tati Doyle   Post-Pain Block/Radiofrequency  Home Going Instructions    1-Go home, rest for the remainder of the day  2-Please do not lift over 20 pounds the day of the injection  3-If you received sedation No: alcohol, driving, operating lawn mowers, plows, tractors or other dangerous equipment until next morning. Do not make important decisions or sign legal documents for 24 hours. You may experience light headedness, dizziness, nausea or sleepiness after sedation. Do not stay alone. A responsible adult must be with you for 24 hours. You could be nauseated from the medications you have received. Your IV site may be sore and bruised.    4-No dietary restrictions     5-Resume all medications the same day, blood thinners to be resumed 24 hours after injection if you were instructed to stop any.    6-Keep the surgical site clean and dry, you may shower the next morning and remove the      dressing.     7- No sitz baths, tub baths or hot tubs/swimming for 24 hours.       8- If you have any pain at the injection site(s), application of an ice pack to the area should be       helpful, 20 minutes on/20 minutes off for next 48 hours.  9- Call [facility] immediately at if you develop.  Fever greater than 100.4 F  Have bleeding or drainage from the puncture site  Have progressive Leg/arm numbness and or weakness  Loss of control of bowel and or bladder (wet/soil yourself)  Severe headache with inability to lift head  10-You may return to work the next day

## 2024-08-11 DIAGNOSIS — E78.5 HYPERLIPIDEMIA, UNSPECIFIED HYPERLIPIDEMIA TYPE: ICD-10-CM

## 2024-08-12 DIAGNOSIS — E78.5 HYPERLIPIDEMIA, UNSPECIFIED HYPERLIPIDEMIA TYPE: ICD-10-CM

## 2024-08-12 DIAGNOSIS — K21.9 GASTROESOPHAGEAL REFLUX DISEASE WITHOUT ESOPHAGITIS: ICD-10-CM

## 2024-08-12 RX ORDER — ATORVASTATIN CALCIUM 10 MG/1
10 TABLET, FILM COATED ORAL DAILY
Qty: 90 TABLET | Refills: 0 | OUTPATIENT
Start: 2024-08-12

## 2024-08-12 RX ORDER — SPIRONOLACTONE 25 MG/1
25 TABLET ORAL DAILY
Qty: 30 TABLET | Refills: 5 | OUTPATIENT
Start: 2024-08-12

## 2024-08-13 RX ORDER — FAMOTIDINE 20 MG/1
20 TABLET, FILM COATED ORAL DAILY
Qty: 90 TABLET | Refills: 0 | Status: SHIPPED | OUTPATIENT
Start: 2024-08-13

## 2024-08-13 RX ORDER — ATORVASTATIN CALCIUM 10 MG/1
10 TABLET, FILM COATED ORAL DAILY
Qty: 90 TABLET | Refills: 0 | Status: SHIPPED | OUTPATIENT
Start: 2024-08-13

## 2024-08-30 ENCOUNTER — OFFICE VISIT (OUTPATIENT)
Dept: PAIN MANAGEMENT | Age: 74
End: 2024-08-30
Payer: MEDICARE

## 2024-08-30 VITALS
HEART RATE: 80 BPM | TEMPERATURE: 97.6 F | SYSTOLIC BLOOD PRESSURE: 109 MMHG | BODY MASS INDEX: 29.64 KG/M2 | OXYGEN SATURATION: 97 % | WEIGHT: 147 LBS | DIASTOLIC BLOOD PRESSURE: 65 MMHG | HEIGHT: 59 IN | RESPIRATION RATE: 16 BRPM

## 2024-08-30 DIAGNOSIS — M54.42 CHRONIC LEFT-SIDED LOW BACK PAIN WITH LEFT-SIDED SCIATICA: ICD-10-CM

## 2024-08-30 DIAGNOSIS — G89.29 CHRONIC LEFT-SIDED LOW BACK PAIN WITH LEFT-SIDED SCIATICA: ICD-10-CM

## 2024-08-30 DIAGNOSIS — G89.4 CHRONIC PAIN SYNDROME: Primary | ICD-10-CM

## 2024-08-30 DIAGNOSIS — M51.9 LUMBAR DISC DISORDER: ICD-10-CM

## 2024-08-30 DIAGNOSIS — M54.16 LUMBAR RADICULOPATHY: ICD-10-CM

## 2024-08-30 PROCEDURE — 3074F SYST BP LT 130 MM HG: CPT | Performed by: PAIN MEDICINE

## 2024-08-30 PROCEDURE — 99213 OFFICE O/P EST LOW 20 MIN: CPT | Performed by: PAIN MEDICINE

## 2024-08-30 PROCEDURE — 3078F DIAST BP <80 MM HG: CPT | Performed by: PAIN MEDICINE

## 2024-08-30 PROCEDURE — 1123F ACP DISCUSS/DSCN MKR DOCD: CPT | Performed by: PAIN MEDICINE

## 2024-08-30 PROCEDURE — 99213 OFFICE O/P EST LOW 20 MIN: CPT

## 2024-08-30 NOTE — PROGRESS NOTES
Jazz Calderon presents to the [facility] on 8/30/2024. Jazz is complaining of pain lower back. The pain is intermittent. The pain is described as sharp. Pain is rated on her best day at a 1, on her worst day at a 10, and on average at a 1 on the VAS scale. She took her last dose of Neurontin today.     Any procedures since your last visit: Yes, with 95 % relief.    Pacemaker or defibrillator: No.    She is  on NSAIDS and is  on anticoagulation medications to include ASA and is managed by Kendrick Pierce MD  .     Medication Contract and Consent for Opioid Use Documents Filed        No documents found                    /65   Pulse 80   Temp 97.6 °F (36.4 °C) (Infrared)   Resp 16   Ht 1.499 m (4' 11\")   Wt 66.7 kg (147 lb)   SpO2 97%   BMI 29.69 kg/m²      No LMP recorded. Patient is postmenopausal.     [Annual Visit] : annual visit [FreeTextEntry1] : Annual

## 2024-08-30 NOTE — PROGRESS NOTES
[facility]        [address], [place] [address]  Dept: [phone]        Follow up Note      Jazz Calderon     Date of Visit:  24     CC:  Patient presents for follow up   Chief Complaint   Patient presents with    Follow Up After Procedure     Left Transforaminal epidural steroid injection under fluoroscopic guidance at foraminal level L5.       HPI:    Pain is better.  Change in quality of symptoms:no.    Medication side effects:not applicable .   Recent diagnostic testing:none.   Recent interventional procedures:left L5 TFESI with 95% relief.    She has been on anticoagulation medications to include ASA and has not been on herbal supplements.  She is diabetic.     Imagin/2024 CT lumbar w/o -  FINDINGS:  BONES/ALIGNMENT: There is stable minimal scoliosis of the lumbar spine convex  towards the left with the apex at the L3 level.     There is slightly increased grade 1 anterior subluxation of L4 on L5, now 6  mm, previously 5 mm on the  exam and 3 mm on the  exam.  There is  stable mild L4-5 disc degenerative disease.     The rest of the lumbar vertebral bodies remain in anatomic alignment.     There is no sign of any compression fracture or fracture of the posterior  elements.     There is decreased AP canal diameter from L3 through L5, congenital spinal  stenosis which predisposes the patient to spinal stenosis from degenerative  disease.     DEGENERATIVE CHANGES:     L2-3: Stable mild diffuse disc bulging without spinal stenosis.  Stable  moderate bilateral foraminal zone disc bulging without contact with the  exiting nerve roots.  Normal disc height.     L3-4: Stable mild spinal stenosis from congenital narrowing, mild diffuse  disc bulging, and mild ligamentum flavum hypertrophy.  Stable mild bilateral  foraminal zone disc bulging without contact with the exiting nerve roots.  Normal disc height.     L4-5: Stable mild spinal stenosis  Organization Meetings: Never     Marital Status:    Intimate Partner Violence: Not At Risk (11/21/2023)    Humiliation, Afraid, Rape, and Kick questionnaire     Fear of Current or Ex-Partner: No     Emotionally Abused: No     Physically Abused: No     Sexually Abused: No   Housing Stability: Low Risk  (11/29/2023)    Housing Stability Vital Sign     Unable to Pay for Housing in the Last Year: No     Number of Places Lived in the Last Year: 1     Unstable Housing in the Last Year: No       Family History   Problem Relation Age of Onset    Lymphoma Mother     Heart Attack Father     Other Father         bowel disorder    Colon Cancer Brother     Diabetes Brother     Heart Disease Brother         evy smoker, MD    Other Brother         stomach problems    Asthma Son     Cancer Other     Diabetes Other        REVIEW OF SYSTEMS:     Jazz denies fever/chills, chest pain, shortness of breath, new bowel or bladder complaints. All other review of systems was negative.    PHYSICAL EXAMINATION:      /65   Pulse 80   Temp 97.6 °F (36.4 °C) (Infrared)   Resp 16   Ht 1.499 m (4' 11\")   Wt 66.7 kg (147 lb)   SpO2 97%   BMI 29.69 kg/m²     General:       General appearance:pleasant and well-hydrated, in no distress and A & O x3  Build:Normal Weight  Function:Rises from a seated position easily     HEENT:     Head:normocephalic, atraumatic  Pupils:regular, round, equal  Sclera: icterus absent     Lungs:     Breathing:normal breathing pattern     Abdomen:     Shape:non-distended  Tenderness:none  Guarding:none     Lumbar spine:     Spine inspection:normal   CVA tenderness:No   Palpation:tenderness paravertebral muscles, left negative, right negative.  Range of motion:abnormal mildly in lateral bending, flexion, extension rotation bilateral and is painful.     Musculoskeletal:     Trigger points in Paraveteral:absent bilaterally  SI joint tenderness:negative right, negative left              MALATHI test:not done

## 2024-11-05 ENCOUNTER — OFFICE VISIT (OUTPATIENT)
Dept: PAIN MANAGEMENT | Age: 74
End: 2024-11-05
Payer: MEDICARE

## 2024-11-05 VITALS
HEIGHT: 59 IN | WEIGHT: 147 LBS | OXYGEN SATURATION: 97 % | RESPIRATION RATE: 16 BRPM | DIASTOLIC BLOOD PRESSURE: 74 MMHG | HEART RATE: 79 BPM | SYSTOLIC BLOOD PRESSURE: 114 MMHG | BODY MASS INDEX: 29.64 KG/M2 | TEMPERATURE: 96.9 F

## 2024-11-05 DIAGNOSIS — M54.42 CHRONIC LEFT-SIDED LOW BACK PAIN WITH LEFT-SIDED SCIATICA: ICD-10-CM

## 2024-11-05 DIAGNOSIS — G89.4 CHRONIC PAIN SYNDROME: Primary | ICD-10-CM

## 2024-11-05 DIAGNOSIS — M54.16 LUMBAR RADICULOPATHY: ICD-10-CM

## 2024-11-05 DIAGNOSIS — G89.29 CHRONIC LEFT-SIDED LOW BACK PAIN WITH LEFT-SIDED SCIATICA: ICD-10-CM

## 2024-11-05 DIAGNOSIS — M51.9 LUMBAR DISC DISORDER: ICD-10-CM

## 2024-11-05 PROCEDURE — 99213 OFFICE O/P EST LOW 20 MIN: CPT | Performed by: PAIN MEDICINE

## 2024-11-05 NOTE — PROGRESS NOTES
[facility]        [address] [address]  Dept: [phone]        Follow up Note      Jazz Calderon     Date of Visit:  24     CC:  Patient presents for follow up   Chief Complaint   Patient presents with    Follow-up     Follow up 3 months       HPI:    Pain is  controlled .  Change in quality of symptoms:no.    Medication side effects:not applicable .   Recent diagnostic testing:none.   Recent interventional procedures: none    She has been on anticoagulation medications to include ASA and has not been on herbal supplements.  She is diabetic.     Imagin/2024 CT lumbar w/o -  FINDINGS:  BONES/ALIGNMENT: There is stable minimal scoliosis of the lumbar spine convex  towards the left with the apex at the L3 level.     There is slightly increased grade 1 anterior subluxation of L4 on L5, now 6  mm, previously 5 mm on the  exam and 3 mm on the  exam.  There is  stable mild L4-5 disc degenerative disease.     The rest of the lumbar vertebral bodies remain in anatomic alignment.     There is no sign of any compression fracture or fracture of the posterior  elements.     There is decreased AP canal diameter from L3 through L5, congenital spinal  stenosis which predisposes the patient to spinal stenosis from degenerative  disease.     DEGENERATIVE CHANGES:     L2-3: Stable mild diffuse disc bulging without spinal stenosis.  Stable  moderate bilateral foraminal zone disc bulging without contact with the  exiting nerve roots.  Normal disc height.     L3-4: Stable mild spinal stenosis from congenital narrowing, mild diffuse  disc bulging, and mild ligamentum flavum hypertrophy.  Stable mild bilateral  foraminal zone disc bulging without contact with the exiting nerve roots.  Normal disc height.     L4-5: Stable mild spinal stenosis from congenital narrowing, mild ligamentum  flavum and moderate bilateral facet hypertrophy.  Stable mild left

## 2024-11-29 DIAGNOSIS — E78.5 HYPERLIPIDEMIA, UNSPECIFIED HYPERLIPIDEMIA TYPE: ICD-10-CM

## 2024-11-29 RX ORDER — ATORVASTATIN CALCIUM 10 MG/1
10 TABLET, FILM COATED ORAL DAILY
Qty: 90 TABLET | Refills: 0 | OUTPATIENT
Start: 2024-11-29

## 2024-11-29 NOTE — TELEPHONE ENCOUNTER
Name of Medication(s) Requested:  Requested Prescriptions     Pending Prescriptions Disp Refills    atorvastatin (LIPITOR) 10 MG tablet 90 tablet 0     Sig: Take 1 tablet by mouth daily       Medication is on current medication list Yes    Dosage and directions were verified? Yes    Quantity verified: 90 day supply     Pharmacy Verified?  Yes    Last Appointment:  7/11/2024    Future appts:  Future Appointments   Date Time Provider Department Center   12/19/2024  1:30 PM Kendrick Pierce MD [facility]   2/6/2025  2:30 PM Edna Doyle DO [facility] PA[facility]   7/1/2025  2:30 PM SCHEDULE, [facility]        (If no appt send self scheduling link. .REFILLAPPT)  Scheduling request sent?     [] Yes  [x] No    Does patient need updated?  [] Yes  [x] No

## 2024-12-02 RX ORDER — ATORVASTATIN CALCIUM 10 MG/1
10 TABLET, FILM COATED ORAL DAILY
Qty: 90 TABLET | Refills: 0 | Status: SHIPPED | OUTPATIENT
Start: 2024-12-02

## 2024-12-19 ENCOUNTER — OFFICE VISIT (OUTPATIENT)
Dept: FAMILY MEDICINE CLINIC | Age: 74
End: 2024-12-19

## 2024-12-19 VITALS
HEART RATE: 75 BPM | TEMPERATURE: 97.6 F | BODY MASS INDEX: 29.76 KG/M2 | HEIGHT: 59 IN | OXYGEN SATURATION: 97 % | DIASTOLIC BLOOD PRESSURE: 83 MMHG | RESPIRATION RATE: 18 BRPM | WEIGHT: 147.6 LBS | SYSTOLIC BLOOD PRESSURE: 127 MMHG

## 2024-12-19 DIAGNOSIS — I50.20 HFREF (HEART FAILURE WITH REDUCED EJECTION FRACTION) (HCC): ICD-10-CM

## 2024-12-19 DIAGNOSIS — E78.5 HYPERLIPIDEMIA, UNSPECIFIED HYPERLIPIDEMIA TYPE: ICD-10-CM

## 2024-12-19 DIAGNOSIS — Z71.89 ACP (ADVANCE CARE PLANNING): ICD-10-CM

## 2024-12-19 DIAGNOSIS — Z00.00 MEDICARE ANNUAL WELLNESS VISIT, SUBSEQUENT: Primary | ICD-10-CM

## 2024-12-19 SDOH — ECONOMIC STABILITY: INCOME INSECURITY: HOW HARD IS IT FOR YOU TO PAY FOR THE VERY BASICS LIKE FOOD, HOUSING, MEDICAL CARE, AND HEATING?: NOT VERY HARD

## 2024-12-19 SDOH — ECONOMIC STABILITY: FOOD INSECURITY: WITHIN THE PAST 12 MONTHS, THE FOOD YOU BOUGHT JUST DIDN'T LAST AND YOU DIDN'T HAVE MONEY TO GET MORE.: NEVER TRUE

## 2024-12-19 SDOH — ECONOMIC STABILITY: FOOD INSECURITY: WITHIN THE PAST 12 MONTHS, YOU WORRIED THAT YOUR FOOD WOULD RUN OUT BEFORE YOU GOT MONEY TO BUY MORE.: NEVER TRUE

## 2024-12-19 ASSESSMENT — PATIENT HEALTH QUESTIONNAIRE - PHQ9
SUM OF ALL RESPONSES TO PHQ QUESTIONS 1-9: 0
SUM OF ALL RESPONSES TO PHQ QUESTIONS 1-9: 0
SUM OF ALL RESPONSES TO PHQ9 QUESTIONS 1 & 2: 0
SUM OF ALL RESPONSES TO PHQ QUESTIONS 1-9: 0
2. FEELING DOWN, DEPRESSED OR HOPELESS: NOT AT ALL
SUM OF ALL RESPONSES TO PHQ QUESTIONS 1-9: 0
1. LITTLE INTEREST OR PLEASURE IN DOING THINGS: NOT AT ALL

## 2024-12-19 ASSESSMENT — LIFESTYLE VARIABLES: HOW OFTEN DO YOU HAVE A DRINK CONTAINING ALCOHOL: NEVER

## 2024-12-19 NOTE — PROGRESS NOTES
Medicare Annual Wellness Visit    Jazz Calderon is here for Medicare AWV    Assessment & Plan   Medicare annual wellness visit, subsequent  ACP (advance care planning)  -     DNR comfort care - arrest  Hyperlipidemia, unspecified hyperlipidemia type  -     Lipid Panel; Future  HFrEF (heart failure with reduced ejection fraction) ([facility])  -     Comprehensive Metabolic Panel; Future    Recommendations for Preventive Services Due: see orders and patient instructions/AVS.  Recommended screening schedule for the next 5-10 years is provided to the patient in written form: see Patient Instructions/AVS.     Return for Medicare Annual Wellness Visit in 1 year.     Subjective       Patient's complete Health Risk Assessment and screening values have been reviewed and are found in Flowsheets. The following problems were reviewed today and where indicated follow up appointments were made and/or referrals ordered.    Positive Risk Factor Screenings with Interventions:    Fall Risk:  Do you feel unsteady or are you worried about falling? : no  2 or more falls in past year?: (!) yes  Fall with injury in past year?: no     Not sure 2 falls, maybe just one. Hinking in [place].     Interventions:    Reviewed medications, home hazards, visual acuity, and co-morbidities that can increase risk for falls                                 Objective   Vitals:    12/19/24 1352   BP: 127/83   Pulse: 75   Resp: 18   Temp: 97.6 °F (36.4 °C)   SpO2: 97%   Weight: 67 kg (147 lb 9.6 oz)   Height: 1.499 m (4' 11\")      Body mass index is 29.81 kg/m².                No Known Allergies  Prior to Visit Medications    Medication Sig Taking? Authorizing Provider   atorvastatin (LIPITOR) 10 MG tablet Take 1 tablet by mouth daily Yes Kendrick Pierce MD   famotidine (PEPCID) 20 MG tablet Take 1 tablet by mouth daily Yes Kendrick Pierce MD   spironolactone (ALDACTONE) 25 MG tablet Take 1 tablet by mouth daily Yes Jose Decker MD   gabapentin

## 2025-01-16 ENCOUNTER — HOSPITAL ENCOUNTER (OUTPATIENT)
Age: 75
Discharge: HOME | End: 2025-01-16
Payer: MEDICARE

## 2025-01-16 ENCOUNTER — HOSPITAL ENCOUNTER (OUTPATIENT)
Dept: GENERAL RADIOLOGY | Age: 75
Discharge: HOME | End: 2025-01-18
Payer: MEDICARE

## 2025-01-16 ENCOUNTER — HOSPITAL ENCOUNTER (OUTPATIENT)
Age: 75
Discharge: HOME | End: 2025-01-18
Payer: MEDICARE

## 2025-01-16 DIAGNOSIS — R19.5 LOOSE STOOLS: ICD-10-CM

## 2025-01-16 LAB
ALBUMIN SERPL-MCNC: 4.4 G/DL (ref 3.5–5.2)
ALP SERPL-CCNC: 79 U/L (ref 35–104)
ALT SERPL-CCNC: 8 U/L (ref 0–32)
ANION GAP SERPL CALCULATED.3IONS-SCNC: 10 MMOL/L (ref 7–16)
AST SERPL-CCNC: 26 U/L (ref 0–31)
BASOPHILS # BLD: 0.03 K/UL (ref 0–0.2)
BASOPHILS NFR BLD: 1 % (ref 0–2)
BILIRUB SERPL-MCNC: 0.6 MG/DL (ref 0–1.2)
BUN SERPL-MCNC: 45 MG/DL (ref 6–23)
CALCIUM SERPL-MCNC: 10.1 MG/DL (ref 8.6–10.2)
CHLORIDE SERPL-SCNC: 101 MMOL/L (ref 98–107)
CO2 SERPL-SCNC: 30 MMOL/L (ref 22–29)
CREAT SERPL-MCNC: 1.3 MG/DL (ref 0.5–1)
CRP SERPL HS-MCNC: <3 MG/L (ref 0–5)
EOSINOPHIL # BLD: 0.07 K/UL (ref 0.05–0.5)
EOSINOPHILS RELATIVE PERCENT: 1 % (ref 0–6)
ERYTHROCYTE [DISTWIDTH] IN BLOOD BY AUTOMATED COUNT: 13.4 % (ref 11.5–15)
ERYTHROCYTE [SEDIMENTATION RATE] IN BLOOD BY WESTERGREN METHOD: 15 MM/HR (ref 0–20)
GFR, ESTIMATED: 41 ML/MIN/1.73M2
GLUCOSE SERPL-MCNC: 87 MG/DL (ref 74–99)
HCT VFR BLD AUTO: 43.6 % (ref 34–48)
HGB BLD-MCNC: 14.2 G/DL (ref 11.5–15.5)
IMM GRANULOCYTES # BLD AUTO: 0.03 K/UL (ref 0–0.58)
IMM GRANULOCYTES NFR BLD: 1 % (ref 0–5)
LYMPHOCYTES NFR BLD: 2.3 K/UL (ref 1.5–4)
LYMPHOCYTES RELATIVE PERCENT: 40 % (ref 20–42)
MCH RBC QN AUTO: 30.7 PG (ref 26–35)
MCHC RBC AUTO-ENTMCNC: 32.6 G/DL (ref 32–34.5)
MCV RBC AUTO: 94.2 FL (ref 80–99.9)
MONOCYTES NFR BLD: 0.71 K/UL (ref 0.1–0.95)
MONOCYTES NFR BLD: 12 % (ref 2–12)
NEUTROPHILS NFR BLD: 45 % (ref 43–80)
NEUTS SEG NFR BLD: 2.59 K/UL (ref 1.8–7.3)
PLATELET # BLD AUTO: 201 K/UL (ref 130–450)
PMV BLD AUTO: 9.3 FL (ref 7–12)
POTASSIUM SERPL-SCNC: 4.5 MMOL/L (ref 3.5–5)
PROT SERPL-MCNC: 7.4 G/DL (ref 6.4–8.3)
RBC # BLD AUTO: 4.63 M/UL (ref 3.5–5.5)
SODIUM SERPL-SCNC: 141 MMOL/L (ref 132–146)
WBC OTHER # BLD: 5.7 K/UL (ref 4.5–11.5)

## 2025-01-16 PROCEDURE — 83993 ASSAY FOR CALPROTECTIN FECAL: CPT

## 2025-01-16 PROCEDURE — 85652 RBC SED RATE AUTOMATED: CPT

## 2025-01-16 PROCEDURE — 80053 COMPREHEN METABOLIC PANEL: CPT

## 2025-01-16 PROCEDURE — 86140 C-REACTIVE PROTEIN: CPT

## 2025-01-16 PROCEDURE — 74018 RADEX ABDOMEN 1 VIEW: CPT

## 2025-01-16 PROCEDURE — 36415 COLL VENOUS BLD VENIPUNCTURE: CPT

## 2025-01-16 PROCEDURE — 85025 COMPLETE CBC W/AUTO DIFF WBC: CPT

## 2025-01-18 ENCOUNTER — HOSPITAL ENCOUNTER (OUTPATIENT)
Age: 75
Discharge: HOME | End: 2025-01-18
Payer: MEDICARE

## 2025-01-18 DIAGNOSIS — I50.20 HFREF (HEART FAILURE WITH REDUCED EJECTION FRACTION) (HCC): ICD-10-CM

## 2025-01-18 DIAGNOSIS — E78.5 HYPERLIPIDEMIA, UNSPECIFIED HYPERLIPIDEMIA TYPE: ICD-10-CM

## 2025-01-18 LAB
ALBUMIN SERPL-MCNC: 4.3 G/DL (ref 3.5–5.2)
ALP SERPL-CCNC: 76 U/L (ref 35–104)
ALT SERPL-CCNC: 10 U/L (ref 0–32)
ANION GAP SERPL CALCULATED.3IONS-SCNC: 11 MMOL/L (ref 7–16)
AST SERPL-CCNC: 27 U/L (ref 0–31)
BILIRUB SERPL-MCNC: 0.5 MG/DL (ref 0–1.2)
BUN SERPL-MCNC: 45 MG/DL (ref 6–23)
CALCIUM SERPL-MCNC: 10 MG/DL (ref 8.6–10.2)
CHLORIDE SERPL-SCNC: 103 MMOL/L (ref 98–107)
CHOLEST SERPL-MCNC: 233 MG/DL
CO2 SERPL-SCNC: 28 MMOL/L (ref 22–29)
CREAT SERPL-MCNC: 1.4 MG/DL (ref 0.5–1)
GFR, ESTIMATED: 40 ML/MIN/1.73M2
GLUCOSE SERPL-MCNC: 93 MG/DL (ref 74–99)
HDLC SERPL-MCNC: 46 MG/DL
LDLC SERPL CALC-MCNC: 157 MG/DL
POTASSIUM SERPL-SCNC: 4.7 MMOL/L (ref 3.5–5)
PROT SERPL-MCNC: 7.4 G/DL (ref 6.4–8.3)
SODIUM SERPL-SCNC: 142 MMOL/L (ref 132–146)
TRIGL SERPL-MCNC: 152 MG/DL
VLDLC SERPL CALC-MCNC: 30 MG/DL

## 2025-01-18 PROCEDURE — 36415 COLL VENOUS BLD VENIPUNCTURE: CPT

## 2025-01-18 PROCEDURE — 80053 COMPREHEN METABOLIC PANEL: CPT

## 2025-01-18 PROCEDURE — 80061 LIPID PANEL: CPT

## 2025-01-20 DIAGNOSIS — K21.9 GASTROESOPHAGEAL REFLUX DISEASE WITHOUT ESOPHAGITIS: ICD-10-CM

## 2025-01-20 LAB — CALPROTECTIN, FECAL: 70 UG/G

## 2025-01-20 RX ORDER — FAMOTIDINE 20 MG/1
20 TABLET, FILM COATED ORAL DAILY
Qty: 90 TABLET | Refills: 1 | Status: SHIPPED | OUTPATIENT
Start: 2025-01-20

## 2025-01-20 NOTE — TELEPHONE ENCOUNTER
Name of Medication(s) Requested:  Requested Prescriptions     Pending Prescriptions Disp Refills    famotidine (PEPCID) 20 MG tablet [Pharmacy Med Name: Famotidine 20 MG Oral Tablet] 90 tablet 1     Sig: Take 1 tablet by mouth once daily       Medication is on current medication list Yes    Dosage and directions were verified? Yes    Quantity verified: 90 day supply     Pharmacy Verified?  Yes    Last Appointment:  12/19/2024    Future appts:  Future Appointments   Date Time Provider Department Center   2/6/2025  2:30 PM Edna Doyle,  [facility]   6/19/2025  2:15 PM Kendrick Pierce MD [place] DE[facility]   7/1/2025  2:30 PM SCHEDULE, [facility]   12/11/2025  2:15 PM Kendrick Pierce MD [facility]        (If no appt send self scheduling link. .REFILLAPPT)  Scheduling request sent?     [] Yes  [x] No    Does patient need updated?  [] Yes  [x] No

## 2025-01-23 ENCOUNTER — TELEPHONE (OUTPATIENT)
Dept: FAMILY MEDICINE CLINIC | Age: 75
End: 2025-01-23

## 2025-01-23 NOTE — TELEPHONE ENCOUNTER
Pt called back, I read lab result message from . Pt verbalized understanding but is concerned about increasing the dose on Lipitor, pt was on a higher dose in the past but it caused her bones to ache. Pt is wondering if she can increase the medication a little bit to see how she does. Please call pt back with response.

## 2025-01-24 ENCOUNTER — TELEPHONE (OUTPATIENT)
Dept: FAMILY MEDICINE CLINIC | Age: 75
End: 2025-01-24

## 2025-01-24 NOTE — TELEPHONE ENCOUNTER
Patient drinks apple cider every morning and evening 10 oz 2x daily.   Is this okay to be part of her 64oz ? Please advise [phone]

## 2025-01-26 ENCOUNTER — HOSPITAL ENCOUNTER (EMERGENCY)
Age: 75
Discharge: HOME | End: 2025-01-27
Attending: EMERGENCY MEDICINE
Payer: MEDICARE

## 2025-01-26 ENCOUNTER — APPOINTMENT (OUTPATIENT)
Dept: CT IMAGING | Age: 75
End: 2025-01-26
Payer: MEDICARE

## 2025-01-26 ENCOUNTER — APPOINTMENT (OUTPATIENT)
Dept: GENERAL RADIOLOGY | Age: 75
End: 2025-01-26
Payer: MEDICARE

## 2025-01-26 DIAGNOSIS — R19.7 DIARRHEA, UNSPECIFIED TYPE: ICD-10-CM

## 2025-01-26 DIAGNOSIS — K52.9 GASTROENTERITIS: Primary | ICD-10-CM

## 2025-01-26 DIAGNOSIS — R10.9 ABDOMINAL PAIN, UNSPECIFIED ABDOMINAL LOCATION: ICD-10-CM

## 2025-01-26 LAB
ALBUMIN SERPL-MCNC: 4.4 G/DL (ref 3.5–5.2)
ALP SERPL-CCNC: 79 U/L (ref 35–104)
ALT SERPL-CCNC: 12 U/L (ref 0–32)
AMYLASE SERPL-CCNC: 42 U/L (ref 20–100)
ANION GAP SERPL CALCULATED.3IONS-SCNC: 15 MMOL/L (ref 7–16)
AST SERPL-CCNC: 18 U/L (ref 0–31)
BILIRUB DIRECT SERPL-MCNC: <0.2 MG/DL (ref 0–0.3)
BILIRUB INDIRECT SERPL-MCNC: NORMAL MG/DL (ref 0–1)
BILIRUB SERPL-MCNC: 0.9 MG/DL (ref 0–1.2)
BILIRUB UR QL STRIP: NEGATIVE
BNP SERPL-MCNC: 196 PG/ML (ref 0–450)
BUN SERPL-MCNC: 44 MG/DL (ref 6–23)
CALCIUM SERPL-MCNC: 10 MG/DL (ref 8.6–10.2)
CHLORIDE SERPL-SCNC: 100 MMOL/L (ref 98–107)
CK SERPL-CCNC: 194 U/L (ref 20–180)
CLARITY UR: CLEAR
CO2 SERPL-SCNC: 25 MMOL/L (ref 22–29)
COLOR UR: YELLOW
CREAT SERPL-MCNC: 1.3 MG/DL (ref 0.5–1)
D-DIMER QUANTITATIVE: 443 NG/ML DDU (ref 0–230)
EPI CELLS #/AREA URNS HPF: ABNORMAL /HPF
ERYTHROCYTE [DISTWIDTH] IN BLOOD BY AUTOMATED COUNT: 13.5 % (ref 11.5–15)
FLUAV RNA RESP QL NAA+PROBE: NOT DETECTED
FLUBV RNA RESP QL NAA+PROBE: NOT DETECTED
GFR, ESTIMATED: 43 ML/MIN/1.73M2
GLUCOSE SERPL-MCNC: 116 MG/DL (ref 74–99)
GLUCOSE UR STRIP-MCNC: 500 MG/DL
HCT VFR BLD AUTO: 44 % (ref 34–48)
HGB BLD-MCNC: 14.7 G/DL (ref 11.5–15.5)
HGB UR QL STRIP.AUTO: NEGATIVE
INR PPP: 1.1
KETONES UR STRIP-MCNC: ABNORMAL MG/DL
LACTATE BLDV-SCNC: 1.2 MMOL/L (ref 0.5–2.2)
LEUKOCYTE ESTERASE UR QL STRIP: NEGATIVE
LIPASE SERPL-CCNC: 33 U/L (ref 13–60)
MAGNESIUM SERPL-MCNC: 2.1 MG/DL (ref 1.6–2.6)
MCH RBC QN AUTO: 31.2 PG (ref 26–35)
MCHC RBC AUTO-ENTMCNC: 33.4 G/DL (ref 32–34.5)
MCV RBC AUTO: 93.4 FL (ref 80–99.9)
NITRITE UR QL STRIP: NEGATIVE
PH UR STRIP: 5.5 [PH] (ref 5–9)
PLATELET # BLD AUTO: 194 K/UL (ref 130–450)
PMV BLD AUTO: 9.4 FL (ref 7–12)
POTASSIUM SERPL-SCNC: 4.4 MMOL/L (ref 3.5–5)
PROT SERPL-MCNC: 7.6 G/DL (ref 6.4–8.3)
PROT UR STRIP-MCNC: NEGATIVE MG/DL
PROTHROMBIN TIME: 11.8 SEC (ref 9.3–12.4)
RBC # BLD AUTO: 4.71 M/UL (ref 3.5–5.5)
RBC #/AREA URNS HPF: ABNORMAL /HPF
RSV BY PCR: NOT DETECTED
SARS-COV-2 RNA RESP QL NAA+PROBE: NOT DETECTED
SODIUM SERPL-SCNC: 140 MMOL/L (ref 132–146)
SOURCE: NORMAL
SP GR UR STRIP: 1.02 (ref 1–1.03)
SPECIMEN DESCRIPTION: NORMAL
SPECIMEN SOURCE: NORMAL
TROPONIN I SERPL HS-MCNC: 15 NG/L (ref 0–9)
UROBILINOGEN UR STRIP-ACNC: 0.2 EU/DL (ref 0–1)
WBC #/AREA URNS HPF: ABNORMAL /HPF
WBC OTHER # BLD: 8.2 K/UL (ref 4.5–11.5)

## 2025-01-26 PROCEDURE — 2580000003 HC RX 258: Performed by: EMERGENCY MEDICINE

## 2025-01-26 PROCEDURE — 74176 CT ABD & PELVIS W/O CONTRAST: CPT

## 2025-01-26 PROCEDURE — 96360 HYDRATION IV INFUSION INIT: CPT

## 2025-01-26 PROCEDURE — 71045 X-RAY EXAM CHEST 1 VIEW: CPT

## 2025-01-26 PROCEDURE — 99285 EMERGENCY DEPT VISIT HI MDM: CPT

## 2025-01-26 RX ORDER — 0.9 % SODIUM CHLORIDE 0.9 %
500 INTRAVENOUS SOLUTION INTRAVENOUS ONCE
Status: COMPLETED | OUTPATIENT
Start: 2025-01-26 | End: 2025-01-27

## 2025-01-26 RX ADMIN — SODIUM CHLORIDE 500 ML: 9 INJECTION, SOLUTION INTRAVENOUS at 22:41

## 2025-01-26 ASSESSMENT — PAIN DESCRIPTION - DESCRIPTORS: DESCRIPTORS: DISCOMFORT

## 2025-01-26 ASSESSMENT — PAIN SCALES - GENERAL: PAINLEVEL_OUTOF10: 3

## 2025-01-26 ASSESSMENT — PAIN - FUNCTIONAL ASSESSMENT: PAIN_FUNCTIONAL_ASSESSMENT: 0-10

## 2025-01-26 ASSESSMENT — PAIN DESCRIPTION - LOCATION: LOCATION: ABDOMEN

## 2025-01-27 VITALS
WEIGHT: 147 LBS | HEART RATE: 108 BPM | RESPIRATION RATE: 18 BRPM | DIASTOLIC BLOOD PRESSURE: 60 MMHG | BODY MASS INDEX: 29.69 KG/M2 | TEMPERATURE: 97.7 F | SYSTOLIC BLOOD PRESSURE: 103 MMHG | OXYGEN SATURATION: 97 %

## 2025-01-27 PROCEDURE — 96361 HYDRATE IV INFUSION ADD-ON: CPT

## 2025-01-27 RX ORDER — FAMOTIDINE 20 MG/1
20 TABLET, FILM COATED ORAL 2 TIMES DAILY
Qty: 60 TABLET | Refills: 3 | Status: SHIPPED | OUTPATIENT
Start: 2025-01-27

## 2025-01-27 RX ORDER — ONDANSETRON 4 MG/1
4 TABLET, FILM COATED ORAL EVERY 8 HOURS PRN
Qty: 12 TABLET | Refills: 0 | Status: SHIPPED | OUTPATIENT
Start: 2025-01-27

## 2025-01-27 NOTE — TELEPHONE ENCOUNTER
Notified pt.  Pt wants to know if she is suppose to take Lipitor 10 mg 2 tabs a day or what dose is she suppose to take

## 2025-01-27 NOTE — ED PROVIDER NOTES
HPI:  1/27/25, Time: 12:17 AM EST         Jazz Calderon is a 74 y.o. female presenting to the ED for nausea vomiting and diarrhea her diarrhea is actually slowing down and not much vomiting anymore she has no bleeding she denies any chest pain or trouble breathing she is not weak or lightheaded her vital signs are stable, beginning hours ago.  The complaint has been persistent, moderate in severity, and worsened by nothing.  Her CAT scan was consistent with gastroenteritis she is up on antibiotics traveling or camping there is no bleeding no fever she is not weak or lightheaded her abdomen is mostly benign she has been urinating normally    ROS:   Pertinent positives and negatives are stated within HPI, all other systems reviewed and are negative.  --------------------------------------------- PAST HISTORY ---------------------------------------------  Past Medical History:  has a past medical history of Arthritis, CAD (coronary artery disease), Cancer (HCC), Cardiomyopathy (HCC), HTN (hypertension), Hyperlipidemia, LBBB (left bundle branch block), Lumbar pain, Mitral regurgitation, PONV (postoperative nausea and vomiting), Tachycardia, Thyroid disease, and Tobacco abuse, in remission.    Past Surgical History:  has a past surgical history that includes Diagnostic Cardiac Cath Lab Procedure (11/09/2001); Tonsillectomy; Lithotripsy; Breast biopsy; Skin cancer excision; Upper gastrointestinal endoscopy (06/17/2015); vulvar/perineal biopsy (1985); pr esophageal motility study w/interp&rpt (N/A, 08/17/2018); Colonoscopy; Endoscopy, colon, diagnostic; pr laps rpr paraesphgl hrna incl fundplsty w/mesh (N/A, 10/03/2018); Lithotripsy (Left, 01/30/2020); Lithotripsy (Left, 06/16/2021); ep device procedure (N/A, 11/29/2023); and Pain management procedure (Left, 8/8/2024).    Social History:  reports that she quit smoking about 56 years ago. Her smoking use included cigarettes. She started smoking about 60 years ago. She

## 2025-01-27 NOTE — TELEPHONE ENCOUNTER
Can take lipitor as follows:    Week 1: lipitor 10 mg every other day  Week 2: lipitor 10 mg daily  Week 3: lipitor 20 mg every other day  Week 4: lipitor 20 mg daily    Thank you very much.

## 2025-01-30 LAB
EKG ATRIAL RATE: 102 BPM
EKG P AXIS: 43 DEGREES
EKG P-R INTERVAL: 154 MS
EKG Q-T INTERVAL: 384 MS
EKG QRS DURATION: 130 MS
EKG QTC CALCULATION (BAZETT): 500 MS
EKG R AXIS: -99 DEGREES
EKG T AXIS: 21 DEGREES
EKG VENTRICULAR RATE: 102 BPM

## 2025-02-06 ENCOUNTER — OFFICE VISIT (OUTPATIENT)
Dept: PAIN MANAGEMENT | Age: 75
End: 2025-02-06
Payer: MEDICARE

## 2025-02-06 ENCOUNTER — PREP FOR PROCEDURE (OUTPATIENT)
Dept: PAIN MANAGEMENT | Age: 75
End: 2025-02-06

## 2025-02-06 VITALS
SYSTOLIC BLOOD PRESSURE: 95 MMHG | RESPIRATION RATE: 18 BRPM | OXYGEN SATURATION: 99 % | DIASTOLIC BLOOD PRESSURE: 59 MMHG | HEIGHT: 59 IN | HEART RATE: 76 BPM | WEIGHT: 147.05 LBS | TEMPERATURE: 97.2 F | BODY MASS INDEX: 29.64 KG/M2

## 2025-02-06 DIAGNOSIS — G89.29 CHRONIC LEFT-SIDED LOW BACK PAIN WITH LEFT-SIDED SCIATICA: ICD-10-CM

## 2025-02-06 DIAGNOSIS — M51.9 LUMBAR DISC DISORDER: ICD-10-CM

## 2025-02-06 DIAGNOSIS — M54.16 LUMBAR RADICULOPATHY: Primary | ICD-10-CM

## 2025-02-06 DIAGNOSIS — G89.4 CHRONIC PAIN SYNDROME: Primary | ICD-10-CM

## 2025-02-06 DIAGNOSIS — M54.42 CHRONIC LEFT-SIDED LOW BACK PAIN WITH LEFT-SIDED SCIATICA: ICD-10-CM

## 2025-02-06 DIAGNOSIS — M54.16 LUMBAR RADICULOPATHY: ICD-10-CM

## 2025-02-06 PROCEDURE — 1159F MED LIST DOCD IN RCRD: CPT | Performed by: PAIN MEDICINE

## 2025-02-06 PROCEDURE — 1123F ACP DISCUSS/DSCN MKR DOCD: CPT | Performed by: PAIN MEDICINE

## 2025-02-06 PROCEDURE — 3078F DIAST BP <80 MM HG: CPT | Performed by: PAIN MEDICINE

## 2025-02-06 PROCEDURE — 3074F SYST BP LT 130 MM HG: CPT | Performed by: PAIN MEDICINE

## 2025-02-06 PROCEDURE — 99213 OFFICE O/P EST LOW 20 MIN: CPT | Performed by: PAIN MEDICINE

## 2025-02-06 PROCEDURE — 1160F RVW MEDS BY RX/DR IN RCRD: CPT | Performed by: PAIN MEDICINE

## 2025-02-06 NOTE — PROGRESS NOTES
Jazz Calderon presents to the [facility] on 2/6/2025. Jazz is complaining of pain in her lower back. The pain is intermittent. The pain is described as aching. Pain is rated on her best day at a 0, on her worst day at a 5, and on average at a 3 on the VAS scale. She took her last dose of Tylenol and Neurontin today.      Any procedures since your last visit: No    She is not on NSAIDS and  is  on anticoagulation medications to include ASA and is managed by dr. lawson.     Pacemaker or defibrillator: No    Do you want someone present when the provider examines you? No    Medication Contract and Consent for Opioid Use Documents Filed        No documents found                       Resp 18   Ht 1.499 m (4' 11.02\")   Wt 66.7 kg (147 lb 0.8 oz)   BMI 29.68 kg/m²      No LMP recorded. Patient is postmenopausal.    
time  Patient encouraged to stay active  Treatment plan discussed with the patient including medication and procedure side effects      Cc:  Referring physician    MERRICK Doyle D.O.

## 2025-02-20 RX ORDER — ATORVASTATIN CALCIUM 20 MG/1
20 TABLET, FILM COATED ORAL DAILY
Qty: 90 TABLET | Refills: 0 | Status: SHIPPED | OUTPATIENT
Start: 2025-02-20

## 2025-03-05 DIAGNOSIS — I50.20 HFREF (HEART FAILURE WITH REDUCED EJECTION FRACTION) (HCC): ICD-10-CM

## 2025-03-06 RX ORDER — CARVEDILOL 12.5 MG/1
12.5 TABLET ORAL 2 TIMES DAILY
Qty: 60 TABLET | Refills: 5 | Status: SHIPPED | OUTPATIENT
Start: 2025-03-06

## 2025-03-16 DIAGNOSIS — M54.50 ACUTE LOW BACK PAIN, UNSPECIFIED BACK PAIN LATERALITY, UNSPECIFIED WHETHER SCIATICA PRESENT: ICD-10-CM

## 2025-03-18 RX ORDER — GABAPENTIN 300 MG/1
300 CAPSULE ORAL 3 TIMES DAILY
Qty: 270 CAPSULE | Refills: 1 | Status: SHIPPED | OUTPATIENT
Start: 2025-03-18 | End: 2025-09-14

## 2025-03-18 NOTE — TELEPHONE ENCOUNTER
Name of Medication(s) Requested:  Requested Prescriptions     Pending Prescriptions Disp Refills    gabapentin (NEURONTIN) 300 MG capsule 270 capsule 1     Sig: Take 1 capsule by mouth 3 times daily for 180 days. Intended supply: 90 days       Medication is on current medication list Yes    Dosage and directions were verified? Yes    Quantity verified: 90 day supply     Pharmacy Verified?  Yes    Last Appointment:  12/19/2024    Future appts:  Future Appointments   Date Time Provider Department Center   6/19/2025  2:15 PM Kendrick Pierce MD [place] [facility] DEP   7/1/2025  2:30 PM SCHEDULE, [facility]   12/11/2025  2:15 PM Kendrick Pierce MD [place] [facility] DEP        (If no appt send self scheduling link. .REFILLAPPT)  Scheduling request sent?     [] Yes  [x] No    Does patient need updated?  [] Yes  [x] No

## 2025-03-25 ENCOUNTER — HOSPITAL ENCOUNTER (OUTPATIENT)
Age: 75
Discharge: HOME | End: 2025-03-25
Payer: MEDICARE

## 2025-03-25 DIAGNOSIS — R94.4 DECREASED GFR: ICD-10-CM

## 2025-03-25 LAB
ANION GAP SERPL CALCULATED.3IONS-SCNC: 10 MMOL/L (ref 7–16)
BUN SERPL-MCNC: 37 MG/DL (ref 6–23)
CALCIUM SERPL-MCNC: 10.3 MG/DL (ref 8.6–10.2)
CHLORIDE SERPL-SCNC: 100 MMOL/L (ref 98–107)
CO2 SERPL-SCNC: 28 MMOL/L (ref 22–29)
CREAT SERPL-MCNC: 1.6 MG/DL (ref 0.5–1)
GFR, ESTIMATED: 35 ML/MIN/1.73M2
GLUCOSE SERPL-MCNC: 98 MG/DL (ref 74–99)
POTASSIUM SERPL-SCNC: 4.3 MMOL/L (ref 3.5–5)
SODIUM SERPL-SCNC: 138 MMOL/L (ref 132–146)

## 2025-03-25 PROCEDURE — 80048 BASIC METABOLIC PNL TOTAL CA: CPT

## 2025-03-26 PROCEDURE — 93297 REM INTERROG DEV EVAL ICPMS: CPT | Performed by: INTERNAL MEDICINE

## 2025-03-27 ENCOUNTER — RESULTS FOLLOW-UP (OUTPATIENT)
Dept: FAMILY MEDICINE CLINIC | Age: 75
End: 2025-03-27

## 2025-04-04 ENCOUNTER — OFFICE VISIT (OUTPATIENT)
Dept: FAMILY MEDICINE CLINIC | Age: 75
End: 2025-04-04

## 2025-04-04 VITALS — SYSTOLIC BLOOD PRESSURE: 97 MMHG | HEART RATE: 80 BPM | DIASTOLIC BLOOD PRESSURE: 63 MMHG | TEMPERATURE: 96.9 F

## 2025-04-04 DIAGNOSIS — M85.80 OSTEOPENIA, UNSPECIFIED LOCATION: ICD-10-CM

## 2025-04-04 DIAGNOSIS — I50.20 HFREF (HEART FAILURE WITH REDUCED EJECTION FRACTION) (HCC): ICD-10-CM

## 2025-04-04 DIAGNOSIS — M54.42 CHRONIC LEFT-SIDED LOW BACK PAIN WITH LEFT-SIDED SCIATICA: ICD-10-CM

## 2025-04-04 DIAGNOSIS — R94.4 DECREASED GFR: Primary | ICD-10-CM

## 2025-04-04 DIAGNOSIS — E78.5 HYPERLIPIDEMIA, UNSPECIFIED HYPERLIPIDEMIA TYPE: ICD-10-CM

## 2025-04-04 DIAGNOSIS — K21.9 GASTROESOPHAGEAL REFLUX DISEASE WITHOUT ESOPHAGITIS: ICD-10-CM

## 2025-04-04 DIAGNOSIS — G89.29 CHRONIC LEFT-SIDED LOW BACK PAIN WITH LEFT-SIDED SCIATICA: ICD-10-CM

## 2025-04-04 SDOH — ECONOMIC STABILITY: FOOD INSECURITY: WITHIN THE PAST 12 MONTHS, YOU WORRIED THAT YOUR FOOD WOULD RUN OUT BEFORE YOU GOT MONEY TO BUY MORE.: NEVER TRUE

## 2025-04-04 SDOH — ECONOMIC STABILITY: FOOD INSECURITY: WITHIN THE PAST 12 MONTHS, THE FOOD YOU BOUGHT JUST DIDN'T LAST AND YOU DIDN'T HAVE MONEY TO GET MORE.: NEVER TRUE

## 2025-04-04 ASSESSMENT — PATIENT HEALTH QUESTIONNAIRE - PHQ9
SUM OF ALL RESPONSES TO PHQ QUESTIONS 1-9: 2
2. FEELING DOWN, DEPRESSED OR HOPELESS: SEVERAL DAYS
SUM OF ALL RESPONSES TO PHQ QUESTIONS 1-9: 2
1. LITTLE INTEREST OR PLEASURE IN DOING THINGS: SEVERAL DAYS

## 2025-04-04 NOTE — PROGRESS NOTES
FM Progress Note    Subjective:     HFrEF. Coreg. Jardiance. Entresto. Aldactone. S/p ICD placement. Sees Dr. Decker.     HLD. Tolerates Lipitor. The 10-year ASCVD risk score (Colby SUBRAMANIAN, et al., 2019) is: 14%    Values used to calculate the score:      Age: 74 years      Sex: Female      Is Non- : No      Diabetic: No      Tobacco smoker: No      Systolic Blood Pressure: 97 mmHg      Is BP treated: Yes      HDL Cholesterol: 46 mg/dL      Total Cholesterol: 233 mg/dL     L lower back pain. H/o kidney stone, but seeing Dr. Doyle who started pt on gabapentin and it helps.     Elevated Cr.    Hypercalcemia. On calcium supplement. Advised to stop.     Osteopenia. Next DEXA due 6/2026. On vit D    H/o colitis. Acid reflux at baseline. Sees Dr. Guerrero. On ppi, counseling provided on elevated Cr.       Still working    Health Maintenance Due   Topic Date Due    COVID-19 Vaccine (4 - 2024-25 season) 09/01/2024    Annual Wellness Visit (Medicare Advantage)  01/01/2025           Objective:   BP 97/63   Pulse 80   Temp 96.9 °F (36.1 °C) (Temporal)   General appearance: NAD, alert and interacting appropriately  HEENT: NCAT, PERRLA, EOMI   Resp: CTAB, no WRC  CVS: RRR, no MRG  Abdomen: BS +, SNDNT  Extremities: No clubbing, cyanosis, or edema. Warm. Dry.        I have reviewed this patient's previous records.    I have reviewed this patient's labs and imaging reports and Xray images.     I have reviewed this patient's medications.      Assessment/Plan:    Jazz was seen today for discuss labs.    Diagnoses and all orders for this visit:    Decreased GFR    HFrEF (heart failure with reduced ejection fraction) (HCC)    Hyperlipidemia, unspecified hyperlipidemia type    Gastroesophageal reflux disease without esophagitis    Chronic left-sided low back pain with left-sided sciatica    Osteopenia, unspecified location      Patient Instructions   Ask Dr. Decker if you can come off the spironolactone to preserve

## 2025-04-04 NOTE — PATIENT INSTRUCTIONS
Ask Dr. Decker if you can come off the spironolactone to preserve your kidney function.     Continue lipitor for the cholesterol.    Stop the calcium supplement. Stay hydrated.     Continue gabapentin.

## 2025-04-07 ENCOUNTER — TELEPHONE (OUTPATIENT)
Dept: FAMILY MEDICINE CLINIC | Age: 75
End: 2025-04-07

## 2025-04-07 NOTE — TELEPHONE ENCOUNTER
spironolactone (ALDACTONE) 25 MG tablet   Patient sees cardiologist on 4/21 doctor will address stopping the medication during that appointment.

## 2025-04-21 ENCOUNTER — OFFICE VISIT (OUTPATIENT)
Dept: CARDIOLOGY CLINIC | Age: 75
End: 2025-04-21
Payer: MEDICARE

## 2025-04-21 VITALS
HEART RATE: 74 BPM | SYSTOLIC BLOOD PRESSURE: 112 MMHG | DIASTOLIC BLOOD PRESSURE: 66 MMHG | HEIGHT: 60 IN | WEIGHT: 148.8 LBS | RESPIRATION RATE: 18 BRPM | BODY MASS INDEX: 29.21 KG/M2

## 2025-04-21 DIAGNOSIS — I42.8 NONISCHEMIC CARDIOMYOPATHY (HCC): Primary | ICD-10-CM

## 2025-04-21 PROBLEM — G89.4 CHRONIC PAIN SYNDROME: Status: RESOLVED | Noted: 2024-07-31 | Resolved: 2025-04-21

## 2025-04-21 PROBLEM — K52.9 GASTROENTERITIS: Status: RESOLVED | Noted: 2021-02-28 | Resolved: 2025-04-21

## 2025-04-21 PROBLEM — I34.0 NONRHEUMATIC MITRAL VALVE REGURGITATION: Status: RESOLVED | Noted: 2023-07-20 | Resolved: 2025-04-21

## 2025-04-21 PROBLEM — M54.42 CHRONIC LEFT-SIDED LOW BACK PAIN WITH LEFT-SIDED SCIATICA: Status: RESOLVED | Noted: 2024-07-31 | Resolved: 2025-04-21

## 2025-04-21 PROBLEM — G89.29 CHRONIC LEFT-SIDED LOW BACK PAIN WITH LEFT-SIDED SCIATICA: Status: RESOLVED | Noted: 2024-07-31 | Resolved: 2025-04-21

## 2025-04-21 PROCEDURE — 3078F DIAST BP <80 MM HG: CPT | Performed by: INTERNAL MEDICINE

## 2025-04-21 PROCEDURE — 3074F SYST BP LT 130 MM HG: CPT | Performed by: INTERNAL MEDICINE

## 2025-04-21 PROCEDURE — G2211 COMPLEX E/M VISIT ADD ON: HCPCS | Performed by: INTERNAL MEDICINE

## 2025-04-21 PROCEDURE — 93000 ELECTROCARDIOGRAM COMPLETE: CPT | Performed by: INTERNAL MEDICINE

## 2025-04-21 PROCEDURE — 99214 OFFICE O/P EST MOD 30 MIN: CPT | Performed by: INTERNAL MEDICINE

## 2025-04-21 PROCEDURE — 1160F RVW MEDS BY RX/DR IN RCRD: CPT | Performed by: INTERNAL MEDICINE

## 2025-04-21 PROCEDURE — 1123F ACP DISCUSS/DSCN MKR DOCD: CPT | Performed by: INTERNAL MEDICINE

## 2025-04-21 PROCEDURE — 1159F MED LIST DOCD IN RCRD: CPT | Performed by: INTERNAL MEDICINE

## 2025-04-21 NOTE — PROGRESS NOTES
Chief Complaint   Patient presents with    Cardiomyopathy       Patient Active Problem List    Diagnosis Date Noted    Cardiac resynchronization therapy defibrillator (CRT-D) in place 11/29/2023     Priority: High     Overview Note:     [employer] Primary prevention.       Lumbar radiculopathy 07/31/2024    Lumbar disc disorder 07/31/2024    Nonischemic cardiomyopathy (HCC) 07/20/2023     Overview Note:     A. Cath 6/2023: no obstructive lesions  B. CMR 6/9/2023 ([facility]): EF 31%, no inflammatory pattern, moderate MR  C. TTE 10/2023: EF 36%  D. TTE 5/21/2024: EF 40-45%      HFrEF (heart failure with reduced ejection fraction) (HCC) 06/22/2023    Hiatal hernia 03/28/2018    Aden's esophagus 01/11/2018    Renal stones 01/11/2018    HTN (hypertension) 01/27/2014    Hyperlipidemia 01/27/2014       Current Outpatient Medications   Medication Sig Dispense Refill    gabapentin (NEURONTIN) 300 MG capsule Take 1 capsule by mouth 3 times daily for 180 days. Intended supply: 90 days 270 capsule 1    carvedilol (COREG) 12.5 MG tablet Take 1 tablet by mouth twice daily 60 tablet 5    atorvastatin (LIPITOR) 20 MG tablet Take 1 tablet by mouth daily 90 tablet 0    famotidine (PEPCID) 20 MG tablet Take 1 tablet by mouth 2 times daily 60 tablet 3    omeprazole (PRILOSEC) 20 MG delayed release capsule Take 2 capsules by mouth daily      spironolactone (ALDACTONE) 25 MG tablet Take 1 tablet by mouth daily 30 tablet 5    Coenzyme Q10 (COQ10 PO) Take by mouth      Cholecalciferol (VITAMIN D) 50 MCG (2000 UT) CAPS capsule Take by mouth      Multiple Vitamins-Minerals (CENTRUM/CERTA-ARVIND WITH MINERALS ORAL) solution Take 15 mLs by mouth daily      Probiotic Product (PROBIOTIC ADVANCED PO) Take by mouth      budesonide (ENTOCORT EC) 3 MG delayed release capsule Take 1 capsule by mouth every morning      empagliflozin (JARDIANCE) 10 MG tablet Take 1 tablet by mouth daily 30 tablet 5    sacubitril-valsartan (ENTRESTO)  MG per tablet

## 2025-05-20 ENCOUNTER — HOSPITAL ENCOUNTER (OUTPATIENT)
Age: 75
Discharge: HOME | End: 2025-05-20
Payer: MEDICARE

## 2025-05-20 DIAGNOSIS — I42.8 NONISCHEMIC CARDIOMYOPATHY (HCC): ICD-10-CM

## 2025-05-20 LAB
ANION GAP SERPL CALCULATED.3IONS-SCNC: 10 MMOL/L (ref 7–16)
BUN SERPL-MCNC: 22 MG/DL (ref 6–23)
CALCIUM SERPL-MCNC: 9.8 MG/DL (ref 8.6–10.2)
CHLORIDE SERPL-SCNC: 103 MMOL/L (ref 98–107)
CO2 SERPL-SCNC: 27 MMOL/L (ref 22–29)
CREAT SERPL-MCNC: 1.1 MG/DL (ref 0.5–1)
GFR, ESTIMATED: 53 ML/MIN/1.73M2
GLUCOSE SERPL-MCNC: 114 MG/DL (ref 74–99)
POTASSIUM SERPL-SCNC: 4.2 MMOL/L (ref 3.5–5)
SODIUM SERPL-SCNC: 140 MMOL/L (ref 132–146)

## 2025-05-20 PROCEDURE — 36415 COLL VENOUS BLD VENIPUNCTURE: CPT

## 2025-05-20 PROCEDURE — 80048 BASIC METABOLIC PNL TOTAL CA: CPT

## 2025-05-21 ENCOUNTER — RESULTS FOLLOW-UP (OUTPATIENT)
Dept: CARDIOLOGY CLINIC | Age: 75
End: 2025-05-21

## 2025-05-23 ENCOUNTER — OFFICE VISIT (OUTPATIENT)
Dept: PRIMARY CARE CLINIC | Age: 75
End: 2025-05-23

## 2025-05-23 VITALS
OXYGEN SATURATION: 98 % | HEIGHT: 59 IN | TEMPERATURE: 97.9 F | DIASTOLIC BLOOD PRESSURE: 67 MMHG | HEART RATE: 78 BPM | RESPIRATION RATE: 20 BRPM | BODY MASS INDEX: 30.04 KG/M2 | WEIGHT: 149 LBS | SYSTOLIC BLOOD PRESSURE: 110 MMHG

## 2025-05-23 DIAGNOSIS — L08.9 SKIN INFECTION: Primary | ICD-10-CM

## 2025-05-23 RX ORDER — CEPHALEXIN 500 MG/1
500 CAPSULE ORAL 2 TIMES DAILY
Qty: 14 CAPSULE | Refills: 0 | Status: SHIPPED | OUTPATIENT
Start: 2025-05-23 | End: 2025-05-30

## 2025-05-23 NOTE — PROGRESS NOTES
Chief Complaint:   Sore (On head that seem to be spreading x 2 days/)      History of Present Illness   Source of history provided by:  patient.      Jazz Calderon is a 75 y.o. 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   Pt has not had similar symptoms in the past.  Pt states the area is red, not itchy or painful.  Denies any fever, chills, HA, recent illness, myalgias, vomiting, or lethargy.       ROS    Unless otherwise stated in this report or unable to obtain because of the patient's clinical or mental status as evidenced by the medical record, this patients's positive and negative responses for Review of Systems, constitutional, psych, eyes, ENT, cardiovascular, respiratory, gastrointestinal, neurological, genitourinary, musculoskeletal, integument systems and systems related to the presenting problem are either stated in the preceding or were not pertinent or were negative for the symptoms and/or complaints related to the medical problem.    Past Surgical History:  has a past surgical history that includes Diagnostic Cardiac Cath Lab Procedure (11/09/2001); Tonsillectomy; Lithotripsy; Breast biopsy; Skin cancer excision; Upper gastrointestinal endoscopy (06/17/2015); vulvar/perineal biopsy (1985); pr esophageal motility study w/interp&rpt (N/A, 08/17/2018); Colonoscopy;

## 2025-05-27 RX ORDER — ATORVASTATIN CALCIUM 20 MG/1
20 TABLET, FILM COATED ORAL DAILY
Qty: 90 TABLET | Refills: 0 | Status: SHIPPED | OUTPATIENT
Start: 2025-05-27

## 2025-05-27 RX ORDER — ATORVASTATIN CALCIUM 20 MG/1
20 TABLET, FILM COATED ORAL DAILY
Qty: 90 TABLET | Refills: 0 | OUTPATIENT
Start: 2025-05-27

## 2025-05-27 NOTE — TELEPHONE ENCOUNTER
Name of Medication(s) Requested:  Requested Prescriptions     Pending Prescriptions Disp Refills    atorvastatin (LIPITOR) 20 MG tablet [Pharmacy Med Name: Atorvastatin Calcium 20 MG Oral Tablet] 90 tablet 0     Sig: Take 1 tablet by mouth once daily       Medication is on current medication list Yes    Dosage and directions were verified? Yes    Quantity verified: 90 day supply     Pharmacy Verified?  Yes    Last Appointment:  4/4/2025    Future appts:  Future Appointments   Date Time Provider Department Center   6/19/2025  2:15 PM Kendrick Pierce MD [facility]   7/1/2025  2:30 PM SCHEDU[facility]   7/22/2025  1:00 PM Jose Decker MD [facility]   12/11/2025  2:15 PM Kendrick Pierce MD [facility]        (If no appt send self scheduling link. .REFILLAPPT)  Scheduling request sent?     [] Yes  [x] No    Does patient need updated?  [] Yes  [] No

## 2025-05-27 NOTE — TELEPHONE ENCOUNTER
Name of Medication(s) Requested:  Requested Prescriptions     Pending Prescriptions Disp Refills    atorvastatin (LIPITOR) 20 MG tablet 90 tablet 0     Sig: Take 1 tablet by mouth daily       Medication is on current medication list Yes    Dosage and directions were verified? Yes    Quantity verified: 90 day supply     Pharmacy Verified?  Yes    Last Appointment:  4/4/2025    Future appts:  Future Appointments   Date Time Provider Department Center   6/19/2025  2:15 PM Kendrick Pierce MD [facility]   7/1/2025  2:30 PM SCHEDU[facility], [facility]   7/22/2025  1:00 PM Jose Decker MD [facility]   12/11/2025  2:15 PM Kendrick Pierce MD [facility]        (If no appt send self scheduling link. .REFILLAPPT)  Scheduling request sent?     [] Yes  [x] No    Does patient need updated?  [] Yes  [] No   138

## 2025-06-19 ENCOUNTER — OFFICE VISIT (OUTPATIENT)
Dept: FAMILY MEDICINE CLINIC | Age: 75
End: 2025-06-19

## 2025-06-19 VITALS
TEMPERATURE: 97.1 F | WEIGHT: 150 LBS | DIASTOLIC BLOOD PRESSURE: 74 MMHG | HEART RATE: 92 BPM | BODY MASS INDEX: 29.8 KG/M2 | SYSTOLIC BLOOD PRESSURE: 114 MMHG

## 2025-06-19 DIAGNOSIS — K52.9 COLITIS: ICD-10-CM

## 2025-06-19 DIAGNOSIS — K22.70 BARRETT'S ESOPHAGUS WITHOUT DYSPLASIA: ICD-10-CM

## 2025-06-19 DIAGNOSIS — M54.12 CERVICAL RADICULOPATHY: Primary | ICD-10-CM

## 2025-06-19 DIAGNOSIS — I50.20 HFREF (HEART FAILURE WITH REDUCED EJECTION FRACTION) (HCC): ICD-10-CM

## 2025-06-19 DIAGNOSIS — R94.4 DECREASED GFR: ICD-10-CM

## 2025-06-19 DIAGNOSIS — K21.9 GASTROESOPHAGEAL REFLUX DISEASE WITHOUT ESOPHAGITIS: ICD-10-CM

## 2025-06-19 RX ORDER — METHYLPREDNISOLONE 4 MG/1
TABLET ORAL
Qty: 1 KIT | Refills: 0 | Status: SHIPPED | OUTPATIENT
Start: 2025-06-19

## 2025-06-19 NOTE — PROGRESS NOTES
FM Progress Note    Subjective:     HFrEF. Coreg. Jardiance. Entresto. Aldactone may have worsened CKD and pt feels it made colitis worse. Off aldactone since 4/21/25. S/p ICD placement. Sees Dr. Decker.     HLD. Tolerates Lipitor. The 10-year ASCVD risk score (Colby SUBRAMANIAN, et al., 2019) is: 20.6%    Values used to calculate the score:      Age: 75 years      Sex: Female      Is Non- : No      Diabetic: No      Tobacco smoker: No      Systolic Blood Pressure: 114 mmHg      Is BP treated: Yes      HDL Cholesterol: 46 mg/dL      Total Cholesterol: 233 mg/dL     L lower back pain found to have kidney stone, but seeing Dr. Doyle who started pt on gabapentin and it helps.     CKD. Stopped aldactone.     Osteopenia. Next DEXA due 6/2026. On vit D. Not on calcium supplement since 4/4/25    H/o colitis. Budesonide. Recent flare, improving but still with some stool frequency lately.    Acid reflux at baseline. Sees Dr. Guerrero. On ppi    Tingling R hand since Saturday. No injury. FROM, motor intact.      Still working    Health Maintenance Due   Topic Date Due    Annual Wellness Visit (Medicare Advantage)  01/01/2025    COVID-19 Vaccine (6 - 2024-25 season) 04/03/2025           Objective:   /74   Pulse 92   Temp 97.1 °F (36.2 °C) (Temporal)   Wt 68 kg (150 lb)   BMI 29.80 kg/m²   General appearance: NAD, alert and interacting appropriately  HEENT: NCAT, PERRLA, EOMI   Resp: CTAB, no WRC  CVS: RRR, no MRG  Abdomen: BS +, SNDNT  Extremities: No clubbing, cyanosis, or edema. Warm. Dry.  Slightly pos CTC, neg tinnels, neg phalens. Slightly pos cubital tinnels and spurling.      I have reviewed this patient's previous records.    I have reviewed this patient's labs and imaging reports and Xray images.     I have reviewed this patient's medications.      Assessment/Plan:    Jazz was seen today for follow-up.    Diagnoses and all orders for this visit:    Cervical radiculopathy  -     methylPREDNISolone

## 2025-06-25 PROCEDURE — 93297 REM INTERROG DEV EVAL ICPMS: CPT | Performed by: INTERNAL MEDICINE

## 2025-07-01 LAB — MAMMOGRAPHY, EXTERNAL: NEGATIVE

## 2025-07-07 ENCOUNTER — TELEPHONE (OUTPATIENT)
Dept: FAMILY MEDICINE CLINIC | Age: 75
End: 2025-07-07

## 2025-07-07 NOTE — TELEPHONE ENCOUNTER
Pt called today she wants to know if she can go back on Calcium supplements being her kidneys are doing well, please advise

## 2025-07-22 ENCOUNTER — OFFICE VISIT (OUTPATIENT)
Dept: CARDIOLOGY CLINIC | Age: 75
End: 2025-07-22
Payer: MEDICARE

## 2025-07-22 VITALS
WEIGHT: 150.8 LBS | HEIGHT: 60 IN | HEART RATE: 74 BPM | BODY MASS INDEX: 29.61 KG/M2 | DIASTOLIC BLOOD PRESSURE: 82 MMHG | SYSTOLIC BLOOD PRESSURE: 135 MMHG | RESPIRATION RATE: 14 BRPM

## 2025-07-22 DIAGNOSIS — I42.8 NONISCHEMIC CARDIOMYOPATHY (HCC): Primary | ICD-10-CM

## 2025-07-22 PROCEDURE — 1159F MED LIST DOCD IN RCRD: CPT | Performed by: INTERNAL MEDICINE

## 2025-07-22 PROCEDURE — 93000 ELECTROCARDIOGRAM COMPLETE: CPT | Performed by: INTERNAL MEDICINE

## 2025-07-22 PROCEDURE — 1160F RVW MEDS BY RX/DR IN RCRD: CPT | Performed by: INTERNAL MEDICINE

## 2025-07-22 PROCEDURE — 3075F SYST BP GE 130 - 139MM HG: CPT | Performed by: INTERNAL MEDICINE

## 2025-07-22 PROCEDURE — 3079F DIAST BP 80-89 MM HG: CPT | Performed by: INTERNAL MEDICINE

## 2025-07-22 PROCEDURE — 99214 OFFICE O/P EST MOD 30 MIN: CPT | Performed by: INTERNAL MEDICINE

## 2025-07-22 PROCEDURE — 1123F ACP DISCUSS/DSCN MKR DOCD: CPT | Performed by: INTERNAL MEDICINE

## 2025-07-22 PROCEDURE — G2211 COMPLEX E/M VISIT ADD ON: HCPCS | Performed by: INTERNAL MEDICINE

## 2025-07-22 RX ORDER — SPIRONOLACTONE 25 MG/1
25 TABLET ORAL DAILY
COMMUNITY

## 2025-07-22 NOTE — PROGRESS NOTES
Chief Complaint   Patient presents with    Cardiomyopathy       Patient Active Problem List    Diagnosis Date Noted    Lumbar radiculopathy 07/31/2024    Lumbar disc disorder 07/31/2024    Cardiac resynchronization therapy defibrillator (CRT-D) in place 11/29/2023     Overview Note:     [employer] Primary prevention.       Nonischemic cardiomyopathy (HCC) 07/20/2023     Overview Note:     A. Cath 6/2023: no obstructive lesions  B. CMR 6/9/2023 ([facility]): EF 31%, no inflammatory pattern, moderate MR  C. TTE 10/2023: EF 36%  D. TTE 5/21/2024: EF 40-45%      HFrEF (heart failure with reduced ejection fraction) ([facility]) 06/22/2023    Hiatal hernia 03/28/2018    Aden's esophagus 01/11/2018    Renal stones 01/11/2018    HTN (hypertension) 01/27/2014    Hyperlipidemia 01/27/2014       Current Outpatient Medications   Medication Sig Dispense Refill    spironolactone (ALDACTONE) 25 MG tablet Take 1 tablet by mouth daily      atorvastatin (LIPITOR) 20 MG tablet Take 1 tablet by mouth daily (Patient taking differently: Take 0.5 tablets by mouth daily) 90 tablet 0    gabapentin (NEURONTIN) 300 MG capsule Take 1 capsule by mouth 3 times daily for 180 days. Intended supply: 90 days 270 capsule 1    carvedilol (COREG) 12.5 MG tablet Take 1 tablet by mouth twice daily 60 tablet 5    famotidine (PEPCID) 20 MG tablet Take 1 tablet by mouth 2 times daily 60 tablet 3    omeprazole (PRILOSEC) 20 MG delayed release capsule Take 2 capsules by mouth daily      Coenzyme Q10 (COQ10 PO) Take by mouth      Cholecalciferol (VITAMIN D) 50 MCG (2000 UT) CAPS capsule Take by mouth      Multiple Vitamins-Minerals (CENTRUM/CERTA-ARVIND WITH MINERALS ORAL) solution Take 15 mLs by mouth daily      Probiotic Product (PROBIOTIC ADVANCED PO) Take by mouth      budesonide (ENTOCORT EC) 3 MG delayed release capsule Take 1 capsule by mouth every morning (Patient taking differently: Take 2 capsules by mouth every morning)      empagliflozin (JARDIANCE) 10 MG

## 2025-08-07 ENCOUNTER — HOSPITAL ENCOUNTER (OUTPATIENT)
Dept: GENERAL RADIOLOGY | Age: 75
Discharge: HOME | End: 2025-08-09
Payer: MEDICARE

## 2025-08-07 DIAGNOSIS — R19.7 OVERFLOW DIARRHEA: ICD-10-CM

## 2025-08-07 PROCEDURE — 74018 RADEX ABDOMEN 1 VIEW: CPT

## 2025-08-12 PROBLEM — U07.1 COVID-19: Status: ACTIVE | Noted: 2024-11-27

## 2025-08-12 PROBLEM — L81.9 DISORDER OF PIGMENTATION: Status: ACTIVE | Noted: 2024-11-26

## 2025-08-13 RX ORDER — ATORVASTATIN CALCIUM 20 MG/1
10 TABLET, FILM COATED ORAL DAILY
Qty: 45 TABLET | Refills: 1 | Status: SHIPPED | OUTPATIENT
Start: 2025-08-13

## 2025-08-21 DIAGNOSIS — E78.5 HYPERLIPIDEMIA, UNSPECIFIED HYPERLIPIDEMIA TYPE: Primary | ICD-10-CM

## 2025-08-21 RX ORDER — ATORVASTATIN CALCIUM 20 MG/1
20 TABLET, FILM COATED ORAL DAILY
Qty: 90 TABLET | Refills: 1 | Status: SHIPPED | OUTPATIENT
Start: 2025-08-21

## 2025-08-27 ENCOUNTER — TELEPHONE (OUTPATIENT)
Dept: CARDIOLOGY CLINIC | Age: 75
End: 2025-08-27

## 2025-09-05 RX ORDER — EMPAGLIFLOZIN 10 MG/1
10 TABLET, FILM COATED ORAL DAILY
Qty: 90 TABLET | Refills: 3 | Status: SHIPPED | OUTPATIENT
Start: 2025-09-05

## (undated) DEVICE — BAG DRNGE COMB PK

## (undated) DEVICE — SOLUTION IV IRRIG WATER 1000ML POUR BRL 2F7114

## (undated) DEVICE — GUIDEWIRE ENDO L150CM DIA0.035IN STR TIP

## (undated) DEVICE — [HIGH FLOW INSUFFLATOR,  DO NOT USE IF PACKAGE IS DAMAGED,  KEEP DRY,  KEEP AWAY FROM SUNLIGHT,  PROTECT FROM HEAT AND RADIOACTIVE SOURCES.]: Brand: PNEUMOSURE

## (undated) DEVICE — BAG DRAINAGE CONTAINER 15 LT FLUID COLLCTN

## (undated) DEVICE — CAMERA STRYKER 1488 HD GEN

## (undated) DEVICE — 1.5 FR, 120 CM, NITI TIPLESS STONE BASKET: Brand: HALO

## (undated) DEVICE — SET ENDO INSTR LAPAROSCOPIC INCISIONAL

## (undated) DEVICE — BANDAGE ADH W1XL3IN NAT FAB WVN FLX DURABLE N ADH PD SEAL

## (undated) DEVICE — GLOVE ORANGE PI 7 1/2   MSG9075

## (undated) DEVICE — GOWN,SIRUS,FABRNF,XL,20/CS: Brand: MEDLINE

## (undated) DEVICE — INTRODUCER SHTH L13CM OD7FR SH ORNG HUB SEAMLESS SAFSHTH

## (undated) DEVICE — CAMERA STRYKER 1488

## (undated) DEVICE — GLOVE SURG SZ 75 L12IN FNGR THK83MIL CRM POLYISOPRENE

## (undated) DEVICE — SET SURG BASIN MAYO REUSABLE

## (undated) DEVICE — 4-PORT MANIFOLD: Brand: NEPTUNE 2

## (undated) DEVICE — KIT BEDSIDE REVITAL OX 500ML

## (undated) DEVICE — CATHETER URET 5FR L70CM OPN END SGL LUMN INJ HUB FLEXIMA

## (undated) DEVICE — TOWEL,OR,DSP,ST,BLUE,STD,6/PK,12PK/CS: Brand: MEDLINE

## (undated) DEVICE — GAUZE SPONGES,8 PLY: Brand: CURITY

## (undated) DEVICE — CYSTO PACK: Brand: MEDLINE INDUSTRIES, INC.

## (undated) DEVICE — GUIDEWIRE ENDOSCP L150CM DIA0.035IN TIP L15CM BENT PTFE

## (undated) DEVICE — PMI PTFE COATED LAPAROSCOPIC WIRE L-HOOK 44 CM: Brand: PMI

## (undated) DEVICE — CONTROL SYRINGE LUER-LOCK TIP: Brand: MONOJECT

## (undated) DEVICE — SYRINGE MED 5ML STD CLR PLAS LUERLOCK TIP N CTRL DISP

## (undated) DEVICE — GUIDEWIRE URO L150CM DIA0.035IN TAPR 3CM NIT HYDRPHLC ANG

## (undated) DEVICE — RADIFOCUS GLIDEWIRE: Brand: GLIDEWIRE

## (undated) DEVICE — SUTURE ABSRB L6IN L37MM 0 GS-21 GRN 1/2 CIR TAPR PNT NDL VLOCL0306

## (undated) DEVICE — Device: Brand: PORTEX

## (undated) DEVICE — SUTURE DEV SZ 0 L6IN N ABSORBABLE

## (undated) DEVICE — PENCIL ES L3M BTTN SWCH HOLSTER W/ BLDE ELECTRD EDGE

## (undated) DEVICE — HIGH POWER SINGLE-USE LASER FIBER: Brand: FLEXIVA™ ID

## (undated) DEVICE — LENS CYSTOSCOPE 30 DEG

## (undated) DEVICE — HOSE CONN FOR WST MGMT SYS NEPTUNE 2

## (undated) DEVICE — SET CYSTOSCOPE 21FR

## (undated) DEVICE — CHLORAPREP 26ML ORANGE

## (undated) DEVICE — TROCAR ENDOSCP L100MM DIA5MM BLDELSS STBL SL OBT RADLUC

## (undated) DEVICE — MEDIA CONTRAST ISOVUE  300 10X50ML

## (undated) DEVICE — TROCAR ENDOSCP L100MM DIA12MM BLDELSS OBT RADLUC STBL SL

## (undated) DEVICE — TUBING, SUCTION, 3/16" X 12', STRAIGHT: Brand: MEDLINE

## (undated) DEVICE — Device

## (undated) DEVICE — LAPAROSCOPIC SCISSORS: Brand: EPIX LAPAROSCOPIC SCISSORS

## (undated) DEVICE — DOUBLE BASIN SET: Brand: MEDLINE INDUSTRIES, INC.

## (undated) DEVICE — STANDARD HYPODERMIC NEEDLE,POLYPROPYLENE HUB: Brand: MONOJECT

## (undated) DEVICE — 6 ML SYRINGE LUER-LOCK TIP: Brand: MONOJECT

## (undated) DEVICE — PLUMEPORT LAPAROSCOPIC SMOKE FILTRATION DEVICE: Brand: PLUMEPORT ACTIV

## (undated) DEVICE — GOWN,SIRUS,NONRNF,SETINSLV,XL,20/CS: Brand: MEDLINE

## (undated) DEVICE — BLOCK BITE 60FR CAREGUARD

## (undated) DEVICE — GUIDEWIRE ENDOSCP L150CM DIA0.035IN TIP 3CM PTFE NIT

## (undated) DEVICE — AGENT HEMOSTATIC SURGIFLOW MATRIX KIT W/THROMBIN

## (undated) DEVICE — 12 ML SYRINGE,LUER-LOCK TIP: Brand: MONOJECT

## (undated) DEVICE — Z INACTIVE USE 2635503 SOLUTION IRRIG 3000ML ST H2O USP UROMATIC PLAS CONT

## (undated) DEVICE — PATIENT RETURN ELECTRODE, SINGLE-USE, CONTACT QUALITY MONITORING, ADULT, WITH 9FT CORD, FOR PATIENTS WEIGING OVER 33LBS. (15KG): Brand: MEGADYNE

## (undated) DEVICE — GAUZE,SPONGE,4"X4",8PLY,STRL,LF,10/TRAY: Brand: MEDLINE

## (undated) DEVICE — KIT INTRO 9FR L13CM DIA0.118IN SPLITTABLE HEMSTAT ROBUST

## (undated) DEVICE — GUIDEWIRE: Brand: PT²™

## (undated) DEVICE — NEEDLE INSUF L120MM DIA2MM DISP FOR PNEUMOPERI ENDOPATH

## (undated) DEVICE — SOLUTION IV IRRIG 1000ML POUR BTL 2F7114

## (undated) DEVICE — NEEDLE HYPO 25GA L1.5IN BLU POLYPR HUB S STL REG BVL STR

## (undated) DEVICE — MARKER,SKIN,WI/RULER AND LABELS: Brand: MEDLINE

## (undated) DEVICE — PAD, DEFIB, ADULT, RADIOTRAN, PHYSIO, LO: Brand: MEDLINE

## (undated) DEVICE — SOLUTION SURG PREP ANTIMICROBIAL 4 OZ SKIN WND EXIDINE

## (undated) DEVICE — Z INACTIVE USE 2660664 SOLUTION IRRIG 3000ML 0.9% SOD CHL USP UROMATIC PLAS CONT

## (undated) DEVICE — BLADE ES ELASTOMERIC COAT INSUL DURABLE BEND UPTO 90DEG

## (undated) DEVICE — 3M™ RED DOT™ MONITORING ELECTRODE WITH FOAM TAPE AND STICKY GEL 2560, 50/BAG, 20/CASE, 72/PLT: Brand: RED DOT™

## (undated) DEVICE — GARMENT,MEDLINE,DVT,INT,CALF,MED, GEN2: Brand: MEDLINE

## (undated) DEVICE — PACK SURG LAP CHOLE CUSTOM

## (undated) DEVICE — NON-DEHP CATHETER EXTENSION SET, MALE LUER LOCK ADAPTER

## (undated) DEVICE — NEEDLE HYPO 18GA L1.5IN PNK POLYPR HUB S STL REG BVL STR

## (undated) DEVICE — UNIDIRECTIONAL STEERABLE DIAGNOSTIC CATHETER: Brand: DYNAMIC XT™